# Patient Record
Sex: FEMALE | Race: WHITE | NOT HISPANIC OR LATINO | Employment: OTHER | URBAN - NONMETROPOLITAN AREA
[De-identification: names, ages, dates, MRNs, and addresses within clinical notes are randomized per-mention and may not be internally consistent; named-entity substitution may affect disease eponyms.]

---

## 2017-04-28 ENCOUNTER — OFFICE VISIT (OUTPATIENT)
Dept: CARDIOLOGY | Facility: CLINIC | Age: 72
End: 2017-04-28

## 2017-04-28 VITALS
BODY MASS INDEX: 28 KG/M2 | HEIGHT: 64 IN | HEART RATE: 62 BPM | WEIGHT: 164 LBS | DIASTOLIC BLOOD PRESSURE: 75 MMHG | SYSTOLIC BLOOD PRESSURE: 133 MMHG

## 2017-04-28 DIAGNOSIS — I21.4 NON-STEMI (NON-ST ELEVATED MYOCARDIAL INFARCTION) (HCC): Primary | ICD-10-CM

## 2017-04-28 DIAGNOSIS — E78.5 DYSLIPIDEMIA: ICD-10-CM

## 2017-04-28 DIAGNOSIS — I10 ESSENTIAL HYPERTENSION: ICD-10-CM

## 2017-04-28 PROCEDURE — 93000 ELECTROCARDIOGRAM COMPLETE: CPT | Performed by: PHYSICIAN ASSISTANT

## 2017-04-28 PROCEDURE — 99213 OFFICE O/P EST LOW 20 MIN: CPT | Performed by: PHYSICIAN ASSISTANT

## 2017-04-28 RX ORDER — ATORVASTATIN CALCIUM 10 MG/1
10 TABLET, FILM COATED ORAL DAILY
Qty: 30 TABLET | Refills: 5 | Status: SHIPPED | OUTPATIENT
Start: 2017-04-28 | End: 2017-09-01 | Stop reason: SDUPTHER

## 2017-04-28 RX ORDER — EXEMESTANE 25 MG/1
25 TABLET ORAL DAILY
COMMUNITY

## 2017-04-28 RX ORDER — ROPINIROLE 0.25 MG/1
0.25 TABLET, FILM COATED ORAL NIGHTLY
COMMUNITY

## 2017-04-28 RX ORDER — UBIDECARENONE 75 MG
200 CAPSULE ORAL DAILY
Status: ON HOLD | COMMUNITY
End: 2020-01-05

## 2017-04-28 RX ORDER — ATORVASTATIN CALCIUM 10 MG/1
10 TABLET, FILM COATED ORAL DAILY
COMMUNITY
End: 2017-04-28 | Stop reason: SDUPTHER

## 2017-04-28 RX ORDER — FUROSEMIDE 20 MG/1
20 TABLET ORAL DAILY
Qty: 30 TABLET | Refills: 5 | Status: SHIPPED | OUTPATIENT
Start: 2017-04-28 | End: 2017-09-01 | Stop reason: SDUPTHER

## 2017-04-28 RX ORDER — LISINOPRIL 30 MG/1
30 TABLET ORAL DAILY
Qty: 30 TABLET | Refills: 5 | Status: SHIPPED | OUTPATIENT
Start: 2017-04-28 | End: 2017-09-01 | Stop reason: DRUGHIGH

## 2017-04-28 NOTE — PROGRESS NOTES
Kathy Tao DO  Tahmina Martinez  1945  04/28/2017    Patient Active Problem List   Diagnosis   • History of Non-STEMI (non-ST elevated myocardial infarction) with no coronary intervention needed in 2008, clinically stable.    • Dyspnea, class II-III.    • Hypertension, fair control.   • History of Acute myelocytic leukemia,status post chemotherapy and bone marrow transplant in 2005.   • Breast cancer (right), status post radiation therapy in 08/2015       Dear Kathy Tao DO:    Chief Complaint   Patient presents with   • History of N-STEMI   • Hypertension     Follow up   • Dyslipidemia       Subjective     Tahmina Martinez is a 71 y.o. female with a past medical history significant for previous non-ST elevation myocardial infarction with no significant coronary artery disease noted on cardiac catheterization at that time. She also history of breast cancer status post lumpectomy and radiation therapy in August 2015 and acute myelocytic leukemia status post chemotherapy and bone marrow transplantation in 2005. She presents to the office today for a follow up visit today. She is doing well without any complaints of chest pain, shortness breath, palpitations, dizziness, or near syncope.      Current Outpatient Prescriptions:   •  aspirin 81 MG EC tablet, Take 81 mg by mouth Daily., Disp: , Rfl:   •  atorvastatin (LIPITOR) 10 MG tablet, Take 10 mg by mouth Daily., Disp: , Rfl:   •  Calcium Citrate-Vitamin D (CALCIUM + D PO), Take 600 mg by mouth., Disp: , Rfl:   •  Coenzyme Q10 (CO Q-10) 200 MG capsule, Take  by mouth., Disp: , Rfl:   •  exemestane (AROMASIN) 25 MG chemo tablet, Take 25 mg by mouth Daily., Disp: , Rfl:   •  furosemide (LASIX) 20 MG tablet, Take 1 tablet by mouth Daily., Disp: 30 tablet, Rfl: 5  •  gabapentin (NEURONTIN) 100 MG capsule, Take 100 mg by mouth 3 (Three) Times a Day., Disp: , Rfl:   •  lisinopril (PRINIVIL,ZESTRIL) 20 MG tablet, Take 1 tablet by mouth Daily. (Patient taking  differently: Take 30 mg by mouth Daily.), Disp: 30 tablet, Rfl: 5  •  Loratadine 10 MG capsule, Take  by mouth., Disp: , Rfl:   •  meloxicam (MOBIC) 15 MG tablet, Take 7.5 mg by mouth Daily As Needed., Disp: , Rfl:   •  metFORMIN (GLUCOPHAGE) 500 MG tablet, Take 500 mg by mouth Every Morning., Disp: , Rfl:   •  metoprolol tartrate (LOPRESSOR) 25 MG tablet, Take 0.5 tablets by mouth 2 (Two) Times a Day., Disp: 30 tablet, Rfl: 5  •  pantoprazole (PROTONIX) 40 MG EC tablet, Take 40 mg by mouth Daily., Disp: , Rfl:   •  rOPINIRole (REQUIP) 0.25 MG tablet, Take 0.25 mg by mouth Every Night. Take 1 hour before bedtime., Disp: , Rfl:   •  traMADol (ULTRAM) 50 MG tablet, Take 50 mg by mouth 2 (Two) Times a Day As Needed for Moderate Pain (4-6)., Disp: , Rfl:   •  valACYclovir (VALTREX) 500 MG tablet, Take 1 g by mouth Daily., Disp: , Rfl:   •  vitamin D (ERGOCALCIFEROL) 45336 UNITS capsule capsule, Take 50,000 Units by mouth 1 (One) Time Per Week., Disp: , Rfl:   •  nitroglycerin (NITROSTAT) 0.4 MG SL tablet, Place 0.4 mg under the tongue Every 5 (Five) Minutes As Needed for chest pain. Take no more than 3 doses in 15 minutes., Disp: , Rfl:     The following portions of the patient's history were reviewed and updated as appropriate: allergies, current medications, past family history, past medical history, past social history, past surgical history and problem list.    Social History     Social History   • Marital status:      Spouse name: N/A   • Number of children: N/A   • Years of education: N/A     Occupational History   • Not on file.     Social History Main Topics   • Smoking status: Not on file   • Smokeless tobacco: Not on file   • Alcohol use Not on file   • Drug use: Not on file   • Sexual activity: Defer     Other Topics Concern   • Not on file     Social History Narrative   • No narrative on file       Review of Systems   Constitution: Negative for weakness and malaise/fatigue.   Cardiovascular: Negative  "for chest pain, dyspnea on exertion, leg swelling, near-syncope, palpitations and syncope.   Respiratory: Negative for shortness of breath.    Neurological: Negative for dizziness.       Objective   Blood pressure 133/75, pulse 62, height 64\" (162.6 cm), weight 164 lb (74.4 kg).    Physical Exam   Constitutional: She is oriented to person, place, and time. She appears well-developed and well-nourished. No distress.   HENT:   Head: Normocephalic and atraumatic.   Eyes: Conjunctivae are normal. Right eye exhibits no discharge. Left eye exhibits no discharge.   Neck: Normal range of motion. Neck supple. Carotid bruit is not present.   Cardiovascular: Normal rate, regular rhythm and normal heart sounds.  Exam reveals no gallop and no friction rub.    No murmur heard.  Pulmonary/Chest: Effort normal and breath sounds normal. No respiratory distress. She has no wheezes. She has no rales. She exhibits no tenderness.   Musculoskeletal: Normal range of motion. She exhibits no edema.   Neurological: She is alert and oriented to person, place, and time.   Skin: Skin is warm and dry. No rash noted. She is not diaphoretic. No erythema. No pallor.   Psychiatric: She has a normal mood and affect. Her behavior is normal.   Nursing note and vitals reviewed.      ECG 12 Lead  Date/Time: 4/28/2017 1:21 PM  Performed by: LOUIS ESPINOSA  Authorized by: LOUIS ESPINOSA   Comparison: compared with previous ECG   Similar to previous ECG  Rhythm: sinus rhythm  Rate: normal  BPM: 66  Conduction: incomplete RBBB, LAFB and 1st degree  ST Segments: ST segments normal  T depression: V1 and V2  QRS axis: right  Clinical impression: abnormal ECG  Comments: QTc 469          Assessment:          Diagnosis Plan   1. History of Non-STEMI (non-ST elevated myocardial infarction) with no coronary intervention needed in 2008, clinically stable.      2. Essential hypertension     3. Dyslipidemia      On atorvastatin        Plan:     "   1. Continue low-dose aspirin, atorvastatin, metoprolol, lisinopril.  2. Will follow up in 6 months or sooner if needed.    No Follow-up on file.    I appreciate the opportunity to participate in this patient's cardiovascular care.    Best Regards,    Aurora Oliveros PA-C

## 2017-05-31 ENCOUNTER — HOSPITAL ENCOUNTER (EMERGENCY)
Facility: HOSPITAL | Age: 72
Discharge: HOME | End: 2017-05-31
Attending: EMERGENCY MEDICINE | Admitting: EMERGENCY MEDICINE

## 2017-05-31 ENCOUNTER — APPOINTMENT (OUTPATIENT)
Dept: CT IMAGING | Facility: HOSPITAL | Age: 72
End: 2017-05-31

## 2017-05-31 VITALS
HEIGHT: 64 IN | DIASTOLIC BLOOD PRESSURE: 92 MMHG | WEIGHT: 150 LBS | SYSTOLIC BLOOD PRESSURE: 186 MMHG | TEMPERATURE: 98 F | RESPIRATION RATE: 18 BRPM | HEART RATE: 77 BPM | BODY MASS INDEX: 25.61 KG/M2 | OXYGEN SATURATION: 98 %

## 2017-05-31 DIAGNOSIS — S09.90XA CLOSED HEAD INJURY, INITIAL ENCOUNTER: Primary | ICD-10-CM

## 2017-05-31 PROCEDURE — 70450 CT HEAD/BRAIN W/O DYE: CPT

## 2017-05-31 PROCEDURE — 99283 EMERGENCY DEPT VISIT LOW MDM: CPT

## 2017-05-31 PROCEDURE — 70450 CT HEAD/BRAIN W/O DYE: CPT | Performed by: RADIOLOGY

## 2017-06-01 NOTE — ED PROVIDER NOTES
Subjective   Patient is a 71 y.o. female presenting with head injury.   History provided by:  Patient  Head Injury   Location:  R parietal  Time since incident:  1 hour  Mechanism of injury: fall    Fall:     Fall occurred:  Standing    Impact surface:  Hard floor  Pain details:     Quality:  Aching    Severity:  Mild    Timing:  Constant    Progression:  Unchanged  Chronicity:  New  Relieved by:  Nothing  Worsened by:  Nothing  Ineffective treatments:  None tried  Associated symptoms: headache        Review of Systems   Constitutional: Negative.  Negative for fever.   HENT:        Headache   Respiratory: Negative.    Cardiovascular: Negative.  Negative for chest pain.   Gastrointestinal: Negative.  Negative for abdominal pain.   Endocrine: Negative.    Genitourinary: Negative.  Negative for dysuria.   Skin: Negative.    Neurological: Positive for headaches.   Psychiatric/Behavioral: Negative.    All other systems reviewed and are negative.      Past Medical History:   Diagnosis Date   • Breast cancer    • Dyspnea    • H/O splenectomy    • Hypertension    • Myelocytic leukemia    • Non-STEMI (non-ST elevated myocardial infarction)        Allergies   Allergen Reactions   • Latex    • Penicillins    • Zithromax [Azithromycin]        Past Surgical History:   Procedure Laterality Date   • BONE MARROW TRANSPLANT     •  SECTION     • SPLENECTOMY     • TUBAL ABDOMINAL LIGATION         Family History   Problem Relation Age of Onset   • Heart disease Mother    • Heart disease Father        Social History     Social History   • Marital status:      Spouse name: N/A   • Number of children: N/A   • Years of education: N/A     Social History Main Topics   • Smoking status: Never Smoker   • Smokeless tobacco: None   • Alcohol use No   • Drug use: No   • Sexual activity: Defer     Other Topics Concern   • None     Social History Narrative   • None           Objective   Physical Exam   Constitutional: She is  oriented to person, place, and time. She appears well-developed and well-nourished. No distress.   HENT:   Head: Normocephalic and atraumatic.   Right Ear: External ear normal.   Left Ear: External ear normal.   Nose: Nose normal.   Hematoma right parietal scalp   Eyes: Conjunctivae and EOM are normal. Pupils are equal, round, and reactive to light.   Neck: Normal range of motion. Neck supple. No JVD present. No tracheal deviation present.   Cardiovascular: Normal rate, regular rhythm and normal heart sounds.    No murmur heard.  Pulmonary/Chest: Effort normal and breath sounds normal. No respiratory distress. She has no wheezes.   Abdominal: Soft. Bowel sounds are normal. There is no tenderness.   Musculoskeletal: Normal range of motion. She exhibits no edema or deformity.   Neurological: She is alert and oriented to person, place, and time. No cranial nerve deficit.   Skin: Skin is warm and dry. No rash noted. She is not diaphoretic. No erythema. No pallor.   Psychiatric: She has a normal mood and affect. Her behavior is normal. Thought content normal.   Nursing note and vitals reviewed.      Procedures         ED Course  ED Course                  MDM    Final diagnoses:   Closed head injury, initial encounter            Dick Moreno MD  05/31/17 2054

## 2017-06-15 ENCOUNTER — TRANSCRIBE ORDERS (OUTPATIENT)
Dept: ADMINISTRATIVE | Facility: HOSPITAL | Age: 72
End: 2017-06-15

## 2017-06-15 DIAGNOSIS — Q78.2 BONY SCLEROSIS: Primary | ICD-10-CM

## 2017-06-19 ENCOUNTER — HOSPITAL ENCOUNTER (OUTPATIENT)
Dept: NUCLEAR MEDICINE | Facility: HOSPITAL | Age: 72
Discharge: HOME | End: 2017-06-19

## 2017-06-19 DIAGNOSIS — Q78.2 BONY SCLEROSIS: ICD-10-CM

## 2017-06-19 PROCEDURE — 78306 BONE IMAGING WHOLE BODY: CPT | Performed by: RADIOLOGY

## 2017-06-19 PROCEDURE — A9561 TC99M OXIDRONATE: HCPCS | Performed by: NURSE PRACTITIONER

## 2017-06-19 PROCEDURE — 0 TECHNETIUM OXIDRONATE KIT: Performed by: NURSE PRACTITIONER

## 2017-06-19 PROCEDURE — 78306 BONE IMAGING WHOLE BODY: CPT

## 2017-06-19 RX ADMIN — TECHNETIUM TC 99M OXIDRONATE 1 DOSE: 3.15 INJECTION, POWDER, LYOPHILIZED, FOR SOLUTION INTRAVENOUS at 10:30

## 2017-06-28 ENCOUNTER — TRANSCRIBE ORDERS (OUTPATIENT)
Dept: ADMINISTRATIVE | Facility: HOSPITAL | Age: 72
End: 2017-06-28

## 2017-06-28 DIAGNOSIS — S32.030D COMPRESSION FRACTURE OF L3 LUMBAR VERTEBRA, WITH ROUTINE HEALING, SUBSEQUENT ENCOUNTER: Primary | ICD-10-CM

## 2017-06-30 ENCOUNTER — HOSPITAL ENCOUNTER (OUTPATIENT)
Dept: MRI IMAGING | Facility: HOSPITAL | Age: 72
Discharge: HOME | End: 2017-06-30
Admitting: INTERNAL MEDICINE

## 2017-06-30 DIAGNOSIS — S32.030D COMPRESSION FRACTURE OF L3 LUMBAR VERTEBRA, WITH ROUTINE HEALING, SUBSEQUENT ENCOUNTER: ICD-10-CM

## 2017-06-30 PROCEDURE — 72148 MRI LUMBAR SPINE W/O DYE: CPT | Performed by: RADIOLOGY

## 2017-06-30 PROCEDURE — 72148 MRI LUMBAR SPINE W/O DYE: CPT

## 2017-09-01 ENCOUNTER — OFFICE VISIT (OUTPATIENT)
Dept: CARDIOLOGY | Facility: CLINIC | Age: 72
End: 2017-09-01

## 2017-09-01 VITALS
BODY MASS INDEX: 28.27 KG/M2 | HEART RATE: 60 BPM | HEIGHT: 64 IN | WEIGHT: 165.6 LBS | SYSTOLIC BLOOD PRESSURE: 162 MMHG | DIASTOLIC BLOOD PRESSURE: 75 MMHG

## 2017-09-01 DIAGNOSIS — I21.4 NON-STEMI (NON-ST ELEVATED MYOCARDIAL INFARCTION) (HCC): Primary | ICD-10-CM

## 2017-09-01 DIAGNOSIS — R55 SYNCOPE, UNSPECIFIED SYNCOPE TYPE: ICD-10-CM

## 2017-09-01 DIAGNOSIS — C50.911 MALIGNANT NEOPLASM OF RIGHT FEMALE BREAST, UNSPECIFIED SITE OF BREAST: ICD-10-CM

## 2017-09-01 DIAGNOSIS — I45.10 RBBB: ICD-10-CM

## 2017-09-01 DIAGNOSIS — C92.01 ACUTE MYELOID LEUKEMIA IN REMISSION (HCC): ICD-10-CM

## 2017-09-01 DIAGNOSIS — R06.00 DYSPNEA, UNSPECIFIED TYPE: ICD-10-CM

## 2017-09-01 DIAGNOSIS — I10 ESSENTIAL HYPERTENSION: ICD-10-CM

## 2017-09-01 DIAGNOSIS — R29.6 FALLING EPISODES: ICD-10-CM

## 2017-09-01 PROCEDURE — 99214 OFFICE O/P EST MOD 30 MIN: CPT | Performed by: INTERNAL MEDICINE

## 2017-09-01 RX ORDER — AMLODIPINE BESYLATE 5 MG/1
5 TABLET ORAL DAILY
COMMUNITY
Start: 2017-09-01 | End: 2017-11-14 | Stop reason: SDUPTHER

## 2017-09-01 RX ORDER — FUROSEMIDE 20 MG/1
20 TABLET ORAL DAILY
Qty: 30 TABLET | Refills: 1 | Status: SHIPPED | OUTPATIENT
Start: 2017-09-01 | End: 2018-02-16 | Stop reason: SDUPTHER

## 2017-09-01 RX ORDER — LISINOPRIL 20 MG/1
20 TABLET ORAL DAILY
COMMUNITY
End: 2017-09-01 | Stop reason: SDUPTHER

## 2017-09-01 RX ORDER — AMLODIPINE BESYLATE 2.5 MG/1
2.5 TABLET ORAL DAILY
COMMUNITY
End: 2017-09-01

## 2017-09-01 RX ORDER — LISINOPRIL 20 MG/1
20 TABLET ORAL DAILY
Qty: 30 TABLET | Refills: 1 | Status: SHIPPED | OUTPATIENT
Start: 2017-09-01 | End: 2017-11-14 | Stop reason: SDUPTHER

## 2017-09-01 RX ORDER — ATORVASTATIN CALCIUM 10 MG/1
10 TABLET, FILM COATED ORAL DAILY
Qty: 30 TABLET | Refills: 1 | Status: SHIPPED | OUTPATIENT
Start: 2017-09-01 | End: 2017-11-14 | Stop reason: SDUPTHER

## 2017-09-01 NOTE — PROGRESS NOTES
Tahmina Martinez  1945  09/01/2017   Ref:No referring provider defined for this encounter.  Pcp: Kathy Tao, DO  [address] [facility]  [place] [address]    Follow up for:  Chief Complaint   Patient presents with   • surgery clearance     prior to back surgery        Patient Active Problem List   Diagnosis   • History of Non-STEMI (non-ST elevated myocardial infarction) with no coronary intervention needed in 2008, clinically stable.    • Dyspnea, class II-III.    • Hypertension, fair control.   • History of Acute myelocytic leukemia,status post chemotherapy and bone marrow transplant in 2005.   • Breast cancer (right), status post radiation therapy in 08/2015   • Falling episodes   • Syncope   • RBBB unchanged from 2015       HPI     Tahmina Martinez is a 71 yo female who presents to our office today for a follow-up visit to for a cardiac clearance for L-S  back surgery at  with Dr. Arpan Cruz, Neurosurgery.. Patient notes mild  shortness of breath that has not changes since 2005.She can climb 14 stair without stopping and be mildly SOB at the top. Patient notes bilateral leg edema if she does'tt take her diuretic. .  Patient denies chest pain, edema, or dizziness.She states that her BP has been poorly controlled and is usually 170/90 at home. It was 215/115 at  a couple of weeks ago. Blood pressure slightly better over last 2 weeks on Amlodipine 2.5 mg QD.     She has had mutliple sudden falls about 5 over the last years and has hurt her low back she is not certain if she has passed out. She no dizziness and no warning with these episodes.     Dr. Soler had concern she may had had a non STEMI in 2008 and did a heart cath showing normal EF, 20% mid LAD and 50% RCA stenoses. Repeat heart cath 2014 at [place] showed 20-30% LAD and otherwise normal.     Review of Systems   Constitution: Negative for chills and fever.   HENT: Negative for headaches, nosebleeds and sore throat.    Cardiovascular: Positive for  dyspnea on exertion and leg swelling. Negative for chest pain, palpitations and syncope.   Respiratory: Negative for cough, hemoptysis, shortness of breath and wheezing.    Gastrointestinal: Negative for abdominal pain, hematemesis, hematochezia, melena, nausea and vomiting.   Genitourinary: Negative for dysuria and hematuria.   Neurological: Positive for dizziness.         Current Outpatient Prescriptions:   •  amLODIPine (NORVASC) 5 MG tablet, Take 1 tablet by mouth Daily., Disp: , Rfl:   •  aspirin 81 MG EC tablet, Take 81 mg by mouth Daily., Disp: , Rfl:   •  atorvastatin (LIPITOR) 10 MG tablet, Take 1 tablet by mouth Daily., Disp: 30 tablet, Rfl: 1  •  Calcium Citrate-Vitamin D (CALCIUM + D PO), Take 600 mg by mouth., Disp: , Rfl:   •  Coenzyme Q10 (CO Q-10) 200 MG capsule, Take  by mouth., Disp: , Rfl:   •  exemestane (AROMASIN) 25 MG chemo tablet, Take 25 mg by mouth Daily., Disp: , Rfl:   •  furosemide (LASIX) 20 MG tablet, Take 1 tablet by mouth Daily., Disp: 30 tablet, Rfl: 1  •  gabapentin (NEURONTIN) 100 MG capsule, Take 100 mg by mouth 3 (Three) Times a Day., Disp: , Rfl:   •  lisinopril (PRINIVIL,ZESTRIL) 20 MG tablet, Take 1 tablet by mouth Daily., Disp: 30 tablet, Rfl: 1  •  Loratadine 10 MG capsule, Take  by mouth., Disp: , Rfl:   •  meloxicam (MOBIC) 15 MG tablet, Take 7.5 mg by mouth Daily As Needed., Disp: , Rfl:   •  metFORMIN (GLUCOPHAGE) 500 MG tablet, Take 500 mg by mouth Every Morning., Disp: , Rfl:   •  metoprolol tartrate (LOPRESSOR) 25 MG tablet, Take 0.5 tablets by mouth 2 (Two) Times a Day., Disp: 30 tablet, Rfl: 5  •  nitroglycerin (NITROSTAT) 0.4 MG SL tablet, Place 0.4 mg under the tongue Every 5 (Five) Minutes As Needed for chest pain. Take no more than 3 doses in 15 minutes., Disp: , Rfl:   •  pantoprazole (PROTONIX) 40 MG EC tablet, Take 40 mg by mouth Daily., Disp: , Rfl:   •  rOPINIRole (REQUIP) 0.25 MG tablet, Take 0.25 mg by mouth Every Night. Take 1 hour before bedtime.,  "Disp: , Rfl:   •  traMADol (ULTRAM) 50 MG tablet, Take 50 mg by mouth 2 (Two) Times a Day As Needed for Moderate Pain (4-6)., Disp: , Rfl:   •  valACYclovir (VALTREX) 500 MG tablet, Take 1 g by mouth Daily., Disp: , Rfl:   •  vitamin D (ERGOCALCIFEROL) 09603 UNITS capsule capsule, Take 50,000 Units by mouth 1 (One) Time Per Week., Disp: , Rfl:     Allergies   Allergen Reactions   • Latex    • Penicillins    • Zithromax [Azithromycin]        /75  Pulse 60  Ht 64\" (162.6 cm)  Wt 165 lb 9.6 oz (75.1 kg)  BMI 28.43 kg/m2       Physical Exam   Constitutional: She is oriented to person, place, and time. She appears well-developed and well-nourished. No distress.   Modestly obese   Eyes: No scleral icterus.   Neck: Normal range of motion. Neck supple. No JVD present. No tracheal deviation present. No thyromegaly present.   Cardiovascular: Normal rate, regular rhythm, normal heart sounds and intact distal pulses.  Exam reveals no gallop and no friction rub.    No murmur heard.  Pulses:       Carotid pulses are 2+ on the right side, and 2+ on the left side.       Dorsalis pedis pulses are 2+ on the right side, and 2+ on the left side.        Posterior tibial pulses are 2+ on the right side, and 2+ on the left side.   Pulmonary/Chest: Effort normal and breath sounds normal. No stridor. No respiratory distress. She has no wheezes. She has no rales. She exhibits no tenderness.   Abdominal: Soft. Bowel sounds are normal. She exhibits no distension and no mass. There is no tenderness. There is no rebound and no guarding.   Modestly obese   Musculoskeletal: Normal range of motion. She exhibits no edema, tenderness or deformity.   Lymphadenopathy:     She has no cervical adenopathy.   Neurological: She is alert and oriented to person, place, and time. No cranial nerve deficit. She exhibits normal muscle tone. Coordination normal.   Skin: No rash noted. She is not diaphoretic. No erythema. No pallor.   Psychiatric: She has " a normal mood and affect. Her behavior is normal. Judgment and thought content normal.   :    Lab Review:    Procedures    Lab Results   Component Value Date     12/20/2014    K 3.9 12/20/2014     12/20/2014    CO2 29.1 12/20/2014    BUN 19 12/20/2014    CREATININE 0.68 12/20/2014    GLUCOSE 137 (H) 12/20/2014    CALCIUM 9.7 12/20/2014    AST 25 12/20/2014    ALT 17 12/20/2014    ALKPHOS 148 (H) 12/20/2014    LABIL2 1.4 (L) 12/20/2014     No results found for: CKTOTAL  Lab Results   Component Value Date    WBC 11.4 12/20/2014    HGB 13.1 12/20/2014    HCT 40.0 12/20/2014     07/20/2015     Lab Results   Component Value Date    INR 0.90 07/20/2015    INR <0.90 12/20/2014     No results found for: MG  No results found for: TSH, PSA, CHLPL, TRIG, HDL, LDL   No results found for: BNP    Chemistry        Component Value Date/Time     12/20/2014 1618    K 3.9 12/20/2014 1618     12/20/2014 1618    CO2 29.1 12/20/2014 1618    BUN 19 12/20/2014 1618    CREATININE 0.68 12/20/2014 1618        Component Value Date/Time    CALCIUM 9.7 12/20/2014 1618    ALKPHOS 148 (H) 12/20/2014 1618    AST 25 12/20/2014 1618    ALT 17 12/20/2014 1618    BILITOT 0.4 12/20/2014 1618            Impression:   Diagnosis Plan   1. History of Non-STEMI (non-ST elevated myocardial infarction) with no coronary intervention needed in 2008, clinically stable. Minimal coronary irregularities and no need for intervention.   Adult Stress Echo With Color & Doppler   2. Essential hypertension, uncontrolled     3. Dyspnea, unspecified type     4. Falling episodes  Holter Monitor - 24 Hour    Adult Transthoracic Echo Complete    Adult Stress Echo With Color & Doppler   5. Possible Syncope, unspecified syncope type  Adult Transthoracic Echo Complete    Adult Stress Echo With Color & Doppler   6. Acute myeloid leukemia in remission     7. Malignant neoplasm of right female breast, unspecified site of breast     8. RBBB unchanged  from 2009         Plan:  1. Postpone back surgery until better blood pressure control and cardiac evaluation complete.   2. 24 Holter monitor to rule out arrythmic syncope  3. Echocardiogram  4. Stress echo  5. Increase Amlodipine to 5 mg QD  6. Chart BP        Return in about 2 years (around 9/1/2019) for or sooner if needed..     This document signed by Israel Bardales MD September 1, 2017 2:54 PM     I, Israel Bardales MD, personally performed the services described in this documentation as scribed by the above named individual in my presence, and it is both accurate and complete.  9/1/2017  2:54 PM    Scribed for Israel Bardales MD by Aline Willett CMA. 9/1/2017  2:54 PM

## 2017-09-05 ENCOUNTER — TRANSCRIBE ORDERS (OUTPATIENT)
Dept: ADMINISTRATIVE | Facility: HOSPITAL | Age: 72
End: 2017-09-05

## 2017-09-05 DIAGNOSIS — R93.89 ABNORMAL RADIOLOGICAL FINDINGS IN SKIN AND SUBCUTANEOUS TISSUE: Primary | ICD-10-CM

## 2017-09-06 ENCOUNTER — HOSPITAL ENCOUNTER (OUTPATIENT)
Dept: CT IMAGING | Facility: HOSPITAL | Age: 72
Discharge: HOME | End: 2017-09-06
Admitting: INTERNAL MEDICINE

## 2017-09-06 DIAGNOSIS — R93.89 ABNORMAL RADIOLOGICAL FINDINGS IN SKIN AND SUBCUTANEOUS TISSUE: ICD-10-CM

## 2017-09-06 PROCEDURE — 71250 CT THORAX DX C-: CPT | Performed by: RADIOLOGY

## 2017-09-06 PROCEDURE — 71250 CT THORAX DX C-: CPT

## 2017-09-26 ENCOUNTER — HOSPITAL ENCOUNTER (OUTPATIENT)
Dept: CARDIOLOGY | Facility: HOSPITAL | Age: 72
Discharge: HOME | End: 2017-09-26
Attending: INTERNAL MEDICINE | Admitting: INTERNAL MEDICINE

## 2017-09-26 DIAGNOSIS — I21.4 NON-STEMI (NON-ST ELEVATED MYOCARDIAL INFARCTION) (HCC): ICD-10-CM

## 2017-09-26 DIAGNOSIS — R55 SYNCOPE, UNSPECIFIED SYNCOPE TYPE: ICD-10-CM

## 2017-09-26 DIAGNOSIS — R29.6 FALLING EPISODES: ICD-10-CM

## 2017-09-26 LAB
BH CV ECHO MEAS - % IVS THICK: 51.4 %
BH CV ECHO MEAS - % LVPW THICK: 46 %
BH CV ECHO MEAS - ACS: 2.1 CM
BH CV ECHO MEAS - AO ROOT AREA (BSA CORRECTED): 1.5
BH CV ECHO MEAS - AO ROOT AREA: 6.1 CM^2
BH CV ECHO MEAS - AO ROOT DIAM: 2.8 CM
BH CV ECHO MEAS - BSA(HAYCOCK): 1.9 M^2
BH CV ECHO MEAS - BSA: 1.8 M^2
BH CV ECHO MEAS - BZI_BMI: 28.3 KILOGRAMS/M^2
BH CV ECHO MEAS - BZI_METRIC_HEIGHT: 162.6 CM
BH CV ECHO MEAS - BZI_METRIC_WEIGHT: 74.8 KG
BH CV ECHO MEAS - CONTRAST EF 4CH: 43.8 ML/M^2
BH CV ECHO MEAS - EDV(CUBED): 70 ML
BH CV ECHO MEAS - EDV(MOD-SP4): 73 ML
BH CV ECHO MEAS - EDV(TEICH): 75.1 ML
BH CV ECHO MEAS - EF(CUBED): 82.5 %
BH CV ECHO MEAS - EF(TEICH): 75.8 %
BH CV ECHO MEAS - ESV(CUBED): 12.2 ML
BH CV ECHO MEAS - ESV(MOD-SP4): 41 ML
BH CV ECHO MEAS - ESV(TEICH): 18.2 ML
BH CV ECHO MEAS - FS: 44.1 %
BH CV ECHO MEAS - IVS/LVPW: 0.94
BH CV ECHO MEAS - IVSD: 1.1 CM
BH CV ECHO MEAS - IVSS: 1.7 CM
BH CV ECHO MEAS - LA DIMENSION: 4.2 CM
BH CV ECHO MEAS - LA/AO: 1.5
BH CV ECHO MEAS - LV DIASTOLIC VOL/BSA (35-75): 40.5 ML/M^2
BH CV ECHO MEAS - LV MASS(C)D: 167.1 GRAMS
BH CV ECHO MEAS - LV MASS(C)DI: 92.7 GRAMS/M^2
BH CV ECHO MEAS - LV MASS(C)S: 151.6 GRAMS
BH CV ECHO MEAS - LV MASS(C)SI: 84.1 GRAMS/M^2
BH CV ECHO MEAS - LV SYSTOLIC VOL/BSA (12-30): 22.7 ML/M^2
BH CV ECHO MEAS - LVIDD: 4.1 CM
BH CV ECHO MEAS - LVIDS: 2.3 CM
BH CV ECHO MEAS - LVLD AP4: 6.8 CM
BH CV ECHO MEAS - LVLS AP4: 6.6 CM
BH CV ECHO MEAS - LVOT AREA (M): 2.3 CM^2
BH CV ECHO MEAS - LVOT AREA: 2.3 CM^2
BH CV ECHO MEAS - LVOT DIAM: 1.7 CM
BH CV ECHO MEAS - LVPWD: 1.2 CM
BH CV ECHO MEAS - LVPWS: 1.8 CM
BH CV ECHO MEAS - MV A MAX VEL: 86.9 CM/SEC
BH CV ECHO MEAS - MV E MAX VEL: 68.1 CM/SEC
BH CV ECHO MEAS - MV E/A: 0.78
BH CV ECHO MEAS - PA ACC SLOPE: 785 CM/SEC^2
BH CV ECHO MEAS - PA ACC TIME: 0.12 SEC
BH CV ECHO MEAS - PA PR(ACCEL): 26.7 MMHG
BH CV ECHO MEAS - RAP SYSTOLE: 10 MMHG
BH CV ECHO MEAS - RVDD: 2.7 CM
BH CV ECHO MEAS - RVSP: 31.2 MMHG
BH CV ECHO MEAS - SI(CUBED): 32 ML/M^2
BH CV ECHO MEAS - SI(MOD-SP4): 17.8 ML/M^2
BH CV ECHO MEAS - SI(TEICH): 31.6 ML/M^2
BH CV ECHO MEAS - SV(CUBED): 57.7 ML
BH CV ECHO MEAS - SV(MOD-SP4): 32 ML
BH CV ECHO MEAS - SV(TEICH): 56.9 ML
BH CV ECHO MEAS - TR MAX VEL: 230.4 CM/SEC
BH CV STRESS BP STAGE 1: NORMAL
BH CV STRESS BP STAGE 2: NORMAL
BH CV STRESS BP STAGE 3: NORMAL
BH CV STRESS DURATION MIN STAGE 1: 3
BH CV STRESS DURATION MIN STAGE 2: 3
BH CV STRESS DURATION MIN STAGE 3: 3
BH CV STRESS DURATION SEC STAGE 1: 0
BH CV STRESS DURATION SEC STAGE 2: 0
BH CV STRESS DURATION SEC STAGE 3: 0
BH CV STRESS ECHO POST STRESS EJECTION FRACTION EF: 80 %
BH CV STRESS GRADE STAGE 1: 10
BH CV STRESS GRADE STAGE 2: 12
BH CV STRESS GRADE STAGE 3: 14
BH CV STRESS HR STAGE 1: 104
BH CV STRESS HR STAGE 2: 109
BH CV STRESS HR STAGE 3: 120
BH CV STRESS METS STAGE 1: 5
BH CV STRESS METS STAGE 2: 7.5
BH CV STRESS METS STAGE 3: 10
BH CV STRESS PROTOCOL 1: NORMAL
BH CV STRESS RECOVERY BP: NORMAL MMHG
BH CV STRESS RECOVERY HR: 77 BPM
BH CV STRESS SPEED STAGE 1: 1.7
BH CV STRESS SPEED STAGE 2: 2.5
BH CV STRESS SPEED STAGE 3: 3.4
BH CV STRESS STAGE 1: 1
BH CV STRESS STAGE 2: 2
BH CV STRESS STAGE 3: 3
MAXIMAL PREDICTED HEART RATE: 146 BPM
PERCENT MAX PREDICTED HR: 82.19 %
STRESS BASELINE BP: NORMAL MMHG
STRESS BASELINE HR: 71 BPM
STRESS PERCENT HR: 97 %
STRESS POST ESTIMATED WORKLOAD: 10.1 METS
STRESS POST EXERCISE DUR MIN: 7 MIN
STRESS POST EXERCISE DUR SEC: 0 SEC
STRESS POST PEAK BP: NORMAL MMHG
STRESS POST PEAK HR: 120 BPM
STRESS TARGET HR: 124 BPM

## 2017-09-26 PROCEDURE — 93350 STRESS TTE ONLY: CPT

## 2017-09-26 PROCEDURE — 93325 DOPPLER ECHO COLOR FLOW MAPG: CPT

## 2017-09-26 PROCEDURE — 93350 STRESS TTE ONLY: CPT | Performed by: INTERNAL MEDICINE

## 2017-09-26 PROCEDURE — 93320 DOPPLER ECHO COMPLETE: CPT

## 2017-09-26 PROCEDURE — 93018 CV STRESS TEST I&R ONLY: CPT | Performed by: INTERNAL MEDICINE

## 2017-09-26 PROCEDURE — 93320 DOPPLER ECHO COMPLETE: CPT | Performed by: INTERNAL MEDICINE

## 2017-09-26 PROCEDURE — 93017 CV STRESS TEST TRACING ONLY: CPT

## 2017-09-26 PROCEDURE — 93325 DOPPLER ECHO COLOR FLOW MAPG: CPT | Performed by: INTERNAL MEDICINE

## 2017-10-13 ENCOUNTER — TRANSCRIBE ORDERS (OUTPATIENT)
Dept: ADMINISTRATIVE | Facility: HOSPITAL | Age: 72
End: 2017-10-13

## 2017-10-13 DIAGNOSIS — R93.89 ABNORMAL CXR (CHEST X-RAY): Primary | ICD-10-CM

## 2017-11-14 ENCOUNTER — OFFICE VISIT (OUTPATIENT)
Dept: CARDIOLOGY | Facility: CLINIC | Age: 72
End: 2017-11-14

## 2017-11-14 VITALS
HEIGHT: 64 IN | WEIGHT: 168.8 LBS | DIASTOLIC BLOOD PRESSURE: 78 MMHG | RESPIRATION RATE: 16 BRPM | BODY MASS INDEX: 28.82 KG/M2 | HEART RATE: 66 BPM | SYSTOLIC BLOOD PRESSURE: 161 MMHG

## 2017-11-14 DIAGNOSIS — I21.4 NON-STEMI (NON-ST ELEVATED MYOCARDIAL INFARCTION) (HCC): Primary | ICD-10-CM

## 2017-11-14 DIAGNOSIS — C50.911 MALIGNANT NEOPLASM OF RIGHT FEMALE BREAST, UNSPECIFIED ESTROGEN RECEPTOR STATUS, UNSPECIFIED SITE OF BREAST (HCC): ICD-10-CM

## 2017-11-14 DIAGNOSIS — I10 ESSENTIAL HYPERTENSION: ICD-10-CM

## 2017-11-14 DIAGNOSIS — E78.5 DYSLIPIDEMIA: ICD-10-CM

## 2017-11-14 PROCEDURE — 99213 OFFICE O/P EST LOW 20 MIN: CPT | Performed by: PHYSICIAN ASSISTANT

## 2017-11-14 RX ORDER — LISINOPRIL 20 MG/1
20 TABLET ORAL DAILY
Qty: 30 TABLET | Refills: 5 | Status: SHIPPED | OUTPATIENT
Start: 2017-11-14 | End: 2018-02-16 | Stop reason: SDUPTHER

## 2017-11-14 RX ORDER — ATORVASTATIN CALCIUM 10 MG/1
10 TABLET, FILM COATED ORAL DAILY
Qty: 30 TABLET | Refills: 5 | Status: SHIPPED | OUTPATIENT
Start: 2017-11-14 | End: 2017-12-15 | Stop reason: SDUPTHER

## 2017-11-14 RX ORDER — AMLODIPINE BESYLATE 10 MG/1
10 TABLET ORAL DAILY
Qty: 30 TABLET | Refills: 5 | Status: SHIPPED | OUTPATIENT
Start: 2017-11-14 | End: 2017-11-15 | Stop reason: SDUPTHER

## 2017-11-14 NOTE — PROGRESS NOTES
Kathy Tao DO  Tahmina Martinez  1945  11/14/2017    Patient Active Problem List   Diagnosis   • History of Non-STEMI (non-ST elevated myocardial infarction) with no coronary intervention needed in 2008, clinically stable.    • Dyspnea, class II-III.    • Hypertension, fair control.   • History of Acute myelocytic leukemia,status post chemotherapy and bone marrow transplant in 2005.   • Breast cancer (right), status post radiation therapy in 08/2015   • Falling episodes   • Syncope   • RBBB unchanged from 2015     Dear Kathy Tao DO:    Chief Complaint   Patient presents with   • Follow-up     Echo results, No symptoms    • Dizziness     Subjective     Tahmina Martinez is a 72 y.o. female with a past medical history significant for Previous reported non-ST elevation myocardial infarction in 2008 with nonobstructive coronary artery disease noted, hypertension, history of breast cancer as well as acute myeloid leukemia both of which are apparently in remission.  She was recently evaluated with a stress echocardiogram on 9/26/17 which revealed a normal ejection fraction of 61-65%.  There was mild aortic, mitral, and tricuspid regurgitation.  There was no evidence of myocardial ischemia appreciated.  She was to also have a 24-hour Holter monitor, however it seems this has not been completed yet.  Her blood pressure medications were adjusted due to uncontrolled hypertension. Since then, she has been doing well without any further dizziness or near syncopal episodes. She does report that she is just getting over a flulike illness which occurred after taking her flu vaccination.  She is now starting to feel better.      Current Outpatient Prescriptions:   •  amLODIPine (NORVASC) 10 MG tablet, Take 1 tablet by mouth Daily., Disp: 30 tablet, Rfl: 5  •  aspirin 81 MG EC tablet, Take 81 mg by mouth Daily., Disp: , Rfl:   •  atorvastatin (LIPITOR) 10 MG tablet, Take 1 tablet by mouth Daily., Disp: 30 tablet, Rfl: 5  •   Calcium Citrate-Vitamin D (CALCIUM + D PO), Take 600 mg by mouth., Disp: , Rfl:   •  Coenzyme Q10 (CO Q-10) 200 MG capsule, Take  by mouth., Disp: , Rfl:   •  exemestane (AROMASIN) 25 MG chemo tablet, Take 25 mg by mouth Daily., Disp: , Rfl:   •  furosemide (LASIX) 20 MG tablet, Take 1 tablet by mouth Daily., Disp: 30 tablet, Rfl: 1  •  gabapentin (NEURONTIN) 100 MG capsule, Take 100 mg by mouth 3 (Three) Times a Day., Disp: , Rfl:   •  lisinopril (PRINIVIL,ZESTRIL) 20 MG tablet, Take 1 tablet by mouth Daily., Disp: 30 tablet, Rfl: 5  •  Loratadine 10 MG capsule, Take  by mouth., Disp: , Rfl:   •  meloxicam (MOBIC) 15 MG tablet, Take 7.5 mg by mouth Daily As Needed., Disp: , Rfl:   •  metFORMIN (GLUCOPHAGE) 500 MG tablet, Take 500 mg by mouth Every Morning., Disp: , Rfl:   •  metoprolol tartrate (LOPRESSOR) 25 MG tablet, Take 0.5 tablets by mouth 2 (Two) Times a Day., Disp: 30 tablet, Rfl: 5  •  nitroglycerin (NITROSTAT) 0.4 MG SL tablet, Place 0.4 mg under the tongue Every 5 (Five) Minutes As Needed for chest pain. Take no more than 3 doses in 15 minutes., Disp: , Rfl:   •  pantoprazole (PROTONIX) 40 MG EC tablet, Take 40 mg by mouth Daily., Disp: , Rfl:   •  rOPINIRole (REQUIP) 0.25 MG tablet, Take 0.25 mg by mouth Every Night. Take 1 hour before bedtime., Disp: , Rfl:   •  traMADol (ULTRAM) 50 MG tablet, Take 50 mg by mouth 2 (Two) Times a Day As Needed for Moderate Pain (4-6)., Disp: , Rfl:   •  valACYclovir (VALTREX) 500 MG tablet, Take 1 g by mouth Daily., Disp: , Rfl:   •  vitamin D (ERGOCALCIFEROL) 76737 UNITS capsule capsule, Take 50,000 Units by mouth 1 (One) Time Per Week., Disp: , Rfl:     The following portions of the patient's history were reviewed and updated as appropriate: allergies, current medications, past family history, past medical history, past social history, past surgical history and problem list.    Social History     Social History   • Marital status:      Spouse name: N/A   •  "Number of children: N/A   • Years of education: N/A     Occupational History   • Not on file.     Social History Main Topics   • Smoking status: Never Smoker   • Smokeless tobacco: Not on file   • Alcohol use No   • Drug use: No   • Sexual activity: Defer     Other Topics Concern   • Not on file     Social History Narrative     Review of Systems   Constitution: Negative for weakness and malaise/fatigue.   Cardiovascular: Negative for dyspnea on exertion, leg swelling, near-syncope and syncope.   Respiratory: Negative for shortness of breath.    Neurological: Negative for dizziness.       Objective   Blood pressure 161/78, pulse 66, resp. rate 16, height 64\" (162.6 cm), weight 168 lb 12.8 oz (76.6 kg).  Body mass index is 28.97 kg/(m^2).    Physical Exam   Constitutional: She is oriented to person, place, and time. She appears well-developed and well-nourished. No distress.   HENT:   Head: Normocephalic and atraumatic.   Eyes: Conjunctivae are normal. Right eye exhibits no discharge. Left eye exhibits no discharge.   Neck: Normal range of motion. Neck supple. Carotid bruit is not present.   Cardiovascular: Normal rate, regular rhythm and normal heart sounds.  Exam reveals no gallop and no friction rub.    No murmur heard.  Pulmonary/Chest: Effort normal and breath sounds normal. No respiratory distress. She has no wheezes. She has no rales. She exhibits no tenderness.   Musculoskeletal: Normal range of motion. She exhibits no edema.   Neurological: She is alert and oriented to person, place, and time.   Skin: Skin is warm and dry. No rash noted. She is not diaphoretic. No erythema. No pallor.   Psychiatric: She has a normal mood and affect. Her behavior is normal.   Nursing note and vitals reviewed.    Procedures    Assessment:          Diagnosis Plan   1. History of Non-STEMI (non-ST elevated myocardial infarction) with no coronary intervention needed in 2008, clinically stable.      2. Malignant neoplasm of right " female breast, unspecified estrogen receptor status, unspecified site of breast     3. Essential hypertension, uncontrolled     4. Dyslipidemia      On atorvastatin.         Plan:       1. Increase amlodipine to 10mg QD.   2. She has not had any further dizziness or near syncope. Denies any palpitations. She does not wish to have the holter monitor for now.   3. We will follow up in 5-6 weeks or sooner if needed.    No Follow-up on file.    I appreciate the opportunity to participate in this patient's cardiovascular care.    Best Regards,    Aurora Oliveros PA-C

## 2017-11-15 ENCOUNTER — TELEPHONE (OUTPATIENT)
Dept: CARDIOLOGY | Facility: CLINIC | Age: 72
End: 2017-11-15

## 2017-11-15 RX ORDER — AMLODIPINE BESYLATE 10 MG/1
10 TABLET ORAL DAILY
Qty: 30 TABLET | Refills: 5 | Status: SHIPPED | OUTPATIENT
Start: 2017-11-15 | End: 2018-02-16 | Stop reason: SDUPTHER

## 2017-11-15 NOTE — TELEPHONE ENCOUNTER
Pt called and stated that her medication was sent to [facility] in [place] instead of [place]. I advised her that I will call the [place] one and cancel the order and send it to [facility] in [place]. She expressed understanding.

## 2017-12-07 ENCOUNTER — HOSPITAL ENCOUNTER (OUTPATIENT)
Dept: CT IMAGING | Facility: HOSPITAL | Age: 72
Discharge: HOME | End: 2017-12-07
Admitting: INTERNAL MEDICINE

## 2017-12-07 DIAGNOSIS — R93.89 ABNORMAL CXR (CHEST X-RAY): ICD-10-CM

## 2017-12-07 PROCEDURE — 71250 CT THORAX DX C-: CPT

## 2017-12-07 PROCEDURE — 71250 CT THORAX DX C-: CPT | Performed by: RADIOLOGY

## 2017-12-15 RX ORDER — ATORVASTATIN CALCIUM 10 MG/1
10 TABLET, FILM COATED ORAL DAILY
Qty: 30 TABLET | Refills: 2 | Status: SHIPPED | OUTPATIENT
Start: 2017-12-15 | End: 2018-02-04 | Stop reason: SDUPTHER

## 2018-02-05 RX ORDER — ATORVASTATIN CALCIUM 10 MG/1
TABLET, FILM COATED ORAL
Qty: 30 TABLET | Refills: 2 | Status: SHIPPED | OUTPATIENT
Start: 2018-02-05 | End: 2018-02-16 | Stop reason: SDUPTHER

## 2018-02-16 ENCOUNTER — TELEPHONE (OUTPATIENT)
Dept: CARDIOLOGY | Facility: CLINIC | Age: 73
End: 2018-02-16

## 2018-02-16 ENCOUNTER — OFFICE VISIT (OUTPATIENT)
Dept: CARDIOLOGY | Facility: CLINIC | Age: 73
End: 2018-02-16

## 2018-02-16 VITALS
RESPIRATION RATE: 16 BRPM | BODY MASS INDEX: 28.38 KG/M2 | HEIGHT: 64 IN | SYSTOLIC BLOOD PRESSURE: 182 MMHG | DIASTOLIC BLOOD PRESSURE: 78 MMHG | WEIGHT: 166.2 LBS | HEART RATE: 74 BPM

## 2018-02-16 DIAGNOSIS — I21.4 NON-STEMI (NON-ST ELEVATED MYOCARDIAL INFARCTION) (HCC): ICD-10-CM

## 2018-02-16 DIAGNOSIS — E78.5 DYSLIPIDEMIA: ICD-10-CM

## 2018-02-16 DIAGNOSIS — I10 ESSENTIAL HYPERTENSION: Primary | ICD-10-CM

## 2018-02-16 PROCEDURE — 99213 OFFICE O/P EST LOW 20 MIN: CPT | Performed by: PHYSICIAN ASSISTANT

## 2018-02-16 RX ORDER — ATORVASTATIN CALCIUM 10 MG/1
10 TABLET, FILM COATED ORAL DAILY
Qty: 30 TABLET | Refills: 3 | Status: SHIPPED | OUTPATIENT
Start: 2018-02-16 | End: 2018-05-14 | Stop reason: SDUPTHER

## 2018-02-16 RX ORDER — AMLODIPINE BESYLATE 10 MG/1
10 TABLET ORAL DAILY
Qty: 30 TABLET | Refills: 3 | Status: SHIPPED | OUTPATIENT
Start: 2018-02-16 | End: 2018-03-29 | Stop reason: SDUPTHER

## 2018-02-16 RX ORDER — LISINOPRIL 20 MG/1
20 TABLET ORAL DAILY
Qty: 30 TABLET | Refills: 3 | Status: SHIPPED | OUTPATIENT
Start: 2018-02-16 | End: 2018-03-29 | Stop reason: ALTCHOICE

## 2018-02-16 RX ORDER — CARVEDILOL 12.5 MG/1
12.5 TABLET ORAL 2 TIMES DAILY
Qty: 60 TABLET | Refills: 3 | Status: SHIPPED | OUTPATIENT
Start: 2018-02-16 | End: 2018-03-29 | Stop reason: SDUPTHER

## 2018-02-16 RX ORDER — FUROSEMIDE 20 MG/1
20 TABLET ORAL DAILY
Qty: 30 TABLET | Refills: 3 | Status: SHIPPED | OUTPATIENT
Start: 2018-02-16 | End: 2018-03-29 | Stop reason: ALTCHOICE

## 2018-02-16 NOTE — PATIENT INSTRUCTIONS
BMI for Adults  Body mass index (BMI) is a number that is calculated from a person's weight and height. In most adults, the number is used to find how much of an adult's weight is made up of fat. BMI is not as accurate as a direct measure of body fat.  HOW IS BMI CALCULATED?  BMI is calculated by dividing weight in kilograms by height in meters squared. It can also be calculated by dividing weight in pounds by height in inches squared, then multiplying the resulting number by 703. Charts are available to help you find your BMI quickly and easily without doing this calculation.   HOW IS BMI INTERPRETED?  Health care professionals use BMI charts to identify whether an adult is underweight, at a normal weight, or overweight based on the following guidelines:  · Underweight: BMI less than 18.5.  · Normal weight: BMI between 18.5 and 24.9.  · Overweight: BMI between 25 and 29.9.  · Obese: BMI of 30 and above.  BMI is usually interpreted the same for males and females.  Weight includes both fat and muscle, so someone with a muscular build, such as an athlete, may have a BMI that is higher than 24.9. In cases like these, BMI may not accurately depict body fat. To determine if excess body fat is the cause of a BMI of 25 or higher, further assessments may need to be done by a health care provider.  WHY IS BMI A USEFUL TOOL?  BMI is used to identify a possible weight problem that may be related to a medical problem or may increase the risk for medical problems. BMI can also be used to promote changes to reach a healthy weight.     This information is not intended to replace advice given to you by your health care provider. Make sure you discuss any questions you have with your health care provider.     Document Released: 08/29/2005 Document Revised: 01/08/2016 Document Reviewed: 05/15/2015  [employer] Interactive Patient Education ©2017 [employer]       Calorie Counting for Weight Loss  Calories are energy you get from the  things you eat and drink. Your body uses this energy to keep you going throughout the day. The number of calories you eat affects your weight. When you eat more calories than your body needs, your body stores the extra calories as fat. When you eat fewer calories than your body needs, your body burns fat to get the energy it needs.  Calorie counting means keeping track of how many calories you eat and drink each day. If you make sure to eat fewer calories than your body needs, you should lose weight. In order for calorie counting to work, you will need to eat the number of calories that are right for you in a day to lose a healthy amount of weight per week. A healthy amount of weight to lose per week is usually 1-2 lb (0.5-0.9 kg). A dietitian can determine how many calories you need in a day and give you suggestions on how to reach your calorie goal.   WHAT IS MY MY PLAN?  My goal is to have __________ calories per day.   If I have this many calories per day, I should lose around __________ pounds per week.  WHAT DO I NEED TO KNOW ABOUT CALORIE COUNTING?  In order to meet your daily calorie goal, you will need to:  · Find out how many calories are in each food you would like to eat. Try to do this before you eat.  · Decide how much of the food you can eat.  · Write down what you ate and how many calories it had. Doing this is called keeping a food log.  WHERE DO I FIND CALORIE INFORMATION?  The number of calories in a food can be found on a Nutrition Facts label. Note that all the information on a label is based on a specific serving of the food. If a food does not have a Nutrition Facts label, try to look up the calories online or ask your dietitian for help.  HOW DO I DECIDE HOW MUCH TO EAT?  To decide how much of the food you can eat, you will need to consider both the number of calories in one serving and the size of one serving. This information can be found on the Nutrition Facts label. If a food does not  have a Nutrition Facts label, look up the information online or ask your dietitian for help.  Remember that calories are listed per serving. If you choose to have more than one serving of a food, you will have to multiply the calories per serving by the amount of servings you plan to eat. For example, the label on a package of bread might say that a serving size is 1 slice and that there are 90 calories in a serving. If you eat 1 slice, you will have eaten 90 calories. If you eat 2 slices, you will have eaten 180 calories.  HOW DO I KEEP A FOOD LOG?  After each meal, record the following information in your food log:  · What you ate.  · How much of it you ate.  · How many calories it had.  · Then, add up your calories.  Keep your food log near you, such as in a small notebook in your pocket. Another option is to use a mobile hailey or website. Some programs will calculate calories for you and show you how many calories you have left each time you add an item to the log.  WHAT ARE SOME CALORIE COUNTING TIPS?  · Use your calories on foods and drinks that will fill you up and not leave you hungry. Some examples of this include foods like nuts and nut butters, vegetables, lean proteins, and high-fiber foods (more than 5 g fiber per serving).  · Eat nutritious foods and avoid empty calories. Empty calories are calories you get from foods or beverages that do not have many nutrients, such as candy and soda. It is better to have a nutritious high-calorie food (such as an avocado) than a food with few nutrients (such as a bag of chips).  · Know how many calories are in the foods you eat most often. This way, you do not have to look up how many calories they have each time you eat them.  · Look out for foods that may seem like low-calorie foods but are really high-calorie foods, such as baked goods, soda, and fat-free candy.  · Pay attention to calories in drinks. Drinks such as sodas, specialty coffee drinks, alcohol, and  juices have a lot of calories yet do not fill you up. Choose low-calorie drinks like water and diet drinks.  · Focus your calorie counting efforts on higher calorie items. Logging the calories in a garden salad that contains only vegetables is less important than calculating the calories in a milk shake.  · Find a way of tracking calories that works for you. Get creative. Most people who are successful find ways to keep track of how much they eat in a day, even if they do not count every calorie.  WHAT ARE SOME PORTION CONTROL TIPS?  · Know how many calories are in a serving. This will help you know how many servings of a certain food you can have.  · Use a measuring cup to measure serving sizes. This is helpful when you start out. With time, you will be able to estimate serving sizes for some foods.  · Take some time to put servings of different foods on your favorite plates, bowls, and cups so you know what a serving looks like.  · Try not to eat straight from a bag or box. Doing this can lead to overeating. Put the amount you would like to eat in a cup or on a plate to make sure you are eating the right portion.  · Use smaller plates, glasses, and bowls to prevent overeating. This is a quick and easy way to practice portion control. If your plate is smaller, less food can fit on it.  · Try not to multitask while eating, such as watching TV or using your computer. If it is time to eat, sit down at a table and enjoy your food. Doing this will help you to start recognizing when you are full. It will also make you more aware of what and how much you are eating.  HOW CAN I CALORIE COUNT WHEN EATING OUT?  · Ask for smaller portion sizes or child-sized portions.  · Consider sharing an entree and sides instead of getting your own entree.  · If you get your own entree, eat only half. Ask for a box at the beginning of your meal and put the rest of your entree in it so you are not tempted to eat it.  · Look for the calories  "on the menu. If calories are listed, choose the lower calorie options.  · Choose dishes that include vegetables, fruits, whole grains, low-fat dairy products, and lean protein. Focusing on smart food choices from each of the 5 food groups can help you stay on track at restaurants.  · Choose items that are boiled, broiled, grilled, or steamed.  · Choose water, milk, unsweetened iced tea, or other drinks without added sugars. If you want an alcoholic beverage, choose a lower calorie option. For example, a regular xu can have up to 700 calories and a glass of wine has around 150.  · Stay away from items that are buttered, battered, fried, or served with cream sauce. Items labeled \"crispy\" are usually fried, unless stated otherwise.  · Ask for dressings, sauces, and syrups on the side. These are usually very high in calories, so do not eat much of them.  · Watch out for salads. Many people think salads are a healthy option, but this is often not the case. Many salads come with newman, fried chicken, lots of cheese, fried chips, and dressing. All of these items have a lot of calories. If you want a salad, choose a garden salad and ask for grilled meats or steak. Ask for the dressing on the side, or ask for olive oil and vinegar or lemon to use as dressing.  · Estimate how many servings of a food you are given. For example, a serving of cooked rice is ½ cup or about the size of half a tennis ball or one cupcake wrapper. Knowing serving sizes will help you be aware of how much food you are eating at restaurants. The list below tells you how big or small some common portion sizes are based on everyday objects.  ¨ 1 oz--4 stacked dice.  ¨ 3 oz--1 deck of cards.  ¨ 1 tsp--1 dice.  ¨ 1 Tbsp--½ a Ping-Pong ball.  ¨ 2 Tbsp--1 Ping-Pong ball.  ¨ ½ cup--1 tennis ball or 1 cupcake wrapper.  ¨ 1 cup--1 baseball.     This information is not intended to replace advice given to you by your health care provider. Make sure you " discuss any questions you have with your health care provider.     Document Released: 12/18/2006 Document Revised: 01/08/2016 Document Reviewed: 10/23/2014  [employer] Interactive Patient Education ©2017 [employer]

## 2018-02-16 NOTE — PROGRESS NOTES
Kathy Tao DO  Tahmina Martinez  1945  02/16/2018    Patient Active Problem List   Diagnosis   • History of Non-STEMI (non-ST elevated myocardial infarction) with no coronary intervention needed in 2008, clinically stable.    • Dyspnea, class II-III.    • Hypertension, fair control.   • History of Acute myelocytic leukemia,status post chemotherapy and bone marrow transplant in 2005.   • Breast cancer (right), status post radiation therapy in 08/2015   • Falling episodes   • Syncope   • RBBB unchanged from 2015   • Dyslipidemia     Dear Kathy Tao DO:    Chief Complaint   Patient presents with   • History of non-STEMI   • Hypertension     Subjective     Tahmina Martinez is a 72 y.o. female with a past medical history significant for previous reported non-ST elevation myocardial infarction in 2008 with nonobstructive coronary artery disease noted, hypertension, history of breast cancer as well as acute myeloid leukemia both of which are apparently in remission. She was recently evaluated with a stress echocardiogram on 9/26/17 which revealed a normal ejection fraction of 61-65%.  There was mild aortic, mitral, and tricuspid regurgitation.  There was no evidence of myocardial ischemia appreciated. Bp has been running high at home 199/110 at times. She has been ill recently with cough and congestion. No chest pains.  Breathing is at baseline.  She states that last night her leg swelled up, but resolved this morning.       Current Outpatient Prescriptions:   •  amLODIPine (NORVASC) 10 MG tablet, Take 1 tablet by mouth Daily., Disp: 30 tablet, Rfl: 3  •  aspirin 81 MG EC tablet, Take 81 mg by mouth Daily., Disp: , Rfl:   •  atorvastatin (LIPITOR) 10 MG tablet, Take 1 tablet by mouth Daily., Disp: 30 tablet, Rfl: 3  •  Calcium Citrate-Vitamin D (CALCIUM + D PO), Take 600 mg by mouth., Disp: , Rfl:   •  Coenzyme Q10 (CO Q-10) 200 MG capsule, Take  by mouth., Disp: , Rfl:   •  exemestane (AROMASIN) 25 MG chemo tablet,  Take 25 mg by mouth Daily., Disp: , Rfl:   •  furosemide (LASIX) 20 MG tablet, Take 1 tablet by mouth Daily., Disp: 30 tablet, Rfl: 3  •  gabapentin (NEURONTIN) 100 MG capsule, Take 100 mg by mouth 3 (Three) Times a Day., Disp: , Rfl:   •  lisinopril (PRINIVIL,ZESTRIL) 20 MG tablet, Take 1 tablet by mouth Daily., Disp: 30 tablet, Rfl: 3  •  Loratadine 10 MG capsule, Take  by mouth., Disp: , Rfl:   •  meloxicam (MOBIC) 15 MG tablet, Take 7.5 mg by mouth Daily As Needed., Disp: , Rfl:   •  metFORMIN (GLUCOPHAGE) 500 MG tablet, Take 500 mg by mouth Every Morning., Disp: , Rfl:   •  nitroglycerin (NITROSTAT) 0.4 MG SL tablet, Place 0.4 mg under the tongue Every 5 (Five) Minutes As Needed for chest pain. Take no more than 3 doses in 15 minutes., Disp: , Rfl:   •  pantoprazole (PROTONIX) 40 MG EC tablet, Take 40 mg by mouth Daily., Disp: , Rfl:   •  rOPINIRole (REQUIP) 0.25 MG tablet, Take 0.25 mg by mouth Every Night. Take 1 hour before bedtime., Disp: , Rfl:   •  traMADol (ULTRAM) 50 MG tablet, Take 50 mg by mouth 2 (Two) Times a Day As Needed for Moderate Pain (4-6)., Disp: , Rfl:   •  valACYclovir (VALTREX) 500 MG tablet, Take 1 g by mouth Daily., Disp: , Rfl:   •  vitamin D (ERGOCALCIFEROL) 41606 UNITS capsule capsule, Take 50,000 Units by mouth 1 (One) Time Per Week., Disp: , Rfl:   •  carvedilol (COREG) 12.5 MG tablet, Take 1 tablet by mouth 2 (Two) Times a Day., Disp: 60 tablet, Rfl: 3    The following portions of the patient's history were reviewed and updated as appropriate: allergies, current medications, past family history, past medical history, past social history, past surgical history and problem list.    Social History     Social History   • Marital status:      Spouse name: N/A   • Number of children: N/A   • Years of education: N/A     Occupational History   • Not on file.     Social History Main Topics   • Smoking status: Never Smoker   • Smokeless tobacco: Not on file   • Alcohol use No   •  "Drug use: No   • Sexual activity: Defer     Other Topics Concern   • Not on file     Social History Narrative     Review of Systems   Constitution: Negative for chills and fever.   HENT: Negative for nosebleeds and sore throat.    Cardiovascular: Positive for dyspnea on exertion, leg swelling and palpitations. Negative for chest pain and syncope.   Respiratory: Negative for cough, hemoptysis, shortness of breath and wheezing.    Gastrointestinal: Negative for abdominal pain, hematemesis, hematochezia, melena, nausea and vomiting.   Genitourinary: Negative for dysuria and hematuria.   Neurological: Positive for dizziness. Negative for headaches.     Objective   Blood pressure (!) 182/78, pulse 74, resp. rate 16, height 162.6 cm (64.02\"), weight 75.4 kg (166 lb 3.2 oz).  Body mass index is 28.51 kg/(m^2).    Physical Exam   Constitutional: She is oriented to person, place, and time. She appears well-developed and well-nourished. No distress.   HENT:   Head: Normocephalic and atraumatic.   Eyes: Conjunctivae are normal. Right eye exhibits no discharge. Left eye exhibits no discharge.   Neck: Normal range of motion. Neck supple. Carotid bruit is not present.   Cardiovascular: Normal rate, regular rhythm and normal heart sounds.  Exam reveals no gallop and no friction rub.    No murmur heard.  Pulmonary/Chest: Effort normal and breath sounds normal. No respiratory distress. She has no wheezes. She has no rales. She exhibits no tenderness.   Musculoskeletal: Normal range of motion. She exhibits no edema.   Neurological: She is alert and oriented to person, place, and time.   Skin: Skin is warm and dry. No rash noted. She is not diaphoretic. No erythema. No pallor.   Psychiatric: She has a normal mood and affect. Her behavior is normal.   Nursing note and vitals reviewed.    Procedures    Assessment:          Diagnosis Plan   1. History of Non-STEMI (non-ST elevated myocardial infarction) with no coronary intervention " needed in 2008, clinically stable.      2. Essential hypertension, uncontrolled     3. Dyslipidemia          Plan:       1. Continue aspirin 81mg QD, atorvastatin, lisinopril.   2. Discontinue metoprolol tartrate and start carvedilol 12.5 mg twice daily for better efficacy with uncontrolled hypertension.  3. Patient's BMI is above normal parameters. Follow-up plan includes:  educational material.  4. Encouraged her to avoid sodium diet..  5. She continues to have some mild intermittent ankle edema at next visit and blood pressure has improved, we'll consider decreasing amlodipine.  6. I will write her a letter for jury duty exemption due to her multiple health issues including coronary artery disease, uncontrolled hypertension, and multiple cancers.  7. Follow-up one to 2 months or sooner if needed.    Return in about 1 month (around 3/16/2018).    I appreciate the opportunity to participate in this patient's cardiovascular care.    Best Regards,    Aurora Oliveros PA-C

## 2018-02-19 ENCOUNTER — TELEPHONE (OUTPATIENT)
Dept: CARDIOLOGY | Facility: CLINIC | Age: 73
End: 2018-02-19

## 2018-03-29 ENCOUNTER — OFFICE VISIT (OUTPATIENT)
Dept: CARDIOLOGY | Facility: CLINIC | Age: 73
End: 2018-03-29

## 2018-03-29 VITALS
HEIGHT: 64 IN | DIASTOLIC BLOOD PRESSURE: 76 MMHG | BODY MASS INDEX: 28.17 KG/M2 | WEIGHT: 165 LBS | SYSTOLIC BLOOD PRESSURE: 139 MMHG | HEART RATE: 64 BPM | RESPIRATION RATE: 16 BRPM

## 2018-03-29 DIAGNOSIS — I21.4 NON-STEMI (NON-ST ELEVATED MYOCARDIAL INFARCTION) (HCC): Primary | ICD-10-CM

## 2018-03-29 DIAGNOSIS — E78.5 DYSLIPIDEMIA: ICD-10-CM

## 2018-03-29 DIAGNOSIS — R06.00 DYSPNEA, UNSPECIFIED TYPE: ICD-10-CM

## 2018-03-29 DIAGNOSIS — I45.2 BIFASCICULAR BLOCK: ICD-10-CM

## 2018-03-29 DIAGNOSIS — I10 ESSENTIAL HYPERTENSION: ICD-10-CM

## 2018-03-29 DIAGNOSIS — C50.911 MALIGNANT NEOPLASM OF RIGHT FEMALE BREAST, UNSPECIFIED ESTROGEN RECEPTOR STATUS, UNSPECIFIED SITE OF BREAST (HCC): ICD-10-CM

## 2018-03-29 DIAGNOSIS — C92.01 ACUTE MYELOID LEUKEMIA IN REMISSION (HCC): ICD-10-CM

## 2018-03-29 PROCEDURE — 93000 ELECTROCARDIOGRAM COMPLETE: CPT | Performed by: INTERNAL MEDICINE

## 2018-03-29 PROCEDURE — 99214 OFFICE O/P EST MOD 30 MIN: CPT | Performed by: INTERNAL MEDICINE

## 2018-03-29 RX ORDER — HYDROCHLOROTHIAZIDE 25 MG/1
25 TABLET ORAL DAILY
Qty: 30 TABLET | Refills: 5 | Status: SHIPPED | OUTPATIENT
Start: 2018-03-29 | End: 2018-08-08 | Stop reason: SDUPTHER

## 2018-03-29 RX ORDER — AMLODIPINE BESYLATE 10 MG/1
10 TABLET ORAL DAILY
Qty: 30 TABLET | Refills: 3 | Status: SHIPPED | OUTPATIENT
Start: 2018-03-29 | End: 2018-08-08 | Stop reason: SDUPTHER

## 2018-03-29 RX ORDER — CARVEDILOL 12.5 MG/1
12.5 TABLET ORAL 2 TIMES DAILY
Qty: 60 TABLET | Refills: 5 | Status: SHIPPED | OUTPATIENT
Start: 2018-03-29 | End: 2018-08-08 | Stop reason: SDUPTHER

## 2018-04-04 ENCOUNTER — TELEPHONE (OUTPATIENT)
Dept: CARDIOLOGY | Facility: CLINIC | Age: 73
End: 2018-04-04

## 2018-04-04 NOTE — TELEPHONE ENCOUNTER
Pt called and stated that she had some labs done at her PCP last week. I advised her that I will call them and request them to send us the results also. She expressed understanding.     Called PCP and requested her labs that was done last week.

## 2018-04-12 ENCOUNTER — TELEPHONE (OUTPATIENT)
Dept: CARDIOLOGY | Facility: CLINIC | Age: 73
End: 2018-04-12

## 2018-04-12 DIAGNOSIS — Z79.899 DRUG THERAPY: Primary | ICD-10-CM

## 2018-04-12 NOTE — TELEPHONE ENCOUNTER
"Kitty from pt's PCP office wanted to make us aware that pt was experiencing increasing edema and KIMBROUGH, so pt's HCTZ was stopped and Lasix was restarted at 40 mg qd.  Pt's Amlodipine and Coreg were continued at current doses.  PCP office ordered labs and a cxr, which they will be sending us the results later today.       I called pt check on her symptoms.  She states the above is true, and that she hasn't had this much SOA in a long time and that she is more SOA today than yesterday.  She can only walk up a few stairs then has to stop.  She also said her legs and feet swelling \"looks awful\", but hasn't gotten any worse \"the past few days\".     Pts labs and cxr are scanned into her chart.  It looks like they were routed to Dr. Soler's insket.   "

## 2018-04-12 NOTE — TELEPHONE ENCOUNTER
Labs and CXR reviewed. CXR ok and renal function looks good. Potassium 5.2. CK mildly elevated.     Change atorvastatin to 10mg QOD and recheck a BMP/BNP/CK in 1 week.     When was she placed on the lasix 40mg? Can increase to 40mg BID for 2 days and then back to 40mg QD. Labs as requested above.

## 2018-04-12 NOTE — TELEPHONE ENCOUNTER
Called pt and advised her. She stated that she saw her PCP yesterday and they called her today about med change.   Advised [facility] and she stated to have her to continue with the Lasix like her PCP advised her to do.   She still wanted to her change her Atorvastatin and to get the labs that she is ordering. Pt stated she will go to her PCP to have her labs done.

## 2018-05-14 RX ORDER — ATORVASTATIN CALCIUM 10 MG/1
TABLET, FILM COATED ORAL
Qty: 30 TABLET | Refills: 3 | Status: SHIPPED | OUTPATIENT
Start: 2018-05-14 | End: 2018-08-08 | Stop reason: SDUPTHER

## 2018-06-04 RX ORDER — AMLODIPINE BESYLATE 10 MG/1
TABLET ORAL
Qty: 30 TABLET | Refills: 3 | Status: SHIPPED | OUTPATIENT
Start: 2018-06-04 | End: 2018-08-08 | Stop reason: SDUPTHER

## 2018-08-08 ENCOUNTER — OFFICE VISIT (OUTPATIENT)
Dept: CARDIOLOGY | Facility: CLINIC | Age: 73
End: 2018-08-08

## 2018-08-08 VITALS
SYSTOLIC BLOOD PRESSURE: 109 MMHG | BODY MASS INDEX: 27.72 KG/M2 | DIASTOLIC BLOOD PRESSURE: 67 MMHG | HEART RATE: 60 BPM | WEIGHT: 162.4 LBS | HEIGHT: 64 IN | OXYGEN SATURATION: 96 %

## 2018-08-08 DIAGNOSIS — I21.4 NON-STEMI (NON-ST ELEVATED MYOCARDIAL INFARCTION) (HCC): Primary | ICD-10-CM

## 2018-08-08 DIAGNOSIS — I10 ESSENTIAL HYPERTENSION: ICD-10-CM

## 2018-08-08 DIAGNOSIS — C92.01 ACUTE MYELOID LEUKEMIA IN REMISSION (HCC): ICD-10-CM

## 2018-08-08 DIAGNOSIS — Z79.899 DRUG THERAPY: ICD-10-CM

## 2018-08-08 DIAGNOSIS — I45.2 BIFASCICULAR BLOCK: ICD-10-CM

## 2018-08-08 PROCEDURE — 99213 OFFICE O/P EST LOW 20 MIN: CPT | Performed by: INTERNAL MEDICINE

## 2018-08-08 RX ORDER — ATORVASTATIN CALCIUM 10 MG/1
10 TABLET, FILM COATED ORAL NIGHTLY
Qty: 30 TABLET | Refills: 3 | Status: SHIPPED | OUTPATIENT
Start: 2018-08-08 | End: 2019-01-07 | Stop reason: SDUPTHER

## 2018-08-08 RX ORDER — CARVEDILOL 12.5 MG/1
12.5 TABLET ORAL 2 TIMES DAILY
Qty: 60 TABLET | Refills: 5 | Status: SHIPPED | OUTPATIENT
Start: 2018-08-08 | End: 2019-02-14 | Stop reason: SDUPTHER

## 2018-08-08 RX ORDER — HYDROCHLOROTHIAZIDE 25 MG/1
25 TABLET ORAL DAILY
Qty: 30 TABLET | Refills: 5 | Status: SHIPPED | OUTPATIENT
Start: 2018-08-08 | End: 2019-02-14 | Stop reason: SDUPTHER

## 2018-08-08 RX ORDER — AMLODIPINE BESYLATE 10 MG/1
10 TABLET ORAL DAILY
Qty: 30 TABLET | Refills: 3 | Status: SHIPPED | OUTPATIENT
Start: 2018-08-08 | End: 2019-02-14 | Stop reason: SDUPTHER

## 2018-08-08 NOTE — PROGRESS NOTES
Kathy Tao DO  Tahmina Martinez  1945  08/08/2018    Patient Active Problem List   Diagnosis   • History of Non-STEMI (non-ST elevated myocardial infarction) with no coronary intervention needed in 2008, clinically stable.    • Dyspnea, class II-III.    • Hypertension, fair control.   • History of Acute myelocytic leukemia,status post chemotherapy and bone marrow transplant in 2005.   • Breast cancer (right), status post radiation therapy in 08/2015   • Falling episodes   • Syncope   • RBBB unchanged from 2015   • Dyslipidemia   • Bifascicular block (RBBB plus LP FB)       Dear Kathy Tao, DO:    Subjective     Tahmina Martinez is a 72 y.o. female with the problems as listed above, presents      Fatigue   Associated symptoms include fatigue.   Shortness of Breath     :        Allergies   Allergen Reactions   • Latex    • Penicillins    • Zithromax [Azithromycin]    :      Current Outpatient Prescriptions:   •  amLODIPine (NORVASC) 10 MG tablet, Take 1 tablet by mouth Daily. Take this at 1 PM daily., Disp: 30 tablet, Rfl: 3  •  amLODIPine (NORVASC) 10 MG tablet, TAKE ONE TABLET BY MOUTH EVERY DAY FOR BLOOD PRESSURE., Disp: 30 tablet, Rfl: 3  •  aspirin 81 MG EC tablet, Take 81 mg by mouth Daily., Disp: , Rfl:   •  atorvastatin (LIPITOR) 10 MG tablet, TAKE ONE TABLET BY MOUTH EVERY DAY FOR CHOLESTERO., Disp: 30 tablet, Rfl: 3  •  Calcium Citrate-Vitamin D (CALCIUM + D PO), Take 600 mg by mouth., Disp: , Rfl:   •  carvedilol (COREG) 12.5 MG tablet, Take 1 tablet by mouth 2 (Two) Times a Day., Disp: 60 tablet, Rfl: 5  •  Coenzyme Q10 (CO Q-10) 200 MG capsule, Take  by mouth., Disp: , Rfl:   •  exemestane (AROMASIN) 25 MG chemo tablet, Take 25 mg by mouth Daily., Disp: , Rfl:   •  gabapentin (NEURONTIN) 100 MG capsule, Take 100 mg by mouth 3 (Three) Times a Day., Disp: , Rfl:   •  hydrochlorothiazide (HYDRODIURIL) 25 MG tablet, Take 1 tablet by mouth Daily. Take this every morning along with the lisinopril 40 mg by  mouth., Disp: 30 tablet, Rfl: 5  •  Loratadine 10 MG capsule, Take  by mouth., Disp: , Rfl:   •  meloxicam (MOBIC) 15 MG tablet, Take 7.5 mg by mouth 2 (Two) Times a Day., Disp: , Rfl:   •  metFORMIN (GLUCOPHAGE) 500 MG tablet, Take 500 mg by mouth Every Morning., Disp: , Rfl:   •  nitroglycerin (NITROSTAT) 0.4 MG SL tablet, Place 0.4 mg under the tongue Every 5 (Five) Minutes As Needed for chest pain. Take no more than 3 doses in 15 minutes., Disp: , Rfl:   •  pantoprazole (PROTONIX) 40 MG EC tablet, Take 40 mg by mouth Daily., Disp: , Rfl:   •  rOPINIRole (REQUIP) 0.25 MG tablet, Take 0.25 mg by mouth Every Night. Take 1 hour before bedtime., Disp: , Rfl:   •  traMADol (ULTRAM) 50 MG tablet, Take 50 mg by mouth 2 (Two) Times a Day As Needed for Moderate Pain (4-6)., Disp: , Rfl:   •  valACYclovir (VALTREX) 500 MG tablet, Take 1 g by mouth Daily., Disp: , Rfl:   •  vitamin D (ERGOCALCIFEROL) 35375 UNITS capsule capsule, Take 50,000 Units by mouth Every 14 (Fourteen) Days., Disp: , Rfl:       The following portions of the patient's history were reviewed and updated as appropriate: allergies, current medications, past family history, past medical history, past social history, past surgical history and problem list.    Social History   Substance Use Topics   • Smoking status: Never Smoker   • Smokeless tobacco: Never Used   • Alcohol use No       Review of Systems   Constitution: Positive for fatigue and malaise/fatigue.   Respiratory: Positive for shortness of breath.        Objective   There were no vitals filed for this visit.  There is no height or weight on file to calculate BMI.        Physical Exam    Lab Results   Component Value Date     12/20/2014    K 3.9 12/20/2014     12/20/2014    CO2 29.1 12/20/2014    BUN 19 12/20/2014    CREATININE 0.68 12/20/2014    GLUCOSE 137 (H) 12/20/2014    CALCIUM 9.7 12/20/2014    AST 25 12/20/2014    ALT 17 12/20/2014    ALKPHOS 148 (H) 12/20/2014    LABIL2 1.4  (L) 12/20/2014     No results found for: CKTOTAL  Lab Results   Component Value Date    WBC 11.4 12/20/2014    HGB 13.1 12/20/2014    HCT 40.0 12/20/2014     07/20/2015     Lab Results   Component Value Date    INR 0.90 07/20/2015    INR <0.90 12/20/2014     No results found for: MG  No results found for: TSH, PSA, CHLPL, TRIG, HDL, LDL   No results found for: BNP    During this visit the following were done:  Labs Reviewed [x]    Labs Ordered []    Radiology Reports Reviewed [x]    Radiology Ordered []    PCP Records Reviewed []    Referring Provider Records Reviewed []    ER Records Reviewed [x]    Hospital Records Reviewed [x]    History Obtained From Family []    Radiology Images Reviewed [x]    Other Reviewed [x]    Records Requested []      Procedures      Assessment/Plan   No diagnosis found.             Recommendations:       No Follow-up on file.    As always, I appreciate very much the opportunity to participate in the cardiovascular care of your patients.      With Best Regards,    Dashawn Soler MD, [facility]    Dragon disclaimer:  Much of this encounter note is an electronic transcription/translation of spoken language to printed text. The electronic translation of spoken language may permit erroneous, or at times, nonsensical words or phrases to be inadvertently transcribed; Although I have reviewed the note for such errors, some may still exist.

## 2018-08-08 NOTE — PROGRESS NOTES
Kathy Tao DO Tahmina Martinez  1945  08/08/2018    Patient Active Problem List   Diagnosis   • History of Non-STEMI (non-ST elevated myocardial infarction) with no coronary intervention needed in 2008, clinically stable.    • Dyspnea, class II-III.    • Hypertension, well controlled.   • History of Acute myelocytic leukemia,status post chemotherapy and bone marrow transplant in 2005.   • Breast cancer (right), status post radiation therapy in 08/2015   • Falling episodes   • Syncope   • RBBB unchanged from 2015   • Dyslipidemia   • Bifascicular block (RBBB plus LP FB)       Dear :    Santo Martinez is a 72 y.o. female with the problems as listed above, presents    Chief complaint: Follow-up of ASCVD    History of Present Illness: Ms. Martinez is a pleasant 72-year-old  female with history of known ASCVD with previous non-ST elevation myocardial infarction with no previous coronary interventions, history of hypertension and acute myeloid leukemia, status post chemotherapyAnd more moderate transplant with remission.  She denies any recent chest pains.  She has chronic dyspnea with moderate exertion with no PND, orthopnea.  She lost 3 pounds since the 20/9/18 .  Overall she feels much better.      Allergies   Allergen Reactions   • Latex    • Penicillins    • Zithromax [Azithromycin]    :      Current Outpatient Prescriptions:   •  amLODIPine (NORVASC) 10 MG tablet, Take 1 tablet by mouth Daily. Take this at 1 PM daily., Disp: 30 tablet, Rfl: 3  •  aspirin 81 MG EC tablet, Take 81 mg by mouth Daily., Disp: , Rfl:   •  atorvastatin (LIPITOR) 10 MG tablet, Take 1 tablet by mouth Every Night., Disp: 30 tablet, Rfl: 3  •  Calcium Citrate-Vitamin D (CALCIUM + D PO), Take 600 mg by mouth., Disp: , Rfl:   •  carvedilol (COREG) 12.5 MG tablet, Take 1 tablet by mouth 2 (Two) Times a Day., Disp: 60 tablet, Rfl: 5  •  Coenzyme Q10 (CO Q-10) 200 MG capsule, Take  by mouth., Disp: , Rfl:   •   exemestane (AROMASIN) 25 MG chemo tablet, Take 25 mg by mouth Daily., Disp: , Rfl:   •  gabapentin (NEURONTIN) 100 MG capsule, Take 100 mg by mouth 3 (Three) Times a Day., Disp: , Rfl:   •  hydrochlorothiazide (HYDRODIURIL) 25 MG tablet, Take 1 tablet by mouth Daily. Take this every morning along with the lisinopril 40 mg by mouth., Disp: 30 tablet, Rfl: 5  •  Loratadine 10 MG capsule, Take  by mouth., Disp: , Rfl:   •  meloxicam (MOBIC) 15 MG tablet, Take 7.5 mg by mouth 2 (Two) Times a Day., Disp: , Rfl:   •  metFORMIN (GLUCOPHAGE) 500 MG tablet, Take 500 mg by mouth Every Morning., Disp: , Rfl:   •  nitroglycerin (NITROSTAT) 0.4 MG SL tablet, Place 0.4 mg under the tongue Every 5 (Five) Minutes As Needed for chest pain. Take no more than 3 doses in 15 minutes., Disp: , Rfl:   •  pantoprazole (PROTONIX) 40 MG EC tablet, Take 40 mg by mouth Daily., Disp: , Rfl:   •  rOPINIRole (REQUIP) 0.25 MG tablet, Take 0.25 mg by mouth Every Night. Take 1 hour before bedtime., Disp: , Rfl:   •  traMADol (ULTRAM) 50 MG tablet, Take 50 mg by mouth 2 (Two) Times a Day As Needed for Moderate Pain (4-6)., Disp: , Rfl:   •  valACYclovir (VALTREX) 500 MG tablet, Take 1 g by mouth Daily., Disp: , Rfl:   •  vitamin D (ERGOCALCIFEROL) 59403 UNITS capsule capsule, Take 50,000 Units by mouth Every 14 (Fourteen) Days., Disp: , Rfl:       The following portions of the patient's history were reviewed and updated as appropriate: allergies, current medications, past family history, past medical history, past social history, past surgical history and problem list.    Social History   Substance Use Topics   • Smoking status: Never Smoker   • Smokeless tobacco: Never Used   • Alcohol use No       Review of Systems   Constitution: Negative for chills and fever.   HENT: Negative for nosebleeds and sore throat.    Respiratory: Negative for cough, hemoptysis and wheezing.    Gastrointestinal: Negative for abdominal pain, hematemesis, hematochezia,  "melena, nausea and vomiting.   Genitourinary: Negative for dysuria and hematuria.   Neurological: Negative for headaches.       Objective   Vitals:    08/08/18 1402   BP: 109/67   BP Location: Left arm   Patient Position: Sitting   Cuff Size: Adult   Pulse: 60   SpO2: 96%   Weight: 73.7 kg (162 lb 6.4 oz)   Height: 162.6 cm (64\")     Body mass index is 27.88 kg/m².    Physical Exam   Constitutional: She is oriented to person, place, and time. She appears well-developed and well-nourished.   HENT:   Mouth/Throat: Oropharynx is clear and moist.   Eyes: Pupils are equal, round, and reactive to light. EOM are normal.   Neck: Neck supple. No JVD present. No tracheal deviation present. No thyromegaly present.   Cardiovascular: Normal rate, regular rhythm, S1 normal and S2 normal.  Exam reveals no gallop and no friction rub.    No murmur heard.  Pulmonary/Chest: Effort normal and breath sounds normal.   Abdominal: Soft. Bowel sounds are normal. She exhibits no mass. There is no tenderness.   Musculoskeletal: Normal range of motion. She exhibits no edema.   Lymphadenopathy:     She has no cervical adenopathy.   Neurological: She is alert and oriented to person, place, and time.   Skin: Skin is warm and dry. No rash noted.   Psychiatric: She has a normal mood and affect.           Procedures      Assessment/Plan :   Diagnosis Plan   1. History of Non-STEMI (non-ST elevated myocardial infarction) with no coronary intervention needed in 2008, clinically stable.      2. Essential hypertension, Controlled.     3. Bifascicular block (RBBB plus LPFB)     4. Acute myeloid leukemia in remission, Status post chemotherapy and bone marrow transplantation.         Recommendations:  Continue aspirin, atorvastatin, amlodipine and carvedilol at current doses.  Follow up in 6 months.    Return in about 6 months (around 2/8/2019).    As always, I appreciate very much the opportunity to participate in the cardiovascular care of your " patients.      With Best Regards,    Dashawn Soler MD, [facility]    Dragon disclaimer:  Much of this encounter note is an electronic transcription/translation of spoken language to printed text. The electronic translation of spoken language may permit erroneous, or at times, nonsensical words or phrases to be inadvertently transcribed; Although I have reviewed the note for such errors, some may still exist.

## 2019-01-07 RX ORDER — ATORVASTATIN CALCIUM 10 MG/1
TABLET, FILM COATED ORAL
Qty: 30 TABLET | Refills: 1 | Status: SHIPPED | OUTPATIENT
Start: 2019-01-07 | End: 2019-02-14 | Stop reason: SDUPTHER

## 2019-02-14 ENCOUNTER — OFFICE VISIT (OUTPATIENT)
Dept: CARDIOLOGY | Facility: CLINIC | Age: 74
End: 2019-02-14

## 2019-02-14 VITALS
HEIGHT: 63 IN | WEIGHT: 156 LBS | SYSTOLIC BLOOD PRESSURE: 134 MMHG | HEART RATE: 61 BPM | RESPIRATION RATE: 16 BRPM | DIASTOLIC BLOOD PRESSURE: 67 MMHG | BODY MASS INDEX: 27.64 KG/M2

## 2019-02-14 DIAGNOSIS — C92.01 ACUTE MYELOID LEUKEMIA IN REMISSION (HCC): ICD-10-CM

## 2019-02-14 DIAGNOSIS — I21.4 NON-STEMI (NON-ST ELEVATED MYOCARDIAL INFARCTION) (HCC): Primary | ICD-10-CM

## 2019-02-14 DIAGNOSIS — E78.5 DYSLIPIDEMIA: ICD-10-CM

## 2019-02-14 DIAGNOSIS — I10 ESSENTIAL HYPERTENSION: ICD-10-CM

## 2019-02-14 PROCEDURE — 93000 ELECTROCARDIOGRAM COMPLETE: CPT | Performed by: NURSE PRACTITIONER

## 2019-02-14 PROCEDURE — 99213 OFFICE O/P EST LOW 20 MIN: CPT | Performed by: NURSE PRACTITIONER

## 2019-02-14 RX ORDER — HYDROCHLOROTHIAZIDE 25 MG/1
25 TABLET ORAL DAILY
Qty: 30 TABLET | Refills: 5 | Status: SHIPPED | OUTPATIENT
Start: 2019-02-14 | End: 2019-07-23 | Stop reason: SDUPTHER

## 2019-02-14 RX ORDER — AMLODIPINE BESYLATE 10 MG/1
10 TABLET ORAL DAILY
Qty: 30 TABLET | Refills: 5 | Status: SHIPPED | OUTPATIENT
Start: 2019-02-14 | End: 2019-07-23 | Stop reason: SDUPTHER

## 2019-02-14 RX ORDER — ATORVASTATIN CALCIUM 10 MG/1
10 TABLET, FILM COATED ORAL NIGHTLY
Qty: 30 TABLET | Refills: 5 | Status: SHIPPED | OUTPATIENT
Start: 2019-02-14 | End: 2019-07-23 | Stop reason: SDUPTHER

## 2019-02-14 RX ORDER — CARVEDILOL 12.5 MG/1
12.5 TABLET ORAL 2 TIMES DAILY
Qty: 60 TABLET | Refills: 5 | Status: SHIPPED | OUTPATIENT
Start: 2019-02-14 | End: 2019-07-23 | Stop reason: SDUPTHER

## 2019-02-14 NOTE — PROGRESS NOTES
Noe Bower MD  Tahmina Martinez  1945  02/14/2019    Patient Active Problem List   Diagnosis   • History of Non-STEMI (non-ST elevated myocardial infarction) with no coronary intervention needed in 2008, clinically stable.    • Dyspnea, class II-III.    • Hypertension, well controlled.   • History of Acute myelocytic leukemia,status post chemotherapy and bone marrow transplant in 2005.   • Breast cancer (right), status post radiation therapy in 08/2015   • Falling episodes   • Syncope   • RBBB unchanged from 2015   • Dyslipidemia   • Bifascicular block (RBBB plus LP FB)       Dear Noe Bower MD:    Subjective     Chief Complaint   Patient presents with   • Follow-up     6 mos   • Med Management     call pharm (called @ 2:00 pm; awaiting list via fax)           History of Present Illness:    Tahmina Martinez is a 73 y.o. female with a history of known ASCVD with previous non-ST elevation myocardial infarction 4 years ago she also has a history of hypertension and acute myeloid leukemia status post chemotherapy and bone marrow transplant, currently in remission.  She reports she is doing very well.  She denies any chest pains or shortness of breath.  Denies palpitations, dizziness, lightheadedness, leg edema, near-syncope, and syncope.          Allergies   Allergen Reactions   • Latex    • Penicillins    • Zithromax [Azithromycin]    :      Current Outpatient Medications:   •  amLODIPine (NORVASC) 10 MG tablet, Take 1 tablet by mouth Daily. Take this at 1 PM daily., Disp: 30 tablet, Rfl: 5  •  aspirin 81 MG EC tablet, Take 81 mg by mouth Daily., Disp: , Rfl:   •  atorvastatin (LIPITOR) 10 MG tablet, Take 1 tablet by mouth Every Night., Disp: 30 tablet, Rfl: 5  •  Calcium Citrate-Vitamin D (CALCIUM + D PO), Take 600 mg by mouth., Disp: , Rfl:   •  carvedilol (COREG) 12.5 MG tablet, Take 1 tablet by mouth 2 (Two) Times a Day., Disp: 60 tablet, Rfl: 5  •  Coenzyme Q10 (CO Q-10) 200 MG capsule, Take  by  mouth., Disp: , Rfl:   •  exemestane (AROMASIN) 25 MG chemo tablet, Take 25 mg by mouth Daily., Disp: , Rfl:   •  gabapentin (NEURONTIN) 100 MG capsule, Take 100 mg by mouth 3 (Three) Times a Day., Disp: , Rfl:   •  hydrochlorothiazide (HYDRODIURIL) 25 MG tablet, Take 1 tablet by mouth Daily., Disp: 30 tablet, Rfl: 5  •  Loratadine 10 MG capsule, Take  by mouth., Disp: , Rfl:   •  meloxicam (MOBIC) 15 MG tablet, Take 7.5 mg by mouth 2 (Two) Times a Day., Disp: , Rfl:   •  metFORMIN (GLUCOPHAGE) 500 MG tablet, Take 500 mg by mouth Every Morning., Disp: , Rfl:   •  nitroglycerin (NITROSTAT) 0.4 MG SL tablet, Place 0.4 mg under the tongue Every 5 (Five) Minutes As Needed for chest pain. Take no more than 3 doses in 15 minutes., Disp: , Rfl:   •  pantoprazole (PROTONIX) 40 MG EC tablet, Take 40 mg by mouth Daily., Disp: , Rfl:   •  rOPINIRole (REQUIP) 0.25 MG tablet, Take 0.25 mg by mouth Every Night. Take 1 hour before bedtime., Disp: , Rfl:   •  traMADol (ULTRAM) 50 MG tablet, Take 50 mg by mouth 2 (Two) Times a Day As Needed for Moderate Pain (4-6)., Disp: , Rfl:   •  valACYclovir (VALTREX) 500 MG tablet, Take 1 g by mouth Daily., Disp: , Rfl:   •  vitamin D (ERGOCALCIFEROL) 39450 UNITS capsule capsule, Take 50,000 Units by mouth Every 14 (Fourteen) Days., Disp: , Rfl:       The following portions of the patient's history were reviewed and updated as appropriate: allergies, current medications, past family history, past medical history, past social history, past surgical history and problem list.    Social History     Tobacco Use   • Smoking status: Never Smoker   • Smokeless tobacco: Never Used   Substance Use Topics   • Alcohol use: No   • Drug use: No       Review of Systems   Constitution: Negative for weakness and malaise/fatigue.   Cardiovascular: Negative for chest pain, dyspnea on exertion, irregular heartbeat, near-syncope, palpitations and syncope.   Respiratory: Negative for cough, shortness of breath and  "wheezing.    Neurological: Negative for dizziness and light-headedness.       Objective   Vitals:    02/14/19 1338   BP: 134/67   Pulse: 61   Resp: 16   Weight: 70.8 kg (156 lb)   Height: 160 cm (63\")     Body mass index is 27.63 kg/m².        Physical Exam   Constitutional: She is oriented to person, place, and time. She appears well-developed and well-nourished.   HENT:   Head: Normocephalic and atraumatic.   Neck: No JVD present.   Cardiovascular: Normal rate, regular rhythm and normal heart sounds. Exam reveals no S3 and no S4.   No murmur heard.  Pulmonary/Chest: Effort normal and breath sounds normal. She has no wheezes. She has no rales.   Abdominal: Soft. Bowel sounds are normal.   Musculoskeletal: She exhibits no edema.   Neurological: She is alert and oriented to person, place, and time.   Skin: Skin is warm and dry.   Psychiatric: She has a normal mood and affect. Her behavior is normal.               ECG 12 Lead  Date/Time: 2/14/2019 1:42 PM  Performed by: Kyra Clark APRN  Authorized by: Kyra Clark APRN   Comparison: compared with previous ECG   Similar to previous ECG  Rhythm: sinus rhythm  BPM: 63  Conduction: bifascicular block and 1st degree AV block              Assessment/Plan    Diagnosis Plan   1. History of Non-STEMI (non-ST elevated myocardial infarction) with no coronary intervention needed in 2008, clinically stable.   atorvastatin (LIPITOR) 10 MG tablet    hydrochlorothiazide (HYDRODIURIL) 25 MG tablet    carvedilol (COREG) 12.5 MG tablet    amLODIPine (NORVASC) 10 MG tablet   2. Essential hypertension  atorvastatin (LIPITOR) 10 MG tablet    hydrochlorothiazide (HYDRODIURIL) 25 MG tablet    carvedilol (COREG) 12.5 MG tablet    amLODIPine (NORVASC) 10 MG tablet   3. Acute myeloid leukemia in remission (CMS/HCC)     4. Dyslipidemia  atorvastatin (LIPITOR) 10 MG tablet    hydrochlorothiazide (HYDRODIURIL) 25 MG tablet    carvedilol (COREG) 12.5 MG tablet    amLODIPine (NORVASC) " 10 MG tablet                Recommendations:    1. Continue low dose aspirin, atorvatatin, carvedilol, amlodipine, and HCTZ    2. Follow up in 6 months and PRN        Return in about 6 months (around 8/14/2019) for Recheck.    As always, I appreciate very much the opportunity to participate in the cardiovascular care of your patients.      With Best Regards,    JENNY Hope

## 2019-04-21 ENCOUNTER — APPOINTMENT (OUTPATIENT)
Dept: GENERAL RADIOLOGY | Facility: HOSPITAL | Age: 74
End: 2019-04-21

## 2019-04-21 ENCOUNTER — APPOINTMENT (OUTPATIENT)
Dept: CT IMAGING | Facility: HOSPITAL | Age: 74
End: 2019-04-21

## 2019-04-21 ENCOUNTER — HOSPITAL ENCOUNTER (EMERGENCY)
Facility: HOSPITAL | Age: 74
Discharge: HOME | End: 2019-04-21
Attending: EMERGENCY MEDICINE | Admitting: EMERGENCY MEDICINE

## 2019-04-21 VITALS
OXYGEN SATURATION: 99 % | TEMPERATURE: 98.4 F | RESPIRATION RATE: 18 BRPM | HEART RATE: 87 BPM | DIASTOLIC BLOOD PRESSURE: 98 MMHG | HEIGHT: 64 IN | BODY MASS INDEX: 25.61 KG/M2 | SYSTOLIC BLOOD PRESSURE: 162 MMHG | WEIGHT: 150 LBS

## 2019-04-21 DIAGNOSIS — S32.592A CLOSED FRACTURE OF RAMUS OF LEFT PUBIS, INITIAL ENCOUNTER (HCC): Primary | ICD-10-CM

## 2019-04-21 DIAGNOSIS — S30.0XXA CONTUSION OF COCCYX, INITIAL ENCOUNTER: ICD-10-CM

## 2019-04-21 PROCEDURE — 73502 X-RAY EXAM HIP UNI 2-3 VIEWS: CPT | Performed by: RADIOLOGY

## 2019-04-21 PROCEDURE — 72131 CT LUMBAR SPINE W/O DYE: CPT | Performed by: RADIOLOGY

## 2019-04-21 PROCEDURE — 73502 X-RAY EXAM HIP UNI 2-3 VIEWS: CPT

## 2019-04-21 PROCEDURE — 72131 CT LUMBAR SPINE W/O DYE: CPT

## 2019-04-21 PROCEDURE — 99284 EMERGENCY DEPT VISIT MOD MDM: CPT

## 2019-04-21 RX ORDER — HYDROCODONE BITARTRATE AND ACETAMINOPHEN 5; 325 MG/1; MG/1
1 TABLET ORAL EVERY 4 HOURS PRN
Qty: 12 TABLET | Refills: 0 | Status: ON HOLD | OUTPATIENT
Start: 2019-04-21 | End: 2019-11-03

## 2019-04-22 ENCOUNTER — PATIENT OUTREACH (OUTPATIENT)
Dept: CASE MANAGEMENT | Facility: OTHER | Age: 74
End: 2019-04-22

## 2019-04-22 NOTE — OUTREACH NOTE
RN-CC HRCM outreach to patient.  Details of f/u appointment with Orthopedic Surgery relayed to patient.  Dr. Elizalde only available Wednesday and Thursday of this week.  Patient had requested afternoon appointment.  First available acceptable to patient.  4/24/2019 at 1:00 pm.  Patient has AVS in hand.  Clinic address and number reviewed.  RN-CC contact information provided.

## 2019-04-22 NOTE — OUTREACH NOTE
Care Management Plan 4/22/2019   Lifestyle Goals Decrease falls risk;Medication management   Suggested Appointments Other (See Comment)   Suggested Appointments Follow with orthopedic surgeon and PCP as recommended.   Discharged From: [facility] ED   Discharged to: Home / Self Care   Admit Date: 04/21/2019   Discharge Date: 04/21/2019   Discharge destination: Home   Medication Adherence Other (see comment)   Medication Adherence states adherent     RN-CC contact following patient ED visit at [facility].  Patient presented with c/o pain after experiencing two recent falls. Resulting diagnosis: fracture of ramus of left pubis and contusion of coccyx.  Patient to follow with orthopedic surgeon.  Patient complains of continued discomfort.  Education provided on rest, appropriate use of prescribed pain meds, and importance of follow up with providers.  RN-CC will schedule appointment for patient.  Patient normally drives but has family that can assist. After visit summary reviewed.     Elke Hyatt RN

## 2019-04-22 NOTE — OUTREACH NOTE
RN-CC spoke with patient.  Same day appointment 4/22/2019 at 11:40 acceptable.  Patient stated she did not live far from PCP office and could make this appointment.   available for transportation.

## 2019-04-22 NOTE — OUTREACH NOTE
RNKaleyCC spoke with scheduling staff of [facility].  Same day appointment arranged - 4/22/2019 at 11:40 with Dr. Bower.  RNJENNIFER will notify patient.

## 2019-04-22 NOTE — OUTREACH NOTE
RN-CC received call from patient.  Patient expresses intolerable pain not relieved by Norco.  RN-CC will contact PCP to arrange same day appointment.

## 2019-04-22 NOTE — OUTREACH NOTE
Call returned to RN-CC.  Patient appointment scheduled: Wednesday, 4/24/2019 at 1:00 pm with Dr. Diomedes Elizalde, Orthopedic Surgery.   [phone]  RN-CC will relay information to patient.

## 2019-05-20 RX ORDER — CARVEDILOL 12.5 MG/1
TABLET ORAL
Qty: 60 TABLET | Refills: 5 | Status: SHIPPED | OUTPATIENT
Start: 2019-05-20 | End: 2019-07-23 | Stop reason: SDUPTHER

## 2019-07-23 ENCOUNTER — OFFICE VISIT (OUTPATIENT)
Dept: CARDIOLOGY | Facility: CLINIC | Age: 74
End: 2019-07-23

## 2019-07-23 VITALS
WEIGHT: 153 LBS | OXYGEN SATURATION: 98 % | HEART RATE: 61 BPM | DIASTOLIC BLOOD PRESSURE: 78 MMHG | SYSTOLIC BLOOD PRESSURE: 162 MMHG | BODY MASS INDEX: 26.12 KG/M2 | HEIGHT: 64 IN

## 2019-07-23 DIAGNOSIS — C92.01 ACUTE MYELOID LEUKEMIA IN REMISSION (HCC): ICD-10-CM

## 2019-07-23 DIAGNOSIS — I10 ESSENTIAL HYPERTENSION: ICD-10-CM

## 2019-07-23 DIAGNOSIS — E78.5 DYSLIPIDEMIA: ICD-10-CM

## 2019-07-23 DIAGNOSIS — I21.4 NON-STEMI (NON-ST ELEVATED MYOCARDIAL INFARCTION) (HCC): Primary | ICD-10-CM

## 2019-07-23 PROCEDURE — 99213 OFFICE O/P EST LOW 20 MIN: CPT | Performed by: NURSE PRACTITIONER

## 2019-07-23 RX ORDER — AMLODIPINE BESYLATE 10 MG/1
10 TABLET ORAL DAILY
Qty: 30 TABLET | Refills: 5 | Status: SHIPPED | OUTPATIENT
Start: 2019-07-23 | End: 2019-09-03

## 2019-07-23 RX ORDER — CARVEDILOL 12.5 MG/1
12.5 TABLET ORAL 2 TIMES DAILY
Qty: 60 TABLET | Refills: 5 | Status: SHIPPED | OUTPATIENT
Start: 2019-07-23 | End: 2019-10-29 | Stop reason: SDUPTHER

## 2019-07-23 RX ORDER — HYDROCHLOROTHIAZIDE 25 MG/1
25 TABLET ORAL DAILY
Qty: 30 TABLET | Refills: 5 | Status: SHIPPED | OUTPATIENT
Start: 2019-07-23 | End: 2019-10-29 | Stop reason: ALTCHOICE

## 2019-07-23 RX ORDER — LISINOPRIL 40 MG/1
40 TABLET ORAL DAILY
COMMUNITY
End: 2019-10-29 | Stop reason: SDUPTHER

## 2019-07-23 RX ORDER — ATORVASTATIN CALCIUM 10 MG/1
10 TABLET, FILM COATED ORAL NIGHTLY
Qty: 30 TABLET | Refills: 5 | Status: SHIPPED | OUTPATIENT
Start: 2019-07-23 | End: 2019-10-29 | Stop reason: SDUPTHER

## 2019-07-23 NOTE — PROGRESS NOTES
Noe Bower MD  Tahmina Martinez  1945  07/23/2019    Patient Active Problem List   Diagnosis   • History of Non-STEMI (non-ST elevated myocardial infarction) with no coronary intervention needed in 2008, clinically stable.    • Dyspnea, class II-III.    • Hypertension, well controlled.   • History of Acute myelocytic leukemia,status post chemotherapy and bone marrow transplant in 2005.   • Breast cancer (right), status post radiation therapy in 08/2015   • Falling episodes   • Syncope   • RBBB unchanged from 2015   • Dyslipidemia   • Bifascicular block (RBBB plus LP FB)       Dear Noe Bower MD:    Subjective     Chief Complaint   Patient presents with   • Follow-up   • Med Management     Verbal. Advised to every visit.    • Balance Issues           History of Present Illness:    Tahmina Martinez is a 73 y.o. female with a past medical history significant for ASCVD with previous non-ST elevation myocardial infarction in 2015, hypertension, and history of leukemia as well as breast cancer both in remission.  She presents today for routine cardiology follow-up.  She reports she has been doing fairly well.  She denies any chest pain, shortness of breath, palpitations, dizziness, lightheadedness, near-syncope, or syncope.  Her blood pressure is elevated in the office today.  However, she reports she did take her medication she is to few minutes prior to coming into the office today.  She monitors her blood pressure at home and readings are typically lower.  She has been eating more high sodium foods and not drinking much water.  She does have recent labs with her which have been ordered by her PCP.  Her creatinine was 1.42 a few weeks ago.          Allergies   Allergen Reactions   • Latex    • Penicillins    • Zithromax [Azithromycin]    :      Current Outpatient Medications:   •  amLODIPine (NORVASC) 10 MG tablet, Take 1 tablet by mouth Daily. Take this at 1 PM daily., Disp: 30 tablet, Rfl: 5  •   aspirin 81 MG EC tablet, Take 81 mg by mouth Daily., Disp: , Rfl:   •  atorvastatin (LIPITOR) 10 MG tablet, Take 1 tablet by mouth Every Night., Disp: 30 tablet, Rfl: 5  •  Calcium Citrate-Vitamin D (CALCIUM + D PO), Take 600 mg by mouth., Disp: , Rfl:   •  carvedilol (COREG) 12.5 MG tablet, Take 1 tablet by mouth 2 (Two) Times a Day., Disp: 60 tablet, Rfl: 5  •  Coenzyme Q10 (CO Q-10) 200 MG capsule, Take  by mouth., Disp: , Rfl:   •  exemestane (AROMASIN) 25 MG chemo tablet, Take 25 mg by mouth Daily., Disp: , Rfl:   •  gabapentin (NEURONTIN) 100 MG capsule, Take 100 mg by mouth 3 (Three) Times a Day., Disp: , Rfl:   •  hydrochlorothiazide (HYDRODIURIL) 25 MG tablet, Take 1 tablet by mouth Daily., Disp: 30 tablet, Rfl: 5  •  HYDROcodone-acetaminophen (NORCO) 5-325 MG per tablet, Take 1 tablet by mouth Every 4 (Four) Hours As Needed for Severe Pain ., Disp: 12 tablet, Rfl: 0  •  lisinopril (PRINIVIL,ZESTRIL) 40 MG tablet, Take 40 mg by mouth Daily., Disp: , Rfl:   •  Loratadine 10 MG capsule, Take  by mouth., Disp: , Rfl:   •  meloxicam (MOBIC) 15 MG tablet, Take 7.5 mg by mouth 2 (Two) Times a Day., Disp: , Rfl:   •  metFORMIN (GLUCOPHAGE) 500 MG tablet, Take 500 mg by mouth Every Morning., Disp: , Rfl:   •  nitroglycerin (NITROSTAT) 0.4 MG SL tablet, Place 0.4 mg under the tongue Every 5 (Five) Minutes As Needed for chest pain. Take no more than 3 doses in 15 minutes., Disp: , Rfl:   •  pantoprazole (PROTONIX) 40 MG EC tablet, Take 40 mg by mouth Daily., Disp: , Rfl:   •  rOPINIRole (REQUIP) 0.25 MG tablet, Take 0.25 mg by mouth Every Night. Take 1 hour before bedtime., Disp: , Rfl:   •  traMADol (ULTRAM) 50 MG tablet, Take 50 mg by mouth 2 (Two) Times a Day As Needed for Moderate Pain (4-6)., Disp: , Rfl:   •  valACYclovir (VALTREX) 500 MG tablet, Take 1 g by mouth Daily., Disp: , Rfl:   •  vitamin D (ERGOCALCIFEROL) 04791 UNITS capsule capsule, Take 50,000 Units by mouth Every 14 (Fourteen) Days., Disp: , Rfl:  "      The following portions of the patient's history were reviewed and updated as appropriate: allergies, current medications, past family history, past medical history, past social history, past surgical history and problem list.    Social History     Tobacco Use   • Smoking status: Never Smoker   • Smokeless tobacco: Never Used   Substance Use Topics   • Alcohol use: No   • Drug use: No       Review of Systems   Constitution: Negative for weakness and malaise/fatigue.   Cardiovascular: Negative for chest pain, dyspnea on exertion, irregular heartbeat, leg swelling, near-syncope, orthopnea, palpitations, paroxysmal nocturnal dyspnea and syncope.   Respiratory: Negative for cough, shortness of breath and wheezing.    Neurological: Negative for dizziness and light-headedness.       Objective   Vitals:    07/23/19 0858 07/23/19 0927   BP: (!) 183/76 162/78   BP Location: Left arm    Patient Position: Sitting    Cuff Size: Adult    Pulse: 61    SpO2: 98%    Weight: 69.4 kg (153 lb)    Height: 162.6 cm (64\")      Body mass index is 26.26 kg/m².        Physical Exam   Constitutional: She is oriented to person, place, and time. She appears well-developed and well-nourished.   HENT:   Head: Normocephalic and atraumatic.   Cardiovascular: Normal rate, regular rhythm and normal heart sounds. Exam reveals no S3 and no S4.   No murmur heard.  Pulmonary/Chest: Effort normal and breath sounds normal. She has no wheezes. She has no rales.   Abdominal: Soft. Bowel sounds are normal.   Musculoskeletal: She exhibits no edema.   Neurological: She is alert and oriented to person, place, and time.   Skin: Skin is warm and dry.   Psychiatric: She has a normal mood and affect. Her behavior is normal.         Procedures      Assessment/Plan    Diagnosis Plan   1. History of Non-STEMI (non-ST elevated myocardial infarction) with no coronary intervention needed in 2008, clinically stable.   amLODIPine (NORVASC) 10 MG tablet    atorvastatin " (LIPITOR) 10 MG tablet    carvedilol (COREG) 12.5 MG tablet    hydrochlorothiazide (HYDRODIURIL) 25 MG tablet    Basic Metabolic Panel   2. Essential hypertension  amLODIPine (NORVASC) 10 MG tablet    atorvastatin (LIPITOR) 10 MG tablet    carvedilol (COREG) 12.5 MG tablet    hydrochlorothiazide (HYDRODIURIL) 25 MG tablet    Basic Metabolic Panel   3. Acute myeloid leukemia in remission (CMS/HCC)  Basic Metabolic Panel   4. Dyslipidemia  amLODIPine (NORVASC) 10 MG tablet    atorvastatin (LIPITOR) 10 MG tablet    carvedilol (COREG) 12.5 MG tablet    hydrochlorothiazide (HYDRODIURIL) 25 MG tablet    Basic Metabolic Panel                Recommendations:    1.  Since her renal function was abnormal, I have ordered a repeat BMP.  I have asked her to increase her water intake.  I have also asked her to avoid high sodium foods such as canned foods, processed foods, chips, pickles, lunch meats, beats, and pizzas.      2.  I have also asked her to monitor her blood pressure twice a day at home and let us know how her BP is running.  If BP is greater than 140/90, we will consider adding hydralazine.    3.  Follow-up in 6 weeks and if needed.      Return in about 6 weeks (around 9/3/2019) for Recheck.    As always, I appreciate very much the opportunity to participate in the cardiovascular care of your patients.      With Best Regards,    JENNY Hope

## 2019-09-03 ENCOUNTER — OFFICE VISIT (OUTPATIENT)
Dept: CARDIOLOGY | Facility: CLINIC | Age: 74
End: 2019-09-03

## 2019-09-03 VITALS
WEIGHT: 156.4 LBS | DIASTOLIC BLOOD PRESSURE: 74 MMHG | OXYGEN SATURATION: 99 % | HEART RATE: 63 BPM | BODY MASS INDEX: 26.7 KG/M2 | HEIGHT: 64 IN | SYSTOLIC BLOOD PRESSURE: 176 MMHG

## 2019-09-03 DIAGNOSIS — I45.2 BIFASCICULAR BLOCK: Primary | ICD-10-CM

## 2019-09-03 DIAGNOSIS — I10 ESSENTIAL HYPERTENSION: ICD-10-CM

## 2019-09-03 DIAGNOSIS — I21.4 NON-STEMI (NON-ST ELEVATED MYOCARDIAL INFARCTION) (HCC): ICD-10-CM

## 2019-09-03 DIAGNOSIS — R00.2 PALPITATIONS: ICD-10-CM

## 2019-09-03 DIAGNOSIS — R06.00 DYSPNEA, UNSPECIFIED TYPE: ICD-10-CM

## 2019-09-03 DIAGNOSIS — E78.5 DYSLIPIDEMIA: ICD-10-CM

## 2019-09-03 PROCEDURE — 93000 ELECTROCARDIOGRAM COMPLETE: CPT | Performed by: NURSE PRACTITIONER

## 2019-09-03 PROCEDURE — 99214 OFFICE O/P EST MOD 30 MIN: CPT | Performed by: NURSE PRACTITIONER

## 2019-09-03 RX ORDER — HYDRALAZINE HYDROCHLORIDE 25 MG/1
25 TABLET, FILM COATED ORAL 3 TIMES DAILY
Qty: 90 TABLET | Refills: 5 | Status: SHIPPED | OUTPATIENT
Start: 2019-09-03 | End: 2019-10-29 | Stop reason: SDUPTHER

## 2019-09-03 NOTE — PROGRESS NOTES
Noe Bower MD  Tahmina Martinez  1945  09/03/2019    Patient Active Problem List   Diagnosis   • History of Non-STEMI (non-ST elevated myocardial infarction) with no coronary intervention needed in 2008, clinically stable.    • Dyspnea, class II-III.    • Hypertension, well controlled.   • History of Acute myelocytic leukemia,status post chemotherapy and bone marrow transplant in 2005.   • Breast cancer (right), status post radiation therapy in 08/2015   • Falling episodes   • Syncope   • RBBB unchanged from 2015   • Dyslipidemia   • Bifascicular block (RBBB plus LP FB)       Dear Noe Bower MD:    Subjective     Chief Complaint   Patient presents with   • Follow-up   • Med Management     Verbal med list.    • Results     labs   • Edema   • Palpitations           History of Present Illness:    Tahmina Martinez is a 74 y.o. female with a past medical history significant for ASCVD with previous non-ST elevation myocardial infarction in 2015, hypertension, and history of leukemia as well as breast cancer both in remission.  She presents today for routine cardiology follow-up.  Her blood pressure is elevated in the office today.  The last several readings in our office have been elevated.  The patient does not monitor her blood pressure routinely at home.  Previously her creatinine was 1.4 to.  However, the patient reports she was not drinking much water.  She increase her water intake and creatinine x2 has been within normal limits.  She does report over the past year she has had 3 falls.  This occurred when she was feeling very dizzy.  She denies any near syncope or syncopal episodes.  She she does have occasional palpitations.  In addition, she has noticed both of her ankles have been edematous over the last few weeks. She denies shortness of breath, orthopnea, or PND.          Allergies   Allergen Reactions   • Latex    • Penicillins    • Zithromax [Azithromycin]    :      Current Outpatient  Medications:   •  aspirin 81 MG EC tablet, Take 81 mg by mouth Daily., Disp: , Rfl:   •  atorvastatin (LIPITOR) 10 MG tablet, Take 1 tablet by mouth Every Night., Disp: 30 tablet, Rfl: 5  •  Calcium Citrate-Vitamin D (CALCIUM + D PO), Take 600 mg by mouth., Disp: , Rfl:   •  carvedilol (COREG) 12.5 MG tablet, Take 1 tablet by mouth 2 (Two) Times a Day., Disp: 60 tablet, Rfl: 5  •  Coenzyme Q10 (CO Q-10) 200 MG capsule, Take  by mouth., Disp: , Rfl:   •  exemestane (AROMASIN) 25 MG chemo tablet, Take 25 mg by mouth Daily., Disp: , Rfl:   •  gabapentin (NEURONTIN) 100 MG capsule, Take 100 mg by mouth 3 (Three) Times a Day., Disp: , Rfl:   •  hydrochlorothiazide (HYDRODIURIL) 25 MG tablet, Take 1 tablet by mouth Daily., Disp: 30 tablet, Rfl: 5  •  HYDROcodone-acetaminophen (NORCO) 5-325 MG per tablet, Take 1 tablet by mouth Every 4 (Four) Hours As Needed for Severe Pain ., Disp: 12 tablet, Rfl: 0  •  lisinopril (PRINIVIL,ZESTRIL) 40 MG tablet, Take 40 mg by mouth Daily., Disp: , Rfl:   •  Loratadine 10 MG capsule, Take  by mouth., Disp: , Rfl:   •  meloxicam (MOBIC) 15 MG tablet, Take 7.5 mg by mouth 2 (Two) Times a Day., Disp: , Rfl:   •  metFORMIN (GLUCOPHAGE) 500 MG tablet, Take 500 mg by mouth Every Morning., Disp: , Rfl:   •  nitroglycerin (NITROSTAT) 0.4 MG SL tablet, Place 0.4 mg under the tongue Every 5 (Five) Minutes As Needed for chest pain. Take no more than 3 doses in 15 minutes., Disp: , Rfl:   •  pantoprazole (PROTONIX) 40 MG EC tablet, Take 40 mg by mouth Daily., Disp: , Rfl:   •  rOPINIRole (REQUIP) 0.25 MG tablet, Take 0.25 mg by mouth Every Night. Take 1 hour before bedtime., Disp: , Rfl:   •  traMADol (ULTRAM) 50 MG tablet, Take 50 mg by mouth 2 (Two) Times a Day As Needed for Moderate Pain (4-6)., Disp: , Rfl:   •  valACYclovir (VALTREX) 500 MG tablet, Take 1 g by mouth Daily., Disp: , Rfl:   •  vitamin D (ERGOCALCIFEROL) 19731 UNITS capsule capsule, Take 50,000 Units by mouth Every 14 (Fourteen)  "Days., Disp: , Rfl:   •  hydrALAZINE (APRESOLINE) 25 MG tablet, Take 1 tablet by mouth 3 (Three) Times a Day., Disp: 90 tablet, Rfl: 5      The following portions of the patient's history were reviewed and updated as appropriate: allergies, current medications, past family history, past medical history, past social history, past surgical history and problem list.    Social History     Tobacco Use   • Smoking status: Never Smoker   • Smokeless tobacco: Never Used   Substance Use Topics   • Alcohol use: No   • Drug use: No       Review of Systems   Constitution: Negative for weakness and malaise/fatigue.   Cardiovascular: Positive for leg swelling and palpitations. Negative for chest pain, dyspnea on exertion, irregular heartbeat, near-syncope, orthopnea, paroxysmal nocturnal dyspnea and syncope.   Respiratory: Negative for cough, shortness of breath and wheezing.    Neurological: Positive for dizziness. Negative for light-headedness.       Objective   Vitals:    09/03/19 1435   BP: 176/74   BP Location: Left arm   Patient Position: Sitting   Cuff Size: Adult   Pulse: 63   SpO2: 99%   Weight: 70.9 kg (156 lb 6.4 oz)   Height: 162.6 cm (64\")     Body mass index is 26.85 kg/m².        Physical Exam   Constitutional: She is oriented to person, place, and time. She appears well-developed and well-nourished.   HENT:   Head: Normocephalic and atraumatic.   Cardiovascular: Normal rate, regular rhythm and normal heart sounds. Exam reveals no S3 and no S4.   No murmur heard.  Pulmonary/Chest: Effort normal and breath sounds normal. She has no wheezes. She has no rales.   Abdominal: Soft. Bowel sounds are normal.   Musculoskeletal: She exhibits edema (trace bilateral ankles).   Neurological: She is alert and oriented to person, place, and time.   Skin: Skin is warm and dry.   Psychiatric: She has a normal mood and affect. Her behavior is normal.           ECG 12 Lead  Date/Time: 9/3/2019 2:35 PM  Performed by: Eduardo" JENNY Rae  Authorized by: Kyra Clark APRN   Comparison: compared with previous ECG   Similar to previous ECG  Rhythm: sinus bradycardia  BPM: 59  Conduction: right bundle branch block and 1st degree AV block              Assessment/Plan    Diagnosis Plan   1. Bifascicular block (RBBB plus LP FB)     2. History of Non-STEMI (non-ST elevated myocardial infarction) with no coronary intervention needed in 2008, clinically stable.      3. Essential hypertension  hydrALAZINE (APRESOLINE) 25 MG tablet   4. Dyspnea, unspecified type     5. Dyslipidemia     6. Palpitations  Holter Monitor - 24 Hour                Recommendations:    1. Will evaluate her palpitations and dizziness further with a 24 hour holter monitor.    2. For her elevated blood pressure, will discontinue amlodipine and start hydralazine 25 mg TID.    3. I have asked her to stop by our office once a week for the next few weeks so we can check her blood pressure.    4. Continue low dose aspirin, atorvastatin, carvedilol, HCTZ, and lisinopril.    5. Follow up in 6 weeks and PRN.      Return in about 6 weeks (around 10/15/2019) for Recheck.    POC discussed with Dr. Soler    As always, I appreciate very much the opportunity to participate in the cardiovascular care of your patients.      With Best Regards,    JENNY Hope

## 2019-09-04 ENCOUNTER — HOSPITAL ENCOUNTER (OUTPATIENT)
Dept: RESPIRATORY THERAPY | Facility: HOSPITAL | Age: 74
Discharge: HOME | End: 2019-09-04
Admitting: NURSE PRACTITIONER

## 2019-09-04 DIAGNOSIS — R00.2 PALPITATIONS: ICD-10-CM

## 2019-09-04 PROCEDURE — 93226 XTRNL ECG REC<48 HR SCAN A/R: CPT

## 2019-09-04 PROCEDURE — 93225 XTRNL ECG REC<48 HRS REC: CPT

## 2019-09-06 PROCEDURE — 93227 XTRNL ECG REC<48 HR R&I: CPT | Performed by: INTERNAL MEDICINE

## 2019-09-16 ENCOUNTER — TELEPHONE (OUTPATIENT)
Dept: CARDIOLOGY | Facility: CLINIC | Age: 74
End: 2019-09-16

## 2019-09-16 NOTE — TELEPHONE ENCOUNTER
Spoke with patient she said the last 2 weeks her BP has been running in the low 140's/80's. She states that it has went up because she has been under a tremendous amount of stress with her family. She states that until this it was not running high. She states she keeps a check on it with a home monitor.

## 2019-09-17 NOTE — TELEPHONE ENCOUNTER
Called and spoke with patient she expressed verbal understanding and stated she would continue to monitor her BP.

## 2019-09-24 ENCOUNTER — TELEPHONE (OUTPATIENT)
Dept: CARDIOLOGY | Facility: CLINIC | Age: 74
End: 2019-09-24

## 2019-09-25 ENCOUNTER — TELEPHONE (OUTPATIENT)
Dept: CARDIOLOGY | Facility: CLINIC | Age: 74
End: 2019-09-25

## 2019-10-29 ENCOUNTER — OFFICE VISIT (OUTPATIENT)
Dept: CARDIOLOGY | Facility: CLINIC | Age: 74
End: 2019-10-29

## 2019-10-29 VITALS
DIASTOLIC BLOOD PRESSURE: 71 MMHG | HEART RATE: 62 BPM | WEIGHT: 156.6 LBS | SYSTOLIC BLOOD PRESSURE: 163 MMHG | BODY MASS INDEX: 26.73 KG/M2 | HEIGHT: 64 IN | OXYGEN SATURATION: 97 %

## 2019-10-29 DIAGNOSIS — I10 ESSENTIAL HYPERTENSION: ICD-10-CM

## 2019-10-29 DIAGNOSIS — I21.4 NON-STEMI (NON-ST ELEVATED MYOCARDIAL INFARCTION) (HCC): Primary | ICD-10-CM

## 2019-10-29 DIAGNOSIS — E78.5 DYSLIPIDEMIA: ICD-10-CM

## 2019-10-29 DIAGNOSIS — I45.2 BIFASCICULAR BLOCK: ICD-10-CM

## 2019-10-29 PROCEDURE — 99214 OFFICE O/P EST MOD 30 MIN: CPT | Performed by: INTERNAL MEDICINE

## 2019-10-29 RX ORDER — TRIAMTERENE AND HYDROCHLOROTHIAZIDE 37.5; 25 MG/1; MG/1
1 TABLET ORAL DAILY
Qty: 30 TABLET | Refills: 5 | Status: SHIPPED | OUTPATIENT
Start: 2019-10-29 | End: 2019-11-21 | Stop reason: HOSPADM

## 2019-10-29 RX ORDER — LISINOPRIL 40 MG/1
40 TABLET ORAL DAILY
Qty: 90 TABLET | Refills: 2 | Status: SHIPPED | OUTPATIENT
Start: 2019-10-29 | End: 2019-11-27

## 2019-10-29 RX ORDER — HYDRALAZINE HYDROCHLORIDE 25 MG/1
25 TABLET, FILM COATED ORAL 3 TIMES DAILY
Qty: 90 TABLET | Refills: 5 | Status: SHIPPED | OUTPATIENT
Start: 2019-10-29 | End: 2019-11-04 | Stop reason: HOSPADM

## 2019-10-29 RX ORDER — ATORVASTATIN CALCIUM 10 MG/1
10 TABLET, FILM COATED ORAL NIGHTLY
Qty: 90 TABLET | Refills: 2 | Status: SHIPPED | OUTPATIENT
Start: 2019-10-29 | End: 2020-01-22 | Stop reason: SDUPTHER

## 2019-10-29 RX ORDER — CARVEDILOL 12.5 MG/1
12.5 TABLET ORAL 2 TIMES DAILY
Qty: 180 TABLET | Refills: 2 | Status: SHIPPED | OUTPATIENT
Start: 2019-10-29 | End: 2019-12-03 | Stop reason: HOSPADM

## 2019-10-29 NOTE — PROGRESS NOTES
Noe Bower MD  Tahmina Martinez  1945  10/29/2019    Patient Active Problem List   Diagnosis   • History of Non-STEMI (non-ST elevated myocardial infarction) with no coronary intervention needed in 2008, clinically stable.    • Dyspnea, class II-III.    • Hypertension, well controlled.   • History of Acute myelocytic leukemia,status post chemotherapy and bone marrow transplant in 2005.   • Breast cancer (right), status post radiation therapy in 08/2015   • Falling episodes   • Syncope   • RBBB unchanged from 2015   • Dyslipidemia   • Bifascicular block (RBBB plus LP FB)       Dear Noe Bower MD:    Subjective     Tahmina Martinez is a 74 y.o. female with the problems as listed above, presents    Chief complaint: Follow-up of coronary artery disease with previous history of non-ST elevation microinfarction.    History of Present Illness: Ms. Liu is a pleasant 74-year-old  female with history of non-ST elevation microinfarction and nonobstructive coronary artery disease, is here for a cardiology follow-up.  She denies any complains of chest pains or any significant shortness of breath.  She is able to go up a flight of stairs without any significant dyspnea.  She denies any PND, orthopnea but has mild intermittent leg edema.  Her weight has been stable in the last few weeks.      Allergies   Allergen Reactions   • Latex    • Penicillins    • Zithromax [Azithromycin]    :      Current Outpatient Medications:   •  aspirin 81 MG EC tablet, Take 81 mg by mouth Daily., Disp: , Rfl:   •  atorvastatin (LIPITOR) 10 MG tablet, Take 1 tablet by mouth Every Night., Disp: 90 tablet, Rfl: 2  •  Calcium Citrate-Vitamin D (CALCIUM + D PO), Take 600 mg by mouth., Disp: , Rfl:   •  carvedilol (COREG) 12.5 MG tablet, Take 1 tablet by mouth 2 (Two) Times a Day., Disp: 180 tablet, Rfl: 2  •  Coenzyme Q10 (CO Q-10) 200 MG capsule, Take 200 mg by mouth Daily., Disp: , Rfl:   •  exemestane (AROMASIN) 25 MG  chemo tablet, Take 25 mg by mouth Daily., Disp: , Rfl:   •  lisinopril (PRINIVIL,ZESTRIL) 40 MG tablet, Take 1 tablet by mouth Daily., Disp: 90 tablet, Rfl: 2  •  Loratadine 10 MG capsule, Take  by mouth., Disp: , Rfl:   •  meloxicam (MOBIC) 15 MG tablet, Take 7.5 mg by mouth 2 (Two) Times a Day., Disp: , Rfl:   •  metFORMIN (GLUCOPHAGE) 500 MG tablet, Take 500 mg by mouth Every Morning., Disp: , Rfl:   •  nitroglycerin (NITROSTAT) 0.4 MG SL tablet, Place 0.4 mg under the tongue Every 5 (Five) Minutes As Needed for chest pain. Take no more than 3 doses in 15 minutes., Disp: , Rfl:   •  pantoprazole (PROTONIX) 40 MG EC tablet, Take 40 mg by mouth Daily., Disp: , Rfl:   •  rOPINIRole (REQUIP) 0.25 MG tablet, Take 0.25 mg by mouth Every Night. Take 1 hour before bedtime., Disp: , Rfl:   •  traMADol (ULTRAM) 50 MG tablet, Take 50 mg by mouth 2 (Two) Times a Day As Needed for Moderate Pain (4-6)., Disp: , Rfl:   •  valACYclovir (VALTREX) 500 MG tablet, Take 1 g by mouth Daily., Disp: , Rfl:   •  vitamin D (ERGOCALCIFEROL) 83620 UNITS capsule capsule, Take 50,000 Units by mouth Every 14 (Fourteen) Days., Disp: , Rfl:   •  hydrALAZINE (APRESOLINE) 25 MG tablet, Take 1 tablet by mouth 3 (Three) Times a Day., Disp: 90 tablet, Rfl: 5  •  HYDROcodone-acetaminophen (NORCO) 5-325 MG per tablet, Take 1 tablet by mouth Every 4 (Four) Hours As Needed for Severe Pain ., Disp: 12 tablet, Rfl: 0  •  triamterene-hydrochlorothiazide (MAXZIDE-25) 37.5-25 MG per tablet, Take 1 tablet by mouth Daily., Disp: 30 tablet, Rfl: 5      The following portions of the patient's history were reviewed and updated as appropriate: allergies, current medications, past family history, past medical history, past social history, past surgical history and problem list.    Social History     Tobacco Use   • Smoking status: Never Smoker   • Smokeless tobacco: Never Used   Substance Use Topics   • Alcohol use: No   • Drug use: No       Review of Systems  "  Constitution: Negative for chills and fever.   HENT: Negative for nosebleeds and sore throat.    Respiratory: Negative for cough, hemoptysis and wheezing.    Gastrointestinal: Negative for abdominal pain, hematemesis, hematochezia, melena, nausea and vomiting.   Genitourinary: Negative for dysuria and hematuria.   Neurological: Negative for headaches.       Objective   Vitals:    10/29/19 1221   BP: 163/71   BP Location: Right arm   Patient Position: Sitting   Cuff Size: Adult   Pulse: 62   SpO2: 97%   Weight: 71 kg (156 lb 9.6 oz)   Height: 162.6 cm (64\")     Body mass index is 26.88 kg/m².        Physical Exam   Constitutional: She is oriented to person, place, and time. She appears well-developed and well-nourished.   HENT:   Head: Normocephalic.   Eyes: Conjunctivae and EOM are normal.   Neck: Normal range of motion. Neck supple. No JVD present. No tracheal deviation present. No thyromegaly present.   Cardiovascular: Normal rate and regular rhythm. Exam reveals no gallop and no friction rub.   No murmur heard.  Pulmonary/Chest: Breath sounds normal. No respiratory distress. She has no wheezes. She has no rales.   Abdominal: Soft. Bowel sounds are normal. She exhibits no mass. There is no tenderness.   Musculoskeletal: She exhibits edema (1+ edema on both legs.).   Neurological: She is alert and oriented to person, place, and time. No cranial nerve deficit.   Skin: Skin is warm and dry.   Psychiatric: She has a normal mood and affect.         Procedures      Assessment/Plan :   Diagnosis Plan   1. History of Non-STEMI (non-ST elevated myocardial infarction) with no coronary intervention needed in 2008, clinically stable.      2. Essential hypertension with elevated systolic blood pressure today.     3. Bifascicular block (RBBB plus LP FB), clinically asymptomatic and stable.     4. Dyslipidemia on atorvastatin.        Recommendations:  1. Continue with aspirin, atorvastatin, amlodipine and carvedilol at current " doses.  2. Change HCTZ to triamterene HCTZ 25/37.5mg daily to hopefully get a better blood pressure can.   3. Keep a check on the blood pressure at home and call if it is running more than 140 on the top or more than 90 on the bottom.  4. Check BMP in 1 week.    Return in about 5 months (around 3/29/2020).    As always, Bill I appreciate very much the opportunity to participate in the cardiovascular care of your patients. Please do not hesitate to call me with any questions with regards to Tahmina Martinez's evaluation and management.       With Best Regards,        Dashawn Sloer MD, [facility]    Dragon disclaimer:  Much of this encounter note is an electronic transcription/translation of spoken language to printed text. The electronic translation of spoken language may permit erroneous, or at times, nonsensical words or phrases to be inadvertently transcribed; Although I have reviewed the note for such errors, some may still exist.

## 2019-11-02 ENCOUNTER — APPOINTMENT (OUTPATIENT)
Dept: CT IMAGING | Facility: HOSPITAL | Age: 74
End: 2019-11-02

## 2019-11-02 ENCOUNTER — HOSPITAL ENCOUNTER (OUTPATIENT)
Facility: HOSPITAL | Age: 74
Setting detail: OBSERVATION
Discharge: HOME | End: 2019-11-04
Attending: FAMILY MEDICINE | Admitting: INTERNAL MEDICINE

## 2019-11-02 ENCOUNTER — APPOINTMENT (OUTPATIENT)
Dept: GENERAL RADIOLOGY | Facility: HOSPITAL | Age: 74
End: 2019-11-02

## 2019-11-02 DIAGNOSIS — I16.0 HYPERTENSIVE URGENCY: Primary | ICD-10-CM

## 2019-11-02 LAB
ALBUMIN SERPL-MCNC: 4.26 G/DL (ref 3.5–5.2)
ALBUMIN/GLOB SERPL: 1.1 G/DL
ALP SERPL-CCNC: 131 U/L (ref 39–117)
ALT SERPL W P-5'-P-CCNC: 23 U/L (ref 1–33)
ANION GAP SERPL CALCULATED.3IONS-SCNC: 13.3 MMOL/L (ref 5–15)
APTT PPP: 26.8 SECONDS (ref 23.8–36.1)
AST SERPL-CCNC: 29 U/L (ref 1–32)
BACTERIA UR QL AUTO: ABNORMAL /HPF
BASOPHILS # BLD AUTO: 0.07 10*3/MM3 (ref 0–0.2)
BASOPHILS NFR BLD AUTO: 0.9 % (ref 0–1.5)
BILIRUB SERPL-MCNC: 0.2 MG/DL (ref 0.2–1.2)
BILIRUB UR QL STRIP: NEGATIVE
BUN BLD-MCNC: 12 MG/DL (ref 8–23)
BUN/CREAT SERPL: 15.6 (ref 7–25)
CALCIUM SPEC-SCNC: 9.9 MG/DL (ref 8.6–10.5)
CHLORIDE SERPL-SCNC: 100 MMOL/L (ref 98–107)
CLARITY UR: CLEAR
CO2 SERPL-SCNC: 23.7 MMOL/L (ref 22–29)
COLOR UR: YELLOW
CREAT BLD-MCNC: 0.77 MG/DL (ref 0.57–1)
CRP SERPL-MCNC: 0.33 MG/DL (ref 0–0.5)
DEPRECATED RDW RBC AUTO: 51.1 FL (ref 37–54)
EOSINOPHIL # BLD AUTO: 0.24 10*3/MM3 (ref 0–0.4)
EOSINOPHIL NFR BLD AUTO: 3.2 % (ref 0.3–6.2)
ERYTHROCYTE [DISTWIDTH] IN BLOOD BY AUTOMATED COUNT: 13.2 % (ref 12.3–15.4)
GFR SERPL CREATININE-BSD FRML MDRD: 73 ML/MIN/1.73
GLOBULIN UR ELPH-MCNC: 4 GM/DL
GLUCOSE BLD-MCNC: 93 MG/DL (ref 65–99)
GLUCOSE UR STRIP-MCNC: NEGATIVE MG/DL
HCT VFR BLD AUTO: 37.8 % (ref 34–46.6)
HGB BLD-MCNC: 12.7 G/DL (ref 12–15.9)
HGB UR QL STRIP.AUTO: NEGATIVE
HYALINE CASTS UR QL AUTO: ABNORMAL /LPF
IMM GRANULOCYTES # BLD AUTO: 0.02 10*3/MM3 (ref 0–0.05)
IMM GRANULOCYTES NFR BLD AUTO: 0.3 % (ref 0–0.5)
INR PPP: 0.97 (ref 0.9–1.1)
KETONES UR QL STRIP: NEGATIVE
LEUKOCYTE ESTERASE UR QL STRIP.AUTO: ABNORMAL
LIPASE SERPL-CCNC: 31 U/L (ref 13–60)
LYMPHOCYTES # BLD AUTO: 2.57 10*3/MM3 (ref 0.7–3.1)
LYMPHOCYTES NFR BLD AUTO: 34 % (ref 19.6–45.3)
MCH RBC QN AUTO: 35.1 PG (ref 26.6–33)
MCHC RBC AUTO-ENTMCNC: 33.6 G/DL (ref 31.5–35.7)
MCV RBC AUTO: 104.4 FL (ref 79–97)
MONOCYTES # BLD AUTO: 1.11 10*3/MM3 (ref 0.1–0.9)
MONOCYTES NFR BLD AUTO: 14.7 % (ref 5–12)
NEUTROPHILS # BLD AUTO: 3.55 10*3/MM3 (ref 1.7–7)
NEUTROPHILS NFR BLD AUTO: 46.9 % (ref 42.7–76)
NITRITE UR QL STRIP: NEGATIVE
NRBC BLD AUTO-RTO: 0 /100 WBC (ref 0–0.2)
NT-PROBNP SERPL-MCNC: 795.4 PG/ML (ref 5–1800)
PH UR STRIP.AUTO: 7 [PH] (ref 5–8)
PLATELET # BLD AUTO: 378 10*3/MM3 (ref 140–450)
PMV BLD AUTO: 10 FL (ref 6–12)
POTASSIUM BLD-SCNC: 4.2 MMOL/L (ref 3.5–5.2)
PROT SERPL-MCNC: 8.3 G/DL (ref 6–8.5)
PROT UR QL STRIP: ABNORMAL
PROTHROMBIN TIME: 13.4 SECONDS (ref 11–15.4)
RBC # BLD AUTO: 3.62 10*6/MM3 (ref 3.77–5.28)
RBC # UR: ABNORMAL /HPF
REF LAB TEST METHOD: ABNORMAL
SODIUM BLD-SCNC: 137 MMOL/L (ref 136–145)
SP GR UR STRIP: <=1.005 (ref 1–1.03)
SQUAMOUS #/AREA URNS HPF: ABNORMAL /HPF
TROPONIN T SERPL-MCNC: <0.01 NG/ML (ref 0–0.03)
UROBILINOGEN UR QL STRIP: ABNORMAL
WBC NRBC COR # BLD: 7.56 10*3/MM3 (ref 3.4–10.8)
WBC UR QL AUTO: ABNORMAL /HPF

## 2019-11-02 PROCEDURE — 25010000002 HYDRALAZINE PER 20 MG: Performed by: FAMILY MEDICINE

## 2019-11-02 PROCEDURE — 85025 COMPLETE CBC W/AUTO DIFF WBC: CPT | Performed by: FAMILY MEDICINE

## 2019-11-02 PROCEDURE — 93005 ELECTROCARDIOGRAM TRACING: CPT | Performed by: FAMILY MEDICINE

## 2019-11-02 PROCEDURE — 85730 THROMBOPLASTIN TIME PARTIAL: CPT | Performed by: FAMILY MEDICINE

## 2019-11-02 PROCEDURE — 96374 THER/PROPH/DIAG INJ IV PUSH: CPT

## 2019-11-02 PROCEDURE — 84484 ASSAY OF TROPONIN QUANT: CPT | Performed by: FAMILY MEDICINE

## 2019-11-02 PROCEDURE — 86140 C-REACTIVE PROTEIN: CPT | Performed by: FAMILY MEDICINE

## 2019-11-02 PROCEDURE — 99284 EMERGENCY DEPT VISIT MOD MDM: CPT

## 2019-11-02 PROCEDURE — 85610 PROTHROMBIN TIME: CPT | Performed by: FAMILY MEDICINE

## 2019-11-02 PROCEDURE — 36415 COLL VENOUS BLD VENIPUNCTURE: CPT

## 2019-11-02 PROCEDURE — 80053 COMPREHEN METABOLIC PANEL: CPT | Performed by: FAMILY MEDICINE

## 2019-11-02 PROCEDURE — 96376 TX/PRO/DX INJ SAME DRUG ADON: CPT

## 2019-11-02 PROCEDURE — 83690 ASSAY OF LIPASE: CPT | Performed by: FAMILY MEDICINE

## 2019-11-02 PROCEDURE — 70450 CT HEAD/BRAIN W/O DYE: CPT

## 2019-11-02 PROCEDURE — 71045 X-RAY EXAM CHEST 1 VIEW: CPT

## 2019-11-02 PROCEDURE — 81001 URINALYSIS AUTO W/SCOPE: CPT | Performed by: FAMILY MEDICINE

## 2019-11-02 PROCEDURE — 83880 ASSAY OF NATRIURETIC PEPTIDE: CPT | Performed by: FAMILY MEDICINE

## 2019-11-02 RX ORDER — HYDRALAZINE HYDROCHLORIDE 20 MG/ML
10 INJECTION INTRAMUSCULAR; INTRAVENOUS ONCE
Status: COMPLETED | OUTPATIENT
Start: 2019-11-02 | End: 2019-11-02

## 2019-11-02 RX ORDER — SODIUM CHLORIDE 0.9 % (FLUSH) 0.9 %
10 SYRINGE (ML) INJECTION AS NEEDED
Status: DISCONTINUED | OUTPATIENT
Start: 2019-11-02 | End: 2019-11-04 | Stop reason: HOSPADM

## 2019-11-02 RX ADMIN — HYDRALAZINE HYDROCHLORIDE 10 MG: 20 INJECTION INTRAMUSCULAR; INTRAVENOUS at 23:20

## 2019-11-03 ENCOUNTER — APPOINTMENT (OUTPATIENT)
Dept: CARDIOLOGY | Facility: HOSPITAL | Age: 74
End: 2019-11-03

## 2019-11-03 PROBLEM — I16.0 HYPERTENSIVE URGENCY: Status: ACTIVE | Noted: 2019-11-03

## 2019-11-03 LAB
ALBUMIN SERPL-MCNC: 4 G/DL (ref 3.5–5.2)
ALBUMIN/GLOB SERPL: 1.1 G/DL
ALP SERPL-CCNC: 122 U/L (ref 39–117)
ALT SERPL W P-5'-P-CCNC: 20 U/L (ref 1–33)
ANION GAP SERPL CALCULATED.3IONS-SCNC: 12.9 MMOL/L (ref 5–15)
AST SERPL-CCNC: 26 U/L (ref 1–32)
BASOPHILS # BLD AUTO: 0.03 10*3/MM3 (ref 0–0.2)
BASOPHILS NFR BLD AUTO: 0.4 % (ref 0–1.5)
BH CV ECHO MEAS - % IVS THICK: 70.4 %
BH CV ECHO MEAS - % LVPW THICK: 28.2 %
BH CV ECHO MEAS - ACS: 1.3 CM
BH CV ECHO MEAS - AI DEC SLOPE: 95.3 CM/SEC^2
BH CV ECHO MEAS - AI MAX PG: 23 MMHG
BH CV ECHO MEAS - AI MAX VEL: 240 CM/SEC
BH CV ECHO MEAS - AI P1/2T: 737.6 MSEC
BH CV ECHO MEAS - AO MAX PG: 12.4 MMHG
BH CV ECHO MEAS - AO MEAN PG: 7 MMHG
BH CV ECHO MEAS - AO ROOT AREA (BSA CORRECTED): 1.7
BH CV ECHO MEAS - AO ROOT AREA: 6.4 CM^2
BH CV ECHO MEAS - AO ROOT DIAM: 2.9 CM
BH CV ECHO MEAS - AO V2 MAX: 176 CM/SEC
BH CV ECHO MEAS - AO V2 MEAN: 122 CM/SEC
BH CV ECHO MEAS - AO V2 VTI: 37.9 CM
BH CV ECHO MEAS - BSA(HAYCOCK): 1.7 M^2
BH CV ECHO MEAS - BSA: 1.7 M^2
BH CV ECHO MEAS - BZI_BMI: 26.3 KILOGRAMS/M^2
BH CV ECHO MEAS - BZI_METRIC_HEIGHT: 157.5 CM
BH CV ECHO MEAS - BZI_METRIC_WEIGHT: 65.3 KG
BH CV ECHO MEAS - EDV(CUBED): 64 ML
BH CV ECHO MEAS - EDV(MOD-SP4): 67 ML
BH CV ECHO MEAS - EDV(TEICH): 70 ML
BH CV ECHO MEAS - EF(CUBED): 83.9 %
BH CV ECHO MEAS - EF(MOD-SP4): 74.6 %
BH CV ECHO MEAS - EF(TEICH): 77.5 %
BH CV ECHO MEAS - ESV(CUBED): 10.3 ML
BH CV ECHO MEAS - ESV(MOD-SP4): 17 ML
BH CV ECHO MEAS - ESV(TEICH): 15.7 ML
BH CV ECHO MEAS - FS: 45.6 %
BH CV ECHO MEAS - IVS/LVPW: 0.93
BH CV ECHO MEAS - IVSD: 1.2 CM
BH CV ECHO MEAS - IVSS: 2.1 CM
BH CV ECHO MEAS - LA DIMENSION: 2.9 CM
BH CV ECHO MEAS - LA/AO: 1
BH CV ECHO MEAS - LV DIASTOLIC VOL/BSA (35-75): 40.3 ML/M^2
BH CV ECHO MEAS - LV MASS(C)D: 178.5 GRAMS
BH CV ECHO MEAS - LV MASS(C)DI: 107.4 GRAMS/M^2
BH CV ECHO MEAS - LV MASS(C)S: 165.1 GRAMS
BH CV ECHO MEAS - LV MASS(C)SI: 99.3 GRAMS/M^2
BH CV ECHO MEAS - LV SYSTOLIC VOL/BSA (12-30): 10.2 ML/M^2
BH CV ECHO MEAS - LVIDD: 4 CM
BH CV ECHO MEAS - LVIDS: 2.2 CM
BH CV ECHO MEAS - LVLD AP4: 6.6 CM
BH CV ECHO MEAS - LVLS AP4: 5.3 CM
BH CV ECHO MEAS - LVOT AREA (M): 2 CM^2
BH CV ECHO MEAS - LVOT AREA: 2 CM^2
BH CV ECHO MEAS - LVOT DIAM: 1.6 CM
BH CV ECHO MEAS - LVPWD: 1.3 CM
BH CV ECHO MEAS - LVPWS: 1.7 CM
BH CV ECHO MEAS - MV A MAX VEL: 95.3 CM/SEC
BH CV ECHO MEAS - MV E MAX VEL: 95.8 CM/SEC
BH CV ECHO MEAS - MV E/A: 1
BH CV ECHO MEAS - PA ACC SLOPE: 1051 CM/SEC^2
BH CV ECHO MEAS - PA ACC TIME: 0.12 SEC
BH CV ECHO MEAS - PA PR(ACCEL): 26.8 MMHG
BH CV ECHO MEAS - RAP SYSTOLE: 10 MMHG
BH CV ECHO MEAS - RVSP: 35.2 MMHG
BH CV ECHO MEAS - SI(AO): 145.4 ML/M^2
BH CV ECHO MEAS - SI(CUBED): 32.3 ML/M^2
BH CV ECHO MEAS - SI(MOD-SP4): 30.1 ML/M^2
BH CV ECHO MEAS - SI(TEICH): 32.6 ML/M^2
BH CV ECHO MEAS - SV(AO): 241.8 ML
BH CV ECHO MEAS - SV(CUBED): 53.7 ML
BH CV ECHO MEAS - SV(MOD-SP4): 50 ML
BH CV ECHO MEAS - SV(TEICH): 54.3 ML
BH CV ECHO MEAS - TR MAX VEL: 251 CM/SEC
BILIRUB SERPL-MCNC: 0.3 MG/DL (ref 0.2–1.2)
BUN BLD-MCNC: 10 MG/DL (ref 8–23)
BUN/CREAT SERPL: 13.7 (ref 7–25)
CALCIUM SPEC-SCNC: 9.7 MG/DL (ref 8.6–10.5)
CHLORIDE SERPL-SCNC: 103 MMOL/L (ref 98–107)
CHOLEST SERPL-MCNC: 142 MG/DL (ref 0–200)
CO2 SERPL-SCNC: 23.1 MMOL/L (ref 22–29)
CREAT BLD-MCNC: 0.73 MG/DL (ref 0.57–1)
DEPRECATED RDW RBC AUTO: 51.3 FL (ref 37–54)
EOSINOPHIL # BLD AUTO: 0.22 10*3/MM3 (ref 0–0.4)
EOSINOPHIL NFR BLD AUTO: 2.7 % (ref 0.3–6.2)
ERYTHROCYTE [DISTWIDTH] IN BLOOD BY AUTOMATED COUNT: 13.3 % (ref 12.3–15.4)
GFR SERPL CREATININE-BSD FRML MDRD: 78 ML/MIN/1.73
GLOBULIN UR ELPH-MCNC: 3.7 GM/DL
GLUCOSE BLD-MCNC: 112 MG/DL (ref 65–99)
HCT VFR BLD AUTO: 38.1 % (ref 34–46.6)
HDLC SERPL-MCNC: 57 MG/DL (ref 40–60)
HGB BLD-MCNC: 12.8 G/DL (ref 12–15.9)
IMM GRANULOCYTES # BLD AUTO: 0.02 10*3/MM3 (ref 0–0.05)
IMM GRANULOCYTES NFR BLD AUTO: 0.2 % (ref 0–0.5)
LDLC SERPL CALC-MCNC: 64 MG/DL (ref 0–100)
LDLC/HDLC SERPL: 1.13 {RATIO}
LYMPHOCYTES # BLD AUTO: 2.4 10*3/MM3 (ref 0.7–3.1)
LYMPHOCYTES NFR BLD AUTO: 29.9 % (ref 19.6–45.3)
MACROCYTES BLD QL SMEAR: NORMAL
MCH RBC QN AUTO: 35.5 PG (ref 26.6–33)
MCHC RBC AUTO-ENTMCNC: 33.6 G/DL (ref 31.5–35.7)
MCV RBC AUTO: 105.5 FL (ref 79–97)
MONOCYTES # BLD AUTO: 1.28 10*3/MM3 (ref 0.1–0.9)
MONOCYTES NFR BLD AUTO: 15.9 % (ref 5–12)
NEUTROPHILS # BLD AUTO: 4.08 10*3/MM3 (ref 1.7–7)
NEUTROPHILS NFR BLD AUTO: 50.9 % (ref 42.7–76)
NRBC BLD AUTO-RTO: 0 /100 WBC (ref 0–0.2)
PLAT MORPH BLD: NORMAL
PLATELET # BLD AUTO: 394 10*3/MM3 (ref 140–450)
PMV BLD AUTO: 10.6 FL (ref 6–12)
POTASSIUM BLD-SCNC: 3.9 MMOL/L (ref 3.5–5.2)
PROT SERPL-MCNC: 7.7 G/DL (ref 6–8.5)
RBC # BLD AUTO: 3.61 10*6/MM3 (ref 3.77–5.28)
SODIUM BLD-SCNC: 139 MMOL/L (ref 136–145)
TARGETS BLD QL SMEAR: NORMAL
TRIGL SERPL-MCNC: 104 MG/DL (ref 0–150)
TROPONIN T SERPL-MCNC: <0.01 NG/ML (ref 0–0.03)
TROPONIN T SERPL-MCNC: <0.01 NG/ML (ref 0–0.03)
VLDLC SERPL-MCNC: 20.8 MG/DL
WBC NRBC COR # BLD: 8.03 10*3/MM3 (ref 3.4–10.8)

## 2019-11-03 PROCEDURE — G0378 HOSPITAL OBSERVATION PER HR: HCPCS

## 2019-11-03 PROCEDURE — 99219 PR INITIAL OBSERVATION CARE/DAY 50 MINUTES: CPT | Performed by: INTERNAL MEDICINE

## 2019-11-03 PROCEDURE — 25010000002 HYDRALAZINE PER 20 MG: Performed by: FAMILY MEDICINE

## 2019-11-03 PROCEDURE — 96376 TX/PRO/DX INJ SAME DRUG ADON: CPT

## 2019-11-03 PROCEDURE — 96375 TX/PRO/DX INJ NEW DRUG ADDON: CPT

## 2019-11-03 PROCEDURE — 80061 LIPID PANEL: CPT | Performed by: HOSPITALIST

## 2019-11-03 PROCEDURE — 85007 BL SMEAR W/DIFF WBC COUNT: CPT | Performed by: HOSPITALIST

## 2019-11-03 PROCEDURE — 84484 ASSAY OF TROPONIN QUANT: CPT | Performed by: FAMILY MEDICINE

## 2019-11-03 PROCEDURE — 80053 COMPREHEN METABOLIC PANEL: CPT | Performed by: HOSPITALIST

## 2019-11-03 PROCEDURE — 93306 TTE W/DOPPLER COMPLETE: CPT

## 2019-11-03 PROCEDURE — 25010000002 HYDRALAZINE PER 20 MG: Performed by: HOSPITALIST

## 2019-11-03 PROCEDURE — 85025 COMPLETE CBC W/AUTO DIFF WBC: CPT | Performed by: HOSPITALIST

## 2019-11-03 PROCEDURE — 84484 ASSAY OF TROPONIN QUANT: CPT | Performed by: HOSPITALIST

## 2019-11-03 RX ORDER — HYDRALAZINE HYDROCHLORIDE 20 MG/ML
10 INJECTION INTRAMUSCULAR; INTRAVENOUS ONCE
Status: COMPLETED | OUTPATIENT
Start: 2019-11-03 | End: 2019-11-03

## 2019-11-03 RX ORDER — EXEMESTANE 25 MG/1
25 TABLET ORAL DAILY
Status: DISCONTINUED | OUTPATIENT
Start: 2019-11-03 | End: 2019-11-04 | Stop reason: HOSPADM

## 2019-11-03 RX ORDER — ROPINIROLE 0.25 MG/1
0.25 TABLET, FILM COATED ORAL NIGHTLY
Status: DISCONTINUED | OUTPATIENT
Start: 2019-11-03 | End: 2019-11-04 | Stop reason: HOSPADM

## 2019-11-03 RX ORDER — FUROSEMIDE 40 MG/1
40 TABLET ORAL DAILY
Status: DISCONTINUED | OUTPATIENT
Start: 2019-11-03 | End: 2019-11-04 | Stop reason: HOSPADM

## 2019-11-03 RX ORDER — CARVEDILOL 6.25 MG/1
12.5 TABLET ORAL 2 TIMES DAILY WITH MEALS
Status: DISCONTINUED | OUTPATIENT
Start: 2019-11-03 | End: 2019-11-03

## 2019-11-03 RX ORDER — HYDRALAZINE HYDROCHLORIDE 50 MG/1
50 TABLET, FILM COATED ORAL EVERY 8 HOURS SCHEDULED
Status: DISCONTINUED | OUTPATIENT
Start: 2019-11-03 | End: 2019-11-04 | Stop reason: HOSPADM

## 2019-11-03 RX ORDER — PANTOPRAZOLE SODIUM 40 MG/1
40 TABLET, DELAYED RELEASE ORAL DAILY
Status: DISCONTINUED | OUTPATIENT
Start: 2019-11-03 | End: 2019-11-04 | Stop reason: HOSPADM

## 2019-11-03 RX ORDER — LISINOPRIL 10 MG/1
40 TABLET ORAL
Status: DISCONTINUED | OUTPATIENT
Start: 2019-11-03 | End: 2019-11-04 | Stop reason: HOSPADM

## 2019-11-03 RX ORDER — HYDROCODONE BITARTRATE AND ACETAMINOPHEN 5; 325 MG/1; MG/1
1 TABLET ORAL EVERY 4 HOURS PRN
Status: DISCONTINUED | OUTPATIENT
Start: 2019-11-03 | End: 2019-11-04 | Stop reason: HOSPADM

## 2019-11-03 RX ORDER — TRIAMTERENE AND HYDROCHLOROTHIAZIDE 37.5; 25 MG/1; MG/1
1 TABLET ORAL DAILY
Status: DISCONTINUED | OUTPATIENT
Start: 2019-11-03 | End: 2019-11-04 | Stop reason: HOSPADM

## 2019-11-03 RX ORDER — METOPROLOL TARTRATE 5 MG/5ML
5 INJECTION INTRAVENOUS ONCE
Status: COMPLETED | OUTPATIENT
Start: 2019-11-03 | End: 2019-11-03

## 2019-11-03 RX ORDER — ASPIRIN 81 MG/1
81 TABLET ORAL DAILY
Status: DISCONTINUED | OUTPATIENT
Start: 2019-11-03 | End: 2019-11-04 | Stop reason: HOSPADM

## 2019-11-03 RX ORDER — CARVEDILOL 6.25 MG/1
12.5 TABLET ORAL 2 TIMES DAILY WITH MEALS
Status: DISCONTINUED | OUTPATIENT
Start: 2019-11-03 | End: 2019-11-04 | Stop reason: HOSPADM

## 2019-11-03 RX ORDER — SODIUM CHLORIDE 0.9 % (FLUSH) 0.9 %
10 SYRINGE (ML) INJECTION EVERY 12 HOURS SCHEDULED
Status: DISCONTINUED | OUTPATIENT
Start: 2019-11-03 | End: 2019-11-04 | Stop reason: HOSPADM

## 2019-11-03 RX ORDER — HYDRALAZINE HYDROCHLORIDE 20 MG/ML
10 INJECTION INTRAMUSCULAR; INTRAVENOUS EVERY 6 HOURS PRN
Status: DISCONTINUED | OUTPATIENT
Start: 2019-11-03 | End: 2019-11-03

## 2019-11-03 RX ORDER — CARVEDILOL 25 MG/1
25 TABLET ORAL 2 TIMES DAILY WITH MEALS
Status: DISCONTINUED | OUTPATIENT
Start: 2019-11-03 | End: 2019-11-03

## 2019-11-03 RX ORDER — VALACYCLOVIR HYDROCHLORIDE 500 MG/1
1000 TABLET, FILM COATED ORAL DAILY
Status: DISCONTINUED | OUTPATIENT
Start: 2019-11-03 | End: 2019-11-04 | Stop reason: HOSPADM

## 2019-11-03 RX ORDER — TRAMADOL HYDROCHLORIDE 50 MG/1
50 TABLET ORAL 2 TIMES DAILY PRN
Status: DISCONTINUED | OUTPATIENT
Start: 2019-11-03 | End: 2019-11-04 | Stop reason: HOSPADM

## 2019-11-03 RX ORDER — FUROSEMIDE 40 MG/1
40 TABLET ORAL DAILY
COMMUNITY
End: 2019-11-21 | Stop reason: HOSPADM

## 2019-11-03 RX ORDER — ACETAMINOPHEN 325 MG/1
650 TABLET ORAL EVERY 6 HOURS PRN
Status: DISCONTINUED | OUTPATIENT
Start: 2019-11-03 | End: 2019-11-04 | Stop reason: HOSPADM

## 2019-11-03 RX ORDER — SODIUM CHLORIDE 0.9 % (FLUSH) 0.9 %
10 SYRINGE (ML) INJECTION AS NEEDED
Status: DISCONTINUED | OUTPATIENT
Start: 2019-11-03 | End: 2019-11-04 | Stop reason: HOSPADM

## 2019-11-03 RX ORDER — FUROSEMIDE 40 MG/1
40 TABLET ORAL DAILY
Status: CANCELLED | OUTPATIENT
Start: 2019-11-03

## 2019-11-03 RX ORDER — ATORVASTATIN CALCIUM 10 MG/1
10 TABLET, FILM COATED ORAL NIGHTLY
Status: DISCONTINUED | OUTPATIENT
Start: 2019-11-03 | End: 2019-11-04 | Stop reason: HOSPADM

## 2019-11-03 RX ADMIN — SODIUM CHLORIDE, PRESERVATIVE FREE 10 ML: 5 INJECTION INTRAVENOUS at 19:33

## 2019-11-03 RX ADMIN — METOPROLOL TARTRATE 5 MG: 1 INJECTION, SOLUTION INTRAVENOUS at 06:55

## 2019-11-03 RX ADMIN — HYDRALAZINE HYDROCHLORIDE 50 MG: 50 TABLET ORAL at 09:55

## 2019-11-03 RX ADMIN — FUROSEMIDE 40 MG: 40 TABLET ORAL at 17:00

## 2019-11-03 RX ADMIN — SODIUM CHLORIDE, PRESERVATIVE FREE 10 ML: 5 INJECTION INTRAVENOUS at 08:26

## 2019-11-03 RX ADMIN — CARVEDILOL 12.5 MG: 6.25 TABLET, FILM COATED ORAL at 08:26

## 2019-11-03 RX ADMIN — LISINOPRIL 40 MG: 10 TABLET ORAL at 08:26

## 2019-11-03 RX ADMIN — EXEMESTANE 25 MG: 25 TABLET ORAL at 17:10

## 2019-11-03 RX ADMIN — VALACYCLOVIR HYDROCHLORIDE 1000 MG: 500 TABLET, FILM COATED ORAL at 12:25

## 2019-11-03 RX ADMIN — HYDRALAZINE HYDROCHLORIDE 10 MG: 20 INJECTION INTRAMUSCULAR; INTRAVENOUS at 06:01

## 2019-11-03 RX ADMIN — CARVEDILOL 12.5 MG: 6.25 TABLET, FILM COATED ORAL at 17:10

## 2019-11-03 RX ADMIN — HYDRALAZINE HYDROCHLORIDE 50 MG: 50 TABLET ORAL at 14:53

## 2019-11-03 RX ADMIN — TRIAMTERENE AND HYDROCHLOROTHIAZIDE 1 TABLET: 37.5; 25 TABLET ORAL at 08:26

## 2019-11-03 RX ADMIN — HYDRALAZINE HYDROCHLORIDE 50 MG: 50 TABLET ORAL at 19:32

## 2019-11-03 RX ADMIN — ROPINIROLE HYDROCHLORIDE 0.25 MG: 0.25 TABLET, FILM COATED ORAL at 19:32

## 2019-11-03 RX ADMIN — PANTOPRAZOLE SODIUM 40 MG: 40 TABLET, DELAYED RELEASE ORAL at 12:25

## 2019-11-03 RX ADMIN — ATORVASTATIN CALCIUM 10 MG: 10 TABLET, FILM COATED ORAL at 19:32

## 2019-11-03 RX ADMIN — HYDRALAZINE HYDROCHLORIDE 10 MG: 20 INJECTION INTRAMUSCULAR; INTRAVENOUS at 03:05

## 2019-11-03 RX ADMIN — ASPIRIN 81 MG: 81 TABLET, COATED ORAL at 12:25

## 2019-11-03 RX ADMIN — ACETAMINOPHEN 650 MG: 325 TABLET ORAL at 06:56

## 2019-11-03 NOTE — H&P
[facility] Medicine Services  History & Physical    Patient Identification:  Name:  Tahmina Martinez  Age:  74 y.o.  Sex:  female  :  1945  MRN:  [id number]   Visit Number:  [id number]  Primary Care Physician:  Noe Bower MD    I have seen the patient in conjunction with JENNY Bansal and I agree with the following statements:    Subjective     Chief complaint: High blood Pressure    History of presenting illness:      Tahmina Martinez is a 74 y.o. female with past medical history significant for Breast Cancer , hypertension, acute myelocytic leukemia s/p chemotherapy and bone marrow transplant in , bifascicular block (RBBB plus LP FB),  NSTEMI  with no intervention, Syncope, splenectomy and hyperlipidemia.     Mrs. Martinez presented to [facility] emergency department on 2019 with complaints of high blood pressure and new onset intermittent headache.  Patient reports when she awoke on the morning of 2019 she found her blood pressure to be 212/106.  She reports that she took her medications and went back to bed.  Upon waking again she rechecked her blood pressure which it was 186/92.  She reports developing a headache on and off throughout the day.  She denies any nausea, vomiting, diarrhea, fever, recent infection, chest pain or shortness of breath.  Patient does report bilateral lower extremity swelling over the last few days.  Patient denies any dizziness, numbness, seizures or syncopal episodes.  She also denies any alcohol, tobacco or drug use.  Patient follows with Dr. Soler, cardiologist, and was seen on 9/3/2019 in which amlodipine was discontinued and hydralazine 25 mg 3 times daily was added for elevated blood pressure.  Additionally, patient followed up on 10/29/2019 with Dr. Soler who continued patient's aspirin, atorvastatin, amlodipine and carvedilol at current dose.  There was a change made from HCTZ to triamterene  HCTZ 25/37.5 mg daily to help better control blood pressure.    Upon arrival to the ED, vital signs were temperature 97.9, pulse 70, respirations 20, blood pressure 231/100 and oxygen saturation 96% on room air.  Troponin T < 0.010 x2, proBNP 795.4, blood glucose 93, sodium 137, potassium 4.2, anion gap 13.3, creatinine 0.77, BUN 12, CRP 0.33, lipase 31, WBC 7.56, hemoglobin 12.7, and hematocrit 37.8.  Urinalysis negative for nitrites and reveals small leukocytes and 3-5 WBC.  CT of head without contrast shows no acute intracranial abnormality was stable senescent changes and mucosal thickening bilaterally in the maxillary sinuses with partial fluid opacification of the bilateral mastoid air cells per radiology.  Chest x-ray shows borderline cardiomegaly with clear lungs and no pneumothorax or pleural effusion per radiology.    ---------------------------------------------------------------------------------------------------------------------   Review of Systems   Constitutional: Negative for activity change, appetite change, chills, diaphoresis and fever.   HENT: Negative for congestion, sinus pressure, sinus pain, sore throat and trouble swallowing.    Eyes: Negative for photophobia and visual disturbance.   Respiratory: Negative for apnea, cough, chest tightness, shortness of breath and wheezing.    Cardiovascular: Positive for leg swelling. Negative for chest pain.   Gastrointestinal: Negative for abdominal distention, abdominal pain, constipation, diarrhea, nausea and vomiting.   Endocrine: Negative for polydipsia, polyphagia and polyuria.   Genitourinary: Negative for difficulty urinating, dysuria, frequency, hematuria and urgency.   Musculoskeletal: Negative for back pain, gait problem and joint swelling.   Skin: Negative for rash and wound.   Allergic/Immunologic: Negative for immunocompromised state.   Neurological: Positive for headaches. Negative for dizziness, syncope, weakness and light-headedness.    Hematological: Does not bruise/bleed easily.   Psychiatric/Behavioral: Negative for confusion, hallucinations and sleep disturbance.      ---------------------------------------------------------------------------------------------------------------------   Past Medical History:   Diagnosis Date   • Breast cancer (CMS/HCC)    • Dyspnea    • H/O splenectomy    • Hypertension    • Myelocytic leukemia (CMS/HCC)    • Non-STEMI (non-ST elevated myocardial infarction) (CMS/HCC)      Past Surgical History:   Procedure Laterality Date   • BONE MARROW TRANSPLANT     •  SECTION     • SPLENECTOMY     • TUBAL ABDOMINAL LIGATION       Family History   Problem Relation Age of Onset   • Heart disease Mother    • Heart disease Father      Social History     Socioeconomic History   • Marital status:      Spouse name: Not on file   • Number of children: Not on file   • Years of education: Not on file   • Highest education level: Not on file   Tobacco Use   • Smoking status: Never Smoker   • Smokeless tobacco: Never Used   Substance and Sexual Activity   • Alcohol use: No   • Drug use: No   • Sexual activity: Defer     ---------------------------------------------------------------------------------------------------------------------   Allergies:  Latex; Penicillins; and Zithromax [azithromycin]  ---------------------------------------------------------------------------------------------------------------------   Home medications:    Medications below are reported home medications pulling from within the system; at this time, these medications have not been reconciled unless otherwise specified and are in the verification process for further verifcation as current home medications.    (Not in a hospital admission)    Hospital Scheduled Meds:          Current listed hospital scheduled medications may not yet reflect those currently placed in orders that are signed and held awaiting patient's arrival to floor.    ---------------------------------------------------------------------------------------------------------------------     Objective     Vital Signs:  Temp:  [97.9 °F (36.6 °C)] 97.9 °F (36.6 °C)  Heart Rate:  [63-70] 68  Resp:  [18-20] 18  BP: (144-231)/() 175/71      11/02/19  2159   Weight: 68 kg (150 lb)     Body mass index is 26.57 kg/m².  ---------------------------------------------------------------------------------------------------------------------       Physical Exam   Constitutional: She is oriented to person, place, and time and well-developed, well-nourished, and in no distress. No distress.   HENT:   Head: Normocephalic.   Eyes: Pupils are equal, round, and reactive to light. Right eye exhibits no discharge. Left eye exhibits no discharge.   Neck: Normal range of motion. No JVD present. No thyromegaly present.   Cardiovascular: Normal rate, regular rhythm and normal heart sounds.   Pulmonary/Chest: Effort normal and breath sounds normal. No respiratory distress. She has no wheezes.   Abdominal: Soft. Bowel sounds are normal. She exhibits no distension. There is no tenderness.   Musculoskeletal: Normal range of motion. She exhibits edema (trace edema noted to bilateral lower extremities. ).   Neurological: She is alert and oriented to person, place, and time.   Skin: Skin is warm and dry. No rash noted. She is not diaphoretic. No erythema.   Psychiatric: Mood, memory, affect and judgment normal.     ---------------------------------------------------------------------------------------------------------------------  EKG:          ---------------------------------------------------------------------------------------------------------------------   Results from last 7 days   Lab Units 11/02/19  2249   CRP mg/dL 0.33   WBC 10*3/mm3 7.56   HEMOGLOBIN g/dL 12.7   HEMATOCRIT % 37.8   MCV fL 104.4*   MCHC g/dL 33.6   PLATELETS 10*3/mm3 378   INR  0.97         Results from last 7 days   Lab Units  11/02/19 2249   SODIUM mmol/L 137   POTASSIUM mmol/L 4.2   CHLORIDE mmol/L 100   CO2 mmol/L 23.7   BUN mg/dL 12   CREATININE mg/dL 0.77   EGFR IF NONAFRICN AM mL/min/1.73 73   CALCIUM mg/dL 9.9   GLUCOSE mg/dL 93   ALBUMIN g/dL 4.26   BILIRUBIN mg/dL 0.2   ALK PHOS U/L 131*   AST (SGOT) U/L 29   ALT (SGPT) U/L 23   Estimated Creatinine Clearance: 55.3 mL/min (by C-G formula based on SCr of 0.77 mg/dL).  No results found for: AMMONIA  Results from last 7 days   Lab Units 11/03/19  0101 11/02/19 2249   TROPONIN T ng/mL <0.010 <0.010     Results from last 7 days   Lab Units 11/02/19 2249   PROBNP pg/mL 795.4     No results found for: HGBA1C  No results found for: TSH, FREET4  No results found for: PREGTESTUR, PREGSERUM, HCG, HCGQUANT  Pain Management Panel     There is no flowsheet data to display.            ---------------------------------------------------------------------------------------------------------------------  Imaging Results (Last 7 Days)     Procedure Component Value Units Date/Time    XR Chest 1 View [[id number]] Collected:  11/02/19 2336     Updated:  11/02/19 2338    Narrative:       CR Chest 1 Vw    INDICATION:   Hypertension     COMPARISON:    None available.    FINDINGS:  Single portable AP view(s) of the chest.      There is borderline cardiomegaly. The lungs are clear. No pneumothorax or pleural effusion.    Signer Name: Fabio Ambrose MD   Signed: 11/2/2019 11:36 PM   Workstation [facility]: [email or web address]    [facility]    CT Head Without Contrast [[id number]] Collected:  11/02/19 2242     Updated:  11/02/19 2244    Narrative:       CT Head WO    HISTORY:   74-year-old female with headache today.    TECHNIQUE:   Routine noncontrast head CT. Coronal and sagittal reformatted images. Radiation dose reduction techniques included automated exposure control or exposure modulation based on body size. Count of known CT and cardiac nuc med studies performed in previous  12 months:  1.     COMPARISON:   CT head 5/31/2017    FINDINGS:   No hemorrhage, acute infarction, mass lesion, or abnormal extra-axial fluid collection. No midline shift or focal mass effect. Ventricular system is normal in size and configuration. Mild generalized atrophy and chronic small vessel disease throughout  the supratentorial white matter. No acute osseous abnormality. Mucosal thickening bilateral maxillary sinuses. Partial fluid opacification bilateral mastoid air cells.      Impression:         1. No acute intracranial abnormality. Stable senescent changes.  2. Mucosal thickening bilateral maxillary sinuses.  3. Partial fluid opacification of the bilateral mastoid air cells.          Signer Name: Shabbir Bella MD   Signed: 11/2/2019 10:42 PM   Workstation Name: [email or web address]    [facility]          Cultures:   None.     Last echocardiogram:  Results for orders placed during the hospital encounter of 09/26/17   Adult Stress Echo With Color & Doppler    Addendum · Normal left ventricular cavity size and wall thickness noted. All left  ventricular wall segments contract normally. · Estimated EF appears to be in the range of 61 - 65%. · The aortic valve is structurally normal. Mild aortic valve regurgitation  is present. No aortic valve stenosis is present. · The mitral valve is normal in structure. Mild mitral valve regurgitation  is present. No significant mitral valve stenosis is present. · The tricuspid valve is normal. No tricuspid valve stenosis is present.  Mild tricuspid valve regurgitation is present. Estimated right ventricular  systolic pressure from tricuspid regurgitation is normal (<35 mmHg). · There is no evidence of pericardial effusion. · A stress test was performed following the Travon protocol. · A stress test was performed following the Travon protocol. · Exercise duration (min) 7 min Exercise duration (sec) 0 sec Estimated  workload 10.1 METS · Baseline HR 71 bpm Baseline BP  143/75 mmHg Peak Stress Vitals Peak HR  120 bpm Peak /75 mmHg Recovery Vitals Recovery HR 77 bpm Recovery BP  146/73 mmHg Exercise Data Target HR (85%) 124 bpm Max. Pred. HR (100%) 146  bpm Percent Max Pred HR 82.19 % · Equivocal stress ECG interpretation. · Stress Echo Findings · Segment augmentation had a normal response to stress. Cavity size  behaved normally in response to stress. · Normal stress echo with no significant echocardiographic evidence for  myocardial ischemia.        Dashawn Soler MD 9/27/2017 11:57 AM          Narrative · Normal left ventricular cavity size and wall thickness noted. All left   ventricular wall segments contract normally.  · Estimated EF appears to be in the range of 61 - 65%.  · The aortic valve is structurally normal. Mild aortic valve regurgitation   is present. No aortic valve stenosis is present.  · The mitral valve is normal in structure. Mild mitral valve regurgitation   is present. No significant mitral valve stenosis is present.  · The tricuspid valve is normal. No tricuspid valve stenosis is present.   Mild tricuspid valve regurgitation is present. Estimated right ventricular   systolic pressure from tricuspid regurgitation is normal (<35 mmHg).  · There is no evidence of pericardial effusion.          I have personally reviewed the radiology images and read the final radiology report.  ---------------------------------------------------------------------------------------------------------------------  Assessment / Plan     Active Hospital Problems    Diagnosis POA   • Hypertensive urgency [I16.0] Yes       ASSESSMENT/PLAN:  · Hypertensive Urgency: Blood pressure on arrival to /100.  IV hydralazine 10 mg given in ED with repeat blood pressure 156/80.  PRN IV hydralazine continued on admission.  Patient admitted to observation unit for further monitoring.  Continuous cardiac monitoring ordered.  Supplemental oxygen ordered to be titrated for saturations  greater than 90%.  Continue to monitor vital signs per protocol.  Patient currently denies chest pain.  Troponin T <0.010 x2.  We will continue to trend.  Stress echo with color Doppler performed in 2017.  Normal stress echo with no significant echocardiographic evidence of for myocardial ischemia per cardiology read.  EF during study showed 61 to 65%.ransthoracic echocardiogram ordered.  We will continue home medications when available from pharmacy.    · Hyperlipidemia: Lipid panel ordered.  Continue home statin when available from pharmacy.    · Hx of NSTEMI: NSTEMI 2008 without intervention.  Patient currently denies any chest pain.  He was cardiac monitoring ordered.  Troponin T <0.010 x2.  We will continue to trend.  Patient currently denies any chest pain.  We will continue home aspirin and statin when available from pharmacy.    · History of acute myelocytic leukemia s/p chemotherapy: Supportive care provided.    · History of splenectomy: Supportive care provided.    ----------  -DVT prophylaxis: Sequential compression device  -Diet: Regular cardiac  -Activity: Up with assistance  -Expected length of stay: OBSERVATION status; however, if further evaluation or treatment plans warrant, status may change.  Based upon current information, I predict patient's care encounter to be less than or equal to 2 midnights.       Vernell Lazar, JENNY  11/03/19  3:25 AM  Pager # [phone]

## 2019-11-03 NOTE — PLAN OF CARE
Problem: Pain, Chronic (Adult)  Goal: Identify Related Risk Factors and Signs and Symptoms  Outcome: Ongoing (interventions implemented as appropriate)    Goal: Acceptable Pain/Comfort Level and Functional Ability  Outcome: Ongoing (interventions implemented as appropriate)      Problem: Patient Care Overview  Goal: Plan of Care Review  Outcome: Ongoing (interventions implemented as appropriate)    Goal: Individualization and Mutuality  Outcome: Ongoing (interventions implemented as appropriate)    Goal: Discharge Needs Assessment  Outcome: Ongoing (interventions implemented as appropriate)    Goal: Interprofessional Rounds/Family Conf  Outcome: Ongoing (interventions implemented as appropriate)      Problem: Fall Risk (Adult)  Goal: Identify Related Risk Factors and Signs and Symptoms  Outcome: Ongoing (interventions implemented as appropriate)    Goal: Absence of Fall  Outcome: Ongoing (interventions implemented as appropriate)      Problem: Skin Injury Risk (Adult)  Goal: Identify Related Risk Factors and Signs and Symptoms  Outcome: Ongoing (interventions implemented as appropriate)    Goal: Skin Health and Integrity  Outcome: Ongoing (interventions implemented as appropriate)

## 2019-11-03 NOTE — ED PROVIDER NOTES
Subjective   75 yo female with history of AML ( s/p bone marrow transplant), breast cancer, HTN presents to the ED with elevated BP causing her a headache. Pt reports that she takes her BP meds as directed - went and saw PCP and her BP was high then and they scheduled her to come back on Monday for a recheck but her bp keeps getting higher and higher and she has a headache and was afraid she would have a stroke.        History provided by:  Patient  Hypertension   Severity:  Moderate  Onset quality:  Gradual  Timing:  Constant  Progression:  Worsening  Chronicity:  New  Notable PTA blood pressures:  220/115  Context: medication change    Relieved by:  Nothing  Worsened by:  Nothing  Ineffective treatments:  None tried  Associated symptoms: headaches    Associated symptoms: no abdominal pain, no chest pain and no fever    Risk factors: cardiac disease        Review of Systems   Constitutional: Negative.  Negative for fever.   Respiratory: Negative.    Cardiovascular: Negative.  Negative for chest pain.   Gastrointestinal: Negative.  Negative for abdominal pain.   Endocrine: Negative.    Genitourinary: Negative.  Negative for dysuria.   Skin: Negative.    Neurological: Positive for headaches.   Psychiatric/Behavioral: Negative.    All other systems reviewed and are negative.      Past Medical History:   Diagnosis Date   • Breast cancer (CMS/HCC)    • Dyspnea    • H/O splenectomy    • Hypertension    • Myelocytic leukemia (CMS/HCC)    • Non-STEMI (non-ST elevated myocardial infarction) (CMS/HCC)        Allergies   Allergen Reactions   • Latex    • Penicillins    • Zithromax [Azithromycin]        Past Surgical History:   Procedure Laterality Date   • BONE MARROW TRANSPLANT     •  SECTION     • SPLENECTOMY     • TUBAL ABDOMINAL LIGATION         Family History   Problem Relation Age of Onset   • Heart disease Mother    • Heart disease Father        Social History     Socioeconomic History   • Marital status:       Spouse name: Not on file   • Number of children: Not on file   • Years of education: Not on file   • Highest education level: Not on file   Tobacco Use   • Smoking status: Never Smoker   • Smokeless tobacco: Never Used   Substance and Sexual Activity   • Alcohol use: No   • Drug use: No   • Sexual activity: Defer           Objective   Physical Exam   Constitutional: She is oriented to person, place, and time. She appears well-developed and well-nourished.   HENT:   Head: Normocephalic and atraumatic.   Right Ear: External ear normal.   Left Ear: External ear normal.   Mouth/Throat: Oropharynx is clear and moist.   Eyes: EOM are normal. Pupils are equal, round, and reactive to light.   Neck: Neck supple.   Cardiovascular: Normal rate and regular rhythm.   Pulmonary/Chest: Effort normal and breath sounds normal.   Abdominal: Soft. Bowel sounds are normal.   Musculoskeletal: Normal range of motion.   Neurological: She is alert and oriented to person, place, and time.   Skin: Skin is warm. Capillary refill takes less than 2 seconds.   Psychiatric: She has a normal mood and affect. Her behavior is normal. Judgment and thought content normal.   Nursing note and vitals reviewed.      Procedures           ED Course  ED Course as of Nov 03 0645   Sun Nov 03, 2019   0641 EKG interpretation time is 2356 sinus rhythm with AV block right bundle branch block noted and left posterior fascicular block 67 bpm QRS duration is 134 QT is 458 QTC is 483 no evidence of acute ST elevation or depression  [MH]      ED Course User Index  [MH] Radha Porter, DO ESTRADA  Number of Diagnoses or Management Options  Hypertensive urgency: new and requires workup     Amount and/or Complexity of Data Reviewed  Clinical lab tests: ordered and reviewed  Tests in the radiology section of CPT®: ordered and reviewed  Tests in the medicine section of CPT®: reviewed and ordered  Discuss the patient with other providers:  yes  Independent visualization of images, tracings, or specimens: yes    Risk of Complications, Morbidity, and/or Mortality  Presenting problems: high  Diagnostic procedures: high  Management options: high    Patient Progress  Patient progress: (guarded)      Final diagnoses:   Hypertensive urgency              Radha Porter DO  11/03/19 0646

## 2019-11-03 NOTE — PROGRESS NOTES
Patient seen and examined today.     Updated plan of care:    #Hypertensive urgency   - Patient symptomatic with blood pressure of 230/110 at presentation  - Troponin negative  X3  - Overnight blood pressure trended back up and patient was given IV hydralazine this morning.   - Rechecks after then have been much improved: 135/60 then 155/65  - PO hydralazine increased zeina 25 mg TID to 50 mg TID   - Continue home lisinopril 40 mg daily, coreg 12.5 mg BID and home HCTZ/triamterene   - If still uncontrolled later today, will consider increasing coreg to 25 mg BID.   - Recommend outpatient w/u for secondary causes of her hypertension including renal artery duplex.   - Patient to f/u with Dr. Soler in 1 week     #Bifascifular block   - RBB + LPF blocks  - No reports of syncope.   - Can f/u with cardiology as outpatient     #HLD  - Lipids wnl   - Continue home statin     #CAD  - Continue home statin/ASA    #Hx of AML s/p BM tx   #Hx of splenectomy   - In remission per patient. Has had appropriate vaccinations. Continue prophylactic valacyclovir.       F: PO  E: Replace as needed   N: Cardiac     Dispo: Pending clinical improvement.     Code status: Full

## 2019-11-03 NOTE — NURSING NOTE
"Contacted dr oswald about the patient stating that she has not been taking her blood pressure medications at home. Pt states \"What happens if I have not been taking my blood pressure medicine at home?\" I told the patient that that could contribute to her problem. Pt states \"I was so worried about my chemo pill.\" I encouraged the patient of the importance of taking her blood pressure medications and being compliant with them.   "

## 2019-11-03 NOTE — PLAN OF CARE
Problem: Pain, Chronic (Adult)  Goal: Identify Related Risk Factors and Signs and Symptoms  Outcome: Ongoing (interventions implemented as appropriate)    Goal: Acceptable Pain/Comfort Level and Functional Ability  Outcome: Ongoing (interventions implemented as appropriate)      Problem: Patient Care Overview  Goal: Plan of Care Review  Outcome: Ongoing (interventions implemented as appropriate)    Goal: Individualization and Mutuality  Outcome: Ongoing (interventions implemented as appropriate)    Goal: Discharge Needs Assessment  Outcome: Ongoing (interventions implemented as appropriate)    Goal: Interprofessional Rounds/Family Conf  Outcome: Ongoing (interventions implemented as appropriate)      Problem: Fall Risk (Adult)  Goal: Identify Related Risk Factors and Signs and Symptoms  Outcome: Ongoing (interventions implemented as appropriate)    Goal: Absence of Fall  Outcome: Ongoing (interventions implemented as appropriate)      Problem: Patient Care Overview  Goal: Plan of Care Review  Outcome: Ongoing (interventions implemented as appropriate)    Goal: Individualization and Mutuality  Outcome: Ongoing (interventions implemented as appropriate)    Goal: Discharge Needs Assessment  Outcome: Ongoing (interventions implemented as appropriate)      Problem: Skin Injury Risk (Adult)  Goal: Identify Related Risk Factors and Signs and Symptoms  Outcome: Ongoing (interventions implemented as appropriate)    Goal: Skin Health and Integrity  Outcome: Ongoing (interventions implemented as appropriate)

## 2019-11-04 VITALS
HEIGHT: 63 IN | OXYGEN SATURATION: 95 % | WEIGHT: 143.96 LBS | DIASTOLIC BLOOD PRESSURE: 77 MMHG | BODY MASS INDEX: 25.51 KG/M2 | HEART RATE: 72 BPM | RESPIRATION RATE: 20 BRPM | SYSTOLIC BLOOD PRESSURE: 142 MMHG | TEMPERATURE: 97.5 F

## 2019-11-04 PROCEDURE — 94799 UNLISTED PULMONARY SVC/PX: CPT

## 2019-11-04 PROCEDURE — 99217 PR OBSERVATION CARE DISCHARGE MANAGEMENT: CPT | Performed by: INTERNAL MEDICINE

## 2019-11-04 PROCEDURE — G0378 HOSPITAL OBSERVATION PER HR: HCPCS

## 2019-11-04 RX ORDER — HYDRALAZINE HYDROCHLORIDE 50 MG/1
50 TABLET, FILM COATED ORAL EVERY 8 HOURS SCHEDULED
Qty: 90 TABLET | Refills: 0 | Status: SHIPPED | OUTPATIENT
Start: 2019-11-04 | End: 2019-11-27 | Stop reason: SDUPTHER

## 2019-11-04 RX ADMIN — SODIUM CHLORIDE, PRESERVATIVE FREE 10 ML: 5 INJECTION INTRAVENOUS at 08:36

## 2019-11-04 RX ADMIN — TRIAMTERENE AND HYDROCHLOROTHIAZIDE 1 TABLET: 37.5; 25 TABLET ORAL at 08:35

## 2019-11-04 RX ADMIN — PANTOPRAZOLE SODIUM 40 MG: 40 TABLET, DELAYED RELEASE ORAL at 08:34

## 2019-11-04 RX ADMIN — LISINOPRIL 40 MG: 10 TABLET ORAL at 08:34

## 2019-11-04 RX ADMIN — FUROSEMIDE 40 MG: 40 TABLET ORAL at 08:35

## 2019-11-04 RX ADMIN — ASPIRIN 81 MG: 81 TABLET, COATED ORAL at 08:34

## 2019-11-04 RX ADMIN — VALACYCLOVIR HYDROCHLORIDE 1000 MG: 500 TABLET, FILM COATED ORAL at 08:35

## 2019-11-04 RX ADMIN — ACETAMINOPHEN 650 MG: 325 TABLET ORAL at 08:42

## 2019-11-04 RX ADMIN — CARVEDILOL 12.5 MG: 6.25 TABLET, FILM COATED ORAL at 08:35

## 2019-11-04 NOTE — PLAN OF CARE
Problem: Fall Risk (Adult)  Goal: Identify Related Risk Factors and Signs and Symptoms   11/04/19 1007   Fall Risk (Adult)   Related Risk Factors (Fall Risk) age-related changes;history of falls;environment unfamiliar   Signs and Symptoms (Fall Risk) presence of risk factors       Problem: Skin Injury Risk (Adult)  Goal: Identify Related Risk Factors and Signs and Symptoms   11/04/19 1007   Skin Injury Risk (Adult)   Related Risk Factors (Skin Injury Risk) advanced age

## 2019-11-04 NOTE — DISCHARGE SUMMARY
[facility] HOSPITALISTS DISCHARGE SUMMARY    Patient Identification:  Name:  Tahmina Martinez  Age:  74 y.o.  Sex:  female  :  1945  MRN:  [id number]  Visit Number:  [id number]    Date of Admission: 2019  Date of Discharge:  2019    PCP: Noe Bower MD    DISCHARGE DIAGNOSIS  Hypertensive urgency   Bifascicular block   HLD  CAD  Hx of AML s/p BM tx   Hx of splenomegaly     CONSULTS   None    PROCEDURES PERFORMED  None     HOSPITAL COURSE  Patient is a 74 y.o. female presented on 19 to [facility] complaining of headache and uncontrolled HTN.  Please see the admitting history and physical for further details.      Patietn was found to have hypertensive urgency with blood pressure 231/100 on arrival. Patient given IV hydralazine multiple times on admission. Troponins negative and patient without chest pain, do not suspect concomitant ACS. Patient's EKG revealed new bifascicular block but has not experienced syncope or concerning symptoms that would warrant immediate evaluation for pacemaker. Patient denied noncompliance to me but endorsed missing some of her meds recently to nursing staff. I reinforced the importance of compliance with patient. Patient had recently seen Dr. Soler in clinic who had transitioned her to HCTZ/Triamterene. Home regimen restarted and hydralazine increased from 25 mg TID to 50 mg TID. Blood pressure reasonably controlled the last 24 hours with this regimen. Patient's severe headache resolved with blood pressure control. Patient to continue current regimen.     Patient to f/u with PCP and Dr. Soler in 1 week. Recommend outpatient evaluation into secondary causes of her hypertension including a renal artery duplex. Patient requested flu vaccine prior to discharge which was ordered.         VITAL SIGNS:  Temp:  [97.5 °F (36.4 °C)-98.1 °F (36.7 °C)] 97.5 °F (36.4 °C)  Heart Rate:  [64-73] 72  Resp:  [18-20] 20  BP: (119-144)/(54-91)  142/77  SpO2:  [92 %-95 %] 95 %  on   ;   Device (Oxygen Therapy): room air    Body mass index is 25.51 kg/m².  Wt Readings from Last 3 Encounters:   11/04/19 65.3 kg (143 lb 15.4 oz)   10/29/19 71 kg (156 lb 9.6 oz)   09/03/19 70.9 kg (156 lb 6.4 oz)       PHYSICAL EXAM:  Constitutional:  Well-developed and well-nourished.  No respiratory distress.      HENT:  Head:  Normocephalic and atraumatic.  Mouth:  Moist mucous membranes.    Eyes:  Conjunctivae and EOM are normal.  Pupils are equal, round, and reactive to light.  No scleral icterus.    Cardiovascular:  Normal rate, regular rhythm and normal heart sounds with no murmur.  Pulmonary/Chest:  No respiratory distress, no wheezes, no crackles, with normal breath sounds and good air movement.  Abdominal:  Soft.  Bowel sounds are normal.  No distension and no tenderness.   Musculoskeletal:  No edema, no tenderness, and no deformity.  No red or swollen joints anywhere.    Neurological:  Alert and oriented to person, place, and time.  No gross neurological deficit.   Skin:  Skin is warm and dry. No rash noted. No pallor.   Peripheral vascular:  Strong pulses in all 4 extremities with no clubbing, no cyanosis, no edema.    DISCHARGE DISPOSITION   Stable    DISCHARGE MEDICATIONS:     Discharge Medications      Changes to Medications      Instructions Start Date   hydrALAZINE 50 MG tablet  Commonly known as:  APRESOLINE  What changed:    · medication strength  · how much to take  · when to take this   50 mg, Oral, Every 8 Hours Scheduled         Continue These Medications      Instructions Start Date   aspirin 81 MG EC tablet   81 mg, Oral, Daily      atorvastatin 10 MG tablet  Commonly known as:  LIPITOR   10 mg, Oral, Nightly      CALCIUM + D PO   600 mg, Oral      carvedilol 12.5 MG tablet  Commonly known as:  COREG   12.5 mg, Oral, 2 Times Daily      Co Q-10 200 MG capsule   200 mg, Oral, Daily      exemestane 25 MG chemo tablet  Commonly known as:  AROMASIN   25 mg,  Oral, Daily      furosemide 40 MG tablet  Commonly known as:  LASIX   40 mg, Oral, Daily      lisinopril 40 MG tablet  Commonly known as:  PRINIVIL,ZESTRIL   40 mg, Oral, Daily      Loratadine 10 MG capsule   Oral      metFORMIN 500 MG tablet  Commonly known as:  GLUCOPHAGE   500 mg, Oral, Every Morning      nitroglycerin 0.4 MG SL tablet  Commonly known as:  NITROSTAT   0.4 mg, Sublingual, Every 5 Minutes PRN, Take no more than 3 doses in 15 minutes.      pantoprazole 40 MG EC tablet  Commonly known as:  PROTONIX   40 mg, Oral, Daily      rOPINIRole 0.25 MG tablet  Commonly known as:  REQUIP   0.25 mg, Oral, Nightly, Take 1 hour before bedtime.      triamterene-hydrochlorothiazide 37.5-25 MG per tablet  Commonly known as:  MAXZIDE-25   1 tablet, Oral, Daily      valACYclovir 500 MG tablet  Commonly known as:  VALTREX   1 g, Oral, Daily      vitamin D 1.25 MG (87171 UT) capsule capsule  Commonly known as:  ERGOCALCIFEROL   50,000 Units, Oral, Every 14 Days               Follow-up Information     Noe Bower MD Follow up in 1 week(s).    Specialty:  Internal Medicine  Contact information:  [address] [facility] [address]  [phone]             Dashawn Soler MD Follow up in 1 week(s).    Specialty:  Cardiology  Contact information:  [address]  [facility] [address]  [phone]                    TEST  RESULTS PENDING AT DISCHARGE       CODE STATUS  Code Status and Medical Interventions:   Ordered at: 11/03/19 0150     Level Of Support Discussed With:    Patient     Code Status:    CPR     Medical Interventions (Level of Support Prior to Arrest):    Full       Reid Cheek MD  [facility]  11/04/19  11:58 AM    Please note that this discharge summary required more than 30 minutes to complete.

## 2019-11-04 NOTE — DISCHARGE INSTRUCTIONS
Please take medications as directed. Please go to follow-up appointments as recommended. Please seek medical attention if you start having uncontrolled blood pressure again with the top number over 220 or bottom number over 110 or start having chest pain or severe headache.

## 2019-11-06 ENCOUNTER — TELEPHONE (OUTPATIENT)
Dept: CARDIOLOGY | Facility: CLINIC | Age: 74
End: 2019-11-06

## 2019-11-06 NOTE — TELEPHONE ENCOUNTER
Patient called and said she was in the hospital Saturday-Monday because her blood pressure was so romulo. Patient says now her blood pressure is 90/66. Patient says she is feeling really bad. She said they changed some of her medicines around while she was there. Can someone call patient back about this asap?     Thanks!

## 2019-11-06 NOTE — TELEPHONE ENCOUNTER
Patient advised to keep a log of her BP and bring to office visit. She expressed verbal understanding and is agreeable.

## 2019-11-07 ENCOUNTER — HOSPITAL ENCOUNTER (INPATIENT)
Facility: HOSPITAL | Age: 74
LOS: 13 days | Discharge: HOME HEALTH | End: 2019-11-21
Attending: EMERGENCY MEDICINE | Admitting: FAMILY MEDICINE

## 2019-11-07 ENCOUNTER — APPOINTMENT (OUTPATIENT)
Dept: GENERAL RADIOLOGY | Facility: HOSPITAL | Age: 74
End: 2019-11-07

## 2019-11-07 ENCOUNTER — APPOINTMENT (OUTPATIENT)
Dept: CT IMAGING | Facility: HOSPITAL | Age: 74
End: 2019-11-07

## 2019-11-07 DIAGNOSIS — N28.9 RENAL INSUFFICIENCY: ICD-10-CM

## 2019-11-07 DIAGNOSIS — R07.9 CHEST PAIN IN ADULT: Primary | ICD-10-CM

## 2019-11-07 LAB
ALBUMIN SERPL-MCNC: 4.79 G/DL (ref 3.5–5.2)
ALBUMIN/GLOB SERPL: 1.2 G/DL
ALP SERPL-CCNC: 150 U/L (ref 39–117)
ALT SERPL W P-5'-P-CCNC: 26 U/L (ref 1–33)
ANION GAP SERPL CALCULATED.3IONS-SCNC: 15.4 MMOL/L (ref 5–15)
AST SERPL-CCNC: 30 U/L (ref 1–32)
BASOPHILS # BLD AUTO: 0.02 10*3/MM3 (ref 0–0.2)
BASOPHILS NFR BLD AUTO: 0.2 % (ref 0–1.5)
BILIRUB SERPL-MCNC: 0.7 MG/DL (ref 0.2–1.2)
BILIRUB UR QL STRIP: NEGATIVE
BUN BLD-MCNC: 41 MG/DL (ref 8–23)
BUN/CREAT SERPL: 28.9 (ref 7–25)
CALCIUM SPEC-SCNC: 10.8 MG/DL (ref 8.6–10.5)
CHLORIDE SERPL-SCNC: 95 MMOL/L (ref 98–107)
CLARITY UR: CLEAR
CO2 SERPL-SCNC: 20.6 MMOL/L (ref 22–29)
COLOR UR: YELLOW
CREAT BLD-MCNC: 1.42 MG/DL (ref 0.57–1)
DEPRECATED RDW RBC AUTO: 48.7 FL (ref 37–54)
EOSINOPHIL # BLD AUTO: 0 10*3/MM3 (ref 0–0.4)
EOSINOPHIL NFR BLD AUTO: 0 % (ref 0.3–6.2)
ERYTHROCYTE [DISTWIDTH] IN BLOOD BY AUTOMATED COUNT: 12.7 % (ref 12.3–15.4)
GFR SERPL CREATININE-BSD FRML MDRD: 36 ML/MIN/1.73
GLOBULIN UR ELPH-MCNC: 4 GM/DL
GLUCOSE BLD-MCNC: 133 MG/DL (ref 65–99)
GLUCOSE UR STRIP-MCNC: NEGATIVE MG/DL
HCT VFR BLD AUTO: 41 % (ref 34–46.6)
HGB BLD-MCNC: 14.2 G/DL (ref 12–15.9)
HGB UR QL STRIP.AUTO: NEGATIVE
HOLD SPECIMEN: NORMAL
HOLD SPECIMEN: NORMAL
IMM GRANULOCYTES # BLD AUTO: 0.02 10*3/MM3 (ref 0–0.05)
IMM GRANULOCYTES NFR BLD AUTO: 0.2 % (ref 0–0.5)
KETONES UR QL STRIP: NEGATIVE
LEUKOCYTE ESTERASE UR QL STRIP.AUTO: NEGATIVE
LIPASE SERPL-CCNC: 24 U/L (ref 13–60)
LYMPHOCYTES # BLD AUTO: 1.52 10*3/MM3 (ref 0.7–3.1)
LYMPHOCYTES NFR BLD AUTO: 18.4 % (ref 19.6–45.3)
MCH RBC QN AUTO: 35.2 PG (ref 26.6–33)
MCHC RBC AUTO-ENTMCNC: 34.6 G/DL (ref 31.5–35.7)
MCV RBC AUTO: 101.7 FL (ref 79–97)
MONOCYTES # BLD AUTO: 0.81 10*3/MM3 (ref 0.1–0.9)
MONOCYTES NFR BLD AUTO: 9.8 % (ref 5–12)
NEUTROPHILS # BLD AUTO: 5.87 10*3/MM3 (ref 1.7–7)
NEUTROPHILS NFR BLD AUTO: 71.4 % (ref 42.7–76)
NITRITE UR QL STRIP: NEGATIVE
NRBC BLD AUTO-RTO: 0 /100 WBC (ref 0–0.2)
NT-PROBNP SERPL-MCNC: 127.2 PG/ML (ref 5–1800)
PH UR STRIP.AUTO: 5.5 [PH] (ref 5–8)
PLATELET # BLD AUTO: 395 10*3/MM3 (ref 140–450)
PMV BLD AUTO: 10 FL (ref 6–12)
POTASSIUM BLD-SCNC: 5.3 MMOL/L (ref 3.5–5.2)
PROT SERPL-MCNC: 8.8 G/DL (ref 6–8.5)
PROT UR QL STRIP: ABNORMAL
RBC # BLD AUTO: 4.03 10*6/MM3 (ref 3.77–5.28)
SODIUM BLD-SCNC: 131 MMOL/L (ref 136–145)
SP GR UR STRIP: 1.01 (ref 1–1.03)
TROPONIN T SERPL-MCNC: <0.01 NG/ML (ref 0–0.03)
TROPONIN T SERPL-MCNC: <0.01 NG/ML (ref 0–0.03)
UROBILINOGEN UR QL STRIP: ABNORMAL
WBC NRBC COR # BLD: 8.24 10*3/MM3 (ref 3.4–10.8)
WHOLE BLOOD HOLD SPECIMEN: NORMAL
WHOLE BLOOD HOLD SPECIMEN: NORMAL

## 2019-11-07 PROCEDURE — 71045 X-RAY EXAM CHEST 1 VIEW: CPT | Performed by: RADIOLOGY

## 2019-11-07 PROCEDURE — 74176 CT ABD & PELVIS W/O CONTRAST: CPT

## 2019-11-07 PROCEDURE — 80053 COMPREHEN METABOLIC PANEL: CPT | Performed by: PHYSICIAN ASSISTANT

## 2019-11-07 PROCEDURE — 25010000002 MORPHINE PER 10 MG: Performed by: EMERGENCY MEDICINE

## 2019-11-07 PROCEDURE — 70450 CT HEAD/BRAIN W/O DYE: CPT

## 2019-11-07 PROCEDURE — 99285 EMERGENCY DEPT VISIT HI MDM: CPT

## 2019-11-07 PROCEDURE — 93005 ELECTROCARDIOGRAM TRACING: CPT | Performed by: EMERGENCY MEDICINE

## 2019-11-07 PROCEDURE — 71045 X-RAY EXAM CHEST 1 VIEW: CPT

## 2019-11-07 PROCEDURE — 25010000002 ONDANSETRON PER 1 MG: Performed by: EMERGENCY MEDICINE

## 2019-11-07 PROCEDURE — 81003 URINALYSIS AUTO W/O SCOPE: CPT | Performed by: PHYSICIAN ASSISTANT

## 2019-11-07 PROCEDURE — 83690 ASSAY OF LIPASE: CPT | Performed by: PHYSICIAN ASSISTANT

## 2019-11-07 PROCEDURE — 70450 CT HEAD/BRAIN W/O DYE: CPT | Performed by: RADIOLOGY

## 2019-11-07 PROCEDURE — 85025 COMPLETE CBC W/AUTO DIFF WBC: CPT | Performed by: PHYSICIAN ASSISTANT

## 2019-11-07 PROCEDURE — 84484 ASSAY OF TROPONIN QUANT: CPT | Performed by: PHYSICIAN ASSISTANT

## 2019-11-07 PROCEDURE — 83880 ASSAY OF NATRIURETIC PEPTIDE: CPT | Performed by: EMERGENCY MEDICINE

## 2019-11-07 RX ORDER — MORPHINE SULFATE 2 MG/ML
2 INJECTION, SOLUTION INTRAMUSCULAR; INTRAVENOUS ONCE
Status: COMPLETED | OUTPATIENT
Start: 2019-11-07 | End: 2019-11-07

## 2019-11-07 RX ORDER — ONDANSETRON 2 MG/ML
4 INJECTION INTRAMUSCULAR; INTRAVENOUS ONCE
Status: COMPLETED | OUTPATIENT
Start: 2019-11-07 | End: 2019-11-07

## 2019-11-07 RX ORDER — SODIUM CHLORIDE 0.9 % (FLUSH) 0.9 %
10 SYRINGE (ML) INJECTION AS NEEDED
Status: DISCONTINUED | OUTPATIENT
Start: 2019-11-07 | End: 2019-11-21 | Stop reason: HOSPADM

## 2019-11-07 RX ORDER — ASPIRIN 81 MG/1
324 TABLET, CHEWABLE ORAL ONCE
Status: COMPLETED | OUTPATIENT
Start: 2019-11-07 | End: 2019-11-07

## 2019-11-07 RX ADMIN — SODIUM CHLORIDE 1000 ML: 9 INJECTION, SOLUTION INTRAVENOUS at 18:50

## 2019-11-07 RX ADMIN — ONDANSETRON 4 MG: 2 INJECTION, SOLUTION INTRAMUSCULAR; INTRAVENOUS at 18:50

## 2019-11-07 RX ADMIN — MORPHINE SULFATE 2 MG: 2 INJECTION, SOLUTION INTRAMUSCULAR; INTRAVENOUS at 22:38

## 2019-11-07 RX ADMIN — MORPHINE SULFATE 2 MG: 2 INJECTION, SOLUTION INTRAMUSCULAR; INTRAVENOUS at 18:49

## 2019-11-07 RX ADMIN — ASPIRIN 324 MG: 81 TABLET, CHEWABLE ORAL at 18:32

## 2019-11-07 NOTE — ED NOTES
12 lead EKG performed at this time and shown to Dr Thomas at 1816.      Lianna Anne, RN  11/07/19 1328

## 2019-11-08 ENCOUNTER — APPOINTMENT (OUTPATIENT)
Dept: NUCLEAR MEDICINE | Facility: HOSPITAL | Age: 74
End: 2019-11-08

## 2019-11-08 ENCOUNTER — APPOINTMENT (OUTPATIENT)
Dept: CARDIOLOGY | Facility: HOSPITAL | Age: 74
End: 2019-11-08

## 2019-11-08 PROBLEM — R29.6 FALLING EPISODES: Status: RESOLVED | Noted: 2017-09-01 | Resolved: 2019-11-08

## 2019-11-08 PROBLEM — I44.0 FIRST DEGREE AV BLOCK: Status: ACTIVE | Noted: 2019-11-08

## 2019-11-08 PROBLEM — I45.2 BIFASCICULAR BLOCK: Chronic | Status: ACTIVE | Noted: 2018-03-29

## 2019-11-08 PROBLEM — R73.9 HYPERGLYCEMIA: Status: ACTIVE | Noted: 2019-11-08

## 2019-11-08 PROBLEM — Z90.81 HISTORY OF SPLENECTOMY: Chronic | Status: ACTIVE | Noted: 2019-11-08

## 2019-11-08 PROBLEM — D75.89 MACROCYTOSIS WITHOUT ANEMIA: Status: ACTIVE | Noted: 2019-11-08

## 2019-11-08 PROBLEM — R65.10 SIRS (SYSTEMIC INFLAMMATORY RESPONSE SYNDROME) (HCC): Status: ACTIVE | Noted: 2019-11-08

## 2019-11-08 PROBLEM — I45.10 RBBB: Chronic | Status: ACTIVE | Noted: 2017-09-01

## 2019-11-08 PROBLEM — K80.20 CHOLELITHIASIS: Status: ACTIVE | Noted: 2019-11-08

## 2019-11-08 PROBLEM — R07.9 CHEST PAIN IN ADULT: Status: ACTIVE | Noted: 2019-11-08

## 2019-11-08 PROBLEM — N17.9 ACUTE KIDNEY INJURY (HCC): Status: ACTIVE | Noted: 2019-11-08

## 2019-11-08 PROBLEM — R55 SYNCOPE: Status: RESOLVED | Noted: 2017-09-01 | Resolved: 2019-11-08

## 2019-11-08 PROBLEM — E78.5 DYSLIPIDEMIA: Chronic | Status: ACTIVE | Noted: 2017-11-14

## 2019-11-08 PROBLEM — E11.9 TYPE II DIABETES MELLITUS (HCC): Chronic | Status: ACTIVE | Noted: 2019-11-08

## 2019-11-08 PROBLEM — I16.0 HYPERTENSIVE URGENCY: Status: RESOLVED | Noted: 2019-11-03 | Resolved: 2019-11-08

## 2019-11-08 PROBLEM — E87.8 HYPOCARBIA: Status: ACTIVE | Noted: 2019-11-08

## 2019-11-08 PROBLEM — E87.5 HYPERKALEMIA: Status: ACTIVE | Noted: 2019-11-08

## 2019-11-08 LAB
25(OH)D3 SERPL-MCNC: 42.9 NG/ML (ref 30–100)
ALBUMIN SERPL-MCNC: 3.9 G/DL (ref 3.5–5.2)
ALBUMIN SERPL-MCNC: 3.91 G/DL (ref 3.5–5.2)
ALBUMIN/GLOB SERPL: 1.1 G/DL
ALBUMIN/GLOB SERPL: 1.1 G/DL
ALP SERPL-CCNC: 123 U/L (ref 39–117)
ALP SERPL-CCNC: 125 U/L (ref 39–117)
ALT SERPL W P-5'-P-CCNC: 20 U/L (ref 1–33)
ALT SERPL W P-5'-P-CCNC: 21 U/L (ref 1–33)
ANION GAP SERPL CALCULATED.3IONS-SCNC: 11.5 MMOL/L (ref 5–15)
ANION GAP SERPL CALCULATED.3IONS-SCNC: 12.7 MMOL/L (ref 5–15)
AST SERPL-CCNC: 23 U/L (ref 1–32)
AST SERPL-CCNC: 24 U/L (ref 1–32)
BASOPHILS # BLD AUTO: 0.03 10*3/MM3 (ref 0–0.2)
BASOPHILS # BLD AUTO: 0.03 10*3/MM3 (ref 0–0.2)
BASOPHILS NFR BLD AUTO: 0.3 % (ref 0–1.5)
BASOPHILS NFR BLD AUTO: 0.4 % (ref 0–1.5)
BH CV NUCLEAR PRIOR STUDY: 3
BH CV STRESS BP STAGE 1: NORMAL
BH CV STRESS BP STAGE 2: NORMAL
BH CV STRESS COMMENTS STAGE 1: NORMAL
BH CV STRESS COMMENTS STAGE 2: NORMAL
BH CV STRESS DOSE REGADENOSON STAGE 1: 0.4
BH CV STRESS DURATION MIN STAGE 1: 0
BH CV STRESS DURATION MIN STAGE 2: 4
BH CV STRESS DURATION SEC STAGE 1: 10
BH CV STRESS DURATION SEC STAGE 2: 0
BH CV STRESS HR STAGE 1: 90
BH CV STRESS HR STAGE 2: 85
BH CV STRESS PROTOCOL 1: NORMAL
BH CV STRESS RECOVERY BP: NORMAL MMHG
BH CV STRESS RECOVERY HR: 85 BPM
BH CV STRESS STAGE 1: 1
BH CV STRESS STAGE 2: 2
BILIRUB SERPL-MCNC: 0.5 MG/DL (ref 0.2–1.2)
BILIRUB SERPL-MCNC: 0.6 MG/DL (ref 0.2–1.2)
BUN BLD-MCNC: 34 MG/DL (ref 8–23)
BUN BLD-MCNC: 37 MG/DL (ref 8–23)
BUN/CREAT SERPL: 30.1 (ref 7–25)
BUN/CREAT SERPL: 31.2 (ref 7–25)
CALCIUM SPEC-SCNC: 9.8 MG/DL (ref 8.6–10.5)
CALCIUM SPEC-SCNC: 9.8 MG/DL (ref 8.6–10.5)
CHLORIDE SERPL-SCNC: 102 MMOL/L (ref 98–107)
CHLORIDE SERPL-SCNC: 102 MMOL/L (ref 98–107)
CO2 SERPL-SCNC: 20.3 MMOL/L (ref 22–29)
CO2 SERPL-SCNC: 20.5 MMOL/L (ref 22–29)
CREAT BLD-MCNC: 1.09 MG/DL (ref 0.57–1)
CREAT BLD-MCNC: 1.23 MG/DL (ref 0.57–1)
DEPRECATED RDW RBC AUTO: 49.8 FL (ref 37–54)
DEPRECATED RDW RBC AUTO: 50.6 FL (ref 37–54)
EOSINOPHIL # BLD AUTO: 0.02 10*3/MM3 (ref 0–0.4)
EOSINOPHIL # BLD AUTO: 0.02 10*3/MM3 (ref 0–0.4)
EOSINOPHIL NFR BLD AUTO: 0.2 % (ref 0.3–6.2)
EOSINOPHIL NFR BLD AUTO: 0.3 % (ref 0.3–6.2)
ERYTHROCYTE [DISTWIDTH] IN BLOOD BY AUTOMATED COUNT: 13 % (ref 12.3–15.4)
ERYTHROCYTE [DISTWIDTH] IN BLOOD BY AUTOMATED COUNT: 13.1 % (ref 12.3–15.4)
FOLATE SERPL-MCNC: 19.5 NG/ML (ref 4.78–24.2)
GFR SERPL CREATININE-BSD FRML MDRD: 42 ML/MIN/1.73
GFR SERPL CREATININE-BSD FRML MDRD: 49 ML/MIN/1.73
GLOBULIN UR ELPH-MCNC: 3.5 GM/DL
GLOBULIN UR ELPH-MCNC: 3.6 GM/DL
GLUCOSE BLD-MCNC: 91 MG/DL (ref 65–99)
GLUCOSE BLD-MCNC: 99 MG/DL (ref 65–99)
GLUCOSE BLDC GLUCOMTR-MCNC: 152 MG/DL (ref 70–130)
GLUCOSE BLDC GLUCOMTR-MCNC: 180 MG/DL (ref 70–130)
GLUCOSE BLDC GLUCOMTR-MCNC: 90 MG/DL (ref 70–130)
GLUCOSE BLDC GLUCOMTR-MCNC: 98 MG/DL (ref 70–130)
HBA1C MFR BLD: 6.1 % (ref 4.8–5.6)
HCT VFR BLD AUTO: 38.6 % (ref 34–46.6)
HCT VFR BLD AUTO: 39 % (ref 34–46.6)
HGB BLD-MCNC: 12.9 G/DL (ref 12–15.9)
HGB BLD-MCNC: 13.3 G/DL (ref 12–15.9)
IMM GRANULOCYTES # BLD AUTO: 0.02 10*3/MM3 (ref 0–0.05)
IMM GRANULOCYTES # BLD AUTO: 0.03 10*3/MM3 (ref 0–0.05)
IMM GRANULOCYTES NFR BLD AUTO: 0.3 % (ref 0–0.5)
IMM GRANULOCYTES NFR BLD AUTO: 0.3 % (ref 0–0.5)
LYMPHOCYTES # BLD AUTO: 2.29 10*3/MM3 (ref 0.7–3.1)
LYMPHOCYTES # BLD AUTO: 2.44 10*3/MM3 (ref 0.7–3.1)
LYMPHOCYTES NFR BLD AUTO: 24.2 % (ref 19.6–45.3)
LYMPHOCYTES NFR BLD AUTO: 30.4 % (ref 19.6–45.3)
MAGNESIUM SERPL-MCNC: 2.1 MG/DL (ref 1.6–2.4)
MAXIMAL PREDICTED HEART RATE: 144 BPM
MCH RBC QN AUTO: 35 PG (ref 26.6–33)
MCH RBC QN AUTO: 35.2 PG (ref 26.6–33)
MCHC RBC AUTO-ENTMCNC: 33.4 G/DL (ref 31.5–35.7)
MCHC RBC AUTO-ENTMCNC: 34.1 G/DL (ref 31.5–35.7)
MCV RBC AUTO: 103.2 FL (ref 79–97)
MCV RBC AUTO: 104.6 FL (ref 79–97)
MONOCYTES # BLD AUTO: 0.8 10*3/MM3 (ref 0.1–0.9)
MONOCYTES # BLD AUTO: 1.07 10*3/MM3 (ref 0.1–0.9)
MONOCYTES NFR BLD AUTO: 10.6 % (ref 5–12)
MONOCYTES NFR BLD AUTO: 10.6 % (ref 5–12)
NEUTROPHILS # BLD AUTO: 4.37 10*3/MM3 (ref 1.7–7)
NEUTROPHILS # BLD AUTO: 6.51 10*3/MM3 (ref 1.7–7)
NEUTROPHILS NFR BLD AUTO: 58 % (ref 42.7–76)
NEUTROPHILS NFR BLD AUTO: 64.4 % (ref 42.7–76)
NRBC BLD AUTO-RTO: 0 /100 WBC (ref 0–0.2)
NRBC BLD AUTO-RTO: 0 /100 WBC (ref 0–0.2)
PERCENT MAX PREDICTED HR: 62.5 %
PHOSPHATE SERPL-MCNC: 3.5 MG/DL (ref 2.5–4.5)
PLATELET # BLD AUTO: 356 10*3/MM3 (ref 140–450)
PLATELET # BLD AUTO: 359 10*3/MM3 (ref 140–450)
PMV BLD AUTO: 10.1 FL (ref 6–12)
PMV BLD AUTO: 9.9 FL (ref 6–12)
POTASSIUM BLD-SCNC: 4.8 MMOL/L (ref 3.5–5.2)
POTASSIUM BLD-SCNC: 4.9 MMOL/L (ref 3.5–5.2)
PROT SERPL-MCNC: 7.4 G/DL (ref 6–8.5)
PROT SERPL-MCNC: 7.5 G/DL (ref 6–8.5)
RBC # BLD AUTO: 3.69 10*6/MM3 (ref 3.77–5.28)
RBC # BLD AUTO: 3.78 10*6/MM3 (ref 3.77–5.28)
SODIUM BLD-SCNC: 134 MMOL/L (ref 136–145)
SODIUM BLD-SCNC: 135 MMOL/L (ref 136–145)
STRESS BASELINE BP: NORMAL MMHG
STRESS BASELINE HR: 77 BPM
STRESS PERCENT HR: 74 %
STRESS POST PEAK BP: NORMAL MMHG
STRESS POST PEAK HR: 90 BPM
STRESS TARGET HR: 122 BPM
TROPONIN T SERPL-MCNC: <0.01 NG/ML (ref 0–0.03)
TSH SERPL DL<=0.05 MIU/L-ACNC: 1.43 UIU/ML (ref 0.27–4.2)
VIT B12 BLD-MCNC: 725 PG/ML (ref 211–946)
WBC NRBC COR # BLD: 10.1 10*3/MM3 (ref 3.4–10.8)
WBC NRBC COR # BLD: 7.53 10*3/MM3 (ref 3.4–10.8)

## 2019-11-08 PROCEDURE — 83735 ASSAY OF MAGNESIUM: CPT | Performed by: PHYSICIAN ASSISTANT

## 2019-11-08 PROCEDURE — 94799 UNLISTED PULMONARY SVC/PX: CPT

## 2019-11-08 PROCEDURE — 78452 HT MUSCLE IMAGE SPECT MULT: CPT

## 2019-11-08 PROCEDURE — 83036 HEMOGLOBIN GLYCOSYLATED A1C: CPT | Performed by: PHYSICIAN ASSISTANT

## 2019-11-08 PROCEDURE — 93018 CV STRESS TEST I&R ONLY: CPT | Performed by: INTERNAL MEDICINE

## 2019-11-08 PROCEDURE — A9500 TC99M SESTAMIBI: HCPCS | Performed by: INTERNAL MEDICINE

## 2019-11-08 PROCEDURE — 82306 VITAMIN D 25 HYDROXY: CPT | Performed by: PHYSICIAN ASSISTANT

## 2019-11-08 PROCEDURE — 80053 COMPREHEN METABOLIC PANEL: CPT | Performed by: PHYSICIAN ASSISTANT

## 2019-11-08 PROCEDURE — 82607 VITAMIN B-12: CPT | Performed by: PHYSICIAN ASSISTANT

## 2019-11-08 PROCEDURE — 85025 COMPLETE CBC W/AUTO DIFF WBC: CPT | Performed by: PHYSICIAN ASSISTANT

## 2019-11-08 PROCEDURE — 84100 ASSAY OF PHOSPHORUS: CPT | Performed by: PHYSICIAN ASSISTANT

## 2019-11-08 PROCEDURE — 93017 CV STRESS TEST TRACING ONLY: CPT

## 2019-11-08 PROCEDURE — 84484 ASSAY OF TROPONIN QUANT: CPT | Performed by: INTERNAL MEDICINE

## 2019-11-08 PROCEDURE — 82746 ASSAY OF FOLIC ACID SERUM: CPT | Performed by: PHYSICIAN ASSISTANT

## 2019-11-08 PROCEDURE — 84443 ASSAY THYROID STIM HORMONE: CPT | Performed by: PHYSICIAN ASSISTANT

## 2019-11-08 PROCEDURE — G0108 DIAB MANAGE TRN  PER INDIV: HCPCS

## 2019-11-08 PROCEDURE — 25010000002 REGADENOSON 0.4 MG/5ML SOLUTION: Performed by: INTERNAL MEDICINE

## 2019-11-08 PROCEDURE — 99223 1ST HOSP IP/OBS HIGH 75: CPT | Performed by: INTERNAL MEDICINE

## 2019-11-08 PROCEDURE — 0 TECHNETIUM SESTAMIBI: Performed by: INTERNAL MEDICINE

## 2019-11-08 PROCEDURE — 85025 COMPLETE CBC W/AUTO DIFF WBC: CPT | Performed by: INTERNAL MEDICINE

## 2019-11-08 PROCEDURE — 92610 EVALUATE SWALLOWING FUNCTION: CPT

## 2019-11-08 PROCEDURE — 78452 HT MUSCLE IMAGE SPECT MULT: CPT | Performed by: INTERNAL MEDICINE

## 2019-11-08 PROCEDURE — 97165 OT EVAL LOW COMPLEX 30 MIN: CPT

## 2019-11-08 PROCEDURE — 97162 PT EVAL MOD COMPLEX 30 MIN: CPT

## 2019-11-08 PROCEDURE — 25010000002 HEPARIN (PORCINE) PER 1000 UNITS: Performed by: INTERNAL MEDICINE

## 2019-11-08 PROCEDURE — 82962 GLUCOSE BLOOD TEST: CPT

## 2019-11-08 PROCEDURE — 80053 COMPREHEN METABOLIC PANEL: CPT | Performed by: INTERNAL MEDICINE

## 2019-11-08 RX ORDER — SODIUM CHLORIDE 0.9 % (FLUSH) 0.9 %
10 SYRINGE (ML) INJECTION AS NEEDED
Status: DISCONTINUED | OUTPATIENT
Start: 2019-11-08 | End: 2019-11-21 | Stop reason: HOSPADM

## 2019-11-08 RX ORDER — NICOTINE POLACRILEX 4 MG
15 LOZENGE BUCCAL
Status: DISCONTINUED | OUTPATIENT
Start: 2019-11-08 | End: 2019-11-21 | Stop reason: HOSPADM

## 2019-11-08 RX ORDER — NITROGLYCERIN 0.4 MG/1
0.4 TABLET SUBLINGUAL
Status: CANCELLED | OUTPATIENT
Start: 2019-11-08

## 2019-11-08 RX ORDER — PANTOPRAZOLE SODIUM 40 MG/1
40 TABLET, DELAYED RELEASE ORAL DAILY
Status: CANCELLED | OUTPATIENT
Start: 2019-11-08

## 2019-11-08 RX ORDER — ASPIRIN 81 MG/1
81 TABLET ORAL DAILY
Status: DISCONTINUED | OUTPATIENT
Start: 2019-11-08 | End: 2019-11-21 | Stop reason: HOSPADM

## 2019-11-08 RX ORDER — EXEMESTANE 25 MG/1
25 TABLET ORAL DAILY
Status: DISCONTINUED | OUTPATIENT
Start: 2019-11-08 | End: 2019-11-21 | Stop reason: HOSPADM

## 2019-11-08 RX ORDER — PANTOPRAZOLE SODIUM 40 MG/1
40 TABLET, DELAYED RELEASE ORAL
Status: DISCONTINUED | OUTPATIENT
Start: 2019-11-08 | End: 2019-11-21 | Stop reason: HOSPADM

## 2019-11-08 RX ORDER — CETIRIZINE HYDROCHLORIDE 10 MG/1
5 TABLET ORAL DAILY
Status: DISCONTINUED | OUTPATIENT
Start: 2019-11-08 | End: 2019-11-21 | Stop reason: HOSPADM

## 2019-11-08 RX ORDER — SODIUM CHLORIDE 9 MG/ML
50 INJECTION, SOLUTION INTRAVENOUS CONTINUOUS
Status: DISCONTINUED | OUTPATIENT
Start: 2019-11-08 | End: 2019-11-11

## 2019-11-08 RX ORDER — SODIUM CHLORIDE 0.9 % (FLUSH) 0.9 %
10 SYRINGE (ML) INJECTION EVERY 12 HOURS SCHEDULED
Status: DISCONTINUED | OUTPATIENT
Start: 2019-11-08 | End: 2019-11-21 | Stop reason: HOSPADM

## 2019-11-08 RX ORDER — NITROGLYCERIN 0.4 MG/1
0.4 TABLET SUBLINGUAL
Status: DISCONTINUED | OUTPATIENT
Start: 2019-11-08 | End: 2019-11-21 | Stop reason: HOSPADM

## 2019-11-08 RX ORDER — PROMETHAZINE HYDROCHLORIDE 25 MG/ML
12.5 INJECTION, SOLUTION INTRAMUSCULAR; INTRAVENOUS EVERY 6 HOURS PRN
Status: DISCONTINUED | OUTPATIENT
Start: 2019-11-08 | End: 2019-11-13

## 2019-11-08 RX ORDER — DEXTROSE MONOHYDRATE 25 G/50ML
25 INJECTION, SOLUTION INTRAVENOUS
Status: DISCONTINUED | OUTPATIENT
Start: 2019-11-08 | End: 2019-11-21 | Stop reason: HOSPADM

## 2019-11-08 RX ORDER — FUROSEMIDE 40 MG/1
40 TABLET ORAL DAILY
Status: CANCELLED | OUTPATIENT
Start: 2019-11-08

## 2019-11-08 RX ORDER — HEPARIN SODIUM 5000 [USP'U]/ML
5000 INJECTION, SOLUTION INTRAVENOUS; SUBCUTANEOUS EVERY 12 HOURS SCHEDULED
Status: DISCONTINUED | OUTPATIENT
Start: 2019-11-08 | End: 2019-11-21 | Stop reason: HOSPADM

## 2019-11-08 RX ORDER — UBIDECARENONE 75 MG
200 CAPSULE ORAL DAILY
Status: CANCELLED | OUTPATIENT
Start: 2019-11-08

## 2019-11-08 RX ORDER — TRIAMTERENE AND HYDROCHLOROTHIAZIDE 37.5; 25 MG/1; MG/1
1 TABLET ORAL DAILY
Status: CANCELLED | OUTPATIENT
Start: 2019-11-08

## 2019-11-08 RX ORDER — LISINOPRIL 10 MG/1
40 TABLET ORAL DAILY
Status: CANCELLED | OUTPATIENT
Start: 2019-11-08

## 2019-11-08 RX ORDER — HYDRALAZINE HYDROCHLORIDE 20 MG/ML
10 INJECTION INTRAMUSCULAR; INTRAVENOUS EVERY 6 HOURS PRN
Status: DISCONTINUED | OUTPATIENT
Start: 2019-11-08 | End: 2019-11-21 | Stop reason: HOSPADM

## 2019-11-08 RX ORDER — VALACYCLOVIR HYDROCHLORIDE 500 MG/1
1000 TABLET, FILM COATED ORAL DAILY
Status: DISCONTINUED | OUTPATIENT
Start: 2019-11-08 | End: 2019-11-21 | Stop reason: HOSPADM

## 2019-11-08 RX ORDER — HYDRALAZINE HYDROCHLORIDE 50 MG/1
50 TABLET, FILM COATED ORAL 2 TIMES DAILY
Status: DISCONTINUED | OUTPATIENT
Start: 2019-11-08 | End: 2019-11-08

## 2019-11-08 RX ORDER — ROPINIROLE 0.25 MG/1
0.25 TABLET, FILM COATED ORAL NIGHTLY
Status: DISCONTINUED | OUTPATIENT
Start: 2019-11-08 | End: 2019-11-21 | Stop reason: HOSPADM

## 2019-11-08 RX ORDER — CARVEDILOL 6.25 MG/1
12.5 TABLET ORAL 2 TIMES DAILY
Status: DISCONTINUED | OUTPATIENT
Start: 2019-11-08 | End: 2019-11-21 | Stop reason: HOSPADM

## 2019-11-08 RX ORDER — HYDRALAZINE HYDROCHLORIDE 50 MG/1
50 TABLET, FILM COATED ORAL EVERY 8 HOURS SCHEDULED
Status: DISCONTINUED | OUTPATIENT
Start: 2019-11-08 | End: 2019-11-13

## 2019-11-08 RX ORDER — ATORVASTATIN CALCIUM 10 MG/1
10 TABLET, FILM COATED ORAL NIGHTLY
Status: DISCONTINUED | OUTPATIENT
Start: 2019-11-08 | End: 2019-11-21 | Stop reason: HOSPADM

## 2019-11-08 RX ORDER — ACETAMINOPHEN 325 MG/1
650 TABLET ORAL EVERY 6 HOURS PRN
Status: DISCONTINUED | OUTPATIENT
Start: 2019-11-08 | End: 2019-11-21 | Stop reason: HOSPADM

## 2019-11-08 RX ADMIN — TECHNETIUM TC 99M SESTAMIBI 1 DOSE: 1 INJECTION INTRAVENOUS at 09:10

## 2019-11-08 RX ADMIN — ATORVASTATIN CALCIUM 10 MG: 10 TABLET, FILM COATED ORAL at 20:05

## 2019-11-08 RX ADMIN — CARVEDILOL 12.5 MG: 6.25 TABLET, FILM COATED ORAL at 20:05

## 2019-11-08 RX ADMIN — ROPINIROLE HYDROCHLORIDE 0.25 MG: 0.25 TABLET, FILM COATED ORAL at 20:05

## 2019-11-08 RX ADMIN — SODIUM CHLORIDE, PRESERVATIVE FREE 10 ML: 5 INJECTION INTRAVENOUS at 07:51

## 2019-11-08 RX ADMIN — HYDRALAZINE HYDROCHLORIDE 50 MG: 50 TABLET ORAL at 20:10

## 2019-11-08 RX ADMIN — HYDRALAZINE HYDROCHLORIDE 50 MG: 50 TABLET ORAL at 07:50

## 2019-11-08 RX ADMIN — SODIUM CHLORIDE 50 ML/HR: 9 INJECTION, SOLUTION INTRAVENOUS at 01:36

## 2019-11-08 RX ADMIN — REGADENOSON 0.4 MG: 0.08 INJECTION, SOLUTION INTRAVENOUS at 12:08

## 2019-11-08 RX ADMIN — HEPARIN SODIUM 5000 UNITS: 5000 INJECTION INTRAVENOUS; SUBCUTANEOUS at 01:40

## 2019-11-08 RX ADMIN — HEPARIN SODIUM 5000 UNITS: 5000 INJECTION INTRAVENOUS; SUBCUTANEOUS at 20:05

## 2019-11-08 RX ADMIN — CARVEDILOL 12.5 MG: 6.25 TABLET, FILM COATED ORAL at 07:50

## 2019-11-08 RX ADMIN — SODIUM CHLORIDE, PRESERVATIVE FREE 10 ML: 5 INJECTION INTRAVENOUS at 01:40

## 2019-11-08 RX ADMIN — SODIUM CHLORIDE, PRESERVATIVE FREE 10 ML: 5 INJECTION INTRAVENOUS at 20:05

## 2019-11-08 RX ADMIN — TECHNETIUM TC 99M SESTAMIBI 1 DOSE: 1 INJECTION INTRAVENOUS at 12:08

## 2019-11-08 NOTE — THERAPY EVALUATION
Acute Care - Speech Language Pathology   Swallow Initial Evaluation  Jose   CLINICAL DYSPHAGIA ASSESSMENT     Patient Name: Tahmina Martinez  : 1945  MRN: [id number]  Today's Date: 2019             Admit Date: 2019     Tahmina Martinez  is seen at bedside this pm on 3S to assess safety/efficacy of swallowing fnx, determine safest/least restrictive diet tolerance. She was admitted 19 from her private residence 2/2 chest pain w/ dizziness, s/p emesis event. She is assessed to r/o aspiration prior to initiating po diet.     Social History     Socioeconomic History   • Marital status:      Spouse name: Not on file   • Number of children: Not on file   • Years of education: Not on file   • Highest education level: Not on file   Tobacco Use   • Smoking status: Former Smoker     Types: Cigarettes   • Smokeless tobacco: Never Used   • Tobacco comment: Patient states she smoked 3 cigarettes a day for 1 year as a teenager    Substance and Sexual Activity   • Alcohol use: No   • Drug use: No   • Sexual activity: Defer   Social History Narrative    Patient lives at home with ex-; she is currently on disability       Chest xray 19 revealed no acute cardiopulmonary findings.     Diet Orders (active) (From admission, onward)    Start     Ordered    19 0047  NPO Diet  Diet Effective Now      19 0046        Ms. Martinez is positioned upright and centered in bed to accept multiple po presentations of ice chips, solid cracker, puree and thin liquids via spoon, cup and straw.  She is able to self feed.     Facial/oral structures are symmetrical upon observation. Lingual protrusion reveals no deviation. Oral mucosa are moist, pink, and clean. Secretions are clear, thin, and well controlled. OROM/MARINE is wfl to imitate oral postures. Gag is not assessed. Volitional cough is intact w/ adequate  intensity, clear in quality, non-productive. Voice is adequate in intensity, clear in quality  w/ intelligible speech. She is a/a and interactive, oriented x3, follows simple directives, and participates in simple conversational exchanges. She is eager for po intake.     Upon po presentations, adequate bolus anticipation and acceptance w/ good labial seal for bolus clearance via spoon bowl, cup rim stability and suction via straw. Bolus formation, manipulation and control are wfl w/ rotary mastication pattern. A-p transit is timely w/o oral residue. No overt s/s aspiration before the swallow.     Pharyngeal swallow is timely w/ adequate hyolaryngeal elevation per palpation. No overt s/s aspiration evidenced across this evaluation. No silent aspiration suspected as she is w/o changes in vocal quality, respirations or secretions post po presentations. She denies odynophagia.    Visit Dx:     ICD-10-CM ICD-9-CM   1. Chest pain in adult R07.9 786.50   2. Renal insufficiency N28.9 593.9     Patient Active Problem List   Diagnosis   • History of Non-STEMI (non-ST elevated myocardial infarction) with no coronary intervention needed in 2008, clinically stable.    • Dyspnea, class II-III.    • Essential hypertension   • History of Acute myelocytic leukemia,status post chemotherapy and bone marrow transplant in 2005.   • Breast cancer (right), status post radiation therapy in 08/2015   • RBBB unchanged from 2015   • Dyslipidemia   • Bifascicular block (RBBB plus LP FB)   • Chest pain in adult   • History of splenectomy   • Cholelithiasis   • Acute kidney injury (CMS/HCC)   • Hyperkalemia   • SIRS (systemic inflammatory response syndrome) (CMS/HCC)   • Hyperglycemia   • First degree AV block   • Macrocytosis without anemia   • Hypocarbia   • Type II diabetes mellitus (CMS/HCC)     Past Medical History:   Diagnosis Date   • Dyslipidemia 11/14/2017    On atorvastatin.    • Essential hypertension 10/12/2016   • First degree AV block 11/8/2019   • H/O splenectomy    • History of breast cancer     2015   • Hypertension    •  Myelocytic leukemia (CMS/HCC)    • Non-STEMI (non-ST elevated myocardial infarction) (CMS/Formerly [facility])    • Restless leg    • Type II diabetes mellitus (CMS/Formerly [facility]) 2019     Past Surgical History:   Procedure Laterality Date   • BONE MARROW TRANSPLANT     •  SECTION     • SPLENECTOMY     • TUBAL ABDOMINAL LIGATION       EDUCATION  The patient has been educated in the following areas:   Dysphagia (Swallowing Impairment) Oral Care/Hydration.    Impression: Ms. Martinez  presents w/ wfl oropharyngeal swallow w/o s/s aspiration.No s/s indicative of silent aspiration.  No odynophagia reported.    She is felt to most benefit from least restrictive po diet regular consistency, thin liquids. Medications whole in puree, thins. No further formal SLP f/u warranted.       SLP Recommendation and Plan    1. Regular consistency, thin liquids.    2. Medications whole in puree/thins.   3. Upright and centered for all po intake  4. IVONNE precautions.  5. Oral care protocol.  No further formal SLP f/u warranted at this time.    D/w pt results and recommendations w/ verbal agreement.    Thank you for allowing me to participate in the care of your patient-  Danya Brizuela M.S., CCC/SLP                                                      Time Calculation:       Therapy Charges for Today     Code Description Service Date Service Provider Modifiers Qty    [id number] HC ST EVAL ORAL PHARYNG SWALLOW 3 2019 Danya Brizuela, MS CCC-SLP GN 1               Danya Brizuela, MS CCC-SLP  2019

## 2019-11-08 NOTE — ED PROVIDER NOTES
Subjective   74-year-old female who comes in with chief complaint fatigue, weakness, chest discomfort.  Patient reports that she developed left-sided arm pain upon awakening this morning.  States that she has had generalized fatigue and weakness.  Patient states she has had intermittent chest pain started earlier this day.  Patient does have significant history of hypertension, previous cardiac disease.        History provided by:  Patient   used: No    Weakness - Generalized   Severity:  Moderate  Onset quality:  Sudden  Duration:  2 hours  Progression:  Worsening  Chronicity:  New  Context: not alcohol use, not change in medication, not dehydration, not drug use and not increased activity    Relieved by:  Nothing  Worsened by:  Nothing  Ineffective treatments:  None tried  Associated symptoms: nausea and vomiting    Associated symptoms: no aphasia, no ataxia, no chest pain, no diarrhea, no difficulty walking, no dizziness, no dysuria, no falls, no fever, no frequency, no hematochezia, no myalgias, no seizures, no sensory-motor deficit and no shortness of breath    Risk factors: no congestive heart failure, no coronary artery disease, no family hx of stroke, no neurologic disease and no new medications        Review of Systems   Constitutional: Negative for fever.   Respiratory: Negative.  Negative for shortness of breath.    Cardiovascular: Negative for chest pain and leg swelling.   Gastrointestinal: Positive for abdominal distention, nausea and vomiting. Negative for diarrhea and hematochezia.   Genitourinary: Negative.  Negative for difficulty urinating, dyspareunia, dysuria and frequency.   Musculoskeletal: Negative.  Negative for falls, gait problem, joint swelling and myalgias.   Skin: Negative.  Negative for color change and pallor.   Neurological: Negative for dizziness and seizures.   All other systems reviewed and are negative.      Past Medical History:   Diagnosis Date   • Breast  cancer (CMS/HCC)    • Dyspnea    • H/O splenectomy    • Hypertension    • Myelocytic leukemia (CMS/HCC)    • Non-STEMI (non-ST elevated myocardial infarction) (CMS/HCC)        Allergies   Allergen Reactions   • Latex    • Penicillins    • Zithromax [Azithromycin]        Past Surgical History:   Procedure Laterality Date   • BONE MARROW TRANSPLANT     •  SECTION     • SPLENECTOMY     • TUBAL ABDOMINAL LIGATION         Family History   Problem Relation Age of Onset   • Heart disease Mother    • Heart disease Father        Social History     Socioeconomic History   • Marital status:      Spouse name: Not on file   • Number of children: Not on file   • Years of education: Not on file   • Highest education level: Not on file   Tobacco Use   • Smoking status: Never Smoker   • Smokeless tobacco: Never Used   Substance and Sexual Activity   • Alcohol use: No   • Drug use: No   • Sexual activity: Defer           Objective   Physical Exam   Constitutional: She is oriented to person, place, and time. She appears well-developed and well-nourished.  Non-toxic appearance. She does not appear ill. No distress.   HENT:   Head: Normocephalic and atraumatic.   Mouth/Throat: Oropharynx is clear and moist.   Eyes: EOM are normal. Pupils are equal, round, and reactive to light. No scleral icterus.   Cardiovascular: Normal rate and regular rhythm.   Pulmonary/Chest: Effort normal and breath sounds normal. No stridor. No respiratory distress. She has no wheezes. She has no rales. She exhibits no tenderness.   Abdominal: Soft. Normal appearance and bowel sounds are normal. She exhibits no distension, no pulsatile liver, no fluid wave, no abdominal bruit, no ascites and no mass. There is no hepatosplenomegaly. There is no tenderness. There is no rigidity, no rebound, no guarding, no CVA tenderness, no tenderness at McBurney's point and negative Sebastian's sign. No hernia.   Neurological: She is alert and oriented to person,  place, and time.   Skin: Skin is warm and dry. Capillary refill takes less than 2 seconds. No rash noted. She is not diaphoretic. No cyanosis or erythema.   Psychiatric: She has a normal mood and affect. Her behavior is normal.   Nursing note and vitals reviewed.      Procedures           ED Course  ED Course as of Nov 08 0322   Thu Nov 07, 2019   1835 EKG interpreted by Dr. Duque: Sinus rhythm with right bundle branch block ventricular rate of 75 bpm no indication of STEMI.  [RB]   1928 XR chest rad interpreted:  No evidence of active or acute cardiopulmonary disease on today's chest  radiograph.  [RB]   2026 Ct head rad interpreted:  No acute intracranial pathology. Nothing is seen on this exam to  specifically account for the patient's symptoms.  [RB]   2059 Took over care for Wali Gonzalez.  Patient has no abdominal tenderness at this time.  [BH]   2323 Cholelithiasis without CT evidence of cholecystitis or biliary obstruction.    No renal or bowel obstruction or other acute abnormality. Normal appendix.  [BH]   2343 Discussed care with Dr. Ulloa. Patient will be admitted to telemetry for acute renal failure and chest pain rule out.   []   Fri Nov 08, 2019   0049 Evaluated at bedside, and agree with assessment and plan.  Will admit at this time for further evaluation and treatment.  Almas BROWN  [ES]      ED Course User Index  [BH] Carl Estes PA-C  [ES] Charlie Coburn MD  [RB] Wali Gonzalez PA                  [facility]    Final diagnoses:   Chest pain in adult   Renal insufficiency              Carl Estes PA-C  11/07/19 7567       Carl Estes PA-C  11/08/19 1942

## 2019-11-08 NOTE — PROGRESS NOTES
Discharge Planning Assessment   [place]     Patient Name: Tahmina Martinez  MRN: [id number]  Today's Date: 11/8/2019    Admit Date: 11/7/2019    Discharge Needs Assessment     Row Name 11/08/19 1540       Living Environment    Lives With  alone    Current Living Arrangements  home/apartment/condo    Primary Care Provided by  self;spouse/significant other    Provides Primary Care For  no one    Family Caregiver if Needed  child(slade), adult;significant other    Quality of Family Relationships  involved    Able to Return to Prior Arrangements  yes       Resource/Environmental Concerns    Transportation Concerns  car, none       Transition Planning    Patient/Family Anticipates Transition to  home with family    Patient/Family Anticipated Services at Transition  durable medical equipment       Discharge Needs Assessment    Equipment Currently Used at Home  cane, quad;walker, rolling    Equipment Needed After Discharge  rollator    Offered/Gave Vendor List  yes    Patient's Choice of Community Agency(s)  No preference        Discharge Plan     Row Name 11/08/19 154       Plan    Plan  SS spoke with pt on this day. Pt lives at home alone. Pt does not receive  services. Pt has a quad cane, and is requesting a rollator. Pt has no preference of agency. Pt does not have a POA or a living will. Pt uses [facility], and er PCP is Devi Bower. Pt plans to return home at discharge, with transportation provided by her ex- Bala. SS will follow and assist as needed.     Patient/Family in Agreement with Plan  yes          Demographic Summary     Row Name 11/08/19 1540       General Information    Admission Type  inpatient    Referral Source  nursing    Reason for Consult  discharge planning    Preferred Language  English     Used During This Interaction  no            LLOYD Vieira

## 2019-11-08 NOTE — PLAN OF CARE
Problem: Patient Care Overview  Goal: Individualization and Mutuality  CLinical dysphagia assessment w/o overt s/s aspiration evidenced. No silent aspiration suspected. Recommend least restrictive po diet regular consistency, thin liquids. No further formal SLP f/u recommended.

## 2019-11-08 NOTE — THERAPY EVALUATION
Acute Care - Occupational Therapy Initial Evaluation   Jose     Patient Name: Tahmina Martinez  : 1945  MRN: [id number]  Today's Date: 2019  Onset of Illness/Injury or Date of Surgery: 19     Referring Physician: Dr. Cheek    Admit Date: 2019       ICD-10-CM ICD-9-CM   1. Chest pain in adult R07.9 786.50   2. Renal insufficiency N28.9 593.9     Patient Active Problem List   Diagnosis   • History of Non-STEMI (non-ST elevated myocardial infarction) with no coronary intervention needed in , clinically stable.    • Dyspnea, class II-III.    • Essential hypertension   • History of Acute myelocytic leukemia,status post chemotherapy and bone marrow transplant in .   • Breast cancer (right), status post radiation therapy in 2015   • RBBB unchanged from    • Dyslipidemia   • Bifascicular block (RBBB plus LP FB)   • Chest pain in adult   • History of splenectomy   • Cholelithiasis   • Acute kidney injury (CMS/HCC)   • Hyperkalemia   • SIRS (systemic inflammatory response syndrome) (CMS/HCC)   • Hyperglycemia   • First degree AV block   • Macrocytosis without anemia   • Hypocarbia   • Type II diabetes mellitus (CMS/HCC)     Past Medical History:   Diagnosis Date   • Dyslipidemia 2017    On atorvastatin.    • Essential hypertension 10/12/2016   • First degree AV block 2019   • H/O splenectomy    • History of breast cancer        • Hypertension    • Myelocytic leukemia (CMS/HCC)    • Non-STEMI (non-ST elevated myocardial infarction) (CMS/HCC)    • Restless leg    • Type II diabetes mellitus (CMS/HCC) 2019     Past Surgical History:   Procedure Laterality Date   • BONE MARROW TRANSPLANT     •  SECTION     • SPLENECTOMY     • TUBAL ABDOMINAL LIGATION            OT ASSESSMENT FLOWSHEET (last 12 hours)      Occupational Therapy Evaluation     Row Name 19 1528                   OT Evaluation Time/Intention    Subjective Information  complains  of;weakness;fatigue  -LM        Document Type  evaluation  -LM        Mode of Treatment  occupational therapy  -LM        Patient Effort  good  -LM           General Information    Patient Profile Reviewed?  yes  -LM        Onset of Illness/Injury or Date of Surgery  11/07/19  -LM        Patient Observations  alert;cooperative;agree to therapy  -LM        Prior Level of Function  independent:;ADL's;all household mobility  -LM        Equipment Currently Used at Home  cane, quad;walker, rolling  -LM        Pertinent History of Current Functional Problem  Admitted with chest pain.  PMH:  HTN, nstemi, dm, myelocytic leukemia, bone marrow transplant, htn, av block, r bbb, breast ca, splenectomy  -LM        Existing Precautions/Restrictions  fall  -LM        Benefits Reviewed  patient:;improve function;increase independence;increase strength;increase balance  -LM           Relationship/Environment    Lives With  spouse  -LM           Resource/Environmental Concerns    Current Living Arrangements  home/apartment/condo  -           Cognitive Assessment/Intervention- PT/OT    Orientation Status (Cognition)  oriented x 4  -LM        Follows Commands (Cognition)  WFL  -LM           ADL Assessment/Intervention    BADL Assessment/Intervention  bathing;upper body dressing;lower body dressing;feeding;grooming;toileting  -LM           Bathing Assessment/Intervention    Bathing [place] Level  supervision;contact guard assist  -LM           Upper Body Dressing Assessment/Training    Upper Body Dressing [place] Level  set up  -LM           Lower Body Dressing Assessment/Training    Lower Body Dressing [place] Level  contact guard assist  -LM           Grooming Assessment/Training    [place] Level (Grooming)  set up  -LM           Self-Feeding Assessment/Training    [place] Level (Feeding)  independent  -LM           Toileting Assessment/Training    [place] Level (Toileting)  contact guard assist  -LM            General ROM    GENERAL ROM COMMENTS  wfl bue  -LM           MMT (Manual Muscle Testing)    General MMT Comments  wfl bue, 3+/5  -LM           Positioning and Restraints    Post Treatment Position  bed  -LM        In Bed  encouraged to call for assist;call light within reach  -LM           Plan of Care Review    Plan of Care Reviewed With  patient  -LM           Clinical Impression (OT)    OT Diagnosis  debility  -LM        Patient/Family Goals Statement (OT Eval)  return to plof  -LM        Criteria for Skilled Therapeutic Interventions Met (OT Eval)  yes  -LM        Rehab Potential (OT Eval)  good, to achieve stated therapy goals  -LM        Therapy Frequency (OT Eval)  -- 3-5 times week  -LM        Care Plan Review (OT)  evaluation/treatment results reviewed;patient/other agree to care plan;care plan/treatment goals reviewed  -LM           Planned OT Interventions    Planned Therapy Interventions (OT Eval)  activity tolerance training;BADL retraining;transfer/mobility retraining;ROM/therapeutic exercise;strengthening exercise  -LM           OT Goals    Transfer Goal Selection (OT)  transfer, OT goal 1  -LM        Bathing Goal Selection (OT)  --  -LM        Dressing Goal Selection (OT)  dressing, OT goal 1  -LM           Transfer Goal 1 (OT)    Activity/Assistive Device (Transfer Goal 1, OT)  commode, 3-in-1;transfers, all  -LM        [place] Level/Cues Needed (Transfer Goal 1, OT)  conditional independence  -LM        Time Frame (Transfer Goal 1, OT)  1 week  -LM           Bathing Goal 1 (OT)    Activity/Assistive Device (Bathing Goal 1, OT)  bathing skills, all  -LM        [place] Level/Cues Needed (Bathing Goal 1, OT)  conditional independence  -LM        Time Frame (Bathing Goal 1, OT)  1 week  -LM          User Key  (r) = Recorded By, (t) = Taken By, (c) = Cosigned By    Initials Name Effective Dates    LM María Monique, OT 03/14/16 -          Occupational Therapy Education     Title: PT OT  SLP Therapies (Done)     Topic: Occupational Therapy (Done)     Point: ADL training (Done)     Description: Instruct learner(s) on proper safety adaptation and remediation techniques during self care or transfers.   Instruct in proper use of assistive devices.    Learning Progress Summary           Patient Acceptance, E,D, VU,DU,NR by  at 11/8/2019  3:38 PM                   Point: Home exercise program (Done)     Description: Instruct learner(s) on appropriate technique for monitoring, assisting and/or progressing therapeutic exercises/activities.    Learning Progress Summary           Patient Acceptance, E,D, VU,DU,NR by  at 11/8/2019  3:38 PM                   Point: Precautions (Done)     Description: Instruct learner(s) on prescribed precautions during self-care and functional transfers.    Learning Progress Summary           Patient Acceptance, E,D, VU,DU,NR by  at 11/8/2019  3:38 PM                   Point: Body mechanics (Done)     Description: Instruct learner(s) on proper positioning and spine alignment during self-care, functional mobility activities and/or exercises.    Learning Progress Summary           Patient Acceptance, E,D, VU,DU,NR by  at 11/8/2019  3:38 PM                               User Key     Initials Effective Dates Name Provider Type Discipline     03/14/16 -  María Monique, OT Occupational Therapist OT                  OT Recommendation and Plan  Planned Therapy Interventions (OT Eval): activity tolerance training, BADL retraining, transfer/mobility retraining, ROM/therapeutic exercise, strengthening exercise  Therapy Frequency (OT Eval): (3-5 times week)  Plan of Care Review  Plan of Care Reviewed With: patient  Plan of Care Reviewed With: patient  Outcome Summary: OT evaluation completed.  Patient agreeable to POC.    Outcome Measures     Row Name 11/08/19 1500             How much help from another is currently needed...    Putting on and taking off regular lower body  clothing?  3  -LM      Bathing (including washing, rinsing, and drying)  3  -LM      Toileting (which includes using toilet bed pan or urinal)  3  -LM      Putting on and taking off regular upper body clothing  4  -LM      Taking care of personal grooming (such as brushing teeth)  4  -LM      Eating meals  4  -LM      AM-PAC 6 Clicks Score (OT)  21  -LM         Functional Assessment    Outcome Measure Options  AM-PAC 6 Clicks Daily Activity (OT)  -LM        User Key  (r) = Recorded By, (t) = Taken By, (c) = Cosigned By    Initials Name Provider Type    María Allen OT Occupational Therapist          Time Calculation:   Time Calculation- OT     Row Name 11/08/19 1542             Time Calculation- OT    Total Timed Code Minutes- OT  60 minute(s)  -LM        User Key  (r) = Recorded By, (t) = Taken By, (c) = Cosigned By    Initials Name Provider Type    María Allen OT Occupational Therapist        Therapy Charges for Today     Code Description Service Date Service Provider Modifiers Qty    [id number]  OT EVAL LOW COMPLEXITY 4 11/8/2019 María Monique OT GO 1               María Monique OT  11/8/2019

## 2019-11-08 NOTE — PLAN OF CARE
Problem: Fall Risk (Adult)  Goal: Identify Related Risk Factors and Signs and Symptoms  Outcome: Ongoing (interventions implemented as appropriate)    Goal: Absence of Fall  Outcome: Ongoing (interventions implemented as appropriate)      Problem: Pain, Acute (Adult)  Goal: Identify Related Risk Factors and Signs and Symptoms  Outcome: Ongoing (interventions implemented as appropriate)    Goal: Acceptable Pain Control/Comfort Level  Outcome: Ongoing (interventions implemented as appropriate)

## 2019-11-08 NOTE — PLAN OF CARE
Problem: Patient Care Overview  Goal: Plan of Care Review  Outcome: Ongoing (interventions implemented as appropriate)    Goal: Individualization and Mutuality  Outcome: Ongoing (interventions implemented as appropriate)    Goal: Discharge Needs Assessment  Outcome: Ongoing (interventions implemented as appropriate)    Goal: Interprofessional Rounds/Family Conf  Outcome: Ongoing (interventions implemented as appropriate)      Problem: Fall Risk (Adult)  Goal: Identify Related Risk Factors and Signs and Symptoms  Outcome: Ongoing (interventions implemented as appropriate)    Goal: Absence of Fall  Outcome: Ongoing (interventions implemented as appropriate)      Problem: Pain, Acute (Adult)  Goal: Identify Related Risk Factors and Signs and Symptoms  Outcome: Ongoing (interventions implemented as appropriate)    Goal: Acceptable Pain Control/Comfort Level  Outcome: Ongoing (interventions implemented as appropriate)

## 2019-11-08 NOTE — PLAN OF CARE
Problem: Patient Care Overview  Goal: Plan of Care Review  Outcome: Ongoing (interventions implemented as appropriate)   11/08/19 8511   Coping/Psychosocial   Plan of Care Reviewed With patient   Plan of Care Review   Progress no change   OTHER   Outcome Summary OT evaluation completed. Patient agreeable to POC.

## 2019-11-08 NOTE — CONSULTS
Patient resting comfortably in bed, alert and oriented.  Made patient aware of current HgB A1C of 6.10.  Patient reports compliance with diet and medication at home, no episodes of hyperglycemia  Counseled patient on diabetes basic handout which discusses exactly what diabetes is and how it affects the body.  Counseled patient on complications of hyperglycemia and ways to prevent it.  Counseled patient on hypoglycemia and treatment by using the rule of 15.  Counseled patient on the importance of checking blood glucose levels frequently and keeping a log to take to all MD appointments.  Counseled patient on how to care for themselves during sick day.  Stressed the importance of healthy eating with a limit of carbohydrates to 180 per day.  Counseled on importance of complying with medications as ordered by provider.  Counseled on the importance of daily foot inspections.  Counseled patient to seek medical attention if blood glucose above 300.  Counseled on importance of daily exercise. Patient verbalizes understanding of all information given. Encouraged patient to attend an outpatient diabetes smart class or attend diabetes support  group.  Left time and dates of classes with patient along with my name and contact information, will continue to monitor patient as needed.

## 2019-11-08 NOTE — THERAPY EVALUATION
Acute Care - Physical Therapy Initial Evaluation   Jose     Patient Name: Tahmina Martinez  : 1945  MRN: [id number]  Today's Date: 2019   Onset of Illness/Injury or Date of Surgery: 19  Date of Referral to PT: 19  Referring Physician: Dr. Cheek      Admit Date: 2019    Visit Dx:     ICD-10-CM ICD-9-CM   1. Chest pain in adult R07.9 786.50   2. Renal insufficiency N28.9 593.9     Patient Active Problem List   Diagnosis   • History of Non-STEMI (non-ST elevated myocardial infarction) with no coronary intervention needed in , clinically stable.    • Dyspnea, class II-III.    • Essential hypertension   • History of Acute myelocytic leukemia,status post chemotherapy and bone marrow transplant in .   • Breast cancer (right), status post radiation therapy in 2015   • RBBB unchanged from    • Dyslipidemia   • Bifascicular block (RBBB plus LP FB)   • Chest pain in adult   • History of splenectomy   • Cholelithiasis   • Acute kidney injury (CMS/HCC)   • Hyperkalemia   • SIRS (systemic inflammatory response syndrome) (CMS/HCC)   • Hyperglycemia   • First degree AV block   • Macrocytosis without anemia   • Hypocarbia   • Type II diabetes mellitus (CMS/HCC)     Past Medical History:   Diagnosis Date   • Dyslipidemia 2017    On atorvastatin.    • Essential hypertension 10/12/2016   • First degree AV block 2019   • H/O splenectomy    • History of breast cancer        • Hypertension    • Myelocytic leukemia (CMS/HCC)    • Non-STEMI (non-ST elevated myocardial infarction) (CMS/HCC)    • Restless leg    • Type II diabetes mellitus (CMS/HCC) 2019     Past Surgical History:   Procedure Laterality Date   • BONE MARROW TRANSPLANT     •  SECTION     • SPLENECTOMY     • TUBAL ABDOMINAL LIGATION          PT ASSESSMENT (last 12 hours)      Physical Therapy Evaluation     Row Name 19 1534          PT Evaluation Time/Intention    Subjective Information  complains  of;weakness  -BC     Document Type  evaluation  -BC     Mode of Treatment  individual therapy;physical therapy  -BC     Patient Effort  good  -BC     Row Name 11/08/19 1534          General Information    Patient Profile Reviewed?  yes  -BC     Onset of Illness/Injury or Date of Surgery  11/07/19  -BC     Referring Physician  Dr. Cheek  -BC     Patient Observations  alert;cooperative;agree to therapy  -BC     Prior Level of Function  independent:;all household mobility;community mobility  -BC     Equipment Currently Used at Home  cane, quad  -BC     Pertinent History of Current Functional Problem  admitted with chest pain  -BC     Existing Precautions/Restrictions  fall  -BC     Risks Reviewed  patient:;LOB;nausea/vomiting;dizziness;increased discomfort;change in vital signs;increased drainage;lines disloged  -BC     Benefits Reviewed  patient:;improve function;increase independence;increase strength;increase balance;decrease pain;decrease risk of DVT;improve skin integrity;increase knowledge  -BC     Row Name 11/08/19 1534          Relationship/Environment    Lives With  spouse  -BC     Row Name 11/08/19 1534          Resource/Environmental Concerns    Current Living Arrangements  home/apartment/condo  -BC     Row Name 11/08/19 1534          Cognitive Assessment/Intervention- PT/OT    Orientation Status (Cognition)  oriented x 4  -BC     Follows Commands (Cognition)  WFL  -BC     Row Name 11/08/19 1534          Mobility Assessment/Treatment    Extremity Weight-bearing Status  left lower extremity;right lower extremity  -BC     Left Lower Extremity (Weight-bearing Status)  weight-bearing as tolerated (WBAT)  -BC     Right Lower Extremity (Weight-bearing Status)  weight-bearing as tolerated (WBAT)  -BC     Row Name 11/08/19 1534          Bed Mobility Assessment/Treatment    Bed Mobility Assessment/Treatment  bed mobility (all) activities  -BC     [place] Level (Bed Mobility)  minimum assist (75% patient effort)   -BC     Assistive Device (Bed Mobility)  bed rails  -BC     Row Name 11/08/19 1534          Transfer Assessment/Treatment    Transfer Assessment/Treatment  sit-stand transfer;stand-sit transfer  -BC     Maintains Weight-bearing Status (Transfers)  able to maintain  -BC     Sit-Stand [place] (Transfers)  minimum assist (75% patient effort)  -BC     Stand-Sit [place] (Transfers)  minimum assist (75% patient effort)  -BC     Row Name 11/08/19 1534          Sit-Stand Transfer    Assistive Device (Sit-Stand Transfers)  walker, front-wheeled  -BC     Row Name 11/08/19 1534          Stand-Sit Transfer    Assistive Device (Stand-Sit Transfers)  walker, front-wheeled  -BC     Row Name 11/08/19 1534          Gait/Stairs Assessment/Training    Gait/Stairs Assessment/Training  gait/ambulation independence  -BC     [place] Level (Gait)  minimum assist (75% patient effort)  -BC     Assistive Device (Gait)  walker, front-wheeled  -BC     Distance in Feet (Gait)  60  -BC     Row Name 11/08/19 1534          General ROM    GENERAL ROM COMMENTS  WFL BLE  -BC     Row Name 11/08/19 1534          MMT (Manual Muscle Testing)    General MMT Comments  BLE  4/5  -BC     Row Name 11/08/19 1534          Coping    Observed Emotional State  accepting  -BC     Verbalized Emotional State  acceptance  -BC     Row Name 11/08/19 1534          Plan of Care Review    Plan of Care Reviewed With  patient  -BC     Row Name 11/08/19 1534          Physical Therapy Clinical Impression    Date of Referral to PT  11/07/19  -BC     PT Diagnosis (PT Clinical Impression)  weakness  -BC     Functional Level at Time of Evaluation (PT Clinical Impression)  good  -BC     Criteria for Skilled Interventions Met (PT Clinical Impression)  no;no problems identified which require skilled intervention  -BC     Row Name 11/08/19 1534          Positioning and Restraints    Pre-Treatment Position  in bed  -BC     Post Treatment Position  bed  -BC     In Bed   notified nsg;call light within reach;encouraged to call for assist  -BC       User Key  (r) = Recorded By, (t) = Taken By, (c) = Cosigned By    Initials Name Provider Type    Karly Nagel, PT Physical Therapist          PT Recommendation and Plan     Plan of Care Reviewed With: patient  Outcome Measures     Row Name 11/08/19 1500             How much help from another is currently needed...    Putting on and taking off regular lower body clothing?  3  -LM      Bathing (including washing, rinsing, and drying)  3  -LM      Toileting (which includes using toilet bed pan or urinal)  3  -LM      Putting on and taking off regular upper body clothing  4  -LM      Taking care of personal grooming (such as brushing teeth)  4  -LM      Eating meals  4  -LM      AM-PAC 6 Clicks Score (OT)  21  -LM         Functional Assessment    Outcome Measure Options  AM-PAC 6 Clicks Daily Activity (OT)  -        User Key  (r) = Recorded By, (t) = Taken By, (c) = Cosigned By    Initials Name Provider Type     María Monique, OT Occupational Therapist         Time Calculation:   PT Charges     Row Name 11/08/19 1543             Time Calculation    PT Received On  11/08/19  -BC         Time Calculation- PT    Total Timed Code Minutes- PT  60 minute(s)  -BC        User Key  (r) = Recorded By, (t) = Taken By, (c) = Cosigned By    Initials Name Provider Type    Karly Nagel, PT Physical Therapist        Therapy Charges for Today     Code Description Service Date Service Provider Modifiers Qty    66337272720 HC PT EVAL MOD COMPLEXITY 3 11/8/2019 Karly Carranza, PT GP 1          PT G-Codes  Outcome Measure Options: AM-PAC 6 Clicks Daily Activity (OT)  AM-PAC 6 Clicks Score (OT): 21      Karly Carranza PT  11/8/2019

## 2019-11-08 NOTE — H&P
"     [facility] Services  HISTORY & PHYSICAL    Patient Identification:  Name:  Tahmina Martinez  Age:  74 y.o.  Sex:  female  :  1945  MRN:  [id number]   Visit Number:  [id number]  Primary Care Physician:  Noe Bower MD     Subjective     Chief complaint:   Chief Complaint   Patient presents with   • Abdominal Pain   • Chest Pain     History of presenting illness:   Patient is a 74 y.o. 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 The patient states that she started  experiencing a severe headache with chest pain that radiates to her to left arm last night. She describes the chest pain as a \"tingling\" with \"knots\" that is located below her left breast that occurs when she is active and occasionally at rest. She states that once she began having the headache she went to check her blood pressure and take her blood pressure medications. Per the patient, her BP was 170/90 at that time. She states she tried to eat and suddenly felt nauseous with one episode of emesis with subsequent \"weakness\" and \"\"fogginess.\" She admits to having no energy, fatigue, chills, chest tightness, occasional choking, shortness of breath, chest pain described as \"tingling/knots,\" intermittent leg edema, abdominal distention, abdominal pain with greasy foods, nausea and vomiting x 1, vertigo when going from sitting to standing, headaches, light headedness, and weakness. The patient states that she has become more unsteady on " "her feet and has fallen at least 3 times within the past year but denies any recent falls, syncope, or trauma. She uses a cane at home to ambulate. She denies fever, diaphoresis, congestion, sore throat, cough, wheezing, palpitations, constipation, diarrhea, dysuria, wounds, numbness/tingling, syncope, or confusion. The patient has a history of NSTEMI in the past with no coronary intervention and a significant family history with her mother and father having heart disease and hypertension and her two brother's and sister having hypertension as well.The patient states she had \"at least 13 medications\" that she takes regularly and states that she takes them as directed and does not get them confused.     Furthermore, the patient was recently admitted to [facility] on 11/2/2019 with a chief complaint of headache and uncontrolled hypertension.  Patient was found to have hypertensive urgency with blood pressure 231/100 on arrival to the emergency department.  Upon evaluation a new bifascicular block was found but patient denied any episodes of syncope or concerning symptoms that warranted a pacemaker. Patient had been seen by Dr. Soler in the clinic who transitioned her HCTZ/triamterene.  Her home regimen was restarted and hydralazine was increased from 25 mg 3 times daily to 50 mg 3 times daily on this admission and at that time her blood pressure was reasonably controlled.  Patient's severe headache had resolved with blood pressure control and she was educated to continue her current regimen.  Patient was to follow-up with PCP and Dr. Soler in 1 week and outpatient evaluation into secondary causes of her hypertension including a renal artery duplex was suggested. The patient states she has an appointment with Dr. Soler on 11/14/19 and her PCP, Dr. Bower, on 11/12/19.     Upon arrival to the ED, vitals were temperature 97.9 °F, pulse 106, respirations 20, /76, SPO2 95% on room air.  Troponin negative " "x2.  proBNP 127.2.  CMP with glucose 133, sodium 131, potassium 5.3, CO2 20.6, chloride 95, anion gap 15.4, creatinine 1.42, BUN 41, BUN/creatinine ratio 28.9, calcium 10.8, GFR 36, alkaline phosphatase 150, total protein 8.8.  CBC with .7, MCH 35.2.  Urinalysis shows trace protein, otherwise unremarkable.  Chest x-ray shows no evidence of acute or active cardiopulmonary disease.  Noncontrast CT of abdomen/pelvis shows cholelithiasis without CT evidence of cholecystitis or biliary obstruction.  CT of head without contrast shows no acute intracranial pathology.  EKG shows sinus rhythm with first-degree AV block, right bundle branch block, 75 bpm, QTc 469 MS.  Patient received p.o. aspirin 324 mg, IV morphine 2 mg x 2, IV Zofran 4 mg x 1, normal saline thousand millimeter bolus, and emergency department.    Patient has been admitted to the telemetry floor for further evaluation and treatment.  ---------------------------------------------------------------------------------------------------------------------   Review of Systems   Constitutional: Positive for activity change (no energy ), appetite change (unable to eat greasy foods ), chills and fatigue. Negative for diaphoresis and fever.   HENT: Negative for congestion and sore throat.    Eyes: Negative for photophobia and visual disturbance.   Respiratory: Positive for choking (occasionally on foods ), chest tightness and shortness of breath. Negative for cough and wheezing.    Cardiovascular: Positive for chest pain (described as a \"tingling/knotting\" feeling) and leg swelling (intermittent). Negative for palpitations.   Gastrointestinal: Positive for abdominal distention, abdominal pain (after eating greasy foods ), nausea and vomiting (x1 episode yesterday ). Negative for blood in stool, constipation and diarrhea.   Endocrine: Negative for polydipsia and polyphagia.   Genitourinary: Negative for dysuria and frequency.   Musculoskeletal: Negative for " arthralgias.   Skin: Negative for wound.   Allergic/Immunologic: Negative for environmental allergies.   Neurological: Positive for dizziness (when going from sitting to standing ), weakness, light-headedness and headaches. Negative for syncope and numbness.   Hematological: Does not bruise/bleed easily.   Psychiatric/Behavioral: Negative for confusion.      ---------------------------------------------------------------------------------------------------------------------   Past Medical History:   Diagnosis Date   • Breast cancer (CMS/[facility])    • Dyspnea    • H/O splenectomy    • Hypertension    • Myelocytic leukemia (CMS/[facility])    • Non-STEMI (non-ST elevated myocardial infarction) (CMS/[facility])      Past Surgical History:   Procedure Laterality Date   • BONE MARROW TRANSPLANT     •  SECTION     • SPLENECTOMY     • TUBAL ABDOMINAL LIGATION       Family History   Problem Relation Age of Onset   • Heart disease Mother    • Heart disease Father      Social History     Socioeconomic History   • Marital status:      Spouse name: Not on file   • Number of children: Not on file   • Years of education: Not on file   • Highest education level: Not on file   Tobacco Use   • Smoking status: Never Smoker   • Smokeless tobacco: Never Used   Substance and Sexual Activity   • Alcohol use: No   • Drug use: No   • Sexual activity: Defer     ---------------------------------------------------------------------------------------------------------------------   Allergies:  Latex; Penicillins; and Zithromax [azithromycin]  ---------------------------------------------------------------------------------------------------------------------   Medications below are reported home medications pulling from within the system; at this time, these medications have not been reconciled unless otherwise specified and are in the verification process for further verifcation as current home medications.    Prior to Admission Medications      Prescriptions Last Dose Informant Patient Reported? Taking?    aspirin 81 MG EC tablet  Pharmacy Yes No    Take 81 mg by mouth Daily.    atorvastatin (LIPITOR) 10 MG tablet  Pharmacy No No    Take 1 tablet by mouth Every Night.    Calcium Citrate-Vitamin D (CALCIUM + D PO)  Pharmacy Yes No    Take 600 mg by mouth.    carvedilol (COREG) 12.5 MG tablet  Pharmacy No No    Take 1 tablet by mouth 2 (Two) Times a Day.    Coenzyme Q10 (CO Q-10) 200 MG capsule  Pharmacy Yes No    Take 200 mg by mouth Daily.    exemestane (AROMASIN) 25 MG chemo tablet  Pharmacy Yes No    Take 25 mg by mouth Daily.    furosemide (LASIX) 40 MG tablet  Pharmacy Yes No    Take 40 mg by mouth Daily.    hydrALAZINE (APRESOLINE) 50 MG tablet   No No    Take 1 tablet by mouth Every 8 (Eight) Hours.    lisinopril (PRINIVIL,ZESTRIL) 40 MG tablet  Pharmacy No No    Take 1 tablet by mouth Daily.    Loratadine 10 MG capsule  Pharmacy Yes No    Take  by mouth.    metFORMIN (GLUCOPHAGE) 500 MG tablet  Pharmacy Yes No    Take 500 mg by mouth Every Morning.    nitroglycerin (NITROSTAT) 0.4 MG SL tablet  Pharmacy Yes No    Place 0.4 mg under the tongue Every 5 (Five) Minutes As Needed for chest pain. Take no more than 3 doses in 15 minutes.    pantoprazole (PROTONIX) 40 MG EC tablet  Pharmacy Yes No    Take 40 mg by mouth Daily.    rOPINIRole (REQUIP) 0.25 MG tablet  Pharmacy Yes No    Take 0.25 mg by mouth Every Night. Take 1 hour before bedtime.    triamterene-hydrochlorothiazide (MAXZIDE-25) 37.5-25 MG per tablet  Pharmacy No No    Take 1 tablet by mouth Daily.    valACYclovir (VALTREX) 500 MG tablet  Pharmacy Yes No    Take 1 g by mouth Daily.    vitamin D (ERGOCALCIFEROL) 66422 UNITS capsule capsule  Pharmacy Yes No    Take 50,000 Units by mouth Every 14 (Fourteen) Days.        ---------------------------------------------------------------------------------------------------------------------    Objective     Hospital Scheduled Meds:    heparin  (porcine) 5,000 Units Subcutaneous Q12H   pantoprazole 40 mg Oral Q AM   sodium chloride 10 mL Intravenous Q12H       sodium chloride 50 mL/hr Last Rate: 50 mL/hr (11/08/19 0136)       Current listed hospital scheduled medications may not yet reflect those currently placed in orders that are signed and held, awaiting patient's arrival to floor/unit.    ---------------------------------------------------------------------------------------------------------------------   Vital Signs:  Temp:  [97.9 °F (36.6 °C)-98.3 °F (36.8 °C)] 97.9 °F (36.6 °C)  Heart Rate:  [] 93  Resp:  [16-20] 18  BP: (106-182)/(62-90) 131/62  Mean Arterial Pressure (Non-Invasive) for the past 24 hrs (Last 3 readings):   Noninvasive MAP (mmHg)   11/08/19 0049 110   11/08/19 0002 116   11/07/19 2347 117     SpO2 Percentage    11/08/19 0002 11/08/19 0049 11/08/19 0330   SpO2: 99% 95% 96%     SpO2:  [95 %-100 %] 96 %  on   ;        Body mass index is 25.95 kg/m².  Wt Readings from Last 3 Encounters:   11/08/19 66.5 kg (146 lb 8 oz)   11/04/19 65.3 kg (143 lb 15.4 oz)   10/29/19 71 kg (156 lb 9.6 oz)     ---------------------------------------------------------------------------------------------------------------------   Physical Exam:  Physical Exam   Constitutional: She is oriented to person, place, and time. She appears well-developed and well-nourished. She is cooperative.  Non-toxic appearance. She does not have a sickly appearance. She does not appear ill. No distress.   Upon evaluation patient lying in bed in no acute distress    HENT:   Head: Normocephalic and atraumatic.   Nose: Nose normal.   Mouth/Throat: Mucous membranes are normal. Mucous membranes are not pale, not dry and not cyanotic.   Eyes: Conjunctivae and lids are normal. Right eye exhibits no discharge. Left eye exhibits no discharge. Right conjunctiva is not injected. Left conjunctiva is not injected.   Cardiovascular: Regular rhythm, normal heart sounds, intact distal  pulses and normal pulses. Exam reveals no decreased pulses.   Pulses:       Dorsalis pedis pulses are 2+ on the right side, and 2+ on the left side.        Posterior tibial pulses are 2+ on the right side, and 2+ on the left side.   Pulmonary/Chest: Effort normal and breath sounds normal. No stridor. No tachypnea and no bradypnea. No respiratory distress. She has no decreased breath sounds. She has no wheezes. She has no rhonchi. She has no rales.   Abdominal: Soft. Normal appearance and bowel sounds are normal. She exhibits no ascites and no mass. There is no tenderness. There is no rigidity, no guarding and negative Sebastian's sign.   Musculoskeletal:        Right lower leg: She exhibits no edema.        Left lower leg: She exhibits no edema.     Vascular Status -  Her right foot exhibits no edema. Her left foot exhibits no edema.  Skin Integrity  -  Her right foot skin is intact.Her left foot skin is intact..  Neurological: She is alert and oriented to person, place, and time. She has normal strength. She is not disoriented. No sensory deficit.   No focal neurological deficits. Patient alert and oriented to time and place, answers questions appropriately. Strength equal bilaterally in upper and lower extremities. Sensation intact bilaterally in upper and lower extremities. Able to move arms and legs equal bilaterally in both upper and lower extremities.    Skin: No abrasion, no lesion and no rash noted.   Psychiatric: She has a normal mood and affect. Her behavior is normal. Thought content normal. Her mood appears not anxious. Her speech is not delayed and not slurred. Cognition and memory are normal. Cognition and memory are not impaired. She does not exhibit a depressed mood.     ---------------------------------------------------------------------------------------------------------------------  EKG:    Pending cardiology read.  Per my evaluation, EKG shows sinus rhythm with first-degree AV block, right bundle  branch block, heart rate 75 bpm, QTc 469 MS.    Telemetry:    Normal sinus rhythm.  Heart rate 92 bpm, SPO2 99% on room air.    I have personally reviewed the EKG/Telemetry strip  ---------------------------------------------------------------------------------------------------------------------   Results from last 7 days   Lab Units 11/08/19 0111 11/07/19 2032 11/07/19 1832   TROPONIN T ng/mL <0.010 <0.010 <0.010     Results from last 7 days   Lab Units 11/07/19 1832 11/02/19  2249   PROBNP pg/mL 127.2 795.4     Results from last 7 days   Lab Units 11/03/19  0531   CHOLESTEROL mg/dL 142   TRIGLYCERIDES mg/dL 104   HDL CHOL mg/dL 57   LDL CHOL mg/dL 64       Results from last 7 days   Lab Units 11/08/19 0111 11/07/19 1832 11/03/19 0531 11/02/19  2249   CRP mg/dL  --   --   --  0.33   WBC 10*3/mm3 10.10 8.24 8.03 7.56   HEMOGLOBIN g/dL 12.9 14.2 12.8 12.7   HEMATOCRIT % 38.6 41.0 38.1 37.8   MCV fL 104.6* 101.7* 105.5* 104.4*   MCHC g/dL 33.4 34.6 33.6 33.6   PLATELETS 10*3/mm3 359 395 394 378   INR   --   --   --  0.97     Results from last 7 days   Lab Units 11/08/19 0111 11/07/19 1832 11/03/19  0531   SODIUM mmol/L 134* 131* 139   POTASSIUM mmol/L 4.9 5.3* 3.9   CHLORIDE mmol/L 102 95* 103   CO2 mmol/L 20.5* 20.6* 23.1   BUN mg/dL 37* 41* 10   CREATININE mg/dL 1.23* 1.42* 0.73   EGFR IF NONAFRICN AM mL/min/1.73 42* 36* 78   CALCIUM mg/dL 9.8 10.8* 9.7   GLUCOSE mg/dL 99 133* 112*   ALBUMIN g/dL 3.91 4.79 4.00   BILIRUBIN mg/dL 0.5 0.7 0.3   ALK PHOS U/L 125* 150* 122*   AST (SGOT) U/L 23 30 26   ALT (SGPT) U/L 21 26 20   Estimated Creatinine Clearance: 35.6 mL/min (A) (by C-G formula based on SCr of 1.23 mg/dL (H)).  No results found for: AMMONIA    Hemoglobin A1C   Date/Time Value Ref Range Status   11/08/2019 0111 6.10 (H) 4.80 - 5.60 % Final     Lab Results   Component Value Date    HGBA1C 6.10 (H) 11/08/2019     Lab Results   Component Value Date    TSH 1.430 11/08/2019       Pain Management Panel      There is no flowsheet data to display.        I have personally reviewed the above laboratory results.   ---------------------------------------------------------------------------------------------------------------------  Imaging Results (Last 7 Days)     Procedure Component Value Units Date/Time    CT Abdomen Pelvis Without Contrast [[id number]] Collected:  11/07/19 2320     Updated:  11/07/19 2322    Narrative:       CT Abdomen Pelvis WO    INDICATION:   Nausea and vomiting    TECHNIQUE:   CT of the abdomen and pelvis without contrast. Coronal and sagittal reconstructions were obtained.  Radiation dose reduction techniques included automated exposure control or exposure modulation based on body size. Radiation audit for number of CT and  nuclear cardiology exams performed in the last year: 3.      COMPARISON:   None available.    FINDINGS:    Visualized lung bases are unremarkable.    Abdomen: Unenhanced images of the liver are normal. There is cholelithiasis, but no CT evidence of cholecystitis or biliary obstruction. The spleen is surgically absent. The pancreas appears normal. The adrenal glands are normal. There are areas of left  renal cortical scarring but the right kidney is normal. The aorta is normal in caliber and there is no evidence of bowel obstruction.    Pelvis:  The appendix is normal. There is no pelvic mass or inflammatory change or abnormal fluid collection. There is no hernia or bowel obstruction.    There are spinal degenerative and postoperative changes, but there is no acute bony abnormality.      Impression:       Cholelithiasis without CT evidence of cholecystitis or biliary obstruction.    No renal or bowel obstruction or other acute abnormality. Normal appendix.      Signer Name: Fabio Ambrose MD   Signed: 11/7/2019 11:20 PM   Workstation Name: [email or web address]    [facility]    CT Head Without Contrast [[id number]] Collected:  11/07/19 2000     Updated:   11/07/19 2004    Narrative:       CT HEAD WO CONTRAST-     CLINICAL INDICATION: Decreased alertness        COMPARISON: 11/02/2019      TECHNIQUE: Axial images of the brain were obtained with out intravenous  contrast.  Reformatted images were created in the sagittal and coronal  planes.     DOSE:     Radiation dose reduction techniques were utilized per ALARA protocol.  Automated exposure control was initiated through either or [employer] or  [employer] software packages by  protocol.           FINDINGS:   Today's study shows no mass, hemorrhage, or midline shift.   The ventricles, cisterns, and sulci are unremarkable. There is no  hydrocephalus.   There is no evidence of acute ischemia.  I do not see epidural or subdural hematoma.  The gray-white differentiation is appropriate.   The bone window setting images show no destructive calvarial lesion or  acute calvarial fracture.   The posterior fossa is unremarkable.          Impression:       No acute intracranial pathology. Nothing is seen on this exam to  specifically account for the patient's symptoms.     This report was finalized on 11/7/2019 8:00 PM by Dr. Sal Cheek MD.       XR Chest 1 View [[id number]] Collected:  11/07/19 1919     Updated:  11/07/19 1922    Narrative:       XR CHEST 1 VW-     CLINICAL INDICATION: Chest pain protocol        COMPARISON: 11/02/2019      TECHNIQUE: Single frontal view of the chest.     FINDINGS:     There is no focal alveolar infiltrate or effusion.  The cardiac silhouette is normal. The pulmonary vasculature is  unremarkable.  There is no evidence of an acute osseous abnormality.   There are no suspicious-appearing parenchymal soft tissue nodules.          Impression:       No evidence of active or acute cardiopulmonary disease on today's chest  radiograph.     This report was finalized on 11/7/2019 7:19 PM by Dr. Sal Cheek MD.           I have personally reviewed the above radiology results.     Last  Echocardiogram:  Results for orders placed during the hospital encounter of 11/02/19   Transthoracic Echo Complete With Contrast if Necessary Per Protocol    Narrative · Left ventricular wall thickness is consistent with borderline concentric   hypertrophy.  · Left ventricular systolic function is normal.  · Estimated EF appears to be in the range of 66 - 70%.  · Left ventricular diastolic dysfunction.  · Normal cardiac chamber dimensions  · Mild aortic valve regurgitation is present.  · Mild mitral valve regurgitation is present  · Mild tricuspid valve regurgitation is present. 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      • RBBB unchanged from 2015 [I45.10] Yes   • History of Non-STEMI (non-ST elevated myocardial infarction) with no coronary intervention needed in 2008, clinically stable.  [I21.4] Yes   • History of Acute myelocytic leukemia,status post chemotherapy and bone marrow transplant in 2005. [C92.00] Yes   • Essential hypertension [I10] Yes   • Breast cancer (right), status post radiation therapy in 08/2015 [C50.919] Yes     · SIRS criteria present  upon admission: patient meets SIRS criteria with pulse 106 and HR 20. Likely due to patient's elevated blood pressure.  PRN hydralazine for SBP greater than 160.  Continue home medications once reconciled per pharmacy. Will adjust as necessary.  Continue to monitor blood pressure closely.    · Unstable angina with known history of NSTEMI in the past without intervention: Patient is currently chest pain-free.  EKG shows normal sinus rhythm.  Troponin negative x2.  Continue to trend troponin.  Patient stress test in 2017 was normal with no significant echo cardiographic evidence for myocardial ischemia. Continue daily aspirin and statin.  Monitor closely on telemetry.  Repeat a.m. EKG.    · Acute kidney injury: Gentle IV fluids. Avoid nephrotoxic agents as much as possible. Hold home lasix. Repeat AM CMP.     · Hyponatremia: likely due to dehydration in setting of hypochloremia. Will give gentle IV fluids. Repeat AM CMP. Monitor I's and O's. Monitor closely.     · Acute hyperkalemia, mild: only slightly elevated. Will hold off on treatment for now. Monitor closely and repeat AM CMP.     · Acute hypercalcemia, mild: only slightly elevated. Will repeat AM CMP. Monitor closely.     · Type II diabetes mellitus (non-insulin dependent): Hemoglobin A1c ordered. Hold home oral hypoglycemics to prevent hypoglycemia. Will add SSI for now. Accu-cheks qAC and qhs. Titrate insulin therapy as necessary.     · Hypocarbia: Likely secondary to patient's increased respiratory rate.  Repeat a.m. CMP.  Monitor closely.    · Macrocytosis without anemia present upon admission: No active signs of bleeding. H&H stable. Vitamin b12, folate and vitamin D levels ordered, replace as necessary. Monitor H&H closely. Repeat AM CBC.     · Right bundle branch block with first degree AV block: patient in normal sinus rhythm and currently chest pain free.  Bifascicular block was seen on past admission on 11/2/2019, not present on this admission.   Patient denies any recent syncopal episodes or other concerning symptoms.  Monitor closely on telemetry.      · Cholelithiasis without evidence of cholecystitis or biliary obstruction, present upon admission: incidental finding on CT of abdomen. Patient is not complaining of any abdominal pain at this time. No evidence of an acute abdomen present, Sebastian's sign negative. Monitor closely. Recommend outpatient follow up with PCP.     · Essential hypertension, poorly controlled: PRN hydralazine for SBP greater than 160. Continue home medications once reconciled per pharmacy with holding parameters. Obtain orthostatics in setting of vertigo when going from a sitting to standing position.  Continue to monitor blood pressure closely. Will adjust medication as necessary. Recommend patient keep appointment with Dr. Soler on 11/14/19 and with PCP on 11/12/19.     · Hyperlipidemia: continue patients home statin.  Lipid panel on 11/3/2019 was unremarkable, controlled. Recommend continued outpatient follow up with PCP.     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 continue home medication once reconciled per pharmacy. Repeat AM CBC.     · History of splenectomy: patient has not received flu shot this year, requesting one while admitted. States she has tolerated flu shot before with no adverse side effects.       · F/E/N: IV fluids.  Replace electrolytes as necessary per protocol.  N.p.o.    ---------------------------------------------------  DVT Prophylaxis: Subcutaneous heparin   GI Prophylaxis: Protonix   Activity: Up with assistance     The patient is considered to be a high risk patient due to: Sirs criteria present upon admission, chest pain with typical features with known history of an STEMI in the past, acute kidney injury.    INPATIENT status due to the need for care which can only be reasonably provided in an hospital  setting such as aggressive/expedited ancillary services and/or consultation services, the necessity for IV medications, close physician monitoring and/or the possible need for procedures.  In such, I feel patient’s risk for adverse outcomes and need for care warrant INPATIENT evaluation and predict the patient’s care encounter to likely last beyond 2 midnights.      Code Status: FULL CODE     I have discussed the patient's assessment and plan with the patient and attending physician.       Stephenie Munoz PA-C  Hospitalist Service -- [facility]       11/08/19  4:40 AM    Attending Physician: Didi Ulloa DO

## 2019-11-09 LAB
ANION GAP SERPL CALCULATED.3IONS-SCNC: 10.2 MMOL/L (ref 5–15)
BUN BLD-MCNC: 37 MG/DL (ref 8–23)
BUN/CREAT SERPL: 37.4 (ref 7–25)
CALCIUM SPEC-SCNC: 9.3 MG/DL (ref 8.6–10.5)
CHLORIDE SERPL-SCNC: 106 MMOL/L (ref 98–107)
CO2 SERPL-SCNC: 19.8 MMOL/L (ref 22–29)
CREAT BLD-MCNC: 0.99 MG/DL (ref 0.57–1)
GFR SERPL CREATININE-BSD FRML MDRD: 55 ML/MIN/1.73
GLUCOSE BLD-MCNC: 128 MG/DL (ref 65–99)
GLUCOSE BLDC GLUCOMTR-MCNC: 126 MG/DL (ref 70–130)
GLUCOSE BLDC GLUCOMTR-MCNC: 137 MG/DL (ref 70–130)
GLUCOSE BLDC GLUCOMTR-MCNC: 224 MG/DL (ref 70–130)
GLUCOSE BLDC GLUCOMTR-MCNC: 95 MG/DL (ref 70–130)
POTASSIUM BLD-SCNC: 4.5 MMOL/L (ref 3.5–5.2)
SODIUM BLD-SCNC: 136 MMOL/L (ref 136–145)

## 2019-11-09 PROCEDURE — 25010000002 HEPARIN (PORCINE) PER 1000 UNITS: Performed by: INTERNAL MEDICINE

## 2019-11-09 PROCEDURE — 94799 UNLISTED PULMONARY SVC/PX: CPT

## 2019-11-09 PROCEDURE — 93005 ELECTROCARDIOGRAM TRACING: CPT | Performed by: PHYSICIAN ASSISTANT

## 2019-11-09 PROCEDURE — 99231 SBSQ HOSP IP/OBS SF/LOW 25: CPT | Performed by: INTERNAL MEDICINE

## 2019-11-09 PROCEDURE — 93010 ELECTROCARDIOGRAM REPORT: CPT | Performed by: INTERNAL MEDICINE

## 2019-11-09 PROCEDURE — 82962 GLUCOSE BLOOD TEST: CPT

## 2019-11-09 PROCEDURE — 80048 BASIC METABOLIC PNL TOTAL CA: CPT | Performed by: INTERNAL MEDICINE

## 2019-11-09 RX ORDER — TRAMADOL HYDROCHLORIDE 50 MG/1
50 TABLET ORAL DAILY PRN
Status: ON HOLD | COMMUNITY
End: 2019-11-28

## 2019-11-09 RX ORDER — TRAMADOL HYDROCHLORIDE 50 MG/1
50 TABLET ORAL DAILY PRN
Status: DISCONTINUED | OUTPATIENT
Start: 2019-11-09 | End: 2019-11-21 | Stop reason: HOSPADM

## 2019-11-09 RX ADMIN — CETIRIZINE HYDROCHLORIDE 5 MG: 10 TABLET, FILM COATED ORAL at 08:39

## 2019-11-09 RX ADMIN — CARVEDILOL 12.5 MG: 6.25 TABLET, FILM COATED ORAL at 08:39

## 2019-11-09 RX ADMIN — VALACYCLOVIR HYDROCHLORIDE 1000 MG: 500 TABLET, FILM COATED ORAL at 08:39

## 2019-11-09 RX ADMIN — PANTOPRAZOLE SODIUM 40 MG: 40 TABLET, DELAYED RELEASE ORAL at 05:10

## 2019-11-09 RX ADMIN — ATORVASTATIN CALCIUM 10 MG: 10 TABLET, FILM COATED ORAL at 20:43

## 2019-11-09 RX ADMIN — CARVEDILOL 12.5 MG: 6.25 TABLET, FILM COATED ORAL at 20:43

## 2019-11-09 RX ADMIN — ROPINIROLE HYDROCHLORIDE 0.25 MG: 0.25 TABLET, FILM COATED ORAL at 20:43

## 2019-11-09 RX ADMIN — SODIUM CHLORIDE, PRESERVATIVE FREE 10 ML: 5 INJECTION INTRAVENOUS at 08:40

## 2019-11-09 RX ADMIN — Medication 1 TABLET: at 08:39

## 2019-11-09 RX ADMIN — HYDRALAZINE HYDROCHLORIDE 50 MG: 50 TABLET ORAL at 05:10

## 2019-11-09 RX ADMIN — HEPARIN SODIUM 5000 UNITS: 5000 INJECTION INTRAVENOUS; SUBCUTANEOUS at 08:44

## 2019-11-09 RX ADMIN — HYDRALAZINE HYDROCHLORIDE 50 MG: 50 TABLET ORAL at 20:43

## 2019-11-09 RX ADMIN — SODIUM CHLORIDE, PRESERVATIVE FREE 10 ML: 5 INJECTION INTRAVENOUS at 20:43

## 2019-11-09 RX ADMIN — TRAMADOL HYDROCHLORIDE 50 MG: 50 TABLET, FILM COATED ORAL at 20:43

## 2019-11-09 RX ADMIN — ASPIRIN 81 MG: 81 TABLET, COATED ORAL at 08:39

## 2019-11-09 RX ADMIN — HYDRALAZINE HYDROCHLORIDE 50 MG: 50 TABLET ORAL at 14:25

## 2019-11-09 RX ADMIN — HEPARIN SODIUM 5000 UNITS: 5000 INJECTION INTRAVENOUS; SUBCUTANEOUS at 20:43

## 2019-11-09 NOTE — PROGRESS NOTES
Patient seen and evaluated.     Updated plan of care:    #Chest pain   - Potential etiologies include hypertensive urgency, psych, MSK, GERD, costochondritis   - Stress test low risk study, unlikely to be from CAD  - Has had increased stress recently with death of family member   - No recurrent chest pain while inpatient   - Will start on PPI   - Monitor on tele     #CADENCE  - Basline Cr: 0.7, presents with 1.3  - Suspect prerenal in setting of poor PO intake   - Improved to 1.09 with fluids, will continue to monitor     #Uncontrolled HTN   - Patient reports variable readings at home and 100% compliance to home regimen   - Continue 12.5 mg BID coreg, 50 mg TID hydralazine   - Lisinopril held for CADENCE. Blood pressure currently well controlled to borderline low this AM.   - Continue current regimen     #Hypovolemic hyponatremia   - 131 on admission, improved to 135 with fluids     #Cholelithiasis without evidence of cholecystitis or biliary obstruction  - Recommend outpatient surgery evaluation if it becomes symptomatic     #DM  - SSI    #Hypercalcemia  - Resolved after fluids     #Concern for aspiration   - SLP consult revealed no evidence of aspiration   - Restart regular diet     Chronic medical problems:  #HLD  #Hx of AML  #Hx of splenectomy   #Right bundle branch block with first degree AV block - asymptomatics    Code status: Full     Dispo: Pending clinical improvement. Likely tomorrow.

## 2019-11-09 NOTE — PLAN OF CARE
Problem: Patient Care Overview  Goal: Plan of Care Review  Outcome: Ongoing (interventions implemented as appropriate)    Goal: Discharge Needs Assessment  Outcome: Ongoing (interventions implemented as appropriate)      Problem: Fall Risk (Adult)  Goal: Identify Related Risk Factors and Signs and Symptoms  Outcome: Ongoing (interventions implemented as appropriate)    Goal: Absence of Fall  Outcome: Ongoing (interventions implemented as appropriate)      Problem: Pain, Acute (Adult)  Goal: Identify Related Risk Factors and Signs and Symptoms  Outcome: Ongoing (interventions implemented as appropriate)    Goal: Acceptable Pain Control/Comfort Level  Outcome: Ongoing (interventions implemented as appropriate)

## 2019-11-10 LAB
GLUCOSE BLDC GLUCOMTR-MCNC: 116 MG/DL (ref 70–130)
GLUCOSE BLDC GLUCOMTR-MCNC: 141 MG/DL (ref 70–130)
GLUCOSE BLDC GLUCOMTR-MCNC: 176 MG/DL (ref 70–130)

## 2019-11-10 PROCEDURE — 25010000002 HEPARIN (PORCINE) PER 1000 UNITS: Performed by: INTERNAL MEDICINE

## 2019-11-10 PROCEDURE — 94799 UNLISTED PULMONARY SVC/PX: CPT

## 2019-11-10 PROCEDURE — 63710000001 INSULIN ASPART PER 5 UNITS: Performed by: PHYSICIAN ASSISTANT

## 2019-11-10 PROCEDURE — 99231 SBSQ HOSP IP/OBS SF/LOW 25: CPT | Performed by: INTERNAL MEDICINE

## 2019-11-10 PROCEDURE — 82962 GLUCOSE BLOOD TEST: CPT

## 2019-11-10 RX ADMIN — HYDRALAZINE HYDROCHLORIDE 50 MG: 50 TABLET ORAL at 22:05

## 2019-11-10 RX ADMIN — SODIUM CHLORIDE, PRESERVATIVE FREE 10 ML: 5 INJECTION INTRAVENOUS at 09:42

## 2019-11-10 RX ADMIN — ROPINIROLE HYDROCHLORIDE 0.25 MG: 0.25 TABLET, FILM COATED ORAL at 22:05

## 2019-11-10 RX ADMIN — HEPARIN SODIUM 5000 UNITS: 5000 INJECTION INTRAVENOUS; SUBCUTANEOUS at 09:41

## 2019-11-10 RX ADMIN — ATORVASTATIN CALCIUM 10 MG: 10 TABLET, FILM COATED ORAL at 22:05

## 2019-11-10 RX ADMIN — HYDRALAZINE HYDROCHLORIDE 50 MG: 50 TABLET ORAL at 05:04

## 2019-11-10 RX ADMIN — CETIRIZINE HYDROCHLORIDE 5 MG: 10 TABLET, FILM COATED ORAL at 09:41

## 2019-11-10 RX ADMIN — ASPIRIN 81 MG: 81 TABLET, COATED ORAL at 09:40

## 2019-11-10 RX ADMIN — PANTOPRAZOLE SODIUM 40 MG: 40 TABLET, DELAYED RELEASE ORAL at 05:04

## 2019-11-10 RX ADMIN — CARVEDILOL 12.5 MG: 6.25 TABLET, FILM COATED ORAL at 09:40

## 2019-11-10 RX ADMIN — CARVEDILOL 12.5 MG: 6.25 TABLET, FILM COATED ORAL at 22:05

## 2019-11-10 RX ADMIN — VALACYCLOVIR HYDROCHLORIDE 1000 MG: 500 TABLET, FILM COATED ORAL at 09:41

## 2019-11-10 RX ADMIN — EXEMESTANE 25 MG: 25 TABLET ORAL at 09:40

## 2019-11-10 RX ADMIN — HEPARIN SODIUM 5000 UNITS: 5000 INJECTION INTRAVENOUS; SUBCUTANEOUS at 22:07

## 2019-11-10 RX ADMIN — Medication 1 TABLET: at 09:41

## 2019-11-10 NOTE — PLAN OF CARE
Problem: Patient Care Overview  Goal: Plan of Care Review  Outcome: Ongoing (interventions implemented as appropriate)    Goal: Discharge Needs Assessment  Outcome: Ongoing (interventions implemented as appropriate)      Problem: Fall Risk (Adult)  Goal: Identify Related Risk Factors and Signs and Symptoms  Outcome: Ongoing (interventions implemented as appropriate)    Goal: Absence of Fall  Outcome: Ongoing (interventions implemented as appropriate)      Problem: Pain, Acute (Adult)  Goal: Identify Related Risk Factors and Signs and Symptoms  Outcome: Ongoing (interventions implemented as appropriate)    Goal: Acceptable Pain Control/Comfort Level  Outcome: Ongoing (interventions implemented as appropriate)    Goal: Identify Related Risk Factors and Signs and Symptoms  Outcome: Ongoing (interventions implemented as appropriate)    Goal: Acceptable Pain Control/Comfort Level  Outcome: Ongoing (interventions implemented as appropriate)

## 2019-11-10 NOTE — PLAN OF CARE
Problem: Patient Care Overview  Goal: Plan of Care Review  Outcome: Ongoing (interventions implemented as appropriate)    Goal: Individualization and Mutuality  Outcome: Ongoing (interventions implemented as appropriate)    Goal: Discharge Needs Assessment  Outcome: Ongoing (interventions implemented as appropriate)    Goal: Interprofessional Rounds/Family Conf  Outcome: Ongoing (interventions implemented as appropriate)      Problem: Fall Risk (Adult)  Goal: Identify Related Risk Factors and Signs and Symptoms  Outcome: Ongoing (interventions implemented as appropriate)    Goal: Absence of Fall  Outcome: Ongoing (interventions implemented as appropriate)      Problem: Pain, Acute (Adult)  Goal: Identify Related Risk Factors and Signs and Symptoms  Outcome: Ongoing (interventions implemented as appropriate)    Goal: Acceptable Pain Control/Comfort Level  Outcome: Ongoing (interventions implemented as appropriate)    Goal: Identify Related Risk Factors and Signs and Symptoms  Outcome: Ongoing (interventions implemented as appropriate)    Goal: Acceptable Pain Control/Comfort Level  Outcome: Ongoing (interventions implemented as appropriate)

## 2019-11-10 NOTE — PROGRESS NOTES
[facility] HOSPITALIST PROGRESS NOTE     Patient Identification:  Name:  Tahmina Martinez  Age:  74 y.o.  Sex:  female  :  1945  MRN:  [id number]  Visit Number:  [id number]  ROOM: [address] [place] [address]     Primary Care Provider:  Noe Bower MD    Length of stay in inpatient status:  1    Subjective     Chief Compliant:    Chief Complaint   Patient presents with   • Abdominal Pain   • Chest Pain       Subjective:  Patient reports feeling more weak today and does not feel ready to go home. Nursing staff reports she is unsteady on her feet. Blood pressure well controlled.      ROS:   Denies fevers/chills. Denies chest pain.   Otherwise a 10 point ROS reviewed and negative other than per subjective.     Objective     Current Hospital Meds:  aspirin 81 mg Oral Daily   atorvastatin 10 mg Oral Nightly   calcium carb-cholecalciferol 1 tablet Oral Daily   carvedilol 12.5 mg Oral BID   cetirizine 5 mg Oral Daily   cholecalciferol 50,000 Units Oral Weekly   exemestane 25 mg Oral Daily   heparin (porcine) 5,000 Units Subcutaneous Q12H   hydrALAZINE 50 mg Oral Q8H   insulin aspart 0-7 Units Subcutaneous 4x Daily AC & at Bedtime   pantoprazole 40 mg Oral Q AM   rOPINIRole 0.25 mg Oral Nightly   sodium chloride 10 mL Intravenous Q12H   valACYclovir 1,000 mg Oral Daily     sodium chloride 50 mL/hr Last Rate: 50 mL/hr (19 0136)       Current Antimicrobial Therapy:  Anti-Infectives (From admission, onward)    Ordered     Dose/Rate Route Frequency Start Stop    19 0634  valACYclovir (VALTREX) tablet 1,000 mg     Ordering Provider:  Stephenie Munoz PA-C    1,000 mg Oral Daily 19 0900 20 0859        Current Diuretic Therapy:  Diuretics (From admission, onward)    None        ----------------------------------------------------------------------------------------------------------------------  Vital Signs:  Temp:  [97.8 °F (36.6 °C)-98.2 °F (36.8 °C)] 98.1 °F (36.7 °C)  Heart Rate:  [66-73]  69  Resp:  [18] 18  BP: (124-157)/(54-75) 131/65  SpO2:  [96 %-99 %] 96 %  on   ;   Device (Oxygen Therapy): room air  Body mass index is 26.37 kg/m².    Wt Readings from Last 3 Encounters:   11/09/19 67.5 kg (148 lb 12.8 oz)   11/04/19 65.3 kg (143 lb 15.4 oz)   10/29/19 71 kg (156 lb 9.6 oz)     Intake & Output (last 3 days)       11/07 0701 - 11/08 0700 11/08 0701 - 11/09 0700 11/09 0701 - 11/10 0700    P.O.  240 240    I.V. (mL/kg) 1000 (15)      IV Piggyback 1000      Total Intake(mL/kg) 2000 (30.1) 240 (3.6) 240 (3.6)    Urine (mL/kg/hr) 1200 2550 (1.6) 1150 (1.4)    Total Output 1200 2550 1150    Net +800 -2310 -910               Diet Regular; Cardiac, Consistent Carbohydrate  ----------------------------------------------------------------------------------------------------------------------  Physical exam:  Constitutional:  Well-developed and well-nourished.  No respiratory distress.      HENT:  Head:  Normocephalic and atraumatic.  Mouth:  Moist mucous membranes.    Eyes:  Conjunctivae and EOM are normal.  Pupils are equal, round, and reactive to light.  No scleral icterus.    Cardiovascular:  Normal rate, regular rhythm and normal heart sounds with no murmur.  Pulmonary/Chest:  No respiratory distress, no wheezes, no crackles, with normal breath sounds and good air movement.  Abdominal:  Soft.  Bowel sounds are normal.  No distension and no tenderness.   Musculoskeletal:  No edema, no tenderness, and no deformity.  No red or swollen joints anywhere.    Neurological:  Alert and oriented to person, place, and time.  No focal neurological deficit.    Skin:  Skin is warm and dry. No rash noted. No pallor.   Peripheral vascular:  Strong pulses in all 4 extremities with no clubbing, no cyanosis, no edema.  --------------------------------------------------------------------------------------------------  Tele:     ----------------------------------------------------------------------------------------------------------------------  Results from last 7 days   Lab Units 11/08/19  0759 11/08/19  0111 11/07/19  1832  11/02/19  2249   CRP mg/dL  --   --   --   --  0.33   WBC 10*3/mm3 7.53 10.10 8.24   < > 7.56   HEMOGLOBIN g/dL 13.3 12.9 14.2   < > 12.7   HEMATOCRIT % 39.0 38.6 41.0   < > 37.8   MCV fL 103.2* 104.6* 101.7*   < > 104.4*   MCHC g/dL 34.1 33.4 34.6   < > 33.6   PLATELETS 10*3/mm3 356 359 395   < > 378   INR   --   --   --   --  0.97    < > = values in this interval not displayed.         Results from last 7 days   Lab Units 11/09/19  0432 11/08/19  0759 11/08/19  0111 11/07/19  1832   SODIUM mmol/L 136 135* 134* 131*   POTASSIUM mmol/L 4.5 4.8 4.9 5.3*   MAGNESIUM mg/dL  --  2.1  --   --    CHLORIDE mmol/L 106 102 102 95*   CO2 mmol/L 19.8* 20.3* 20.5* 20.6*   BUN mg/dL 37* 34* 37* 41*   CREATININE mg/dL 0.99 1.09* 1.23* 1.42*   EGFR IF NONAFRICN AM mL/min/1.73 55* 49* 42* 36*   CALCIUM mg/dL 9.3 9.8 9.8 10.8*   PHOSPHORUS mg/dL  --  3.5  --   --    GLUCOSE mg/dL 128* 91 99 133*   ALBUMIN g/dL  --  3.90 3.91 4.79   BILIRUBIN mg/dL  --  0.6 0.5 0.7   ALK PHOS U/L  --  123* 125* 150*   AST (SGOT) U/L  --  24 23 30   ALT (SGPT) U/L  --  20 21 26   Estimated Creatinine Clearance: 44.6 mL/min (by C-G formula based on SCr of 0.99 mg/dL).  No results found for: AMMONIA  Results from last 7 days   Lab Units 11/08/19  1334 11/08/19  0759 11/08/19  0111   TROPONIN T ng/mL <0.010 <0.010 <0.010     Results from last 7 days   Lab Units 11/07/19  1832 11/02/19  2249   PROBNP pg/mL 127.2 795.4     Results from last 7 days   Lab Units 11/03/19  0531   CHOLESTEROL mg/dL 142   TRIGLYCERIDES mg/dL 104   HDL CHOL mg/dL 57   LDL CHOL mg/dL 64     Hemoglobin A1C   Date/Time Value Ref Range Status   11/08/2019 0111 6.10 (H) 4.80 - 5.60 % Final     Glucose   Date/Time Value Ref Range Status   11/09/2019 1900 224 (H) 70 - 130 mg/dL Final    11/09/2019 1615 126 70 - 130 mg/dL Final   11/09/2019 1012 137 (H) 70 - 130 mg/dL Final   11/09/2019 0614 95 70 - 130 mg/dL Final   11/08/2019 1944 152 (H) 70 - 130 mg/dL Final   11/08/2019 1557 180 (H) 70 - 130 mg/dL Final   11/08/2019 1112 98 70 - 130 mg/dL Final   11/08/2019 0737 90 70 - 130 mg/dL Final     Lab Results   Component Value Date    TSH 1.430 11/08/2019     No results found for: PREGTESTUR, PREGSERUM, HCG, HCGQUANT  Pain Management Panel     There is no flowsheet data to display.        Brief Urine Lab Results  (Last result in the past 365 days)      Color   Clarity   Blood   Leuk Est   Nitrite   Protein   CREAT   Urine HCG        11/07/19 2128 Yellow Clear Negative Negative Negative Trace                               Results from last 7 days   Lab Units 11/02/19  2249   CRP mg/dL 0.33       I have personally looked at the labs and they are summarized above.  ----------------------------------------------------------------------------------------------------------------------  Detailed radiology reports for the last 24 hours:    Imaging Results (Last 24 Hours)     ** No results found for the last 24 hours. **        Assessment & Plan      #Chest pain   - Potential etiologies include hypertensive urgency, psych, MSK, GERD, costochondritis   - Stress test low risk study, unlikely to be from CAD  - Has had increased stress recently with death of family member   - No recurrent chest pain while inpatient   - Will start on PPI   - Monitor on tele      #CADENCE  - Basline Cr: 0.7, presents with 1.3  - Suspect prerenal in setting of poor PO intake   - Continues to improve, 0.99 today. Encouraged PO intake.      #Uncontrolled HTN   - Patient reports variable readings at home and 100% compliance to home regimen   - Continue 12.5 mg BID coreg, 50 mg TID hydralazine   - Lisinopril held for CADENCE. Blood pressure currently well controlled to borderline low this AM.   - Continue current regimen. Blood pressure well  controlled. Suspect level of noncompliance at home.      #Hypovolemic hyponatremia   - 131 on admission, improved to 135 with fluids      #Cholelithiasis without evidence of cholecystitis or biliary obstruction  - Recommend outpatient surgery evaluation if it becomes symptomatic     #Chronic debility   - Patient more unsteady on her feet today. Possibly a component of orthstasis.   - PT/OT continues to follow      #DM  - SSI     #Hypercalcemia  - Resolved after fluids      #Concern for aspiration   - SLP consult revealed no evidence of aspiration   - Restart regular diet      Chronic medical problems:  #HLD  #Hx of AML  #Hx of splenectomy   #hx of breast ca   #Right bundle branch block with first degree AV block - asymptomatics     Code status: Full      Dispo: Rehab vs. Home.   VTE Prophylaxis:   Mechanical Order History:     None      Pharmalogical Order History:     Ordered     Dose Route Frequency Stop    11/08/19 0046  heparin (porcine) 5000 UNIT/ML injection 5,000 Units      5,000 Units SC Every 12 Hours Scheduled --          Reid Cheek MD  [facility]  11/09/19  7:18 PM

## 2019-11-11 ENCOUNTER — APPOINTMENT (OUTPATIENT)
Dept: GENERAL RADIOLOGY | Facility: HOSPITAL | Age: 74
End: 2019-11-11

## 2019-11-11 ENCOUNTER — APPOINTMENT (OUTPATIENT)
Dept: ULTRASOUND IMAGING | Facility: HOSPITAL | Age: 74
End: 2019-11-11

## 2019-11-11 LAB
ANION GAP SERPL CALCULATED.3IONS-SCNC: 12.7 MMOL/L (ref 5–15)
BUN BLD-MCNC: 28 MG/DL (ref 8–23)
BUN/CREAT SERPL: 29.8 (ref 7–25)
CALCIUM SPEC-SCNC: 10.1 MG/DL (ref 8.6–10.5)
CHLORIDE SERPL-SCNC: 100 MMOL/L (ref 98–107)
CO2 SERPL-SCNC: 21.3 MMOL/L (ref 22–29)
CREAT BLD-MCNC: 0.94 MG/DL (ref 0.57–1)
GFR SERPL CREATININE-BSD FRML MDRD: 58 ML/MIN/1.73
GLUCOSE BLD-MCNC: 167 MG/DL (ref 65–99)
GLUCOSE BLDC GLUCOMTR-MCNC: 107 MG/DL (ref 70–130)
GLUCOSE BLDC GLUCOMTR-MCNC: 111 MG/DL (ref 70–130)
GLUCOSE BLDC GLUCOMTR-MCNC: 128 MG/DL (ref 70–130)
GLUCOSE BLDC GLUCOMTR-MCNC: 141 MG/DL (ref 70–130)
POTASSIUM BLD-SCNC: 4.8 MMOL/L (ref 3.5–5.2)
SODIUM BLD-SCNC: 134 MMOL/L (ref 136–145)

## 2019-11-11 PROCEDURE — 73030 X-RAY EXAM OF SHOULDER: CPT

## 2019-11-11 PROCEDURE — 93880 EXTRACRANIAL BILAT STUDY: CPT

## 2019-11-11 PROCEDURE — 93880 EXTRACRANIAL BILAT STUDY: CPT | Performed by: RADIOLOGY

## 2019-11-11 PROCEDURE — 25010000002 HEPARIN (PORCINE) PER 1000 UNITS: Performed by: INTERNAL MEDICINE

## 2019-11-11 PROCEDURE — 82962 GLUCOSE BLOOD TEST: CPT

## 2019-11-11 PROCEDURE — 73030 X-RAY EXAM OF SHOULDER: CPT | Performed by: RADIOLOGY

## 2019-11-11 PROCEDURE — 25010000002 ONDANSETRON PER 1 MG: Performed by: NURSE PRACTITIONER

## 2019-11-11 PROCEDURE — 99232 SBSQ HOSP IP/OBS MODERATE 35: CPT | Performed by: NURSE PRACTITIONER

## 2019-11-11 PROCEDURE — 25010000002 PROMETHAZINE PER 50 MG: Performed by: PHYSICIAN ASSISTANT

## 2019-11-11 PROCEDURE — 80048 BASIC METABOLIC PNL TOTAL CA: CPT | Performed by: NURSE PRACTITIONER

## 2019-11-11 RX ORDER — ONDANSETRON 2 MG/ML
4 INJECTION INTRAMUSCULAR; INTRAVENOUS ONCE
Status: COMPLETED | OUTPATIENT
Start: 2019-11-11 | End: 2019-11-11

## 2019-11-11 RX ADMIN — VALACYCLOVIR HYDROCHLORIDE 1000 MG: 500 TABLET, FILM COATED ORAL at 07:55

## 2019-11-11 RX ADMIN — CARVEDILOL 12.5 MG: 6.25 TABLET, FILM COATED ORAL at 20:47

## 2019-11-11 RX ADMIN — CARVEDILOL 12.5 MG: 6.25 TABLET, FILM COATED ORAL at 07:57

## 2019-11-11 RX ADMIN — HEPARIN SODIUM 5000 UNITS: 5000 INJECTION INTRAVENOUS; SUBCUTANEOUS at 20:47

## 2019-11-11 RX ADMIN — PROMETHAZINE HYDROCHLORIDE 12.5 MG: 25 INJECTION INTRAMUSCULAR; INTRAVENOUS at 18:06

## 2019-11-11 RX ADMIN — ROPINIROLE HYDROCHLORIDE 0.25 MG: 0.25 TABLET, FILM COATED ORAL at 20:47

## 2019-11-11 RX ADMIN — SODIUM CHLORIDE, PRESERVATIVE FREE 10 ML: 5 INJECTION INTRAVENOUS at 08:00

## 2019-11-11 RX ADMIN — CETIRIZINE HYDROCHLORIDE 5 MG: 10 TABLET, FILM COATED ORAL at 07:54

## 2019-11-11 RX ADMIN — ATORVASTATIN CALCIUM 10 MG: 10 TABLET, FILM COATED ORAL at 20:48

## 2019-11-11 RX ADMIN — EXEMESTANE 25 MG: 25 TABLET ORAL at 07:59

## 2019-11-11 RX ADMIN — PANTOPRAZOLE SODIUM 40 MG: 40 TABLET, DELAYED RELEASE ORAL at 05:26

## 2019-11-11 RX ADMIN — ACETAMINOPHEN 650 MG: 325 TABLET ORAL at 02:24

## 2019-11-11 RX ADMIN — HYDRALAZINE HYDROCHLORIDE 50 MG: 50 TABLET ORAL at 05:26

## 2019-11-11 RX ADMIN — HYDRALAZINE HYDROCHLORIDE 50 MG: 50 TABLET ORAL at 16:29

## 2019-11-11 RX ADMIN — Medication 1 TABLET: at 07:58

## 2019-11-11 RX ADMIN — ONDANSETRON 4 MG: 2 INJECTION, SOLUTION INTRAMUSCULAR; INTRAVENOUS at 20:50

## 2019-11-11 RX ADMIN — HYDRALAZINE HYDROCHLORIDE 50 MG: 50 TABLET ORAL at 20:48

## 2019-11-11 RX ADMIN — HEPARIN SODIUM 5000 UNITS: 5000 INJECTION INTRAVENOUS; SUBCUTANEOUS at 07:58

## 2019-11-11 RX ADMIN — ASPIRIN 81 MG: 81 TABLET, COATED ORAL at 07:56

## 2019-11-11 NOTE — PROGRESS NOTES
Discharge Planning Assessment  [facility]     Patient Name: Tahmina Martinez  MRN: [id number]  Today's Date: 11/11/2019    Admit Date: 11/7/2019        Discharge Plan     Row Name 11/11/19 1636       Plan    Plan  SS spoke with pt on this day. Pt states that she is agreeable to [facility] rehab at discharge. Pt states that [facility] is her first choice if SNF placement is needed. SS spoke with Elisa and sent pt's referral. SS will follow and assist as needed.      Patient/Family in Agreement with Plan  yes    Row Name 11/11/19 5179       LLOYD Vieira

## 2019-11-11 NOTE — DISCHARGE PLACEMENT REQUEST
"Erica Martinez (74 y.o. Female)     Date of Birth Social Security Number Address Home Phone MRN    1945  [address] [facility] [address] [phone] [id number]    Voodoo Marital Status          Christian        Admission Date Admission Type Admitting Provider Attending Provider Department, Room/Bed    11/7/19 Emergency Didi Ulloa DO Troxell, Christopher A, DO [address], 3304/1S    Discharge Date Discharge Disposition Discharge Destination                       Attending Provider:  David Matute DO    Allergies:  Latex, Penicillins, Zithromax [Azithromycin]    Isolation:  None   Infection:  None   Code Status:  CPR    Ht:  160 cm (62.99\")   Wt:  66.7 kg (147 lb 1.6 oz)    Admission Cmt:  None   Principal Problem:  Chest pain in adult [R07.9]                 Active Insurance as of 11/7/2019     Primary Coverage     Payor Plan Insurance Group Employer/Plan Group    MEDICARE MEDICARE A & B      Payor Plan Address Payor Plan Phone Number Payor Plan Fax Number Effective Dates    [address] [phone]  3/1/2007 - None Entered    [facility] [address]       Subscriber Name Subscriber Birth Date Member ERICA TRIMBLE 1945 [id number]           Secondary Coverage     Payor Plan Insurance Group Employer/Plan Group    [place]      Payor Plan Address Payor Plan Phone Number Payor Plan Fax Number Effective Dates    [address] [phone]  5/31/2017 - None Entered    [place] [address]       Subscriber Name Subscriber Birth Date Member ERICA TRIMBLE 1945 [id number]                 Emergency Contacts      (Rel.) Home Phone Work Phone Mobile Phone    Bala Martinez (Spouse) [phone] -- --    PlacidoKamari (Son) [phone] -- [phone]            Emergency Contact Information     Name Relation Home Work Mobile    Bala Martinez Spouse [phone]      Kamari Cummins Son [phone]  " [phone]          Insurance Information                MEDICARE/MEDICARE A & B Phone: [phone]    Subscriber: Tahmina Martinez Subscriber#: [id number]    Group#:  Precert#:         [place] Phone: [phone]    Subscriber: Tahmina Martinez Subscriber#: [id number]    Group#:  Precert#:           Treatment Team     Provider Relationship Specialty Contact    David Matute DO Attending -- [phone]    Georgina Vidal, RRT Respiratory Therapist -- [phone]    Antonio Arroyo Patient Care Technician --     Didi Ulloa DO Consulting Physician Hospitalist [phone]    Lianna Byrne, PAULINA Physical Therapist Physical Therapy     Jones Toussaint RN Registered Nurse --     Stephanie Little Patient Care Technician --           Problem List           Codes Noted - Resolved       Hospital    * (Principal) Chest pain in adult ICD-10-CM: R07.9  ICD-9-CM: 786.50 11/8/2019 - Present    History of splenectomy (Chronic) ICD-10-CM: Z90.81  ICD-9-CM: V45.79 11/8/2019 - Present    Cholelithiasis ICD-10-CM: K80.20  ICD-9-CM: 574.20 11/8/2019 - Present    Acute kidney injury (CMS/HCC) ICD-10-CM: N17.9  ICD-9-CM: 584.9 11/8/2019 - Present    Hyperkalemia ICD-10-CM: E87.5  ICD-9-CM: 276.7 11/8/2019 - Present    SIRS (systemic inflammatory response syndrome) (CMS/HCC) ICD-10-CM: R65.10  ICD-9-CM: 995.90 11/8/2019 - Present    Hyperglycemia ICD-10-CM: R73.9  ICD-9-CM: 790.29 11/8/2019 - Present    First degree AV block ICD-10-CM: I44.0  ICD-9-CM: 426.11 11/8/2019 - Present    Macrocytosis without anemia ICD-10-CM: D75.89  ICD-9-CM: 289.89 11/8/2019 - Present    Hypocarbia ICD-10-CM: E87.8  ICD-9-CM: 276.9 11/8/2019 - Present    Type II diabetes mellitus (CMS/HCC) (Chronic) ICD-10-CM: E11.9  ICD-9-CM: 250.00 11/8/2019 - Present    Dyslipidemia (Chronic) ICD-10-CM: E78.5  ICD-9-CM: 272.4 11/14/2017 - Present    RBBB unchanged from 2015 (Chronic) ICD-10-CM:  "I45.10  ICD-9-CM: 426.4 9/1/2017 - Present    History of Non-STEMI (non-ST elevated myocardial infarction) with no coronary intervention needed in 2008, clinically stable.  (Chronic) ICD-10-CM: I21.4  ICD-9-CM: 410.70 10/12/2016 - Present    Essential hypertension (Chronic) ICD-10-CM: I10  ICD-9-CM: 401.9 10/12/2016 - Present    History of Acute myelocytic leukemia,status post chemotherapy and bone marrow transplant in 2005. (Chronic) ICD-10-CM: C92.00  ICD-9-CM: 205.00 10/12/2016 - Present    Breast cancer (right), status post radiation therapy in 08/2015 (Chronic) ICD-10-CM: C50.919  ICD-9-CM: 174.9 10/12/2016 - Present       Non-Hospital    Bifascicular block (RBBB plus LP FB) ICD-10-CM: I45.2  ICD-9-CM: 426.53 3/29/2018 - Present    Dyspnea, class II-III.  ICD-10-CM: R06.00  ICD-9-CM: 786.09 10/12/2016 - Present             History & Physical      TammyStephenie PA-C at 11/08/19 0051     Attestation signed by Didi Ulloa DO at 11/08/19 0635    Patient seen and examined.  Patient was resting in bed.  Patient currently denies any chest pain and/or left upper extremity pain.  Patient reports that she was awakened with left upper extremity pain and chest pain on Thursday morning.  The patient states that the pain is described as an ache.  Patient states that she also felt very weak.  She reports that she took her medications and attempted to eat breakfast when she became very nauseous and had an episode of vomiting.  She also reports chills and diaphoresis.  She stated that she also felt dizzy when ambulating back to her bedroom and for a moment felt \"confused.\"  The patient told me that her blood pressure was 189/86 yesterday morning but stated that since her most recent discharge her systolic blood pressure will at times be as low as the 1 teens which for her causes dizziness.    Brief data: Patient's maximum blood pressure since this admission has been 182/76.  She has had 2- troponin T levels.  " BUN 41 upon admission with a creatinine of 1.42.  Baseline appears to be around 0.73.  Potassium 5.3, sodium 131, BUN to creatinine ratio 28.9.  CBC largely unremarkable with the exception of an MCV of 101.7.  EKG has been reviewed and reveals a normal sinus rhythm with a first-degree AV block and right bundle branch block.  QTc 469 MS.    Brief exam: Patient is pleasant, well-nourished and well-developed and resting in bed.  HEENT exam is unremarkable.  Sclerae anicteric and oropharynx is clear.  No JVD.  No carotid bruits.  Heart is regular rate and rhythm without obvious murmur/gallop/rub.  Patient does have some mild tenderness to palpation across the anterior chest wall left greater than right.  Lungs are clear to auscultation anteriorly.  Abdomen is soft and nontender, nondistended during my exam.  No hepatomegaly is palpated.  Bowel sounds are slightly hypoactive.  Lower extremities are with no significant edema.  Neurologically Patient Is Oriented x3.  Strength Is Equal Bilaterally.  No Focal Neurologic Deficits.    Assessment and Plan:  -Chest pain with mixed features in the setting of known MI, hypertension and dyslipidemia: Patient has been admitted to the telemetry unit.  She has ruled out for acute myocardial infarction.  Plan to schedule a Lexiscan today.  Patient had a recent echocardiogram that revealed some borderline concentric hypertrophy.  Systolic function was normal with an EF of 66 to 70%.  She had some left ventricular diastolic dysfunction with mild aortic, mitral and tricuspid valve regurgitation.  Recent lipid panel was acceptable.  Obtain hemoglobin A1c.  Consider cardiology evaluation.    -Acute kidney injury on top of probable stage II CKD: Patient's baseline creatinine appears to be around 0.7.  Patient is receiving very gentle IV fluid hydration.  Hold nephrotoxic medications and repeat chemistry panel.    -Mild hyperkalemia at 5.3: Gentle IV fluids as noted above.  Repeat chemistry  "panel.  Monitor on telemetry.    -Essential hypertension, currently with fair control: Await patient's home medication list. Place holding parameters given patient's complaint of dizziness when systolic blood pressure in the range of the low 100s to 1-teens.                       [facility] Medicine Services  HISTORY & PHYSICAL    Patient Identification:  Name:  Tahmina Martinez  Age:  74 y.o.  Sex:  female  :  1945  MRN:  [id number]   Visit Number:  [id number]  Primary Care Physician:  Noe Bower MD     Subjective     Chief complaint:   Chief Complaint   Patient presents with   • Abdominal Pain   • Chest Pain     History of presenting illness:   Patient is a 74 y.o. female with past medical history significant for history of NSTEMI with no coronary intervention (), type II diabetes mellitus (non-insulin dependent), history of acute myelocytic leukemia status post chemotherapy and bone marrow transplant (), essential hypertension, hyperlipidemia, first-degree AV block, right bundle branch block, history of splenectomy, and history of breast cancer as post radiation, that presented to the [facility] emergency department for evaluation of chest pain and abdominal pain. The patient states that she started  experiencing a severe headache with chest pain that radiates to her to left arm last night. She describes the chest pain as a \"tingling\" with \"knots\" that is located below her left breast that occurs when she is active and occasionally at rest. She states that once she began having the headache she went to check her blood pressure and take her blood pressure medications. Per the patient, her BP was 170/90 at that time. She states she tried to eat and suddenly felt nauseous with one episode of emesis with subsequent \"weakness\" and \"\"fogginess.\" She admits to having no energy, fatigue, chills, chest tightness, occasional choking, shortness of breath, chest pain " "described as \"tingling/knots,\" intermittent leg edema, abdominal distention, abdominal pain with greasy foods, nausea and vomiting x 1, vertigo when going from sitting to standing, headaches, light headedness, and weakness. The patient states that she has become more unsteady on her feet and has fallen at least 3 times within the past year but denies any recent falls, syncope, or trauma. She uses a cane at home to ambulate. She denies fever, diaphoresis, congestion, sore throat, cough, wheezing, palpitations, constipation, diarrhea, dysuria, wounds, numbness/tingling, syncope, or confusion. The patient has a history of NSTEMI in the past with no coronary intervention and a significant family history with her mother and father having heart disease and hypertension and her two brother's and sister having hypertension as well.The patient states she had \"at least 13 medications\" that she takes regularly and states that she takes them as directed and does not get them confused.     Furthermore, the patient was recently admitted to [facility] on 11/2/2019 with a chief complaint of headache and uncontrolled hypertension.  Patient was found to have hypertensive urgency with blood pressure 231/100 on arrival to the emergency department.  Upon evaluation a new bifascicular block was found but patient denied any episodes of syncope or concerning symptoms that warranted a pacemaker. Patient had been seen by Dr. Soler in the clinic who transitioned her HCTZ/triamterene.  Her home regimen was restarted and hydralazine was increased from 25 mg 3 times daily to 50 mg 3 times daily on this admission and at that time her blood pressure was reasonably controlled.  Patient's severe headache had resolved with blood pressure control and she was educated to continue her current regimen.  Patient was to follow-up with PCP and Dr. Soler in 1 week and outpatient evaluation into secondary causes of her hypertension including a " "renal artery duplex was suggested. The patient states she has an appointment with Dr. Soler on 11/14/19 and her PCP, Dr. Bower, on 11/12/19.     Upon arrival to the ED, vitals were temperature 97.9 °F, pulse 106, respirations 20, /76, SPO2 95% on room air.  Troponin negative x2.  proBNP 127.2.  CMP with glucose 133, sodium 131, potassium 5.3, CO2 20.6, chloride 95, anion gap 15.4, creatinine 1.42, BUN 41, BUN/creatinine ratio 28.9, calcium 10.8, GFR 36, alkaline phosphatase 150, total protein 8.8.  CBC with .7, MCH 35.2.  Urinalysis shows trace protein, otherwise unremarkable.  Chest x-ray shows no evidence of acute or active cardiopulmonary disease.  Noncontrast CT of abdomen/pelvis shows cholelithiasis without CT evidence of cholecystitis or biliary obstruction.  CT of head without contrast shows no acute intracranial pathology.  EKG shows sinus rhythm with first-degree AV block, right bundle branch block, 75 bpm, QTc 469 MS.  Patient received p.o. aspirin 324 mg, IV morphine 2 mg x 2, IV Zofran 4 mg x 1, normal saline thousand millimeter bolus, and emergency department.    Patient has been admitted to the telemetry floor for further evaluation and treatment.  ---------------------------------------------------------------------------------------------------------------------   Review of Systems   Constitutional: Positive for activity change (no energy ), appetite change (unable to eat greasy foods ), chills and fatigue. Negative for diaphoresis and fever.   HENT: Negative for congestion and sore throat.    Eyes: Negative for photophobia and visual disturbance.   Respiratory: Positive for choking (occasionally on foods ), chest tightness and shortness of breath. Negative for cough and wheezing.    Cardiovascular: Positive for chest pain (described as a \"tingling/knotting\" feeling) and leg swelling (intermittent). Negative for palpitations.   Gastrointestinal: Positive for abdominal distention, " abdominal pain (after eating greasy foods ), nausea and vomiting (x1 episode yesterday ). Negative for blood in stool, constipation and diarrhea.   Endocrine: Negative for polydipsia and polyphagia.   Genitourinary: Negative for dysuria and frequency.   Musculoskeletal: Negative for arthralgias.   Skin: Negative for wound.   Allergic/Immunologic: Negative for environmental allergies.   Neurological: Positive for dizziness (when going from sitting to standing ), weakness, light-headedness and headaches. Negative for syncope and numbness.   Hematological: Does not bruise/bleed easily.   Psychiatric/Behavioral: Negative for confusion.      ---------------------------------------------------------------------------------------------------------------------   Past Medical History:   Diagnosis Date   • Breast cancer (CMS/[facility])    • Dyspnea    • H/O splenectomy    • Hypertension    • Myelocytic leukemia (CMS/[facility])    • Non-STEMI (non-ST elevated myocardial infarction) (CMS/[facility])      Past Surgical History:   Procedure Laterality Date   • BONE MARROW TRANSPLANT     •  SECTION     • SPLENECTOMY     • TUBAL ABDOMINAL LIGATION       Family History   Problem Relation Age of Onset   • Heart disease Mother    • Heart disease Father      Social History     Socioeconomic History   • Marital status:      Spouse name: Not on file   • Number of children: Not on file   • Years of education: Not on file   • Highest education level: Not on file   Tobacco Use   • Smoking status: Never Smoker   • Smokeless tobacco: Never Used   Substance and Sexual Activity   • Alcohol use: No   • Drug use: No   • Sexual activity: Defer     ---------------------------------------------------------------------------------------------------------------------   Allergies:  Latex; Penicillins; and Zithromax [azithromycin]  ---------------------------------------------------------------------------------------------------------------------    Medications below are reported home medications pulling from within the system; at this time, these medications have not been reconciled unless otherwise specified and are in the verification process for further verifcation as current home medications.    Prior to Admission Medications     Prescriptions Last Dose Informant Patient Reported? Taking?    aspirin 81 MG EC tablet  Pharmacy Yes No    Take 81 mg by mouth Daily.    atorvastatin (LIPITOR) 10 MG tablet  Pharmacy No No    Take 1 tablet by mouth Every Night.    Calcium Citrate-Vitamin D (CALCIUM + D PO)  Pharmacy Yes No    Take 600 mg by mouth.    carvedilol (COREG) 12.5 MG tablet  Pharmacy No No    Take 1 tablet by mouth 2 (Two) Times a Day.    Coenzyme Q10 (CO Q-10) 200 MG capsule  Pharmacy Yes No    Take 200 mg by mouth Daily.    exemestane (AROMASIN) 25 MG chemo tablet  Pharmacy Yes No    Take 25 mg by mouth Daily.    furosemide (LASIX) 40 MG tablet  Pharmacy Yes No    Take 40 mg by mouth Daily.    hydrALAZINE (APRESOLINE) 50 MG tablet   No No    Take 1 tablet by mouth Every 8 (Eight) Hours.    lisinopril (PRINIVIL,ZESTRIL) 40 MG tablet  Pharmacy No No    Take 1 tablet by mouth Daily.    Loratadine 10 MG capsule  Pharmacy Yes No    Take  by mouth.    metFORMIN (GLUCOPHAGE) 500 MG tablet  Pharmacy Yes No    Take 500 mg by mouth Every Morning.    nitroglycerin (NITROSTAT) 0.4 MG SL tablet  Pharmacy Yes No    Place 0.4 mg under the tongue Every 5 (Five) Minutes As Needed for chest pain. Take no more than 3 doses in 15 minutes.    pantoprazole (PROTONIX) 40 MG EC tablet  Pharmacy Yes No    Take 40 mg by mouth Daily.    rOPINIRole (REQUIP) 0.25 MG tablet  Pharmacy Yes No    Take 0.25 mg by mouth Every Night. Take 1 hour before bedtime.    triamterene-hydrochlorothiazide (MAXZIDE-25) 37.5-25 MG per tablet  Pharmacy No No    Take 1 tablet by mouth Daily.    valACYclovir (VALTREX) 500 MG tablet  Pharmacy Yes No    Take 1 g by mouth Daily.    vitamin D  (ERGOCALCIFEROL) 15803 UNITS capsule capsule  Pharmacy Yes No    Take 50,000 Units by mouth Every 14 (Fourteen) Days.        ---------------------------------------------------------------------------------------------------------------------    Objective     Hospital Scheduled Meds:    heparin (porcine) 5,000 Units Subcutaneous Q12H   pantoprazole 40 mg Oral Q AM   sodium chloride 10 mL Intravenous Q12H       sodium chloride 50 mL/hr Last Rate: 50 mL/hr (11/08/19 0136)       Current listed hospital scheduled medications may not yet reflect those currently placed in orders that are signed and held, awaiting patient's arrival to floor/unit.    ---------------------------------------------------------------------------------------------------------------------   Vital Signs:  Temp:  [97.9 °F (36.6 °C)-98.3 °F (36.8 °C)] 97.9 °F (36.6 °C)  Heart Rate:  [] 93  Resp:  [16-20] 18  BP: (106-182)/(62-90) 131/62  Mean Arterial Pressure (Non-Invasive) for the past 24 hrs (Last 3 readings):   Noninvasive MAP (mmHg)   11/08/19 0049 110   11/08/19 0002 116   11/07/19 2347 117     SpO2 Percentage    11/08/19 0002 11/08/19 0049 11/08/19 0330   SpO2: 99% 95% 96%     SpO2:  [95 %-100 %] 96 %  on   ;        Body mass index is 25.95 kg/m².  Wt Readings from Last 3 Encounters:   11/08/19 66.5 kg (146 lb 8 oz)   11/04/19 65.3 kg (143 lb 15.4 oz)   10/29/19 71 kg (156 lb 9.6 oz)     ---------------------------------------------------------------------------------------------------------------------   Physical Exam:  Physical Exam   Constitutional: She is oriented to person, place, and time. She appears well-developed and well-nourished. She is cooperative.  Non-toxic appearance. She does not have a sickly appearance. She does not appear ill. No distress.   Upon evaluation patient lying in bed in no acute distress    HENT:   Head: Normocephalic and atraumatic.   Nose: Nose normal.   Mouth/Throat: Mucous membranes are normal. Mucous  membranes are not pale, not dry and not cyanotic.   Eyes: Conjunctivae and lids are normal. Right eye exhibits no discharge. Left eye exhibits no discharge. Right conjunctiva is not injected. Left conjunctiva is not injected.   Cardiovascular: Regular rhythm, normal heart sounds, intact distal pulses and normal pulses. Exam reveals no decreased pulses.   Pulses:       Dorsalis pedis pulses are 2+ on the right side, and 2+ on the left side.        Posterior tibial pulses are 2+ on the right side, and 2+ on the left side.   Pulmonary/Chest: Effort normal and breath sounds normal. No stridor. No tachypnea and no bradypnea. No respiratory distress. She has no decreased breath sounds. She has no wheezes. She has no rhonchi. She has no rales.   Abdominal: Soft. Normal appearance and bowel sounds are normal. She exhibits no ascites and no mass. There is no tenderness. There is no rigidity, no guarding and negative Sebastian's sign.   Musculoskeletal:        Right lower leg: She exhibits no edema.        Left lower leg: She exhibits no edema.     Vascular Status -  Her right foot exhibits no edema. Her left foot exhibits no edema.  Skin Integrity  -  Her right foot skin is intact.Her left foot skin is intact..  Neurological: She is alert and oriented to person, place, and time. She has normal strength. She is not disoriented. No sensory deficit.   No focal neurological deficits. Patient alert and oriented to time and place, answers questions appropriately. Strength equal bilaterally in upper and lower extremities. Sensation intact bilaterally in upper and lower extremities. Able to move arms and legs equal bilaterally in both upper and lower extremities.    Skin: No abrasion, no lesion and no rash noted.   Psychiatric: She has a normal mood and affect. Her behavior is normal. Thought content normal. Her mood appears not anxious. Her speech is not delayed and not slurred. Cognition and memory are normal. Cognition and memory  are not impaired. She does not exhibit a depressed mood.     ---------------------------------------------------------------------------------------------------------------------  EKG:    Pending cardiology read.  Per my evaluation, EKG shows sinus rhythm with first-degree AV block, right bundle branch block, heart rate 75 bpm, QTc 469 MS.    Telemetry:    Normal sinus rhythm.  Heart rate 92 bpm, SPO2 99% on room air.    I have personally reviewed the EKG/Telemetry strip  ---------------------------------------------------------------------------------------------------------------------   Results from last 7 days   Lab Units 11/08/19 0111 11/07/19 2032 11/07/19 1832   TROPONIN T ng/mL <0.010 <0.010 <0.010     Results from last 7 days   Lab Units 11/07/19 1832 11/02/19  2249   PROBNP pg/mL 127.2 795.4     Results from last 7 days   Lab Units 11/03/19  0531   CHOLESTEROL mg/dL 142   TRIGLYCERIDES mg/dL 104   HDL CHOL mg/dL 57   LDL CHOL mg/dL 64       Results from last 7 days   Lab Units 11/08/19 0111 11/07/19 1832 11/03/19  0531 11/02/19  2249   CRP mg/dL  --   --   --  0.33   WBC 10*3/mm3 10.10 8.24 8.03 7.56   HEMOGLOBIN g/dL 12.9 14.2 12.8 12.7   HEMATOCRIT % 38.6 41.0 38.1 37.8   MCV fL 104.6* 101.7* 105.5* 104.4*   MCHC g/dL 33.4 34.6 33.6 33.6   PLATELETS 10*3/mm3 359 395 394 378   INR   --   --   --  0.97     Results from last 7 days   Lab Units 11/08/19  0111 11/07/19  1832 11/03/19  0531   SODIUM mmol/L 134* 131* 139   POTASSIUM mmol/L 4.9 5.3* 3.9   CHLORIDE mmol/L 102 95* 103   CO2 mmol/L 20.5* 20.6* 23.1   BUN mg/dL 37* 41* 10   CREATININE mg/dL 1.23* 1.42* 0.73   EGFR IF NONAFRICN AM mL/min/1.73 42* 36* 78   CALCIUM mg/dL 9.8 10.8* 9.7   GLUCOSE mg/dL 99 133* 112*   ALBUMIN g/dL 3.91 4.79 4.00   BILIRUBIN mg/dL 0.5 0.7 0.3   ALK PHOS U/L 125* 150* 122*   AST (SGOT) U/L 23 30 26   ALT (SGPT) U/L 21 26 20   Estimated Creatinine Clearance: 35.6 mL/min (A) (by C-G formula based on SCr of 1.23 mg/dL  (H)).  No results found for: AMMONIA    Hemoglobin A1C   Date/Time Value Ref Range Status   11/08/2019 0111 6.10 (H) 4.80 - 5.60 % Final     Lab Results   Component Value Date    HGBA1C 6.10 (H) 11/08/2019     Lab Results   Component Value Date    TSH 1.430 11/08/2019       Pain Management Panel     There is no flowsheet data to display.        I have personally reviewed the above laboratory results.   ---------------------------------------------------------------------------------------------------------------------  Imaging Results (Last 7 Days)     Procedure Component Value Units Date/Time    CT Abdomen Pelvis Without Contrast [[id number]] Collected:  11/07/19 2320     Updated:  11/07/19 2322    Narrative:       CT Abdomen Pelvis WO    INDICATION:   Nausea and vomiting    TECHNIQUE:   CT of the abdomen and pelvis without contrast. Coronal and sagittal reconstructions were obtained.  Radiation dose reduction techniques included automated exposure control or exposure modulation based on body size. Radiation audit for number of CT and  nuclear cardiology exams performed in the last year: 3.      COMPARISON:   None available.    FINDINGS:    Visualized lung bases are unremarkable.    Abdomen: Unenhanced images of the liver are normal. There is cholelithiasis, but no CT evidence of cholecystitis or biliary obstruction. The spleen is surgically absent. The pancreas appears normal. The adrenal glands are normal. There are areas of left  renal cortical scarring but the right kidney is normal. The aorta is normal in caliber and there is no evidence of bowel obstruction.    Pelvis:  The appendix is normal. There is no pelvic mass or inflammatory change or abnormal fluid collection. There is no hernia or bowel obstruction.    There are spinal degenerative and postoperative changes, but there is no acute bony abnormality.      Impression:       Cholelithiasis without CT evidence of cholecystitis or biliary obstruction.    No  renal or bowel obstruction or other acute abnormality. Normal appendix.      Signer Name: Fabio Ambrose MD   Signed: 11/7/2019 11:20 PM   Workstation Name: [email or web address]    [facility]    CT Head Without Contrast [[id number]] Collected:  11/07/19 2000     Updated:  11/07/19 2004    Narrative:       CT HEAD WO CONTRAST-     CLINICAL INDICATION: Decreased alertness        COMPARISON: 11/02/2019      TECHNIQUE: Axial images of the brain were obtained with out intravenous  contrast.  Reformatted images were created in the sagittal and coronal  planes.     DOSE:     Radiation dose reduction techniques were utilized per ALARA protocol.  Automated exposure control was initiated through either or [employer] or  DoseRight software packages by  protocol.           FINDINGS:   Today's study shows no mass, hemorrhage, or midline shift.   The ventricles, cisterns, and sulci are unremarkable. There is no  hydrocephalus.   There is no evidence of acute ischemia.  I do not see epidural or subdural hematoma.  The gray-white differentiation is appropriate.   The bone window setting images show no destructive calvarial lesion or  acute calvarial fracture.   The posterior fossa is unremarkable.          Impression:       No acute intracranial pathology. Nothing is seen on this exam to  specifically account for the patient's symptoms.     This report was finalized on 11/7/2019 8:00 PM by Dr. Sal Cheek MD.       XR Chest 1 View [[id number]] Collected:  11/07/19 1919     Updated:  11/07/19 1922    Narrative:       XR CHEST 1 VW-     CLINICAL INDICATION: Chest pain protocol        COMPARISON: 11/02/2019      TECHNIQUE: Single frontal view of the chest.     FINDINGS:     There is no focal alveolar infiltrate or effusion.  The cardiac silhouette is normal. The pulmonary vasculature is  unremarkable.  There is no evidence of an acute osseous abnormality.   There are no suspicious-appearing parenchymal  soft tissue nodules.          Impression:       No evidence of active or acute cardiopulmonary disease on today's chest  radiograph.     This report was finalized on 11/7/2019 7:19 PM by Dr. Sal Cheek MD.           I have personally reviewed the above radiology results.     Last Echocardiogram:  Results for orders placed during the hospital encounter of 11/02/19   Transthoracic Echo Complete With Contrast if Necessary Per Protocol    Narrative · Left ventricular wall thickness is consistent with borderline concentric   hypertrophy.  · Left ventricular systolic function is normal.  · Estimated EF appears to be in the range of 66 - 70%.  · Left ventricular diastolic dysfunction.  · Normal cardiac chamber dimensions  · Mild aortic valve regurgitation is present.  · Mild mitral valve regurgitation is present  · Mild tricuspid valve regurgitation is present. 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      • RBBB unchanged from 2015 [I45.10] Yes   • History of Non-STEMI (non-ST elevated myocardial infarction) with no coronary intervention needed in 2008,  clinically stable.  [I21.4] Yes   • History of Acute myelocytic leukemia,status post chemotherapy and bone marrow transplant in 2005. [C92.00] Yes   • Essential hypertension [I10] Yes   • Breast cancer (right), status post radiation therapy in 08/2015 [C50.919] Yes     · SIRS criteria present upon admission: patient meets SIRS criteria with pulse 106 and HR 20. Likely due to patient's elevated blood pressure.  PRN hydralazine for SBP greater than 160.  Continue home medications once reconciled per pharmacy. Will adjust as necessary.  Continue to monitor blood pressure closely.    · Unstable angina with known history of NSTEMI in the past without intervention: Patient is currently chest pain-free.  EKG shows normal sinus rhythm.  Troponin negative x2.  Continue to trend troponin.  Patient stress test in 2017 was normal with no significant echo cardiographic evidence for myocardial ischemia. Continue daily aspirin and statin.  Monitor closely on telemetry.  Repeat a.m. EKG.    · Acute kidney injury: Gentle IV fluids. Avoid nephrotoxic agents as much as possible. Hold home lasix. Repeat AM CMP.     · Hyponatremia: likely due to dehydration in setting of hypochloremia. Will give gentle IV fluids. Repeat AM CMP. Monitor I's and O's. Monitor closely.     · Acute hyperkalemia, mild: only slightly elevated. Will hold off on treatment for now. Monitor closely and repeat AM CMP.     · Acute hypercalcemia, mild: only slightly elevated. Will repeat AM CMP. Monitor closely.     · Type II diabetes mellitus (non-insulin dependent): Hemoglobin A1c ordered. Hold home oral hypoglycemics to prevent hypoglycemia. Will add SSI for now. Accu-cheks qAC and qhs. Titrate insulin therapy as necessary.     · Hypocarbia: Likely secondary to patient's increased respiratory rate.  Repeat a.m. CMP.  Monitor closely.    · Macrocytosis without anemia present upon admission: No active signs of bleeding. H&H stable. Vitamin b12, folate and vitamin D  levels ordered, replace as necessary. Monitor H&H closely. Repeat AM CBC.     · Right bundle branch block with first degree AV block: patient in normal sinus rhythm and currently chest pain free.  Bifascicular block was seen on past admission on 11/2/2019, not present on this admission.  Patient denies any recent syncopal episodes or other concerning symptoms.  Monitor closely on telemetry.      · Cholelithiasis without evidence of cholecystitis or biliary obstruction, present upon admission: incidental finding on CT of abdomen. Patient is not complaining of any abdominal pain at this time. No evidence of an acute abdomen present, Sebastian's sign negative. Monitor closely. Recommend outpatient follow up with PCP.     · Essential hypertension, poorly controlled: PRN hydralazine for SBP greater than 160. Continue home medications once reconciled per pharmacy with holding parameters. Obtain orthostatics in setting of vertigo when going from a sitting to standing position.  Continue to monitor blood pressure closely. Will adjust medication as necessary. Recommend patient keep appointment with Dr. Soler on 11/14/19 and with PCP on 11/12/19.     · Hyperlipidemia: continue patients home statin.  Lipid panel on 11/3/2019 was unremarkable, controlled. Recommend continued outpatient follow up with PCP.     · History of acute myelocytic leukemia s/p chemotherapy and bone marrow transplant: Follows with [facility] in [place], [place]. continue home medication once reconciled per pharmacy. Repeat AM CBC.     · History of splenectomy: patient has not received flu shot this year, requesting one while admitted. States she has tolerated flu shot before with no adverse side effects.       · F/E/N: IV fluids.  Replace electrolytes as necessary per protocol.  N.p.o.    ---------------------------------------------------  DVT Prophylaxis: Subcutaneous heparin   GI Prophylaxis: Protonix   Activity: Up with assistance     The patient  is considered to be a high risk patient due to: Sirs criteria present upon admission, chest pain with typical features with known history of an STEMI in the past, acute kidney injury.    INPATIENT status due to the need for care which can only be reasonably provided in an hospital setting such as aggressive/expedited ancillary services and/or consultation services, the necessity for IV medications, close physician monitoring and/or the possible need for procedures.  In such, I feel patient’s risk for adverse outcomes and need for care warrant INPATIENT evaluation and predict the patient’s care encounter to likely last beyond 2 midnights.      Code Status: FULL CODE     I have discussed the patient's assessment and plan with the patient and attending physician.       Stephenie Munoz PA-C  Hospitalist Service -- [facility]       11/08/19  4:40 AM    Attending Physician: Didi Ulloa DO       Electronically signed by Didi Ulloa DO at 11/08/19 0635       Vital Signs (last day)     Date/Time   Temp   Temp src   Pulse   Resp   BP   Patient Position   SpO2    11/11/19 1424   --   --   66   --   128/62   Lying   96    11/11/19 1132   --   --   73   --   110/58   Lying   98    11/11/19 1131   --   --   72   --   122/62   Sitting   98    11/11/19 1130   --   --   68   --   114/56   Lying   98    11/11/19 0617   98.4 (36.9)   Oral   77   20   132/63   Lying   98    11/11/19 0314   98.2 (36.8)   Oral   71   20   134/57   --   97    11/10/19 2049   --   --   --   --   --   --   98    11/10/19 1820   98.4 (36.9)   Oral   73   18   144/73   --   98    11/10/19 1500   98.1 (36.7)   Oral   80   16   104/53   Lying   97    11/10/19 1422   --   --   65   --   104/58   --   --    11/10/19 1025   97.7 (36.5)   Oral   70   18   124/61   Lying   98    11/10/19 0654   97.3 (36.3)   Oral   68   18   122/63   Lying   97    11/10/19 0340   98 (36.7)   Oral   69   18   140/70   Lying   96              Intake  & Output (last day)       11/10 0701 - 11/11 0700 11/11 0701 - 11/12 0700    P.O. 480 360    Total Intake(mL/kg) 480 (7.2) 360 (5.4)    Urine (mL/kg/hr) 1450 (0.9)     Total Output 1450     Net -970 +360              Hospital Medications (active)       Dose Frequency Start End    acetaminophen (TYLENOL) tablet 650 mg 650 mg Every 6 Hours PRN 11/8/2019     Sig - Route: Take 2 tablets by mouth Every 6 (Six) Hours As Needed for Mild Pain . - Oral    Cosign for Ordering: Accepted by Didi Ulloa DO on 11/8/2019  4:01 AM    aspirin EC tablet 81 mg 81 mg Daily 11/8/2019     Sig - Route: Take 1 tablet by mouth Daily. - Oral    Cosign for Ordering: Accepted by Didi Ulloa DO on 11/8/2019  6:39 AM    atorvastatin (LIPITOR) tablet 10 mg 10 mg Nightly 11/8/2019     Sig - Route: Take 1 tablet by mouth Every Night. - Oral    Cosign for Ordering: Accepted by Didi Ulloa DO on 11/8/2019  6:39 AM    calcium carb-cholecalciferol 600-800 MG-UNIT tablet 1 tablet 1 tablet Daily 11/8/2019     Sig - Route: Take 1 tablet by mouth Daily. - Oral    Cosign for Ordering: Accepted by Didi Ulloa DO on 11/8/2019  6:39 AM    carvedilol (COREG) tablet 12.5 mg 12.5 mg 2 Times Daily 11/8/2019     Sig - Route: Take 2 tablets by mouth 2 (Two) Times a Day. - Oral    Cosign for Ordering: Accepted by Didi Ulloa DO on 11/8/2019  6:39 AM    cetirizine (zyrTEC) tablet 5 mg 5 mg Daily 11/8/2019     Sig - Route: Take 0.5 tablets by mouth Daily. - Oral    Cosign for Ordering: Accepted by Didi Ulloa DO on 11/8/2019  6:39 AM    cholecalciferol (VITAMIN D3) capsule 50,000 Units 50,000 Units Weekly 11/8/2019     Sig - Route: Take 1 capsule by mouth 1 (One) Time Per Week. - Oral    Cosign for Ordering: Accepted by Didi Ulloa DO on 11/8/2019  6:39 AM    dextrose (D50W) 25 g/ 50mL Intravenous Solution 25 g 25 g Every 15 Minutes PRN 11/8/2019     Sig - Route: Infuse 50 mL into a venous catheter  Every 15 (Fifteen) Minutes As Needed for Low Blood Sugar (Blood Sugar Less Than 70). - Intravenous    Cosign for Ordering: Accepted by Didi Ulloa DO on 11/8/2019  6:39 AM    dextrose (GLUTOSE) oral gel 15 g 15 g Every 15 Minutes PRN 11/8/2019     Sig - Route: Take 15 application by mouth Every 15 (Fifteen) Minutes As Needed for Low Blood Sugar (Blood sugar less than 70). - Oral    Cosign for Ordering: Accepted by Didi Ulloa DO on 11/8/2019  6:39 AM    exemestane (AROMASIN) chemo tablet 25 mg 25 mg Daily 11/8/2019     Sig - Route: Take 1 tablet by mouth Daily. - Oral    Cosign for Ordering: Accepted by Didi Ulloa DO on 11/8/2019  6:39 AM    glucagon (human recombinant) (GLUCAGEN DIAGNOSTIC) injection 1 mg 1 mg Every 15 Minutes PRN 11/8/2019     Sig - Route: Inject 1 mg under the skin into the appropriate area as directed Every 15 (Fifteen) Minutes As Needed for Low Blood Sugar (Blood Glucose Less Than 70). - Subcutaneous    Cosign for Ordering: Accepted by Didi Ulloa DO on 11/8/2019  6:39 AM    heparin (porcine) 5000 UNIT/ML injection 5,000 Units 5,000 Units Every 12 Hours Scheduled 11/8/2019     Sig - Route: Inject 1 mL under the skin into the appropriate area as directed Every 12 (Twelve) Hours. - Subcutaneous    hydrALAZINE (APRESOLINE) injection 10 mg 10 mg Every 6 Hours PRN 11/8/2019     Sig - Route: Infuse 0.5 mL into a venous catheter Every 6 (Six) Hours As Needed for High Blood Pressure. - Intravenous    Cosign for Ordering: Accepted by Didi Ulloa DO on 11/8/2019  4:01 AM    hydrALAZINE (APRESOLINE) tablet 50 mg 50 mg Every 8 Hours Scheduled 11/8/2019     Sig - Route: Take 1 tablet by mouth Every 8 (Eight) Hours. - Oral    insulin aspart (novoLOG) injection 0-7 Units 0-7 Units 4 Times Daily Before Meals & Nightly 11/8/2019     Sig - Route: Inject 0-7 Units under the skin into the appropriate area as directed 4 (Four) Times a Day Before Meals & at Bedtime.  - Subcutaneous    Cosign for Ordering: Accepted by Didi Ulloa DO on 11/8/2019  6:39 AM    nitroglycerin (NITROSTAT) SL tablet 0.4 mg 0.4 mg Every 5 Minutes PRN 11/8/2019     Sig - Route: Place 1 tablet under the tongue Every 5 (Five) Minutes As Needed for Chest Pain (Only if SBP Greater Than 100). - Sublingual    pantoprazole (PROTONIX) EC tablet 40 mg 40 mg Every Early Morning 11/8/2019     Sig - Route: Take 1 tablet by mouth Every Morning. - Oral    Cosign for Ordering: Accepted by Didi Ulloa DO on 11/8/2019  4:01 AM    promethazine (PHENERGAN) injection 12.5 mg 12.5 mg Every 6 Hours PRN 11/8/2019     Sig - Route: Inject 0.5 mL into the appropriate muscle as directed by prescriber Every 6 (Six) Hours As Needed for Nausea or Vomiting. - Intramuscular    Cosign for Ordering: Accepted by Didi Ulloa DO on 11/8/2019  4:01 AM    rOPINIRole (REQUIP) tablet 0.25 mg 0.25 mg Nightly 11/8/2019     Sig - Route: Take 1 tablet by mouth Every Night. - Oral    Cosign for Ordering: Accepted by Didi Ulloa DO on 11/8/2019  6:39 AM    sodium chloride 0.9 % flush 10 mL 10 mL As Needed 11/7/2019     Sig - Route: Infuse 10 mL into a venous catheter As Needed for Line Care. - Intravenous    Cosign for Ordering: Accepted by Suresh Thomas MD on 11/7/2019  6:24 PM    sodium chloride 0.9 % flush 10 mL 10 mL Every 12 Hours Scheduled 11/8/2019     Sig - Route: Infuse 10 mL into a venous catheter Every 12 (Twelve) Hours. - Intravenous    sodium chloride 0.9 % flush 10 mL 10 mL As Needed 11/8/2019     Sig - Route: Infuse 10 mL into a venous catheter As Needed for Line Care. - Intravenous    traMADol (ULTRAM) tablet 50 mg 50 mg Daily PRN 11/9/2019     Sig - Route: Take 1 tablet by mouth Daily As Needed for Moderate Pain . - Oral    valACYclovir (VALTREX) tablet 1,000 mg 1,000 mg Daily 11/8/2019 11/7/2020    Sig - Route: Take 2 tablets by mouth Daily. - Oral    Cosign for Ordering: Accepted by  Didi Ulloa DO on 11/8/2019  6:39 AM    sodium chloride 0.9 % infusion (Discontinued) 50 mL/hr Continuous 11/8/2019 11/11/2019    Sig - Route: Infuse 50 mL/hr into a venous catheter Continuous. - Intravenous            Lab Results (last 24 hours)     Procedure Component Value Units Date/Time    POC Glucose Once [[id number]]  (Normal) Collected:  11/11/19 1605    Specimen:  Blood Updated:  11/11/19 1618     Glucose 111 mg/dL     POC Glucose Once [[id number]]  (Normal) Collected:  11/11/19 1155    Specimen:  Blood Updated:  11/11/19 1216     Glucose 107 mg/dL     Basic Metabolic Panel [[id number]]  (Abnormal) Collected:  11/11/19 0837    Specimen:  Blood Updated:  11/11/19 0909     Glucose 167 mg/dL      BUN 28 mg/dL      Creatinine 0.94 mg/dL      Sodium 134 mmol/L      Potassium 4.8 mmol/L      Chloride 100 mmol/L      CO2 21.3 mmol/L      Calcium 10.1 mg/dL      eGFR Non African Amer 58 mL/min/1.73      BUN/Creatinine Ratio 29.8     Anion Gap 12.7 mmol/L     Narrative:       GFR Normal >60  Chronic Kidney Disease <60  Kidney Failure <15    POC Glucose Once [[id number]]  (Normal) Collected:  11/11/19 0616    Specimen:  Blood Updated:  11/11/19 0642     Glucose 128 mg/dL     POC Glucose Once [[id number]]  (Abnormal) Collected:  11/10/19 1942    Specimen:  Blood Updated:  11/10/19 1948     Glucose 176 mg/dL     POC Glucose Once [[id number]]  (Normal) Collected:  11/10/19 1632    Specimen:  Blood Updated:  11/10/19 1706     Glucose 116 mg/dL         Imaging Results (Most Recent)     Procedure Component Value Units Date/Time    US Carotid Bilateral [[id number]] Collected:  11/11/19 1310     Updated:  11/11/19 1316    Narrative:       US CAROTID BILATERAL-      TECHNIQUE: Multiple real-time color Doppler images were acquired of  bilateral carotid arteries.     Stenosis measurements if obtained, were performed by the NASCET or  similar method.     CLINICAL INDICATION:     Dizziness; R07.9-Chest pain,  unspecified;  N28.9-Disorder of kidney and ureter, unspecified.     COMPARISON:    None.     FINDINGS:        RIGHT:  No significant intimal thickening or plaque is noted. No occlusion.     RIGHT ICA PSV: 1564.00 mm/s  RIGHT ICA EDV: 344.00 mm/s.   Right ICA/CCA Ratio: 1.45  Anterograde flow is demonstrated in RIGHT vertebral artery.     LEFT:  No significant intimal thickening or plaque is noted. No occlusion.     LEFT ICA PSV: 1058.00 mm/s  LEFT EDV: 236.00 mm/s.   Left ICA/CCA Ratio: 0.93  Anterograde flow is demonstrated in LEFT vertebral artery.          Impression:          No evidence of hemodynamically significant plaques or stenosis within  the carotid system at this time.     This report was finalized on 11/11/2019 1:14 PM by Dr. Sal Cheek MD.       XR Shoulder 2+ View Left [[id number]] Collected:  11/11/19 1237     Updated:  11/11/19 1239    Narrative:       EXAMINATION: XR SHOULDER 2+ VW LEFT-      CLINICAL INDICATION:left shoulder pain; R07.9-Chest pain, unspecified;  N28.9-Disorder of kidney and ureter, unspecified     COMPARISON: None      TECHNIQUE: 3 views left shoulder     FINDINGS:   No acute osseous or articular abnormality. No soft tissue abnormality.          Impression:       No acute osseous or articular abnormalities.     This report was finalized on 11/11/2019 12:37 PM by Dr. Brendan Orellana MD.       CT Abdomen Pelvis Without Contrast [[id number]] Collected:  11/07/19 2320     Updated:  11/07/19 2322    Narrative:       CT Abdomen Pelvis WO    INDICATION:   Nausea and vomiting    TECHNIQUE:   CT of the abdomen and pelvis without contrast. Coronal and sagittal reconstructions were obtained.  Radiation dose reduction techniques included automated exposure control or exposure modulation based on body size. Radiation audit for number of CT and  nuclear cardiology exams performed in the last year: 3.      COMPARISON:   None available.    FINDINGS:    Visualized lung bases are  unremarkable.    Abdomen: Unenhanced images of the liver are normal. There is cholelithiasis, but no CT evidence of cholecystitis or biliary obstruction. The spleen is surgically absent. The pancreas appears normal. The adrenal glands are normal. There are areas of left  renal cortical scarring but the right kidney is normal. The aorta is normal in caliber and there is no evidence of bowel obstruction.    Pelvis:  The appendix is normal. There is no pelvic mass or inflammatory change or abnormal fluid collection. There is no hernia or bowel obstruction.    There are spinal degenerative and postoperative changes, but there is no acute bony abnormality.      Impression:       Cholelithiasis without CT evidence of cholecystitis or biliary obstruction.    No renal or bowel obstruction or other acute abnormality. Normal appendix.      Signer Name: Fabio Ambrose MD   Signed: 11/7/2019 11:20 PM   Workstation Name: [email or web address]    [facility]    CT Head Without Contrast [[id number]] Collected:  11/07/19 2000     Updated:  11/07/19 2004    Narrative:       CT HEAD WO CONTRAST-     CLINICAL INDICATION: Decreased alertness        COMPARISON: 11/02/2019      TECHNIQUE: Axial images of the brain were obtained with out intravenous  contrast.  Reformatted images were created in the sagittal and coronal  planes.     DOSE:     Radiation dose reduction techniques were utilized per ALARA protocol.  Automated exposure control was initiated through either or [employer] or  DoseRight software packages by  protocol.           FINDINGS:   Today's study shows no mass, hemorrhage, or midline shift.   The ventricles, cisterns, and sulci are unremarkable. There is no  hydrocephalus.   There is no evidence of acute ischemia.  I do not see epidural or subdural hematoma.  The gray-white differentiation is appropriate.   The bone window setting images show no destructive calvarial lesion or  acute calvarial  fracture.   The posterior fossa is unremarkable.          Impression:       No acute intracranial pathology. Nothing is seen on this exam to  specifically account for the patient's symptoms.     This report was finalized on 11/7/2019 8:00 PM by Dr. Sal Cheek MD.       XR Chest 1 View [[id number]] Collected:  11/07/19 1919     Updated:  11/07/19 1922    Narrative:       XR CHEST 1 VW-     CLINICAL INDICATION: Chest pain protocol        COMPARISON: 11/02/2019      TECHNIQUE: Single frontal view of the chest.     FINDINGS:     There is no focal alveolar infiltrate or effusion.  The cardiac silhouette is normal. The pulmonary vasculature is  unremarkable.  There is no evidence of an acute osseous abnormality.   There are no suspicious-appearing parenchymal soft tissue nodules.          Impression:       No evidence of active or acute cardiopulmonary disease on today's chest  radiograph.     This report was finalized on 11/7/2019 7:19 PM by Dr. Sal Cheek MD.           ECG/EMG Results (most recent)     Procedure Component Value Units Date/Time    ECG 12 Lead [[id number]] Collected:  11/07/19 1815     Updated:  11/08/19 1533    Narrative:       Test Reason : abdominal pain  Blood Pressure : **/** mmHG  Vent. Rate : 075 BPM     Atrial Rate : 075 BPM     P-R Int : 244 ms          QRS Dur : 134 ms      QT Int : 420 ms       P-R-T Axes : 066 270 051 degrees     QTc Int : 469 ms    Sinus rhythm with 1st degree AV block  Right bundle branch block  Abnormal ECG    Confirmed by Deisy Morales (2003) on 11/8/2019 3:33:51 PM    Referred By:  LIUDMILA           Confirmed By:Deisy Morales    ECG 12 Lead [[id number]] Collected:  11/09/19 0706     Updated:  11/10/19 1626    Narrative:       Test Reason : chest pain  Blood Pressure : **/** mmHG  Vent. Rate : 069 BPM     Atrial Rate : 069 BPM     P-R Int : 272 ms          QRS Dur : 150 ms      QT Int : 440 ms       P-R-T Axes : 042 -50 026 degrees     QTc Int : 471 ms    Sinus  rhythm with 1st degree AV block  Left axis deviation  Right bundle branch block  Abnormal ECG  When compared with ECG of 07-NOV-2019 18:15,  No significant change was found  Confirmed by Leida Fontenot ([id number]) on 11/10/2019 4:26:02 PM    Referred By:  JAM           Confirmed By:Leida Fontenot        Orders (last 24 hrs)     Start     Ordered    11/12/19 0600  CBC & Differential  Morning Draw      11/11/19 1200    11/12/19 0600  Comprehensive Metabolic Panel  Morning Draw      11/11/19 1200    11/11/19 1619  POC Glucose Once  Once      11/11/19 1605    11/11/19 1217  POC Glucose Once  Once      11/11/19 1155    11/11/19 1158  US Carotid Bilateral  1 Time Imaging      11/11/19 1157    11/11/19 1100  XR Shoulder 2+ View Left  1 Time Imaging      11/11/19 1059    11/11/19 1059  Orthostatic Blood Pressure  Once      11/11/19 1059    11/11/19 0808  PT Consult: Eval & Treat Other; hx of falls, continued funcitonal decline  Once      11/11/19 0807    11/11/19 0808  Inpatient Rehab Admission Consult  Once     Provider:  (Not yet assigned)    11/11/19 0807    11/11/19 0804  Basic Metabolic Panel  STAT      11/11/19 0803    11/11/19 0643  POC Glucose Once  Once      11/11/19 0616    11/10/19 1949  POC Glucose Once  Once      11/10/19 1942    11/10/19 1707  POC Glucose Once  Once      11/10/19 1632    11/09/19 1052  traMADol (ULTRAM) tablet 50 mg  Daily PRN      11/09/19 1052    11/08/19 2100  rOPINIRole (REQUIP) tablet 0.25 mg  Nightly      11/08/19 0634    11/08/19 2100  atorvastatin (LIPITOR) tablet 10 mg  Nightly      11/08/19 0634    11/08/19 1500  hydrALAZINE (APRESOLINE) tablet 50 mg  Every 8 Hours Scheduled      11/08/19 0932    11/08/19 0900  cetirizine (zyrTEC) tablet 5 mg  Daily      11/08/19 0634    11/08/19 0900  valACYclovir (VALTREX) tablet 1,000 mg  Daily      11/08/19 0634    11/08/19 0900  aspirin EC tablet 81 mg  Daily      11/08/19 0634    11/08/19 0900  cholecalciferol (VITAMIN D3) capsule 50,000 Units   Weekly      11/08/19 0634 11/08/19 0900  exemestane (AROMASIN) chemo tablet 25 mg  Daily      11/08/19 0634 11/08/19 0900  calcium carb-cholecalciferol 600-800 MG-UNIT tablet 1 tablet  Daily      11/08/19 0634 11/08/19 0900  carvedilol (COREG) tablet 12.5 mg  2 Times Daily      11/08/19 0634 11/08/19 0730  insulin aspart (novoLOG) injection 0-7 Units  4 Times Daily Before Meals & Nightly      11/08/19 0454 11/08/19 0700  POC Glucose 4x Daily AC & at Bedtime  4 Times Daily Before Meals & at Bedtime      11/08/19 0454 11/08/19 0600  pantoprazole (PROTONIX) EC tablet 40 mg  Every Early Morning      11/08/19 0214 11/08/19 0453  dextrose (GLUTOSE) oral gel 15 g  Every 15 Minutes PRN      11/08/19 0454    11/08/19 0453  dextrose (D50W) 25 g/ 50mL Intravenous Solution 25 g  Every 15 Minutes PRN      11/08/19 0454    11/08/19 0453  glucagon (human recombinant) (GLUCAGEN DIAGNOSTIC) injection 1 mg  Every 15 Minutes PRN      11/08/19 0454    11/08/19 0214  acetaminophen (TYLENOL) tablet 650 mg  Every 6 Hours PRN      11/08/19 0215 11/08/19 0214  promethazine (PHENERGAN) injection 12.5 mg  Every 6 Hours PRN      11/08/19 0215    11/08/19 0214  hydrALAZINE (APRESOLINE) injection 10 mg  Every 6 Hours PRN      11/08/19 0214    11/08/19 0145  sodium chloride 0.9 % flush 10 mL  Every 12 Hours Scheduled      11/08/19 0046 11/08/19 0145  heparin (porcine) 5000 UNIT/ML injection 5,000 Units  Every 12 Hours Scheduled      11/08/19 0046 11/08/19 0145  sodium chloride 0.9 % infusion  Continuous,   Status:  Discontinued      11/08/19 0046 11/08/19 0046  sodium chloride 0.9 % flush 10 mL  As Needed      11/08/19 0046 11/08/19 0046  nitroglycerin (NITROSTAT) SL tablet 0.4 mg  Every 5 Minutes PRN      11/08/19 0046    11/07/19 1819  sodium chloride 0.9 % flush 10 mL  As Needed      11/07/19 1819    Unscheduled  ECG 12 Lead  As Needed      11/07/19 1819    Unscheduled  Telemetry - Pulse Oximetry  Continuous  PRN     Comments:  If Patient Develops Unresponsiveness, Acute Dyspnea, Cyanosis or Suspected Hypoxemia Start Continuous Pulse Ox Monitoring, Apply Oxygen & Notify Provider    19 004    Unscheduled  Oxygen Therapy- Nasal Cannula; Titrate for SPO2: 90% - 95%  Continuous PRN     Comments:  If Patient Develops Unresponsiveness, Acute Dyspnea, Cyanosis or Suspected Hypoxemia Start Continuous Pulse Ox Monitoring, Apply Oxygen & Notify Provider    19 0046    Unscheduled  ECG 12 Lead  As Needed     Comments:  Nurse to Release if Patient Expericences Acute Chest Pain or Dysrhythmias    19 004    Unscheduled  Potassium  As Needed     Comments:  For Ventricular Arrhythmias      19    Unscheduled  Magnesium  As Needed     Comments:  For Ventricular Arrhythmias      19    Unscheduled  Troponin  As Needed     Comments:  For Chest Pain      19    Unscheduled  Digoxin Level  As Needed     Comments:  For Atrial Arrhythmias      19    Unscheduled  Blood Gas, Arterial  As Needed     Comments:  Per O2 PolicyNotify Physician      19 0046    Unscheduled  Up In Chair  As Needed      19 0214    Unscheduled  Up With Assistance  As Needed      19 0452    --  traMADol (ULTRAM) 50 MG tablet  Daily PRN      19 0844             Physician Progress Notes (last 24 hours) (Notes from 11/10/19 1641 through 19 1641)      Mayra Downey, APRN at 19 0742     Attestation signed by David Matute DO at 19 1624    I have reviewed the documentation above and agree. Pt seen and examined with ANGEL Goldman. Awake and alert. Denies CP. Reports dizziness upon standing. BP mildly fluctuates with standing but not profoundly orthostatic. No hemodynamically significant plaques or stenosis on carotid US. Await PT eval.                     Patient Identification:  Name:  Tahmina Martinez  Age:  74 y.o.  Sex:  female  :  1945  MRN:   [id number]  Visit Number:  [id number]  Primary Care Provider:  Noe Bower MD    Length of stay:  3    Chief Complaint: chest pain     HPI:     Mrs. Martinez is a 74 year old female patient who was admitted to [facility] on 11/7/19 for unstable angina and CADENCE on probable CKD stage II. 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    Subjective:      Mrs. Martinez is lying in bed in no distress on exam this morning. She states last night she had left shoulder pain that improved with tylenol. She denies any chest pain, no nausea or vomiting, she is eating well. Discussed with ANGEL Giraldo.  ----------------------------------------------------------------------------------------------------------------------  Current Hospital Meds:    aspirin 81 mg Oral Daily   atorvastatin 10 mg Oral Nightly   calcium carb-cholecalciferol 1 tablet Oral Daily   carvedilol 12.5 mg Oral BID   cetirizine 5 mg Oral Daily   cholecalciferol 50,000 Units Oral Weekly   exemestane 25 mg Oral Daily   heparin (porcine) 5,000 Units Subcutaneous Q12H   hydrALAZINE 50 mg Oral Q8H   insulin aspart 0-7 Units Subcutaneous 4x Daily AC & at Bedtime   pantoprazole 40 mg Oral Q AM   rOPINIRole 0.25 mg Oral Nightly   sodium chloride 10 mL Intravenous Q12H   valACYclovir 1,000 mg Oral Daily       sodium chloride 50 mL/hr Last Rate: 50 mL/hr (11/08/19 0136)     ----------------------------------------------------------------------------------------------------------------------  Vital Signs:  Temp:  [97.7 °F (36.5 °C)-98.4 °F (36.9 °C)] 98.4 °F (36.9 °C)  Heart Rate:  [65-80] 77  Resp:  [16-20] 20  BP: (104-144)/(53-73) 132/63      11/09/19  0500 11/10/19  0500 11/11/19  0500   Weight: 67.5 kg (148 lb 12.8 oz) 67.3 kg (148 lb 6.4 oz) 66.7 kg (147 lb 1.6 oz)     Body mass index is 26.06 kg/m².    Intake/Output Summary  (Last 24 hours) at 11/11/2019 0743  Last data filed at 11/11/2019 0314  Gross per 24 hour   Intake 480 ml   Output 1450 ml   Net -970 ml     No intake/output data recorded.  Diet Regular; Cardiac, Consistent Carbohydrate  ----------------------------------------------------------------------------------------------------------------------  Physical exam:  Constitutional:  Well-developed and well-nourished.  No respiratory distress.      HENT:  Head:  Normocephalic and atraumatic.  Mouth:  Moist mucous membranes.    Eyes:    Pupils are equal, round, and reactive to light.  No scleral icterus.    Neck:  Neck supple.   Cardiovascular:  Normal rate, regular rhythm and normal heart sounds with no murmur.  Pulmonary/Chest:  No respiratory distress, no wheezes, no crackles, with normal breath sounds and good air movement.  Abdominal:  Soft.  Bowel sounds are normal.  No distension and no tenderness.   Musculoskeletal:  No edema, no tenderness, and no deformity.  No red or swollen joints anywhere.    Neurological:  Alert and oriented to person, place, and time.  No cranial nerve deficit.  No tongue deviation.  No facial droop.  No slurred speech.   Skin:  Skin is warm and dry. No rash noted. No pallor.   ----------------------------------------------------------------------------------------------------------------------  Tele:        ----------------------------------------------------------------------------------------------------------------------  Results from last 7 days   Lab Units 11/08/19  1334 11/08/19 0759 11/08/19 0111 11/07/19 1832   TROPONIN T ng/mL <0.010 <0.010 <0.010   < > <0.010   PROBNP pg/mL  --   --   --   --  127.2    < > = values in this interval not displayed.     Results from last 7 days   Lab Units 11/08/19 0759 11/08/19 0111 11/07/19 1832   WBC 10*3/mm3 7.53 10.10 8.24   HEMOGLOBIN g/dL 13.3 12.9 14.2   HEMATOCRIT % 39.0 38.6 41.0   MCV fL 103.2* 104.6* 101.7*   MCHC g/dL 34.1 33.4 34.6    PLATELETS 10*3/mm3 356 359 395         Results from last 7 days   Lab Units 11/09/19  0432 11/08/19  0759 11/08/19  0111 11/07/19  1832   SODIUM mmol/L 136 135* 134* 131*   POTASSIUM mmol/L 4.5 4.8 4.9 5.3*   MAGNESIUM mg/dL  --  2.1  --   --    CHLORIDE mmol/L 106 102 102 95*   CO2 mmol/L 19.8* 20.3* 20.5* 20.6*   BUN mg/dL 37* 34* 37* 41*   CREATININE mg/dL 0.99 1.09* 1.23* 1.42*   EGFR IF NONAFRICN AM mL/min/1.73 55* 49* 42* 36*   CALCIUM mg/dL 9.3 9.8 9.8 10.8*   GLUCOSE mg/dL 128* 91 99 133*   ALBUMIN g/dL  --  3.90 3.91 4.79   BILIRUBIN mg/dL  --  0.6 0.5 0.7   ALK PHOS U/L  --  123* 125* 150*   AST (SGOT) U/L  --  24 23 30   ALT (SGPT) U/L  --  20 21 26   Estimated Creatinine Clearance: 44.3 mL/min (by C-G formula based on SCr of 0.99 mg/dL).  No results found for: AMMONIA             ----------------------------------------------------------------------------------------------------------------------  Imaging Results (Last 24 Hours)     ** No results found for the last 24 hours. **        ----------------------------------------------------------------------------------------------------------------------  Assessment and Plan:    Chest pain with hx of MI: denies any chest pain currently, no events on telemetry. Stress test on 11/8/19 showed low risk study.     Essential HTN: /58, HR 73, continue coreg 12.5mg PO BID, Hydralazine 50mg TID.     Diabetes Type 2, non-insulin dependent, A1c 6.10:  hypoglycemia protocol initiated, accu checks ac/hs and prn, diabetic diet, low dose sliding scale ordered.     Hypovolemic hyponatremia: sodium is 134, IV fluids dc'd. Repeat in the am.     Chronic Diastolic HF with preserved EF, 66-70%: IV fluids stopped, weight is up 7 pounds from admission, monitor closely for fluid overload. Continue daily weights. She denies any dyspnea,     Macrocytosis: H/H has been stable during hospitalization, repeat in the am. Folate on 11/8/19 is 19.50, Vitamin B12 725. Iron profile  and ferritin ordered for the am.     Cholelithiasis: no concerns currently, continue to monitor outpatient.     Chronic Debility with hx of falls, dizziness: PT/OT consulted. Patient states she has had a few falls in recent months, she states she gets dizzy and falls. CT of the head negative, TSH 1.430, orthostatic v/s noted below. Carotid US pending.         Hx of AML and Breast Cancer: continue aromasin daily.     Hx of Splenectomy:no concerns currently, monitor     Left shoulder pain: patient states she had left shoulder pain last night that was relieved with tylenol. She reports pain on ROM, left shoulder xray negative, monitor.     Diet: cardiac, consistent carb  Precautions:   DVT prophylaxis: Heparin BID sq  GI prophylaxis:     Disposition: rehab referral placed but patient states she does not want to go to rehab and reportedly lives with a significant other. Will await PT evaluation.       Mayra Downey, APRN  19  7:43 AM        Electronically signed by David Matute DO at 19 1624     Reid Cheek MD at 11/10/19 2012              [facility] HOSPITALIST PROGRESS NOTE     Patient Identification:  Name:  Tahmina Martinez  Age:  74 y.o.  Sex:  female  :  1945  MRN:  [id number]  Visit Number:  [id number]  ROOM: [address] [place] [address]     Primary Care Provider:  Noe Bower MD    Length of stay in inpatient status:  2    Subjective     Chief Compliant:    Chief Complaint   Patient presents with   • Abdominal Pain   • Chest Pain       Subjective:  Patient reports multiple falls at home and has been unsteady on her feet. Does not report hitting her head. Patient reports not feeling ready to go home. No recurrent chest pain.      bedside helping provide history.     ROS:   Denies fevers/chills. Denies chest pain.   Otherwise a 10 point ROS reviewed and negative other than per subjective.     Objective     Current Hospital Meds:  aspirin 81 mg Oral Daily   atorvastatin  10 mg Oral Nightly   calcium carb-cholecalciferol 1 tablet Oral Daily   carvedilol 12.5 mg Oral BID   cetirizine 5 mg Oral Daily   cholecalciferol 50,000 Units Oral Weekly   exemestane 25 mg Oral Daily   heparin (porcine) 5,000 Units Subcutaneous Q12H   hydrALAZINE 50 mg Oral Q8H   insulin aspart 0-7 Units Subcutaneous 4x Daily AC & at Bedtime   pantoprazole 40 mg Oral Q AM   rOPINIRole 0.25 mg Oral Nightly   sodium chloride 10 mL Intravenous Q12H   valACYclovir 1,000 mg Oral Daily     sodium chloride 50 mL/hr Last Rate: 50 mL/hr (11/08/19 0136)       Current Antimicrobial Therapy:  Anti-Infectives (From admission, onward)    Ordered     Dose/Rate Route Frequency Start Stop    11/08/19 0634  valACYclovir (VALTREX) tablet 1,000 mg     Ordering Provider:  Stephenie Munoz PA-C    1,000 mg Oral Daily 11/08/19 0900 11/07/20 0859        Current Diuretic Therapy:  Diuretics (From admission, onward)    None        ----------------------------------------------------------------------------------------------------------------------  Vital Signs:  Temp:  [97.3 °F (36.3 °C)-98.4 °F (36.9 °C)] 98.4 °F (36.9 °C)  Heart Rate:  [65-80] 73  Resp:  [16-18] 18  BP: (104-144)/(53-73) 144/73  SpO2:  [96 %-98 %] 98 %  on   ;   Device (Oxygen Therapy): room air  Body mass index is 26.29 kg/m².    Wt Readings from Last 3 Encounters:   11/10/19 67.3 kg (148 lb 6.4 oz)   11/04/19 65.3 kg (143 lb 15.4 oz)   10/29/19 71 kg (156 lb 9.6 oz)     Intake & Output (last 3 days)       11/08 0701 - 11/09 0700 11/09 0701 - 11/10 0700 11/10 0701 - 11/11 0700    P.O. 240 360 480    I.V. (mL/kg)       IV Piggyback       Total Intake(mL/kg) 240 (3.6) 360 (5.3) 480 (7.1)    Urine (mL/kg/hr) 2550 (1.6) 2950 (1.8)     Total Output 2550 2950     Net -2310 -2590 +931               Diet Regular; Cardiac, Consistent Carbohydrate  ----------------------------------------------------------------------------------------------------------------------  Physical  exam:  Constitutional:  Well-developed and well-nourished.  No respiratory distress.      HENT:  Head:  Normocephalic and atraumatic.  Mouth:  Moist mucous membranes.    Eyes:  Conjunctivae and EOM are normal.  Pupils are equal, round, and reactive to light.  No scleral icterus.    Cardiovascular:  Normal rate, regular rhythm and normal heart sounds with no murmur.  Pulmonary/Chest:  No respiratory distress, no wheezes, no crackles, with normal breath sounds and good air movement.  Abdominal:  Soft.  Bowel sounds are normal.  No distension and no tenderness.   Musculoskeletal:  No edema, no tenderness, and no deformity.  No red or swollen joints anywhere.    Neurological:  Alert and oriented to person, place, and time.  No focal neurological deficit.    Skin:  Skin is warm and dry. No rash noted. No pallor.   Peripheral vascular:  Strong pulses in all 4 extremities with no clubbing, no cyanosis, no edema.  --------------------------------------------------------------------------------------------------  Tele:    ----------------------------------------------------------------------------------------------------------------------  Results from last 7 days   Lab Units 11/08/19  0759 11/08/19  0111 11/07/19  1832   WBC 10*3/mm3 7.53 10.10 8.24   HEMOGLOBIN g/dL 13.3 12.9 14.2   HEMATOCRIT % 39.0 38.6 41.0   MCV fL 103.2* 104.6* 101.7*   MCHC g/dL 34.1 33.4 34.6   PLATELETS 10*3/mm3 356 359 395         Results from last 7 days   Lab Units 11/09/19  0432 11/08/19  0759 11/08/19  0111 11/07/19  1832   SODIUM mmol/L 136 135* 134* 131*   POTASSIUM mmol/L 4.5 4.8 4.9 5.3*   MAGNESIUM mg/dL  --  2.1  --   --    CHLORIDE mmol/L 106 102 102 95*   CO2 mmol/L 19.8* 20.3* 20.5* 20.6*   BUN mg/dL 37* 34* 37* 41*   CREATININE mg/dL 0.99 1.09* 1.23* 1.42*   EGFR IF NONAFRICN AM mL/min/1.73 55* 49* 42* 36*   CALCIUM mg/dL 9.3 9.8 9.8 10.8*   PHOSPHORUS mg/dL  --  3.5  --   --    GLUCOSE mg/dL 128* 91 99 133*   ALBUMIN g/dL  --  3.90  3.91 4.79   BILIRUBIN mg/dL  --  0.6 0.5 0.7   ALK PHOS U/L  --  123* 125* 150*   AST (SGOT) U/L  --  24 23 30   ALT (SGPT) U/L  --  20 21 26   Estimated Creatinine Clearance: 44.6 mL/min (by C-G formula based on SCr of 0.99 mg/dL).  No results found for: AMMONIA  Results from last 7 days   Lab Units 11/08/19  1334 11/08/19  0759 11/08/19  0111   TROPONIN T ng/mL <0.010 <0.010 <0.010     Results from last 7 days   Lab Units 11/07/19  1832   PROBNP pg/mL 127.2         Hemoglobin A1C   Date/Time Value Ref Range Status   11/08/2019 0111 6.10 (H) 4.80 - 5.60 % Final     Glucose   Date/Time Value Ref Range Status   11/10/2019 1942 176 (H) 70 - 130 mg/dL Final   11/10/2019 1632 116 70 - 130 mg/dL Final   11/10/2019 1030 141 (H) 70 - 130 mg/dL Final   11/09/2019 1900 224 (H) 70 - 130 mg/dL Final   11/09/2019 1615 126 70 - 130 mg/dL Final   11/09/2019 1012 137 (H) 70 - 130 mg/dL Final   11/09/2019 0614 95 70 - 130 mg/dL Final   11/08/2019 1944 152 (H) 70 - 130 mg/dL Final     Lab Results   Component Value Date    TSH 1.430 11/08/2019     No results found for: PREGTESTUR, PREGSERUM, HCG, HCGQUANT  Pain Management Panel     There is no flowsheet data to display.        Brief Urine Lab Results  (Last result in the past 365 days)      Color   Clarity   Blood   Leuk Est   Nitrite   Protein   CREAT   Urine HCG        11/07/19 2128 Yellow Clear Negative Negative Negative Trace                                     I have personally looked at the labs and they are summarized above.  ----------------------------------------------------------------------------------------------------------------------  Detailed radiology reports for the last 24 hours:    Imaging Results (Last 24 Hours)     ** No results found for the last 24 hours. **        Assessment & Plan       #Chest pain   - Potential etiologies include hypertensive urgency, psych, MSK, GERD, costochondritis   - Stress test low risk study, unlikely to be from CAD  - Has had  increased stress recently with death of family member   - No recurrent chest pain while inpatient   - Will start on PPI   - Monitor on tele      #CADENCE  - Basline Cr: 0.7, presents with 1.3  - Suspect prerenal in setting of poor PO intake   - Continues to improve, 0.99 on 11/9     #Uncontrolled HTN   - Patient reports variable readings at home and 100% compliance to home regimen   - Continue 12.5 mg BID coreg, 50 mg TID hydralazine   - Lisinopril held for CADENCE. Blood pressure currently well controlled without medication.   - Continue current regimen. Blood pressure well controlled. Suspect level of noncompliance at home.      #Hypovolemic hyponatremia   - 131 on admission, improved to 135 with fluids      #Cholelithiasis without evidence of cholecystitis or biliary obstruction  - Recommend outpatient surgery evaluation if it becomes symptomatic      #Chronic debility .   - PT/OT continues to follow       #DM  - SSI     #Hypercalcemia  - Resolved after fluids      #Concern for aspiration   - SLP consult revealed no evidence of aspiration   - Restart regular diet      Chronic medical problems:  #HLD  #Hx of AML  #Hx of splenectomy   #hx of breast ca   #Right bundle branch block with first degree AV block - asymptomatics     Code status: Full      Dispo: Rehab vs. Home.  PT/OT following. Has had multiple falls at home, suspect she might benefit from rehab.     VTE Prophylaxis:   Mechanical Order History:     None      Pharmalogical Order History:     Ordered     Dose Route Frequency Stop    11/08/19 0046  heparin (porcine) 5000 UNIT/ML injection 5,000 Units      5,000 Units SC Every 12 Hours Scheduled --          Reid Cheek MD  [facility]  11/10/19  8:12 PM    Electronically signed by Reid Cheek MD at 11/10/19 2043          Physical Therapy Notes (most recent note)      Karly Carranza PT at 11/08/19 1543  Version 1 of 1         Acute Care - Physical Therapy Initial Evaluation  [facility]  [facility]     Patient Name: Tahmina Martinez  : 1945  MRN: [id number]  Today's Date: 2019   Onset of Illness/Injury or Date of Surgery: 19  Date of Referral to PT: 19  Referring Physician: Dr. Cheek      Admit Date: 2019    Visit Dx:     ICD-10-CM ICD-9-CM   1. Chest pain in adult R07.9 786.50   2. Renal insufficiency N28.9 593.9     Patient Active Problem List   Diagnosis   • History of Non-STEMI (non-ST elevated myocardial infarction) with no coronary intervention needed in , clinically stable.    • Dyspnea, class II-III.    • Essential hypertension   • History of Acute myelocytic leukemia,status post chemotherapy and bone marrow transplant in .   • Breast cancer (right), status post radiation therapy in 2015   • RBBB unchanged from    • Dyslipidemia   • Bifascicular block (RBBB plus LP FB)   • Chest pain in adult   • History of splenectomy   • Cholelithiasis   • Acute kidney injury (CMS/HCC)   • Hyperkalemia   • SIRS (systemic inflammatory response syndrome) (CMS/HCC)   • Hyperglycemia   • First degree AV block   • Macrocytosis without anemia   • Hypocarbia   • Type II diabetes mellitus (CMS/HCC)     Past Medical History:   Diagnosis Date   • Dyslipidemia 2017    On atorvastatin.    • Essential hypertension 10/12/2016   • First degree AV block 2019   • H/O splenectomy    • History of breast cancer        • Hypertension    • Myelocytic leukemia (CMS/HCC)    • Non-STEMI (non-ST elevated myocardial infarction) (CMS/HCC)    • Restless leg    • Type II diabetes mellitus (CMS/HCC) 2019     Past Surgical History:   Procedure Laterality Date   • BONE MARROW TRANSPLANT     •  SECTION     • SPLENECTOMY     • TUBAL ABDOMINAL LIGATION          PT ASSESSMENT (last 12 hours)      Physical Therapy Evaluation     Row Name 19 1534          PT Evaluation Time/Intention    Subjective Information  complains of;weakness  -BC     Document Type  evaluation  -BC      Mode of Treatment  individual therapy;physical therapy  -BC     Patient Effort  good  -BC     Row Name 11/08/19 1534          General Information    Patient Profile Reviewed?  yes  -BC     Onset of Illness/Injury or Date of Surgery  11/07/19  -BC     Referring Physician  Dr. Cheek  -BC     Patient Observations  alert;cooperative;agree to therapy  -BC     Prior Level of Function  independent:;all household mobility;community mobility  -BC     Equipment Currently Used at Home  cane, quad  -BC     Pertinent History of Current Functional Problem  admitted with chest pain  -BC     Existing Precautions/Restrictions  fall  -BC     Risks Reviewed  patient:;LOB;nausea/vomiting;dizziness;increased discomfort;change in vital signs;increased drainage;lines disloged  -BC     Benefits Reviewed  patient:;improve function;increase independence;increase strength;increase balance;decrease pain;decrease risk of DVT;improve skin integrity;increase knowledge  -BC     Row Name 11/08/19 1534          Relationship/Environment    Lives With  spouse  -BC     Row Name 11/08/19 1534          Resource/Environmental Concerns    Current Living Arrangements  home/apartment/condo  -BC     Row Name 11/08/19 1534          Cognitive Assessment/Intervention- PT/OT    Orientation Status (Cognition)  oriented x 4  -BC     Follows Commands (Cognition)  WFL  -BC     Row Name 11/08/19 1534          Mobility Assessment/Treatment    Extremity Weight-bearing Status  left lower extremity;right lower extremity  -BC     Left Lower Extremity (Weight-bearing Status)  weight-bearing as tolerated (WBAT)  -BC     Right Lower Extremity (Weight-bearing Status)  weight-bearing as tolerated (WBAT)  -BC     Row Name 11/08/19 1534          Bed Mobility Assessment/Treatment    Bed Mobility Assessment/Treatment  bed mobility (all) activities  -BC     [place] Level (Bed Mobility)  minimum assist (75% patient effort)  -BC     Assistive Device (Bed Mobility)  bed rails   -BC     Row Name 11/08/19 1534          Transfer Assessment/Treatment    Transfer Assessment/Treatment  sit-stand transfer;stand-sit transfer  -BC     Maintains Weight-bearing Status (Transfers)  able to maintain  -BC     Sit-Stand [place] (Transfers)  minimum assist (75% patient effort)  -BC     Stand-Sit [place] (Transfers)  minimum assist (75% patient effort)  -BC     Row Name 11/08/19 1534          Sit-Stand Transfer    Assistive Device (Sit-Stand Transfers)  walker, front-wheeled  -BC     Row Name 11/08/19 1534          Stand-Sit Transfer    Assistive Device (Stand-Sit Transfers)  walker, front-wheeled  -BC     Row Name 11/08/19 1534          Gait/Stairs Assessment/Training    Gait/Stairs Assessment/Training  gait/ambulation independence  -BC     [place] Level (Gait)  minimum assist (75% patient effort)  -BC     Assistive Device (Gait)  walker, front-wheeled  -BC     Distance in Feet (Gait)  60  -BC     Row Name 11/08/19 1534          General ROM    GENERAL ROM COMMENTS  WFL BLE  -BC     Row Name 11/08/19 1534          MMT (Manual Muscle Testing)    General MMT Comments  BLE  4/5  -BC     Row Name 11/08/19 1534          Coping    Observed Emotional State  accepting  -BC     Verbalized Emotional State  acceptance  -BC     Row Name 11/08/19 1534          Plan of Care Review    Plan of Care Reviewed With  patient  -BC     Row Name 11/08/19 1534          Physical Therapy Clinical Impression    Date of Referral to PT  11/07/19  -BC     PT Diagnosis (PT Clinical Impression)  weakness  -BC     Functional Level at Time of Evaluation (PT Clinical Impression)  good  -BC     Criteria for Skilled Interventions Met (PT Clinical Impression)  no;no problems identified which require skilled intervention  -BC     Row Name 11/08/19 1534          Positioning and Restraints    Pre-Treatment Position  in bed  -BC     Post Treatment Position  bed  -BC     In Bed  notified nsg;call light within reach;encouraged to call  for assist  -BC       User Key  (r) = Recorded By, (t) = Taken By, (c) = Cosigned By    Initials Name Provider Type    BC Karly Carranza PT Physical Therapist          PT Recommendation and Plan     Plan of Care Reviewed With: patient  Outcome Measures     Row Name 11/08/19 1500             How much help from another is currently needed...    Putting on and taking off regular lower body clothing?  3  -LM      Bathing (including washing, rinsing, and drying)  3  -LM      Toileting (which includes using toilet bed pan or urinal)  3  -LM      Putting on and taking off regular upper body clothing  4  -LM      Taking care of personal grooming (such as brushing teeth)  4  -LM      Eating meals  4  -LM      AM-PAC 6 Clicks Score (OT)  21  -LM         Functional Assessment    Outcome Measure Options  AM-PAC 6 Clicks Daily Activity (OT)  -        User Key  (r) = Recorded By, (t) = Taken By, (c) = Cosigned By    Initials Name Provider Type     María Monique, OT Occupational Therapist         Time Calculation:   PT Charges     Row Name 11/08/19 1543             Time Calculation    PT Received On  11/08/19  -BC         Time Calculation- PT    Total Timed Code Minutes- PT  60 minute(s)  -BC        User Key  (r) = Recorded By, (t) = Taken By, (c) = Cosigned By    Initials Name Provider Type    BC Karly Carranza PT Physical Therapist        Therapy Charges for Today     Code Description Service Date Service Provider Modifiers Qty    [id number]  PT EVAL MOD COMPLEXITY 3 11/8/2019 Karly Carranza, PT GP 1          PT G-Codes  Outcome Measure Options: AM-PAC 6 Clicks Daily Activity (OT)  AM-PAC 6 Clicks Score (OT): 21      Karly Carranza PT  11/8/2019          Electronically signed by Karly Carranza, PT at 11/08/19 1549          Occupational Therapy Notes (most recent note)      María Monique, OT at 11/08/19 1542          Acute Care - Occupational Therapy Initial Evaluation  [facility]     Patient Name:  Tahmina Martinez  : 1945  MRN: [id number]  Today's Date: 2019  Onset of Illness/Injury or Date of Surgery: 19     Referring Physician: Dr. Cheek    Admit Date: 2019       ICD-10-CM ICD-9-CM   1. Chest pain in adult R07.9 786.50   2. Renal insufficiency N28.9 593.9     Patient Active Problem List   Diagnosis   • History of Non-STEMI (non-ST elevated myocardial infarction) with no coronary intervention needed in , clinically stable.    • Dyspnea, class II-III.    • Essential hypertension   • History of Acute myelocytic leukemia,status post chemotherapy and bone marrow transplant in .   • Breast cancer (right), status post radiation therapy in 2015   • RBBB unchanged from    • Dyslipidemia   • Bifascicular block (RBBB plus LP FB)   • Chest pain in adult   • History of splenectomy   • Cholelithiasis   • Acute kidney injury (CMS/HCC)   • Hyperkalemia   • SIRS (systemic inflammatory response syndrome) (CMS/HCC)   • Hyperglycemia   • First degree AV block   • Macrocytosis without anemia   • Hypocarbia   • Type II diabetes mellitus (CMS/HCC)     Past Medical History:   Diagnosis Date   • Dyslipidemia 2017    On atorvastatin.    • Essential hypertension 10/12/2016   • First degree AV block 2019   • H/O splenectomy    • History of breast cancer        • Hypertension    • Myelocytic leukemia (CMS/HCC)    • Non-STEMI (non-ST elevated myocardial infarction) (CMS/HCC)    • Restless leg    • Type II diabetes mellitus (CMS/HCC) 2019     Past Surgical History:   Procedure Laterality Date   • BONE MARROW TRANSPLANT     •  SECTION     • SPLENECTOMY     • TUBAL ABDOMINAL LIGATION            OT ASSESSMENT FLOWSHEET (last 12 hours)      Occupational Therapy Evaluation     Row Name 19 1528                   OT Evaluation Time/Intention    Subjective Information  complains of;weakness;fatigue  -LM        Document Type  evaluation  -LM        Mode of Treatment   occupational therapy  -LM        Patient Effort  good  -LM           General Information    Patient Profile Reviewed?  yes  -LM        Onset of Illness/Injury or Date of Surgery  11/07/19  -LM        Patient Observations  alert;cooperative;agree to therapy  -LM        Prior Level of Function  independent:;ADL's;all household mobility  -LM        Equipment Currently Used at Home  cane, quad;walker, rolling  -LM        Pertinent History of Current Functional Problem  Admitted with chest pain.  PMH:  HTN, nstemi, dm, myelocytic leukemia, bone marrow transplant, htn, av block, r bbb, breast ca, splenectomy  -LM        Existing Precautions/Restrictions  fall  -LM        Benefits Reviewed  patient:;improve function;increase independence;increase strength;increase balance  -LM           Relationship/Environment    Lives With  spouse  -LM           Resource/Environmental Concerns    Current Living Arrangements  home/apartment/condo  -LM           Cognitive Assessment/Intervention- PT/OT    Orientation Status (Cognition)  oriented x 4  -LM        Follows Commands (Cognition)  WFL  -LM           ADL Assessment/Intervention    BADL Assessment/Intervention  bathing;upper body dressing;lower body dressing;feeding;grooming;toileting  -LM           Bathing Assessment/Intervention    Bathing [place] Level  supervision;contact guard assist  -LM           Upper Body Dressing Assessment/Training    Upper Body Dressing [place] Level  set up  -LM           Lower Body Dressing Assessment/Training    Lower Body Dressing [place] Level  contact guard assist  -LM           Grooming Assessment/Training    [place] Level (Grooming)  set up  -LM           Self-Feeding Assessment/Training    [place] Level (Feeding)  independent  -LM           Toileting Assessment/Training    [place] Level (Toileting)  contact guard assist  -LM           General ROM    GENERAL ROM COMMENTS  wfl bue  -LM           MMT (Manual Muscle  Testing)    General MMT Comments  wfl bue, 3+/5  -LM           Positioning and Restraints    Post Treatment Position  bed  -LM        In Bed  encouraged to call for assist;call light within reach  -LM           Plan of Care Review    Plan of Care Reviewed With  patient  -LM           Clinical Impression (OT)    OT Diagnosis  debility  -LM        Patient/Family Goals Statement (OT Eval)  return to plof  -LM        Criteria for Skilled Therapeutic Interventions Met (OT Eval)  yes  -LM        Rehab Potential (OT Eval)  good, to achieve stated therapy goals  -LM        Therapy Frequency (OT Eval)  -- 3-5 times week  -LM        Care Plan Review (OT)  evaluation/treatment results reviewed;patient/other agree to care plan;care plan/treatment goals reviewed  -LM           Planned OT Interventions    Planned Therapy Interventions (OT Eval)  activity tolerance training;BADL retraining;transfer/mobility retraining;ROM/therapeutic exercise;strengthening exercise  -LM           OT Goals    Transfer Goal Selection (OT)  transfer, OT goal 1  -LM        Bathing Goal Selection (OT)  --  -LM        Dressing Goal Selection (OT)  dressing, OT goal 1  -LM           Transfer Goal 1 (OT)    Activity/Assistive Device (Transfer Goal 1, OT)  commode, 3-in-1;transfers, all  -LM        [place] Level/Cues Needed (Transfer Goal 1, OT)  conditional independence  -LM        Time Frame (Transfer Goal 1, OT)  1 week  -LM           Bathing Goal 1 (OT)    Activity/Assistive Device (Bathing Goal 1, OT)  bathing skills, all  -LM        [place] Level/Cues Needed (Bathing Goal 1, OT)  conditional independence  -LM        Time Frame (Bathing Goal 1, OT)  1 week  -LM          User Key  (r) = Recorded By, (t) = Taken By, (c) = Cosigned By    Initials Name Effective Dates     María Monique OT 03/14/16 -          Occupational Therapy Education     Title: PT OT SLP Therapies (Done)     Topic: Occupational Therapy (Done)     Point: ADL training  (Done)     Description: Instruct learner(s) on proper safety adaptation and remediation techniques during self care or transfers.   Instruct in proper use of assistive devices.    Learning Progress Summary           Patient Acceptance, E,D, VU,DU,NR by  at 11/8/2019  3:38 PM                   Point: Home exercise program (Done)     Description: Instruct learner(s) on appropriate technique for monitoring, assisting and/or progressing therapeutic exercises/activities.    Learning Progress Summary           Patient Acceptance, E,D, VU,DU,NR by  at 11/8/2019  3:38 PM                   Point: Precautions (Done)     Description: Instruct learner(s) on prescribed precautions during self-care and functional transfers.    Learning Progress Summary           Patient Acceptance, E,D, VU,DU,NR by  at 11/8/2019  3:38 PM                   Point: Body mechanics (Done)     Description: Instruct learner(s) on proper positioning and spine alignment during self-care, functional mobility activities and/or exercises.    Learning Progress Summary           Patient Acceptance, E,D, VU,DU,NR by  at 11/8/2019  3:38 PM                               User Key     Initials Effective Dates Name Provider Type Discipline     03/14/16 -  María Monique, OT Occupational Therapist OT                  OT Recommendation and Plan  Planned Therapy Interventions (OT Eval): activity tolerance training, BADL retraining, transfer/mobility retraining, ROM/therapeutic exercise, strengthening exercise  Therapy Frequency (OT Eval): (3-5 times week)  Plan of Care Review  Plan of Care Reviewed With: patient  Plan of Care Reviewed With: patient  Outcome Summary: OT evaluation completed.  Patient agreeable to POC.    Outcome Measures     Row Name 11/08/19 1500             How much help from another is currently needed...    Putting on and taking off regular lower body clothing?  3  -LM      Bathing (including washing, rinsing, and drying)  3  -LM       Toileting (which includes using toilet bed pan or urinal)  3  -LM      Putting on and taking off regular upper body clothing  4  -LM      Taking care of personal grooming (such as brushing teeth)  4  -LM      Eating meals  4  -LM      AM-PAC 6 Clicks Score (OT)  21  -LM         Functional Assessment    Outcome Measure Options  AM-PAC 6 Clicks Daily Activity (OT)  -LM        User Key  (r) = Recorded By, (t) = Taken By, (c) = Cosigned By    Initials Name Provider Type    María Allen OT Occupational Therapist          Time Calculation:   Time Calculation- OT     Row Name 11/08/19 1542             Time Calculation- OT    Total Timed Code Minutes- OT  60 minute(s)  -LM        User Key  (r) = Recorded By, (t) = Taken By, (c) = Cosigned By    Initials Name Provider Type    María Allen OT Occupational Therapist        Therapy Charges for Today     Code Description Service Date Service Provider Modifiers Qty    [id number]  OT EVAL LOW COMPLEXITY 4 11/8/2019 María Monique OT GO 1               María Monique OT  11/8/2019    Electronically signed by María Monique OT at 11/08/19 3346

## 2019-11-11 NOTE — PLAN OF CARE
Problem: Patient Care Overview  Goal: Individualization and Mutuality  Outcome: Ongoing (interventions implemented as appropriate)    Goal: Discharge Needs Assessment  Outcome: Ongoing (interventions implemented as appropriate)    Goal: Interprofessional Rounds/Family Conf  Outcome: Ongoing (interventions implemented as appropriate)      Problem: Fall Risk (Adult)  Goal: Identify Related Risk Factors and Signs and Symptoms  Outcome: Ongoing (interventions implemented as appropriate)    Goal: Absence of Fall  Outcome: Ongoing (interventions implemented as appropriate)      Problem: Pain, Acute (Adult)  Goal: Identify Related Risk Factors and Signs and Symptoms  Outcome: Ongoing (interventions implemented as appropriate)    Goal: Acceptable Pain Control/Comfort Level  Outcome: Ongoing (interventions implemented as appropriate)    Goal: Identify Related Risk Factors and Signs and Symptoms  Outcome: Ongoing (interventions implemented as appropriate)    Goal: Acceptable Pain Control/Comfort Level  Outcome: Ongoing (interventions implemented as appropriate)

## 2019-11-11 NOTE — NURSING NOTE
"Transitional Care Note    Enrolled in [facility] Transitional Care Note    Enrolled in [facility] Transitional Care Services under theTCM Model to be followed for 6 weeks post discharge.  [facility] will assist with support and education at time of transition home from hospital.  Hospital  will follow throughout the stay at [facility].  Home  will visit within 48 hours of discharge and follow with home vitals and telephone contact for 6 weeks.      Patient admitted to [facility] via Emergency Department, complaints of abd pain and chest pain.    Patient sitting up in bed with family at bedside.  Pleasant and talkative.    Explained transition to home program and Patient is agreeable to home visits.  Home  will be Hedy Paul RN    Clinical Assessment Instrument Scores:  >Short Portable Mental Status 10, normal mental function  >Geriatric Depression Scale 5, normal  >IADL 8, Independent with ADL's  >HOWARD-ADL 6, Independent with self-care  >Overall Quality of Life \"good\"  >Subjective Health Rating \"good\"  >Symptom Bother Scale 26  >General Anxiety Scale  4  >REAL SF 7 indicates reads on 12th grade level   "

## 2019-11-11 NOTE — PLAN OF CARE
Problem: Fall Risk (Adult)  Goal: Identify Related Risk Factors and Signs and Symptoms  Outcome: Ongoing (interventions implemented as appropriate)    Goal: Absence of Fall  Outcome: Ongoing (interventions implemented as appropriate)      Problem: Pain, Acute (Adult)  Goal: Identify Related Risk Factors and Signs and Symptoms  Outcome: Ongoing (interventions implemented as appropriate)    Goal: Acceptable Pain Control/Comfort Level  Outcome: Ongoing (interventions implemented as appropriate)    Goal: Identify Related Risk Factors and Signs and Symptoms  Outcome: Ongoing (interventions implemented as appropriate)    Goal: Acceptable Pain Control/Comfort Level  Outcome: Ongoing (interventions implemented as appropriate)

## 2019-11-11 NOTE — PROGRESS NOTES
[facility] HOSPITALIST PROGRESS NOTE     Patient Identification:  Name:  Tahmina Martinez  Age:  74 y.o.  Sex:  female  :  1945  MRN:  [id number]  Visit Number:  [id number]  ROOM: [address] [place] [address]     Primary Care Provider:  Noe Bower MD    Length of stay in inpatient status:  2    Subjective     Chief Compliant:    Chief Complaint   Patient presents with   • Abdominal Pain   • Chest Pain       Subjective:  Patient reports multiple falls at home and has been unsteady on her feet. Does not report hitting her head. Patient reports not feeling ready to go home. No recurrent chest pain.      bedside helping provide history.     ROS:   Denies fevers/chills. Denies chest pain.   Otherwise a 10 point ROS reviewed and negative other than per subjective.     Objective     Current Hospital Meds:  aspirin 81 mg Oral Daily   atorvastatin 10 mg Oral Nightly   calcium carb-cholecalciferol 1 tablet Oral Daily   carvedilol 12.5 mg Oral BID   cetirizine 5 mg Oral Daily   cholecalciferol 50,000 Units Oral Weekly   exemestane 25 mg Oral Daily   heparin (porcine) 5,000 Units Subcutaneous Q12H   hydrALAZINE 50 mg Oral Q8H   insulin aspart 0-7 Units Subcutaneous 4x Daily AC & at Bedtime   pantoprazole 40 mg Oral Q AM   rOPINIRole 0.25 mg Oral Nightly   sodium chloride 10 mL Intravenous Q12H   valACYclovir 1,000 mg Oral Daily     sodium chloride 50 mL/hr Last Rate: 50 mL/hr (19 0136)       Current Antimicrobial Therapy:  Anti-Infectives (From admission, onward)    Ordered     Dose/Rate Route Frequency Start Stop    19 0634  valACYclovir (VALTREX) tablet 1,000 mg     Ordering Provider:  Stephenie Munoz PA-C    1,000 mg Oral Daily 19 0900 20 0859        Current Diuretic Therapy:  Diuretics (From admission, onward)    None        ----------------------------------------------------------------------------------------------------------------------  Vital Signs:  Temp:  [97.3 °F (36.3  °C)-98.4 °F (36.9 °C)] 98.4 °F (36.9 °C)  Heart Rate:  [65-80] 73  Resp:  [16-18] 18  BP: (104-144)/(53-73) 144/73  SpO2:  [96 %-98 %] 98 %  on   ;   Device (Oxygen Therapy): room air  Body mass index is 26.29 kg/m².    Wt Readings from Last 3 Encounters:   11/10/19 67.3 kg (148 lb 6.4 oz)   11/04/19 65.3 kg (143 lb 15.4 oz)   10/29/19 71 kg (156 lb 9.6 oz)     Intake & Output (last 3 days)       11/08 0701 - 11/09 0700 11/09 0701 - 11/10 0700 11/10 0701 - 11/11 0700    P.O. 240 360 480    I.V. (mL/kg)       IV Piggyback       Total Intake(mL/kg) 240 (3.6) 360 (5.3) 480 (7.1)    Urine (mL/kg/hr) 2550 (1.6) 2950 (1.8)     Total Output 2550 2950     Net -2310 -2590 +480               Diet Regular; Cardiac, Consistent Carbohydrate  ----------------------------------------------------------------------------------------------------------------------  Physical exam:  Constitutional:  Well-developed and well-nourished.  No respiratory distress.      HENT:  Head:  Normocephalic and atraumatic.  Mouth:  Moist mucous membranes.    Eyes:  Conjunctivae and EOM are normal.  Pupils are equal, round, and reactive to light.  No scleral icterus.    Cardiovascular:  Normal rate, regular rhythm and normal heart sounds with no murmur.  Pulmonary/Chest:  No respiratory distress, no wheezes, no crackles, with normal breath sounds and good air movement.  Abdominal:  Soft.  Bowel sounds are normal.  No distension and no tenderness.   Musculoskeletal:  No edema, no tenderness, and no deformity.  No red or swollen joints anywhere.    Neurological:  Alert and oriented to person, place, and time.  No focal neurological deficit.    Skin:  Skin is warm and dry. No rash noted. No pallor.   Peripheral vascular:  Strong pulses in all 4 extremities with no clubbing, no cyanosis, no edema.  --------------------------------------------------------------------------------------------------  Tele:     ----------------------------------------------------------------------------------------------------------------------  Results from last 7 days   Lab Units 11/08/19 0759 11/08/19 0111 11/07/19  1832   WBC 10*3/mm3 7.53 10.10 8.24   HEMOGLOBIN g/dL 13.3 12.9 14.2   HEMATOCRIT % 39.0 38.6 41.0   MCV fL 103.2* 104.6* 101.7*   MCHC g/dL 34.1 33.4 34.6   PLATELETS 10*3/mm3 356 359 395         Results from last 7 days   Lab Units 11/09/19  0432 11/08/19 0759 11/08/19 0111 11/07/19  1832   SODIUM mmol/L 136 135* 134* 131*   POTASSIUM mmol/L 4.5 4.8 4.9 5.3*   MAGNESIUM mg/dL  --  2.1  --   --    CHLORIDE mmol/L 106 102 102 95*   CO2 mmol/L 19.8* 20.3* 20.5* 20.6*   BUN mg/dL 37* 34* 37* 41*   CREATININE mg/dL 0.99 1.09* 1.23* 1.42*   EGFR IF NONAFRICN AM mL/min/1.73 55* 49* 42* 36*   CALCIUM mg/dL 9.3 9.8 9.8 10.8*   PHOSPHORUS mg/dL  --  3.5  --   --    GLUCOSE mg/dL 128* 91 99 133*   ALBUMIN g/dL  --  3.90 3.91 4.79   BILIRUBIN mg/dL  --  0.6 0.5 0.7   ALK PHOS U/L  --  123* 125* 150*   AST (SGOT) U/L  --  24 23 30   ALT (SGPT) U/L  --  20 21 26   Estimated Creatinine Clearance: 44.6 mL/min (by C-G formula based on SCr of 0.99 mg/dL).  No results found for: AMMONIA  Results from last 7 days   Lab Units 11/08/19  1334 11/08/19  0759 11/08/19  0111   TROPONIN T ng/mL <0.010 <0.010 <0.010     Results from last 7 days   Lab Units 11/07/19  1832   PROBNP pg/mL 127.2         Hemoglobin A1C   Date/Time Value Ref Range Status   11/08/2019 0111 6.10 (H) 4.80 - 5.60 % Final     Glucose   Date/Time Value Ref Range Status   11/10/2019 1942 176 (H) 70 - 130 mg/dL Final   11/10/2019 1632 116 70 - 130 mg/dL Final   11/10/2019 1030 141 (H) 70 - 130 mg/dL Final   11/09/2019 1900 224 (H) 70 - 130 mg/dL Final   11/09/2019 1615 126 70 - 130 mg/dL Final   11/09/2019 1012 137 (H) 70 - 130 mg/dL Final   11/09/2019 0614 95 70 - 130 mg/dL Final   11/08/2019 1944 152 (H) 70 - 130 mg/dL Final     Lab Results   Component Value Date    TSH  1.430 11/08/2019     No results found for: PREGTESTUR, PREGSERUM, HCG, HCGQUANT  Pain Management Panel     There is no flowsheet data to display.        Brief Urine Lab Results  (Last result in the past 365 days)      Color   Clarity   Blood   Leuk Est   Nitrite   Protein   CREAT   Urine HCG        11/07/19 2128 Yellow Clear Negative Negative Negative Trace                                     I have personally looked at the labs and they are summarized above.  ----------------------------------------------------------------------------------------------------------------------  Detailed radiology reports for the last 24 hours:    Imaging Results (Last 24 Hours)     ** No results found for the last 24 hours. **        Assessment & Plan      #Chest pain   - Potential etiologies include hypertensive urgency, psych, MSK, GERD, costochondritis   - Stress test low risk study, unlikely to be from CAD  - Has had increased stress recently with death of family member   - No recurrent chest pain while inpatient   - Will start on PPI   - Monitor on tele      #CADENCE  - Basline Cr: 0.7, presents with 1.3  - Suspect prerenal in setting of poor PO intake   - Continues to improve, 0.99 on 11/9     #Uncontrolled HTN   - Patient reports variable readings at home and 100% compliance to home regimen   - Continue 12.5 mg BID coreg, 50 mg TID hydralazine   - Lisinopril held for CADENCE. Blood pressure currently well controlled without medication.   - Continue current regimen. Blood pressure well controlled. Suspect level of noncompliance at home.      #Hypovolemic hyponatremia   - 131 on admission, improved to 135 with fluids      #Cholelithiasis without evidence of cholecystitis or biliary obstruction  - Recommend outpatient surgery evaluation if it becomes symptomatic      #Chronic debility .   - PT/OT continues to follow       #DM  - SSI     #Hypercalcemia  - Resolved after fluids      #Concern for aspiration   - SLP consult revealed no  evidence of aspiration   - Restart regular diet      Chronic medical problems:  #HLD  #Hx of AML  #Hx of splenectomy   #hx of breast ca   #Right bundle branch block with first degree AV block - asymptomatics     Code status: Full      Dispo: Rehab vs. Home. PT/OT following. Has had multiple falls at home, suspect she might benefit from rehab.     VTE Prophylaxis:   Mechanical Order History:     None      Pharmalogical Order History:     Ordered     Dose Route Frequency Stop    11/08/19 0046  heparin (porcine) 5000 UNIT/ML injection 5,000 Units      5,000 Units SC Every 12 Hours Scheduled --          Reid Cheek MD  [facility]  11/10/19  8:12 PM

## 2019-11-11 NOTE — PROGRESS NOTES
Discharge Planning Assessment   [place]     Patient Name: Tahmina Martinez  MRN: [id number]  Today's Date: 11/11/2019    Admit Date: 11/7/2019        Discharge Plan     Row Name 11/11/19 1448       Plan    Plan   SS spoke with pt on this day. Pt lives at home alone. Pt does not receive HH services. Pt is requesting [facility] at discharge for physical therapy. Pt is not agreeable to inpatient rehab at this time. Pt has a quad cane, and is requesting a rollator. Pt has no preference of agency. Pt plans to return home at discharge, with transportation provided by her ex- [place]. HH to be arranged at discharge. SS will follow and assist as needed.     Patient/Family in Agreement with Plan  yes          LLOYD Vieira

## 2019-11-11 NOTE — PROGRESS NOTES
Patient Identification:  Name:  Tahmina Martinez  Age:  74 y.o.  Sex:  female  :  1945  MRN:  [id number]  Visit Number:  [id number]  Primary Care Provider:  Noe Bower MD    Length of stay:  3    Chief Complaint: chest pain     HPI:     Mrs. Martinez is a 74 year old female patient who was admitted to [facility] on 19 for unstable angina and CADENCE on probable CKD stage II. 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    Subjective:      Mrs. Martinez is lying in bed in no distress on exam this morning. She states last night she had left shoulder pain that improved with tylenol. She denies any chest pain, no nausea or vomiting, she is eating well. Discussed with ANGEL Giraldo.  ----------------------------------------------------------------------------------------------------------------------  Current [facility] Meds:    aspirin 81 mg Oral Daily   atorvastatin 10 mg Oral Nightly   calcium carb-cholecalciferol 1 tablet Oral Daily   carvedilol 12.5 mg Oral BID   cetirizine 5 mg Oral Daily   cholecalciferol 50,000 Units Oral Weekly   exemestane 25 mg Oral Daily   heparin (porcine) 5,000 Units Subcutaneous Q12H   hydrALAZINE 50 mg Oral Q8H   insulin aspart 0-7 Units Subcutaneous 4x Daily AC & at Bedtime   pantoprazole 40 mg Oral Q AM   rOPINIRole 0.25 mg Oral Nightly   sodium chloride 10 mL Intravenous Q12H   valACYclovir 1,000 mg Oral Daily       sodium chloride 50 mL/hr Last Rate: 50 mL/hr (19 0136)     ----------------------------------------------------------------------------------------------------------------------  Vital Signs:  Temp:  [97.7 °F (36.5 °C)-98.4 °F (36.9 °C)] 98.4 °F (36.9 °C)  Heart Rate:  [65-80] 77  Resp:  [16-20] 20  BP: (104-144)/(53-73) 132/63      19  0500 11/10/19  0500 19  0500   Weight: 67.5 kg (148 lb 12.8 oz) 67.3 kg  (148 lb 6.4 oz) 66.7 kg (147 lb 1.6 oz)     Body mass index is 26.06 kg/m².    Intake/Output Summary (Last 24 hours) at 11/11/2019 0743  Last data filed at 11/11/2019 0314  Gross per 24 hour   Intake 480 ml   Output 1450 ml   Net -970 ml     No intake/output data recorded.  Diet Regular; Cardiac, Consistent Carbohydrate  ----------------------------------------------------------------------------------------------------------------------  Physical exam:  Constitutional:  Well-developed and well-nourished.  No respiratory distress.      HENT:  Head:  Normocephalic and atraumatic.  Mouth:  Moist mucous membranes.    Eyes:    Pupils are equal, round, and reactive to light.  No scleral icterus.    Neck:  Neck supple.   Cardiovascular:  Normal rate, regular rhythm and normal heart sounds with no murmur.  Pulmonary/Chest:  No respiratory distress, no wheezes, no crackles, with normal breath sounds and good air movement.  Abdominal:  Soft.  Bowel sounds are normal.  No distension and no tenderness.   Musculoskeletal:  No edema, no tenderness, and no deformity.  No red or swollen joints anywhere.    Neurological:  Alert and oriented to person, place, and time.  No cranial nerve deficit.  No tongue deviation.  No facial droop.  No slurred speech.   Skin:  Skin is warm and dry. No rash noted. No pallor.   ----------------------------------------------------------------------------------------------------------------------  Tele:        ----------------------------------------------------------------------------------------------------------------------  Results from last 7 days   Lab Units 11/08/19  1334 11/08/19 0759 11/08/19 0111 11/07/19 1832   TROPONIN T ng/mL <0.010 <0.010 <0.010   < > <0.010   PROBNP pg/mL  --   --   --   --  127.2    < > = values in this interval not displayed.     Results from last 7 days   Lab Units 11/08/19 0759 11/08/19 0111 11/07/19 1832   WBC 10*3/mm3 7.53 10.10 8.24   HEMOGLOBIN g/dL 13.3  12.9 14.2   HEMATOCRIT % 39.0 38.6 41.0   MCV fL 103.2* 104.6* 101.7*   MCHC g/dL 34.1 33.4 34.6   PLATELETS 10*3/mm3 356 359 395         Results from last 7 days   Lab Units 11/09/19  0432 11/08/19  0759 11/08/19  0111 11/07/19  1832   SODIUM mmol/L 136 135* 134* 131*   POTASSIUM mmol/L 4.5 4.8 4.9 5.3*   MAGNESIUM mg/dL  --  2.1  --   --    CHLORIDE mmol/L 106 102 102 95*   CO2 mmol/L 19.8* 20.3* 20.5* 20.6*   BUN mg/dL 37* 34* 37* 41*   CREATININE mg/dL 0.99 1.09* 1.23* 1.42*   EGFR IF NONAFRICN AM mL/min/1.73 55* 49* 42* 36*   CALCIUM mg/dL 9.3 9.8 9.8 10.8*   GLUCOSE mg/dL 128* 91 99 133*   ALBUMIN g/dL  --  3.90 3.91 4.79   BILIRUBIN mg/dL  --  0.6 0.5 0.7   ALK PHOS U/L  --  123* 125* 150*   AST (SGOT) U/L  --  24 23 30   ALT (SGPT) U/L  --  20 21 26   Estimated Creatinine Clearance: 44.3 mL/min (by C-G formula based on SCr of 0.99 mg/dL).  No results found for: AMMONIA             ----------------------------------------------------------------------------------------------------------------------  Imaging Results (Last 24 Hours)     ** No results found for the last 24 hours. **        ----------------------------------------------------------------------------------------------------------------------  Assessment and Plan:    Chest pain with hx of MI: denies any chest pain currently, no events on telemetry. Stress test on 11/8/19 showed low risk study.     Essential HTN: /58, HR 73, continue coreg 12.5mg PO BID, Hydralazine 50mg TID.     Diabetes Type 2, non-insulin dependent, A1c 6.10:  hypoglycemia protocol initiated, accu checks ac/hs and prn, diabetic diet, low dose sliding scale ordered.     Hypovolemic hyponatremia: sodium is 134, IV fluids dc'd. Repeat in the am.     Chronic Diastolic HF with preserved EF, 66-70%: IV fluids stopped, weight is up 7 pounds from admission, monitor closely for fluid overload. Continue daily weights. She denies any dyspnea,     Macrocytosis: H/H has been stable  during hospitalization, repeat in the am. Folate on 11/8/19 is 19.50, Vitamin B12 725. Iron profile and ferritin ordered for the am.     Cholelithiasis: no concerns currently, continue to monitor outpatient.     Chronic Debility with hx of falls, dizziness: PT/OT consulted. Patient states she has had a few falls in recent months, she states she gets dizzy and falls. CT of the head negative, TSH 1.430, orthostatic v/s noted below. Carotid US pending.         Hx of AML and Breast Cancer: continue aromasin daily.     Hx of Splenectomy:no concerns currently, monitor     Left shoulder pain: patient states she had left shoulder pain last night that was relieved with tylenol. She reports pain on ROM, left shoulder xray negative, monitor.     Diet: cardiac, consistent carb  Precautions:   DVT prophylaxis: Heparin BID sq  GI prophylaxis:     Disposition: rehab referral placed but patient states she does not want to go to rehab and reportedly lives with a significant other. Will await PT evaluation.       Mayra Downey, JENNY  11/11/19  7:43 AM

## 2019-11-12 ENCOUNTER — APPOINTMENT (OUTPATIENT)
Dept: ULTRASOUND IMAGING | Facility: HOSPITAL | Age: 74
End: 2019-11-12

## 2019-11-12 ENCOUNTER — APPOINTMENT (OUTPATIENT)
Dept: GENERAL RADIOLOGY | Facility: HOSPITAL | Age: 74
End: 2019-11-12

## 2019-11-12 LAB
ADV 40+41 DNA STL QL NAA+NON-PROBE: NOT DETECTED
ALBUMIN SERPL-MCNC: 3.87 G/DL (ref 3.5–5.2)
ALBUMIN/GLOB SERPL: 1 G/DL
ALP SERPL-CCNC: 121 U/L (ref 39–117)
ALT SERPL W P-5'-P-CCNC: 41 U/L (ref 1–33)
ANION GAP SERPL CALCULATED.3IONS-SCNC: 13.7 MMOL/L (ref 5–15)
AST SERPL-CCNC: 51 U/L (ref 1–32)
ASTRO TYP 1-8 RNA STL QL NAA+NON-PROBE: NOT DETECTED
BASOPHILS # BLD AUTO: 0.02 10*3/MM3 (ref 0–0.2)
BASOPHILS NFR BLD AUTO: 0.2 % (ref 0–1.5)
BILIRUB SERPL-MCNC: 0.6 MG/DL (ref 0.2–1.2)
BUN BLD-MCNC: 36 MG/DL (ref 8–23)
BUN/CREAT SERPL: 30.5 (ref 7–25)
C CAYETANENSIS DNA STL QL NAA+NON-PROBE: NOT DETECTED
CALCIUM SPEC-SCNC: 9.8 MG/DL (ref 8.6–10.5)
CAMPY SP DNA.DIARRHEA STL QL NAA+PROBE: NOT DETECTED
CHLORIDE SERPL-SCNC: 100 MMOL/L (ref 98–107)
CO2 SERPL-SCNC: 16.3 MMOL/L (ref 22–29)
CREAT BLD-MCNC: 1.18 MG/DL (ref 0.57–1)
CRYPTOSP STL CULT: NOT DETECTED
DEPRECATED RDW RBC AUTO: 50.7 FL (ref 37–54)
E COLI DNA SPEC QL NAA+PROBE: NOT DETECTED
E HISTOLYT AG STL-ACNC: NOT DETECTED
EAEC PAA PLAS AGGR+AATA ST NAA+NON-PRB: DETECTED
EC STX1 + STX2 GENES STL NAA+PROBE: NOT DETECTED
EOSINOPHIL # BLD AUTO: 0.02 10*3/MM3 (ref 0–0.4)
EOSINOPHIL NFR BLD AUTO: 0.2 % (ref 0.3–6.2)
EPEC EAE GENE STL QL NAA+NON-PROBE: NOT DETECTED
ERYTHROCYTE [DISTWIDTH] IN BLOOD BY AUTOMATED COUNT: 13.2 % (ref 12.3–15.4)
ETEC LTA+ST1A+ST1B TOX ST NAA+NON-PROBE: NOT DETECTED
G LAMBLIA DNA SPEC QL NAA+PROBE: NOT DETECTED
GFR SERPL CREATININE-BSD FRML MDRD: 45 ML/MIN/1.73
GLOBULIN UR ELPH-MCNC: 3.7 GM/DL
GLUCOSE BLD-MCNC: 126 MG/DL (ref 65–99)
GLUCOSE BLDC GLUCOMTR-MCNC: 112 MG/DL (ref 70–130)
GLUCOSE BLDC GLUCOMTR-MCNC: 144 MG/DL (ref 70–130)
GLUCOSE BLDC GLUCOMTR-MCNC: 152 MG/DL (ref 70–130)
GLUCOSE BLDC GLUCOMTR-MCNC: 155 MG/DL (ref 70–130)
GLUCOSE BLDC GLUCOMTR-MCNC: 184 MG/DL (ref 70–130)
HCT VFR BLD AUTO: 39.1 % (ref 34–46.6)
HGB BLD-MCNC: 13.2 G/DL (ref 12–15.9)
IMM GRANULOCYTES # BLD AUTO: 0.03 10*3/MM3 (ref 0–0.05)
IMM GRANULOCYTES NFR BLD AUTO: 0.3 % (ref 0–0.5)
LYMPHOCYTES # BLD AUTO: 1.24 10*3/MM3 (ref 0.7–3.1)
LYMPHOCYTES NFR BLD AUTO: 12.5 % (ref 19.6–45.3)
MAGNESIUM SERPL-MCNC: 1.8 MG/DL (ref 1.6–2.4)
MCH RBC QN AUTO: 34.8 PG (ref 26.6–33)
MCHC RBC AUTO-ENTMCNC: 33.8 G/DL (ref 31.5–35.7)
MCV RBC AUTO: 103.2 FL (ref 79–97)
MONOCYTES # BLD AUTO: 1.18 10*3/MM3 (ref 0.1–0.9)
MONOCYTES NFR BLD AUTO: 11.9 % (ref 5–12)
NEUTROPHILS # BLD AUTO: 7.4 10*3/MM3 (ref 1.7–7)
NEUTROPHILS NFR BLD AUTO: 74.9 % (ref 42.7–76)
NOROVIRUS GI+II RNA STL QL NAA+NON-PROBE: NOT DETECTED
NRBC BLD AUTO-RTO: 0 /100 WBC (ref 0–0.2)
P SHIGELLOIDES DNA STL QL NAA+PROBE: NOT DETECTED
PLATELET # BLD AUTO: 293 10*3/MM3 (ref 140–450)
PMV BLD AUTO: 10.2 FL (ref 6–12)
POTASSIUM BLD-SCNC: 4.5 MMOL/L (ref 3.5–5.2)
POTASSIUM BLD-SCNC: 4.6 MMOL/L (ref 3.5–5.2)
PROT SERPL-MCNC: 7.6 G/DL (ref 6–8.5)
RBC # BLD AUTO: 3.79 10*6/MM3 (ref 3.77–5.28)
RV RNA STL NAA+PROBE: DETECTED
SALMONELLA DNA SPEC QL NAA+PROBE: NOT DETECTED
SAPO I+II+IV+V RNA STL QL NAA+NON-PROBE: NOT DETECTED
SHIGELLA SP+EIEC IPAH STL QL NAA+PROBE: NOT DETECTED
SODIUM BLD-SCNC: 130 MMOL/L (ref 136–145)
TROPONIN T SERPL-MCNC: <0.01 NG/ML (ref 0–0.03)
V CHOLERAE DNA SPEC QL NAA+PROBE: NOT DETECTED
VIBRIO DNA SPEC NAA+PROBE: NOT DETECTED
WBC NRBC COR # BLD: 9.89 10*3/MM3 (ref 3.4–10.8)
YERSINIA STL CULT: NOT DETECTED

## 2019-11-12 PROCEDURE — 99222 1ST HOSP IP/OBS MODERATE 55: CPT | Performed by: NURSE PRACTITIONER

## 2019-11-12 PROCEDURE — 99233 SBSQ HOSP IP/OBS HIGH 50: CPT | Performed by: NURSE PRACTITIONER

## 2019-11-12 PROCEDURE — 74018 RADEX ABDOMEN 1 VIEW: CPT

## 2019-11-12 PROCEDURE — 63710000001 INSULIN ASPART PER 5 UNITS: Performed by: PHYSICIAN ASSISTANT

## 2019-11-12 PROCEDURE — 83735 ASSAY OF MAGNESIUM: CPT | Performed by: NURSE PRACTITIONER

## 2019-11-12 PROCEDURE — 0097U HC BIOFIRE FILMARRAY GI PANEL: CPT | Performed by: NURSE PRACTITIONER

## 2019-11-12 PROCEDURE — 84484 ASSAY OF TROPONIN QUANT: CPT | Performed by: NURSE PRACTITIONER

## 2019-11-12 PROCEDURE — 93010 ELECTROCARDIOGRAM REPORT: CPT | Performed by: INTERNAL MEDICINE

## 2019-11-12 PROCEDURE — 82962 GLUCOSE BLOOD TEST: CPT

## 2019-11-12 PROCEDURE — 76705 ECHO EXAM OF ABDOMEN: CPT

## 2019-11-12 PROCEDURE — 74018 RADEX ABDOMEN 1 VIEW: CPT | Performed by: RADIOLOGY

## 2019-11-12 PROCEDURE — 25010000002 HEPARIN (PORCINE) PER 1000 UNITS: Performed by: INTERNAL MEDICINE

## 2019-11-12 PROCEDURE — 80053 COMPREHEN METABOLIC PANEL: CPT | Performed by: INTERNAL MEDICINE

## 2019-11-12 PROCEDURE — 84132 ASSAY OF SERUM POTASSIUM: CPT | Performed by: NURSE PRACTITIONER

## 2019-11-12 PROCEDURE — 85025 COMPLETE CBC W/AUTO DIFF WBC: CPT | Performed by: INTERNAL MEDICINE

## 2019-11-12 PROCEDURE — 25010000002 PROMETHAZINE PER 50 MG: Performed by: PHYSICIAN ASSISTANT

## 2019-11-12 PROCEDURE — 93005 ELECTROCARDIOGRAM TRACING: CPT | Performed by: INTERNAL MEDICINE

## 2019-11-12 PROCEDURE — 94799 UNLISTED PULMONARY SVC/PX: CPT

## 2019-11-12 PROCEDURE — 76705 ECHO EXAM OF ABDOMEN: CPT | Performed by: RADIOLOGY

## 2019-11-12 RX ORDER — SODIUM CHLORIDE 9 MG/ML
75 INJECTION, SOLUTION INTRAVENOUS CONTINUOUS
Status: DISCONTINUED | OUTPATIENT
Start: 2019-11-12 | End: 2019-11-21 | Stop reason: HOSPADM

## 2019-11-12 RX ADMIN — ROPINIROLE HYDROCHLORIDE 0.25 MG: 0.25 TABLET, FILM COATED ORAL at 20:47

## 2019-11-12 RX ADMIN — CETIRIZINE HYDROCHLORIDE 5 MG: 10 TABLET, FILM COATED ORAL at 08:17

## 2019-11-12 RX ADMIN — CARVEDILOL 12.5 MG: 6.25 TABLET, FILM COATED ORAL at 08:17

## 2019-11-12 RX ADMIN — ACETAMINOPHEN 650 MG: 325 TABLET ORAL at 20:47

## 2019-11-12 RX ADMIN — PROMETHAZINE HYDROCHLORIDE 12.5 MG: 25 INJECTION INTRAMUSCULAR; INTRAVENOUS at 08:21

## 2019-11-12 RX ADMIN — INSULIN ASPART 2 UNITS: 100 INJECTION, SOLUTION INTRAVENOUS; SUBCUTANEOUS at 20:47

## 2019-11-12 RX ADMIN — ASPIRIN 81 MG: 81 TABLET, COATED ORAL at 08:17

## 2019-11-12 RX ADMIN — CARVEDILOL 12.5 MG: 6.25 TABLET, FILM COATED ORAL at 20:47

## 2019-11-12 RX ADMIN — VALACYCLOVIR HYDROCHLORIDE 1000 MG: 500 TABLET, FILM COATED ORAL at 08:18

## 2019-11-12 RX ADMIN — ACETAMINOPHEN 650 MG: 325 TABLET ORAL at 06:25

## 2019-11-12 RX ADMIN — PROMETHAZINE HYDROCHLORIDE 12.5 MG: 25 INJECTION INTRAMUSCULAR; INTRAVENOUS at 17:21

## 2019-11-12 RX ADMIN — ATORVASTATIN CALCIUM 10 MG: 10 TABLET, FILM COATED ORAL at 20:47

## 2019-11-12 RX ADMIN — SODIUM CHLORIDE, PRESERVATIVE FREE 10 ML: 5 INJECTION INTRAVENOUS at 20:48

## 2019-11-12 RX ADMIN — HEPARIN SODIUM 5000 UNITS: 5000 INJECTION INTRAVENOUS; SUBCUTANEOUS at 08:18

## 2019-11-12 RX ADMIN — HEPARIN SODIUM 5000 UNITS: 5000 INJECTION INTRAVENOUS; SUBCUTANEOUS at 20:47

## 2019-11-12 RX ADMIN — HYDRALAZINE HYDROCHLORIDE 50 MG: 50 TABLET ORAL at 12:23

## 2019-11-12 RX ADMIN — HYDRALAZINE HYDROCHLORIDE 50 MG: 50 TABLET ORAL at 20:47

## 2019-11-12 RX ADMIN — Medication 1 TABLET: at 08:18

## 2019-11-12 RX ADMIN — TRAMADOL HYDROCHLORIDE 50 MG: 50 TABLET, FILM COATED ORAL at 09:52

## 2019-11-12 RX ADMIN — SODIUM CHLORIDE 75 ML/HR: 9 INJECTION, SOLUTION INTRAVENOUS at 09:52

## 2019-11-12 RX ADMIN — PANTOPRAZOLE SODIUM 40 MG: 40 TABLET, DELAYED RELEASE ORAL at 06:22

## 2019-11-12 RX ADMIN — HYDRALAZINE HYDROCHLORIDE 50 MG: 50 TABLET ORAL at 06:22

## 2019-11-12 RX ADMIN — EXEMESTANE 25 MG: 25 TABLET ORAL at 08:18

## 2019-11-12 NOTE — CONSULTS
Date of Admit: 11/7/2019  Date of Consult: 11/12/19  No ref. provider found        Chest pain in adult    History of Non-STEMI (non-ST elevated myocardial infarction) with no coronary intervention needed in 2008, clinically stable.     Essential hypertension    History of Acute myelocytic leukemia,status post chemotherapy and bone marrow transplant in 2005.    Breast cancer (right), status post radiation therapy in 08/2015    RBBB unchanged from 2015    Dyslipidemia    History of splenectomy    Cholelithiasis    Acute kidney injury (CMS/HCC)    Hyperkalemia    SIRS (systemic inflammatory response syndrome) (CMS/HCC)    Hyperglycemia    First degree AV block    Macrocytosis without anemia    Hypocarbia    Type II diabetes mellitus (CMS/HCC)      Assessment      1. Some typical and atypical Chest pains, troponin negative, EKG reveals no acute ischemia, stress test on 11/8/2019 revealed no evidence of ischemia  2. Chronic diastolic heart failure, appears compensated   3. Nonobstructive CAD, noted on [facility] in 2008  4. Acute kidney injury, recent creatinine 1.18  5. Essential hypertension, controlled  6. Dizziness with recent fall  7. Diabetes mellitus type 2  8. Left shoulder pain, xray negative  9. History of AML and breast cancer    Recommendations     1. Chest pains/CAD  · Troponins have been negative.  EKG reveals no acute ischemia.  Stress test on 11/8/2019 revealed no evidence of ischemia.  Will continue with medical management for nonobstructive CAD.  Continue with aspirin, Lipitor and carvedilol.     2.  Dizziness  · Echocardiogram noted EF 66 to 70% with no significant valvular abnormalities.  CT of the head negative.  Orthostatic vital signs were normal yesterday.  Carotid ultrasound revealed no significant stenosis.  Would recommend continuing gentle hydration due to nausea, vomiting and diarrhea.  GI PCR was positive for E. coli and rotavirus. Will continue to monitor on telemetry for any arrhythmias or  significant bradycardia.    Reason for consultation: Chest pain and dizziness    Subjective       Subjective     Tahmina Martinez is a 74 y.o. female with problems as listed above presented to [facility] ED on 10/8/2019 with complaints of chest pain and abdominal pain.    History of Present Illness    Tahmina Floyd a 74-year-old female who presented to [facility] ED on 10/8/2019 with complaints of chest pain abdominal pain.  She was admitted to [facility] for further evaluation.  Cardiology was consulted for chest pain and dizziness.  Per chart review, patient was recently admitted to this facility on 11/2/2019 with a chief complaint of headache and uncontrolled hypertension.  Her home regimen was restarted and hydralazine was increased from 25 mg 3 times daily to 50 mg 3 times daily during that admission and her blood pressure was reasonably controlled.    Upon evaluation today, the patient states that she has felt overall poorly the last couple days. She has been having nausea and vomiting today with episodes of diarrhea. She also complains of intermittent chest tightness in the middle of her chest with left shoulder pain that is achy in nature. She has associated shortness of breath. She also reports dizziness when she gets up out of the bed and goes from sitting to standing.  She states that she has a history of an NSTEMI for which she underwent left heart catheterization 2008 that showed nonobstructive CAD with 50% diffuse narrowing in the distal RCA.  She states that she takes all of her medications as prescribed.She has had recent falls due to dizziness and near syncope.     Troponin has been negative.  Creatinine 1.18.  ALT 41 AST 51.  EKG reveals sinus rhythm with first-degree AV block and right bundle branch block with no acute ischemia.  Echocardiogram on 11/3/2019 revealed EF 66 to 70%, mild aortic valve regurgitation, mild mitral valve regurgitation and mild tricuspid valve  regurgitation. Stress test on 11/8/2019 revealed no evidence of ischemia. GI panel shows stool positive for E.coli and Rotavirus.  Carotid ultrasound reveals no evidence of significant plaques or stenosis. Holter monitor on 9/4/2019 showed sinus rhythm with one 6 beat run of SVT with a rate of 140 bpm.     Cardiac risk factors:arteriosclerotic heart disease, hypercholesterolemia, hypertension and Sedentary life style    Last Echo: 11/3/2019  · Left ventricular wall thickness is consistent with borderline concentric hypertrophy.  · Left ventricular systolic function is normal.  · Estimated EF appears to be in the range of 66 - 70%.  · Left ventricular diastolic dysfunction.  · Normal cardiac chamber dimensions  · Mild aortic valve regurgitation is present.  · Mild mitral valve regurgitation is present  · Mild tricuspid valve regurgitation is present. No evidence of pulmonary hypertension is present  · There is no evidence of pericardial effusion    Last Stress: 11/8/2019  · A pharmacological stress test was performed using regadenoson with low-level exercise.  · Findings consistent with an indeterminate ECG stress test.  · Myocardial perfusion imaging indicates a normal myocardial perfusion study with no evidence of ischemia.  · Normal LV cavity size. Normal LV wall motion noted.  · Left ventricular ejection fraction is hyperdynamic (Calculated EF > 70%).  · Impressions are consistent with a low risk study.    Last Cath: 10/31/2008        Past Medical History:   Diagnosis Date   • Dyslipidemia 11/14/2017    On atorvastatin.    • Essential hypertension 10/12/2016   • First degree AV block 11/8/2019   • H/O splenectomy    • History of breast cancer     2015   • Hypertension    • Myelocytic leukemia (CMS/HCC)    • Non-STEMI (non-ST elevated myocardial infarction) (CMS/HCC)    • Restless leg    • Type II diabetes mellitus (CMS/HCC) 11/8/2019     Past Surgical History:   Procedure Laterality Date   • BONE MARROW  TRANSPLANT     •  SECTION     • SPLENECTOMY     • TUBAL ABDOMINAL LIGATION       Family History   Problem Relation Age of Onset   • Heart disease Mother    • Hypertension Mother    • Heart disease Father    • Hypertension Father    • Hypertension Sister    • Hypertension Brother      Social History     Tobacco Use   • Smoking status: Former Smoker     Types: Cigarettes   • Smokeless tobacco: Never Used   • Tobacco comment: Patient states she smoked 3 cigarettes a day for 1 year as a teenager    Substance Use Topics   • Alcohol use: No   • Drug use: No     Medications Prior to Admission   Medication Sig Dispense Refill Last Dose   • aspirin 81 MG EC tablet Take 81 mg by mouth Daily.   2019 at Unknown time   • atorvastatin (LIPITOR) 10 MG tablet Take 1 tablet by mouth Every Night. 90 tablet 2 2019 at Unknown time   • Calcium Citrate-Vitamin D (CALCIUM + D PO) Take 600 mg by mouth Every Morning.   2019 at 0800   • carvedilol (COREG) 12.5 MG tablet Take 1 tablet by mouth 2 (Two) Times a Day. 180 tablet 2 2019 at 0800   • Coenzyme Q10 (CO Q-10) 200 MG capsule Take 200 mg by mouth Daily.   2019 at 0800   • exemestane (AROMASIN) 25 MG chemo tablet Take 25 mg by mouth Daily.   2019 at 0800   • furosemide (LASIX) 40 MG tablet Take 40 mg by mouth Daily.   2019 at Unknown time   • hydrALAZINE (APRESOLINE) 50 MG tablet Take 1 tablet by mouth Every 8 (Eight) Hours. (Patient taking differently: Take 50 mg by mouth 2 (Two) Times a Day.) 90 tablet 0 2019 at Unknown time   • lisinopril (PRINIVIL,ZESTRIL) 40 MG tablet Take 1 tablet by mouth Daily. 90 tablet 2 2019 at Unknown time   • Loratadine 10 MG capsule Take 1 capsule by mouth Daily.   2019 at Unknown time   • metFORMIN (GLUCOPHAGE) 500 MG tablet Take 500 mg by mouth Every Morning.   2019 at Unknown time   • nitroglycerin (NITROSTAT) 0.4 MG SL tablet Place 0.4 mg under the tongue Every 5 (Five) Minutes As Needed for  chest pain. Take no more than 3 doses in 15 minutes.   11/7/2019 at Unknown time   • pantoprazole (PROTONIX) 40 MG EC tablet Take 40 mg by mouth Daily.   11/6/2019 at Unknown time   • rOPINIRole (REQUIP) 0.25 MG tablet Take 0.25 mg by mouth Every Night. Take 1 hour before bedtime.   11/6/2019 at Unknown time   • traMADol (ULTRAM) 50 MG tablet Take 50 mg by mouth Daily As Needed for Moderate Pain .   11/6/2019 at unknown   • triamterene-hydrochlorothiazide (MAXZIDE-25) 37.5-25 MG per tablet Take 1 tablet by mouth Daily. 30 tablet 5 11/6/2019 at Unknown time   • valACYclovir (VALTREX) 500 MG tablet Take 1 g by mouth Daily.   11/6/2019 at Unknown time   • vitamin D (ERGOCALCIFEROL) 55624 UNITS capsule capsule Take 50,000 Units by mouth Every 14 (Fourteen) Days.   11/6/2019 at Unknown time     Allergies:  Latex; Penicillins; and Zithromax [azithromycin]    Review of Systems   Constitutional: Positive for chills and fatigue. Negative for diaphoresis and fever.   HENT: Negative for congestion and trouble swallowing.    Eyes: Negative for photophobia and visual disturbance.   Respiratory: Positive for chest tightness and shortness of breath. Negative for wheezing.    Cardiovascular: Negative for chest pain, palpitations and leg swelling.   Gastrointestinal: Positive for abdominal pain, nausea and vomiting. Negative for abdominal distention.   Endocrine: Negative for polyphagia and polyuria.   Genitourinary: Negative for dysuria and hematuria.   Musculoskeletal: Negative for neck pain and neck stiffness.   Skin: Negative for rash and wound.   Allergic/Immunologic: Negative for food allergies and immunocompromised state.   Neurological: Positive for dizziness, weakness and light-headedness. Negative for syncope.   Hematological: Does not bruise/bleed easily.   Psychiatric/Behavioral: Negative for confusion and suicidal ideas.       Objective       Objective      Vital Signs  Temp:  [97.9 °F (36.6 °C)-98.6 °F (37 °C)] 98.6 °F  (37 °C)  Heart Rate:  [66-81] 68  Resp:  [17-20] 18  BP: ()/(44-78) 138/65     Vital Signs (last 72 hrs)       11/09 0700  -  11/10 0659 11/10 0700  -  11/11 0659 11/11 0700  -  11/12 0659 11/12 0700  -  11/12 1321   Most Recent    Temp (°F) 97.3 -  98.2    97.7 -  98.4    97.9 -  98.1      98.6     98.6 (37)    Heart Rate 66 -  72    65 -  80    66 -  81      68     68    Resp   18    16 -  20    17 -  20      18     18    /63 -  140/70    104/53 -  144/73    99/44 -  166/74      138/65     138/65    SpO2 (%) 96 -  98    97 -  98    92 -  98    96 -  98     98        Body mass index is 25.69 kg/m².    Intake/Output Summary (Last 24 hours) at 11/12/2019 1321  Last data filed at 11/12/2019 0952  Gross per 24 hour   Intake 1360 ml   Output --   Net 1360 ml     Physical Exam   Constitutional: She is oriented to person, place, and time. She appears well-developed and well-nourished. She has a sickly appearance.   HENT:   Head: Normocephalic and atraumatic.   Neck: Normal range of motion.   Cardiovascular: Normal rate, regular rhythm and normal heart sounds.   No murmur heard.  Pulmonary/Chest: Effort normal and breath sounds normal. No respiratory distress. She has no wheezes.   Abdominal: Soft. Bowel sounds are normal. She exhibits no distension. There is no tenderness.   Musculoskeletal: She exhibits no edema.   Neurological: She is alert and oriented to person, place, and time.   Psychiatric: She has a normal mood and affect. Her behavior is normal.     Results review     Results Review:    I reviewed the patient's new clinical results.  Results from last 7 days   Lab Units 11/12/19  1026 11/08/19  1334 11/08/19  0759 11/08/19  0111 11/07/19  2032   TROPONIN T ng/mL <0.010 <0.010 <0.010 <0.010 <0.010     Results from last 7 days   Lab Units 11/12/19  0443 11/08/19  0759 11/08/19  0111 11/07/19  1832   WBC 10*3/mm3 9.89 7.53 10.10 8.24   HEMOGLOBIN g/dL 13.2 13.3 12.9 14.2   PLATELETS 10*3/mm3 293 356 359  395     Results from last 7 days   Lab Units 11/12/19  0443 11/11/19  0837 11/09/19  0432 11/08/19  0759 11/08/19  0111 11/07/19  1832   SODIUM mmol/L 130* 134* 136 135* 134* 131*   POTASSIUM mmol/L 4.6 4.8 4.5 4.8 4.9 5.3*   CHLORIDE mmol/L 100 100 106 102 102 95*   CO2 mmol/L 16.3* 21.3* 19.8* 20.3* 20.5* 20.6*   BUN mg/dL 36* 28* 37* 34* 37* 41*   CREATININE mg/dL 1.18* 0.94 0.99 1.09* 1.23* 1.42*   CALCIUM mg/dL 9.8 10.1 9.3 9.8 9.8 10.8*   GLUCOSE mg/dL 126* 167* 128* 91 99 133*   ALT (SGPT) U/L 41*  --   --  20 21 26   AST (SGOT) U/L 51*  --   --  24 23 30     Lab Results   Component Value Date    INR 0.97 11/02/2019    INR 0.90 07/20/2015    INR <0.90 12/20/2014     Lab Results   Component Value Date    MG 2.1 11/08/2019     Lab Results   Component Value Date    TSH 1.430 11/08/2019    TRIG 104 11/03/2019    HDL 57 11/03/2019    LDL 64 11/03/2019      Lab Results   Component Value Date    PROBNP 127.2 11/07/2019    PROBNP 795.4 11/02/2019       ECG  ECG/EMG Results (last 24 hours)     ** No results found for the last 24 hours. **          Imaging Results (Last 72 Hours)     Procedure Component Value Units Date/Time    XR Abdomen KUB [[id number]] Collected:  11/12/19 1136     Updated:  11/12/19 1146    Narrative:       EXAMINATION: XR ABDOMEN KUB-      TECHNIQUE: Single KUB     CLINICAL INDICATION:     nausea/vomiting/diarrhea; R07.9-Chest pain,  unspecified; N28.9-Disorder of kidney and ureter, unspecified      COMPARISON:    None available.     FINDINGS:    There are no calcifications projecting over the kidneys or along the  expected course of the ureters.  Nonobstructive bowel gas pattern is noted.  Scattered fecal material seen throughout colon.  The bony structures are unremarkable.  No definite radiographic evidence of soft tissue mass.  Midline laparotomy staples.       Impression:       Nonobstructive bowel gas pattern.     This report was finalized on 11/12/2019 11:37 AM by Dr. Brendan Orellana MD.        US Carotid Bilateral [[id number]] Collected:  11/11/19 1310     Updated:  11/11/19 1316    Narrative:       US CAROTID BILATERAL-      TECHNIQUE: Multiple real-time color Doppler images were acquired of  bilateral carotid arteries.     Stenosis measurements if obtained, were performed by the NASCET or  similar method.     CLINICAL INDICATION:     Dizziness; R07.9-Chest pain, unspecified;  N28.9-Disorder of kidney and ureter, unspecified.     COMPARISON:    None.     FINDINGS:        RIGHT:  No significant intimal thickening or plaque is noted. No occlusion.     RIGHT ICA PSV: 1564.00 mm/s  RIGHT ICA EDV: 344.00 mm/s.   Right ICA/CCA Ratio: 1.45  Anterograde flow is demonstrated in RIGHT vertebral artery.     LEFT:  No significant intimal thickening or plaque is noted. No occlusion.     LEFT ICA PSV: 1058.00 mm/s  LEFT EDV: 236.00 mm/s.   Left ICA/CCA Ratio: 0.93  Anterograde flow is demonstrated in LEFT vertebral artery.          Impression:          No evidence of hemodynamically significant plaques or stenosis within  the carotid system at this time.     This report was finalized on 11/11/2019 1:14 PM by Dr. Sal Cheek MD.       XR Shoulder 2+ View Left [[id number]] Collected:  11/11/19 1237     Updated:  11/11/19 1239    Narrative:       EXAMINATION: XR SHOULDER 2+ VW LEFT-      CLINICAL INDICATION:left shoulder pain; R07.9-Chest pain, unspecified;  N28.9-Disorder of kidney and ureter, unspecified     COMPARISON: None      TECHNIQUE: 3 views left shoulder     FINDINGS:   No acute osseous or articular abnormality. No soft tissue abnormality.          Impression:       No acute osseous or articular abnormalities.     This report was finalized on 11/11/2019 12:37 PM by Dr. Brendan Orellana MD.             I have discussed my impression and recommendations with the patient and family.    Thank you very much for asking us to be involved in this patient's care.  We will follow along with you.      Elyse Dowling,  APRN  11/12/19  1:21 PM     I, Dashawn Soler MD, [facility], personally performed the services described in this documentation as documented by the above named individual in my presence, made necessary changes and the note is both accurate and complete.     Dashawn Soler MD, [facility]  11/12/2019  8:30 PM      Please note that portions of this note were completed with a voice recognition program.

## 2019-11-12 NOTE — NURSING NOTE
Will continue to follow patient from rehab standpoint for possible admission when cleared medically by MD.

## 2019-11-12 NOTE — PLAN OF CARE
Problem: Patient Care Overview  Goal: Discharge Needs Assessment  Outcome: Ongoing (interventions implemented as appropriate)    Goal: Interprofessional Rounds/Family Conf  Outcome: Ongoing (interventions implemented as appropriate)      Problem: Fall Risk (Adult)  Goal: Identify Related Risk Factors and Signs and Symptoms  Outcome: Ongoing (interventions implemented as appropriate)    Goal: Absence of Fall  Outcome: Ongoing (interventions implemented as appropriate)      Problem: Pain, Acute (Adult)  Goal: Identify Related Risk Factors and Signs and Symptoms  Outcome: Ongoing (interventions implemented as appropriate)    Goal: Acceptable Pain Control/Comfort Level  Outcome: Ongoing (interventions implemented as appropriate)    Goal: Identify Related Risk Factors and Signs and Symptoms  Outcome: Ongoing (interventions implemented as appropriate)    Goal: Acceptable Pain Control/Comfort Level  Outcome: Ongoing (interventions implemented as appropriate)

## 2019-11-12 NOTE — PROGRESS NOTES
"Patient Identification:  Name:  Tahmina Martinez  Age:  74 y.o.  Sex:  female  :  1945  MRN:  [id number]  Visit Number:  [id number]  Primary Care Provider:  Noe Bower MD    Length of stay:  4    Chief Complaint: dizziness, nausea/vomiting/diarrhea, left shoulder pain    HPI:      Mrs. Maritnez is a 74 year old female patient who was admitted to [facility] on 19 for unstable angina and CADENCE on probable CKD stage II. 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    Subjective:      Mrs. Martinez is lying in bed on my exam this am. She states she had nausea and some vomiting last night which was \"chunks of food\", she also reports 3 episodes of diarrhea. She denies any abdominal pain, no fevers. She states she started having nausea and vomiting about 2 days ago. She continues to have dizziness when she is up out of the bed. She denies chest pain but continues to have left shoulder pain last night and some this morning. Discussed with ANGEL Cohen. No family is present during exam.   ----------------------------------------------------------------------------------------------------------------------  Current Hospital Meds:    aspirin 81 mg Oral Daily   atorvastatin 10 mg Oral Nightly   calcium carb-cholecalciferol 1 tablet Oral Daily   carvedilol 12.5 mg Oral BID   cetirizine 5 mg Oral Daily   cholecalciferol 50,000 Units Oral Weekly   exemestane 25 mg Oral Daily   heparin (porcine) 5,000 Units Subcutaneous Q12H   hydrALAZINE 50 mg Oral Q8H   insulin aspart 0-7 Units Subcutaneous 4x Daily AC & at Bedtime   pantoprazole 40 mg Oral Q AM   rOPINIRole 0.25 mg Oral Nightly   sodium chloride 10 mL Intravenous Q12H   valACYclovir 1,000 mg Oral Daily      "   ----------------------------------------------------------------------------------------------------------------------  Vital Signs:  Temp:  [97.9 °F (36.6 °C)-98.1 °F (36.7 °C)] 98 °F (36.7 °C)  Heart Rate:  [66-81] 80  Resp:  [17-20] 18  BP: ()/(44-78) 123/67      11/10/19  0500 11/11/19  0500 11/12/19  0500   Weight: 67.3 kg (148 lb 6.4 oz) 66.7 kg (147 lb 1.6 oz) 65.8 kg (145 lb)     Body mass index is 25.69 kg/m².    Intake/Output Summary (Last 24 hours) at 11/12/2019 0712  Last data filed at 11/12/2019 0302  Gross per 24 hour   Intake 480 ml   Output --   Net 480 ml     No intake/output data recorded.  Diet Regular; Cardiac, Consistent Carbohydrate  ----------------------------------------------------------------------------------------------------------------------  Physical exam:  Constitutional:  Well-developed and well-nourished.  No respiratory distress.      HENT:  Head:  Normocephalic and atraumatic.  Mouth:  Moist mucous membranes.    Eyes:   Pupils are equal, round, and reactive to light.  No scleral icterus. Neck:  Neck supple.  No JVD present.    Cardiovascular:  Normal rate, regular rhythm and normal heart sounds with no murmur.  Pulmonary/Chest:  No respiratory distress, no wheezes, no crackles, with normal breath sounds and good air movement.  Abdominal:  Soft.  Bowel sounds are normal.  No distension and no tenderness.   Musculoskeletal:  No edema, no tenderness, and no deformity.  No red or swollen joints anywhere.    Neurological:  Alert and oriented to person, place, and time.   No tongue deviation.  No facial droop.  No slurred speech.   Skin:  Skin is warm and dry. No rash noted. No pallor.   ----------------------------------------------------------------------------------------------------------------------  Tele:      SR with 1st Degree AV block       ----------------------------------------------------------------------------------------------------------------------  Results  from last 7 days   Lab Units 11/08/19  1334 11/08/19  0759 11/08/19  0111  11/07/19  1832   TROPONIN T ng/mL <0.010 <0.010 <0.010   < > <0.010   PROBNP pg/mL  --   --   --   --  127.2    < > = values in this interval not displayed.     Results from last 7 days   Lab Units 11/12/19  0443 11/08/19  0759 11/08/19  0111   WBC 10*3/mm3 9.89 7.53 10.10   HEMOGLOBIN g/dL 13.2 13.3 12.9   HEMATOCRIT % 39.1 39.0 38.6   MCV fL 103.2* 103.2* 104.6*   MCHC g/dL 33.8 34.1 33.4   PLATELETS 10*3/mm3 293 356 359         Results from last 7 days   Lab Units 11/12/19  0443 11/11/19  0837 11/09/19  0432 11/08/19 0759 11/08/19  0111   SODIUM mmol/L 130* 134* 136 135* 134*   POTASSIUM mmol/L 4.6 4.8 4.5 4.8 4.9   MAGNESIUM mg/dL  --   --   --  2.1  --    CHLORIDE mmol/L 100 100 106 102 102   CO2 mmol/L 16.3* 21.3* 19.8* 20.3* 20.5*   BUN mg/dL 36* 28* 37* 34* 37*   CREATININE mg/dL 1.18* 0.94 0.99 1.09* 1.23*   EGFR IF NONAFRICN AM mL/min/1.73 45* 58* 55* 49* 42*   CALCIUM mg/dL 9.8 10.1 9.3 9.8 9.8   GLUCOSE mg/dL 126* 167* 128* 91 99   ALBUMIN g/dL 3.87  --   --  3.90 3.91   BILIRUBIN mg/dL 0.6  --   --  0.6 0.5   ALK PHOS U/L 121*  --   --  123* 125*   AST (SGOT) U/L 51*  --   --  24 23   ALT (SGPT) U/L 41*  --   --  20 21   Estimated Creatinine Clearance: 37 mL/min (A) (by C-G formula based on SCr of 1.18 mg/dL (H)).  No results found for: AMMONIA                  ----------------------------------------------------------------------------------------------------------------------  Imaging Results (Last 24 Hours)     Procedure Component Value Units Date/Time    US Carotid Bilateral [[id number]] Collected:  11/11/19 1310     Updated:  11/11/19 1316    Narrative:       US CAROTID BILATERAL-      TECHNIQUE: Multiple real-time color Doppler images were acquired of  bilateral carotid arteries.     Stenosis measurements if obtained, were performed by the NASCET or  similar method.     CLINICAL INDICATION:     Dizziness; R07.9-Chest pain,  unspecified;  N28.9-Disorder of kidney and ureter, unspecified.     COMPARISON:    None.     FINDINGS:        RIGHT:  No significant intimal thickening or plaque is noted. No occlusion.     RIGHT ICA PSV: 1564.00 mm/s  RIGHT ICA EDV: 344.00 mm/s.   Right ICA/CCA Ratio: 1.45  Anterograde flow is demonstrated in RIGHT vertebral artery.     LEFT:  No significant intimal thickening or plaque is noted. No occlusion.     LEFT ICA PSV: 1058.00 mm/s  LEFT EDV: 236.00 mm/s.   Left ICA/CCA Ratio: 0.93  Anterograde flow is demonstrated in LEFT vertebral artery.          Impression:          No evidence of hemodynamically significant plaques or stenosis within  the carotid system at this time.     This report was finalized on 11/11/2019 1:14 PM by Dr. Sal Cheek MD.       XR Shoulder 2+ View Left [[id number]] Collected:  11/11/19 1237     Updated:  11/11/19 1239    Narrative:       EXAMINATION: XR SHOULDER 2+ VW LEFT-      CLINICAL INDICATION:left shoulder pain; R07.9-Chest pain, unspecified;  N28.9-Disorder of kidney and ureter, unspecified     COMPARISON: None      TECHNIQUE: 3 views left shoulder     FINDINGS:   No acute osseous or articular abnormality. No soft tissue abnormality.          Impression:       No acute osseous or articular abnormalities.     This report was finalized on 11/11/2019 12:37 PM by Dr. Brendan Orellana MD.           ----------------------------------------------------------------------------------------------------------------------  Assessment and Plan:    Chest pain with hx of MI: denies any chest pain currently, no events on telemetry. Stress test on 11/8/19 showed low risk study. she continues to have left shoulder pain, will continue to monitor on telemetry and trend troponin, cardiology consulted.      Essential HTN: /58, HR 73, continue coreg 12.5mg PO BID, Hydralazine 50mg TID.      Diabetes Type 2, non-insulin dependent, A1c 6.10:  hypoglycemia protocol initiated, accu checks ac/hs  and prn, diabetic diet, low dose sliding scale ordered, glucoses are well controlled.      Hypovolemic hyponatremia: sodium is 130, IV fluids dc'd yesterday and restarted today due to her drop in sodium and increase in creatinine with associated vomiting and diarrhea, repeat in the am.       Chronic Diastolic HF with preserved EF, 66-70%:  monitor closely for fluid overload. Continue daily weights, down 2 pounds today, no dyspnea, does not clinically appear to be in fluid overload, giving IV fluids due to her above noted labs. Monitor fluid status closely.      Macrocytosis: H/H 13.2/39.1, repeat in the am. Folate on 11/8/19 is 19.50, Vitamin B12 725.      Cholelithiasis: given her nausea, vomiting and diarrhea that started 2 days ago, GB US ordered, monitor.     Nausea/Vomiting/Diarrhea: GB US ordered along with GI PCR, KUB and IV fluids started, monitor closely and await workup. She denies any abdominal pain and is in no distress.      Chronic Debility with hx of falls, dizziness: PT/OT following, she continues to have dizziness and now with nausea/vomiting and diarrhea so participating in PT is difficult. Patient states she has had a few falls in recent months, she states she gets dizzy and falls. CT of the head negative, TSH 1.430, orthostatic v/s were normal yesterday. Carotid US also unremarkable.     Hx of AML and Breast Cancer: continue aromasin daily.      Hx of Splenectomy:no concerns currently, monitor      Left shoulder pain: patient states she had left shoulder pain last night that was relieved with tylenol. She reports pain on ROM, left shoulder xray negative, monitor.      Diet: cardiac, consistent carb  Precautions: falls   DVT prophylaxis: Heparin BID sq  GI prophylaxis: Protonix 40mg PO daily      Disposition: patient wishes to go home at [place], will continue to discuss rehab with her when she is medically stable for [place]    Patient is high risk for the following reasons: hx of falls       Mayra CAR  JENNY Downey  11/12/19  7:12 AM

## 2019-11-13 ENCOUNTER — APPOINTMENT (OUTPATIENT)
Dept: GENERAL RADIOLOGY | Facility: HOSPITAL | Age: 74
End: 2019-11-13

## 2019-11-13 LAB
ALBUMIN SERPL-MCNC: 3.69 G/DL (ref 3.5–5.2)
ALBUMIN/GLOB SERPL: 1.1 G/DL
ALP SERPL-CCNC: 95 U/L (ref 39–117)
ALT SERPL W P-5'-P-CCNC: 39 U/L (ref 1–33)
ANION GAP SERPL CALCULATED.3IONS-SCNC: 17.5 MMOL/L (ref 5–15)
AST SERPL-CCNC: 48 U/L (ref 1–32)
BILIRUB SERPL-MCNC: 0.4 MG/DL (ref 0.2–1.2)
BILIRUB UR QL STRIP: NEGATIVE
BUN BLD-MCNC: 34 MG/DL (ref 8–23)
BUN/CREAT SERPL: 21.9 (ref 7–25)
CALCIUM SPEC-SCNC: 8.9 MG/DL (ref 8.6–10.5)
CHLORIDE SERPL-SCNC: 100 MMOL/L (ref 98–107)
CLARITY UR: CLEAR
CO2 SERPL-SCNC: 16.5 MMOL/L (ref 22–29)
COLOR UR: YELLOW
CREAT BLD-MCNC: 1.55 MG/DL (ref 0.57–1)
CRP SERPL-MCNC: 10.2 MG/DL (ref 0–0.5)
DEPRECATED RDW RBC AUTO: 49.6 FL (ref 37–54)
ERYTHROCYTE [DISTWIDTH] IN BLOOD BY AUTOMATED COUNT: 13.3 % (ref 12.3–15.4)
GFR SERPL CREATININE-BSD FRML MDRD: 33 ML/MIN/1.73
GLOBULIN UR ELPH-MCNC: 3.4 GM/DL
GLUCOSE BLD-MCNC: 139 MG/DL (ref 65–99)
GLUCOSE BLDC GLUCOMTR-MCNC: 102 MG/DL (ref 70–130)
GLUCOSE BLDC GLUCOMTR-MCNC: 105 MG/DL (ref 70–130)
GLUCOSE BLDC GLUCOMTR-MCNC: 108 MG/DL (ref 70–130)
GLUCOSE BLDC GLUCOMTR-MCNC: 79 MG/DL (ref 70–130)
GLUCOSE UR STRIP-MCNC: NEGATIVE MG/DL
HCT VFR BLD AUTO: 34.9 % (ref 34–46.6)
HGB BLD-MCNC: 12.3 G/DL (ref 12–15.9)
HGB UR QL STRIP.AUTO: NEGATIVE
KETONES UR QL STRIP: NEGATIVE
LEUKOCYTE ESTERASE UR QL STRIP.AUTO: NEGATIVE
LYMPHOCYTES # BLD MANUAL: 1.08 10*3/MM3 (ref 0.7–3.1)
LYMPHOCYTES NFR BLD MANUAL: 14 % (ref 5–12)
LYMPHOCYTES NFR BLD MANUAL: 9 % (ref 19.6–45.3)
MACROCYTES BLD QL SMEAR: ABNORMAL
MAGNESIUM SERPL-MCNC: 1.7 MG/DL (ref 1.6–2.4)
MCH RBC QN AUTO: 35.8 PG (ref 26.6–33)
MCHC RBC AUTO-ENTMCNC: 35.2 G/DL (ref 31.5–35.7)
MCV RBC AUTO: 101.5 FL (ref 79–97)
METAMYELOCYTES NFR BLD MANUAL: 3 % (ref 0–0)
MONOCYTES # BLD AUTO: 1.68 10*3/MM3 (ref 0.1–0.9)
NEUTROPHILS # BLD AUTO: 8.86 10*3/MM3 (ref 1.7–7)
NEUTROPHILS NFR BLD MANUAL: 61 % (ref 42.7–76)
NEUTS BAND NFR BLD MANUAL: 13 % (ref 0–5)
NITRITE UR QL STRIP: NEGATIVE
PH UR STRIP.AUTO: <=5 [PH] (ref 5–8)
PHOSPHATE SERPL-MCNC: 4.7 MG/DL (ref 2.5–4.5)
PLAT MORPH BLD: NORMAL
PLATELET # BLD AUTO: 264 10*3/MM3 (ref 140–450)
PMV BLD AUTO: 10 FL (ref 6–12)
POTASSIUM BLD-SCNC: 4.1 MMOL/L (ref 3.5–5.2)
PROT SERPL-MCNC: 7.1 G/DL (ref 6–8.5)
PROT UR QL STRIP: ABNORMAL
RBC # BLD AUTO: 3.44 10*6/MM3 (ref 3.77–5.28)
SCAN SLIDE: NORMAL
SODIUM BLD-SCNC: 134 MMOL/L (ref 136–145)
SP GR UR STRIP: 1.02 (ref 1–1.03)
TROPONIN T SERPL-MCNC: <0.01 NG/ML (ref 0–0.03)
TROPONIN T SERPL-MCNC: <0.01 NG/ML (ref 0–0.03)
UROBILINOGEN UR QL STRIP: ABNORMAL
WBC NRBC COR # BLD: 11.97 10*3/MM3 (ref 3.4–10.8)

## 2019-11-13 PROCEDURE — 71045 X-RAY EXAM CHEST 1 VIEW: CPT | Performed by: RADIOLOGY

## 2019-11-13 PROCEDURE — 84484 ASSAY OF TROPONIN QUANT: CPT | Performed by: NURSE PRACTITIONER

## 2019-11-13 PROCEDURE — 71045 X-RAY EXAM CHEST 1 VIEW: CPT

## 2019-11-13 PROCEDURE — 25010000002 PROMETHAZINE PER 50 MG: Performed by: NURSE PRACTITIONER

## 2019-11-13 PROCEDURE — 84100 ASSAY OF PHOSPHORUS: CPT | Performed by: INTERNAL MEDICINE

## 2019-11-13 PROCEDURE — 85025 COMPLETE CBC W/AUTO DIFF WBC: CPT | Performed by: INTERNAL MEDICINE

## 2019-11-13 PROCEDURE — 80053 COMPREHEN METABOLIC PANEL: CPT | Performed by: INTERNAL MEDICINE

## 2019-11-13 PROCEDURE — 85007 BL SMEAR W/DIFF WBC COUNT: CPT | Performed by: INTERNAL MEDICINE

## 2019-11-13 PROCEDURE — 87040 BLOOD CULTURE FOR BACTERIA: CPT | Performed by: NURSE PRACTITIONER

## 2019-11-13 PROCEDURE — 99233 SBSQ HOSP IP/OBS HIGH 50: CPT | Performed by: NURSE PRACTITIONER

## 2019-11-13 PROCEDURE — 83735 ASSAY OF MAGNESIUM: CPT | Performed by: INTERNAL MEDICINE

## 2019-11-13 PROCEDURE — 94799 UNLISTED PULMONARY SVC/PX: CPT

## 2019-11-13 PROCEDURE — 82962 GLUCOSE BLOOD TEST: CPT

## 2019-11-13 PROCEDURE — 99232 SBSQ HOSP IP/OBS MODERATE 35: CPT | Performed by: INTERNAL MEDICINE

## 2019-11-13 PROCEDURE — 25010000002 CEFTRIAXONE: Performed by: INTERNAL MEDICINE

## 2019-11-13 PROCEDURE — 25010000002 PROMETHAZINE PER 50 MG: Performed by: PHYSICIAN ASSISTANT

## 2019-11-13 PROCEDURE — 81003 URINALYSIS AUTO W/O SCOPE: CPT | Performed by: NURSE PRACTITIONER

## 2019-11-13 PROCEDURE — 25010000002 HEPARIN (PORCINE) PER 1000 UNITS: Performed by: INTERNAL MEDICINE

## 2019-11-13 PROCEDURE — 86140 C-REACTIVE PROTEIN: CPT | Performed by: INTERNAL MEDICINE

## 2019-11-13 PROCEDURE — 25010000002 MAGNESIUM SULFATE 2 GM/50ML SOLUTION: Performed by: NURSE PRACTITIONER

## 2019-11-13 RX ORDER — MAGNESIUM SULFATE HEPTAHYDRATE 40 MG/ML
2 INJECTION, SOLUTION INTRAVENOUS AS NEEDED
Status: DISCONTINUED | OUTPATIENT
Start: 2019-11-13 | End: 2019-11-13

## 2019-11-13 RX ORDER — MAGNESIUM SULFATE HEPTAHYDRATE 40 MG/ML
2 INJECTION, SOLUTION INTRAVENOUS AS NEEDED
Status: DISCONTINUED | OUTPATIENT
Start: 2019-11-13 | End: 2019-11-21 | Stop reason: HOSPADM

## 2019-11-13 RX ORDER — MAGNESIUM SULFATE HEPTAHYDRATE 40 MG/ML
2 INJECTION, SOLUTION INTRAVENOUS ONCE
Status: COMPLETED | OUTPATIENT
Start: 2019-11-13 | End: 2019-11-13

## 2019-11-13 RX ORDER — MAGNESIUM SULFATE HEPTAHYDRATE 40 MG/ML
4 INJECTION, SOLUTION INTRAVENOUS AS NEEDED
Status: DISCONTINUED | OUTPATIENT
Start: 2019-11-13 | End: 2019-11-21 | Stop reason: HOSPADM

## 2019-11-13 RX ORDER — MAGNESIUM SULFATE HEPTAHYDRATE 40 MG/ML
4 INJECTION, SOLUTION INTRAVENOUS AS NEEDED
Status: DISCONTINUED | OUTPATIENT
Start: 2019-11-13 | End: 2019-11-13

## 2019-11-13 RX ORDER — MAGNESIUM SULFATE 1 G/100ML
1 INJECTION INTRAVENOUS AS NEEDED
Status: DISCONTINUED | OUTPATIENT
Start: 2019-11-13 | End: 2019-11-21 | Stop reason: HOSPADM

## 2019-11-13 RX ADMIN — METRONIDAZOLE 500 MG: 500 INJECTION, SOLUTION INTRAVENOUS at 23:38

## 2019-11-13 RX ADMIN — PROMETHAZINE HYDROCHLORIDE 12.5 MG: 25 INJECTION INTRAMUSCULAR; INTRAVENOUS at 22:58

## 2019-11-13 RX ADMIN — ASPIRIN 81 MG: 81 TABLET, COATED ORAL at 09:33

## 2019-11-13 RX ADMIN — SODIUM CHLORIDE, PRESERVATIVE FREE 10 ML: 5 INJECTION INTRAVENOUS at 19:51

## 2019-11-13 RX ADMIN — EXEMESTANE 25 MG: 25 TABLET ORAL at 09:37

## 2019-11-13 RX ADMIN — CARVEDILOL 12.5 MG: 6.25 TABLET, FILM COATED ORAL at 19:51

## 2019-11-13 RX ADMIN — SODIUM CHLORIDE, PRESERVATIVE FREE 10 ML: 5 INJECTION INTRAVENOUS at 09:36

## 2019-11-13 RX ADMIN — DOXYCYCLINE 100 MG: 100 INJECTION, POWDER, LYOPHILIZED, FOR SOLUTION INTRAVENOUS at 19:48

## 2019-11-13 RX ADMIN — ROPINIROLE HYDROCHLORIDE 0.25 MG: 0.25 TABLET, FILM COATED ORAL at 19:51

## 2019-11-13 RX ADMIN — SODIUM CHLORIDE 125 ML/HR: 9 INJECTION, SOLUTION INTRAVENOUS at 23:04

## 2019-11-13 RX ADMIN — CETIRIZINE HYDROCHLORIDE 5 MG: 10 TABLET, FILM COATED ORAL at 09:33

## 2019-11-13 RX ADMIN — SODIUM CHLORIDE 125 ML/HR: 9 INJECTION, SOLUTION INTRAVENOUS at 11:45

## 2019-11-13 RX ADMIN — Medication 1 TABLET: at 09:33

## 2019-11-13 RX ADMIN — PROMETHAZINE HYDROCHLORIDE 12.5 MG: 25 INJECTION INTRAMUSCULAR; INTRAVENOUS at 06:08

## 2019-11-13 RX ADMIN — HEPARIN SODIUM 5000 UNITS: 5000 INJECTION INTRAVENOUS; SUBCUTANEOUS at 09:37

## 2019-11-13 RX ADMIN — PANTOPRAZOLE SODIUM 40 MG: 40 TABLET, DELAYED RELEASE ORAL at 05:42

## 2019-11-13 RX ADMIN — VALACYCLOVIR HYDROCHLORIDE 1000 MG: 500 TABLET, FILM COATED ORAL at 15:00

## 2019-11-13 RX ADMIN — ATORVASTATIN CALCIUM 10 MG: 10 TABLET, FILM COATED ORAL at 19:51

## 2019-11-13 RX ADMIN — SODIUM CHLORIDE 75 ML/HR: 9 INJECTION, SOLUTION INTRAVENOUS at 00:56

## 2019-11-13 RX ADMIN — MAGNESIUM SULFATE IN WATER 2 G: 40 INJECTION, SOLUTION INTRAVENOUS at 11:45

## 2019-11-13 RX ADMIN — CEFTRIAXONE 1 G: 1 INJECTION, POWDER, FOR SOLUTION INTRAMUSCULAR; INTRAVENOUS at 19:47

## 2019-11-13 RX ADMIN — HEPARIN SODIUM 5000 UNITS: 5000 INJECTION INTRAVENOUS; SUBCUTANEOUS at 19:50

## 2019-11-13 RX ADMIN — CARVEDILOL 12.5 MG: 6.25 TABLET, FILM COATED ORAL at 09:32

## 2019-11-13 NOTE — PROGRESS NOTES
LOS: 5 days     Name: Tahmina Martinez  Age/Sex: 74 y.o. female  :  1945        PCP: Noe Bower MD  REF: No ref. provider found    Principal Problem:    Chest pain in adult  Active Problems:    History of Non-STEMI (non-ST elevated myocardial infarction) with no coronary intervention needed in , clinically stable.     Essential hypertension    History of Acute myelocytic leukemia,status post chemotherapy and bone marrow transplant in .    Breast cancer (right), status post radiation therapy in 2015    RBBB unchanged from     Dyslipidemia    History of splenectomy    Cholelithiasis    Acute kidney injury (CMS/HCC)    Hyperkalemia    SIRS (systemic inflammatory response syndrome) (CMS/HCC)    Hyperglycemia    First degree AV block    Macrocytosis without anemia    Hypocarbia    Type II diabetes mellitus (CMS/HCC)      Reason for follow-up:    Subjective       Subjective  Tahmina Floyd a 74-year-old female who presented to [facility] ED on 10/8/2019 with complaints of chest pain abdominal pain.  She was admitted to [facility] for further evaluation.  Cardiology was consulted for chest pain and dizziness.  Per chart review, patient was recently admitted to this facility on 2019 with a chief complaint of headache and uncontrolled hypertension.  Her home regimen was restarted and hydralazine was increased from 25 mg 3 times daily to 50 mg 3 times daily during that admission and her blood pressure was reasonably controlled.     Upon evaluation today, the patient states that she has felt overall poorly the last couple days. She has been having nausea and vomiting today with episodes of diarrhea. She also complains of intermittent chest tightness in the middle of her chest with left shoulder pain that is achy in nature. She has associated shortness of breath. She also reports dizziness when she gets up out of the bed and goes from sitting to standing.  She states that she has a  history of an NSTEMI for which she underwent left heart catheterization 2008 that showed nonobstructive CAD with 50% diffuse narrowing in the distal RCA.  She states that she takes all of her medications as prescribed.She has had recent falls due to dizziness and near syncope.      Troponin has been negative.  Creatinine 1.18.  ALT 41 AST 51.  EKG reveals sinus rhythm with first-degree AV block and right bundle branch block with no acute ischemia.  Echocardiogram on 11/3/2019 revealed EF 66 to 70%, mild aortic valve regurgitation, mild mitral valve regurgitation and mild tricuspid valve regurgitation. Stress test on 11/8/2019 revealed no evidence of ischemia. GI panel shows stool positive for E.coli and Rotavirus.  Carotid ultrasound reveals no evidence of significant plaques or stenosis. Holter monitor on 9/4/2019 showed sinus rhythm with one 6 beat run of SVT with a rate of 140 bpm.       Interval History: She complains of being sick and has nausea and vomiting's and diarrhea.  She denies any chest pains or shortness of breath.  She has chronic left arm pains that seem to get worse sometimes with movement.    ROS    Vital Signs  Temp:  [98.2 °F (36.8 °C)-101.4 °F (38.6 °C)] 98.4 °F (36.9 °C)  Heart Rate:  [] 86  Resp:  [18] 18  BP: ()/(46-70) 142/68  Vital Signs (last 72 hrs)       11/10 0700  -  11/11 0659 11/11 0700  -  11/12 0659 11/12 0700  -  11/13 0659 11/13 0700  -  11/13 0951   Most Recent    Temp (°F) 97.7 -  98.4    97.9 -  98.1    98.2 -  (!)101.4       98.4 (36.9)    Heart Rate 65 -  80    66 -  81    68 -  108      86     86    Resp 16 -  20    17 -  20      18       18    /53 -  144/73    99/44 -  166/74    98/46 -  140/56      142/68     142/68    SpO2 (%) 97 -  98    92 -  98    92 -  98       92        Body mass index is 26.93 kg/m².    Intake/Output Summary (Last 24 hours) at 11/13/2019 0951  Last data filed at 11/13/2019 0935  Gross per 24 hour   Intake 2690 ml   Output 350 ml    Net 2340 ml     Objective    Objective       Physical Exam:     General Appearance:    Alert, cooperative, in no acute distress   Head:    Normocephalic, without obvious abnormality, atraumatic.  Mucous members are dry.   Eyes:            Conjunctivae and sclerae normal, no   icterus, no pallor, corneas clear.   Neck:   No adenopathy, supple, trachea midline, no thyromegaly, no   carotid bruit, no JVD   Lungs:     Clear to auscultation,respirations regular, even and                  unlabored    Heart:    Regular rhythm and normal rate, normal S1 and S2, no            murmur, no gallop, no rub, no click   Chest Wall:    No abnormalities observed   Abdomen:     Normal bowel sounds, no masses, no organomegaly, soft        non-tender, non-distended, no guarding, no rebound                tenderness   Extremities:   Moves all extremities well, no edema, no cyanosis, no             redness   Pulses:   Pulses palpable and equal bilaterally   Skin:   No bleeding, bruising or rash              Procedures    Results review       Results Review:   Results from last 7 days   Lab Units 11/13/19  0045 11/12/19  0443 11/08/19  0759 11/08/19  0111 11/07/19  1832   WBC 10*3/mm3 11.97* 9.89 7.53 10.10 8.24   HEMOGLOBIN g/dL 12.3 13.2 13.3 12.9 14.2   PLATELETS 10*3/mm3 264 293 356 359 395     Results from last 7 days   Lab Units 11/13/19  0045 11/12/19  1645 11/12/19  0443 11/11/19  0837 11/09/19  0432 11/08/19  0759 11/08/19  0111 11/07/19  1832   SODIUM mmol/L 134*  --  130* 134* 136 135* 134* 131*   POTASSIUM mmol/L 4.1 4.5 4.6 4.8 4.5 4.8 4.9 5.3*   CHLORIDE mmol/L 100  --  100 100 106 102 102 95*   CO2 mmol/L 16.5*  --  16.3* 21.3* 19.8* 20.3* 20.5* 20.6*   BUN mg/dL 34*  --  36* 28* 37* 34* 37* 41*   CREATININE mg/dL 1.55*  --  1.18* 0.94 0.99 1.09* 1.23* 1.42*   CALCIUM mg/dL 8.9  --  9.8 10.1 9.3 9.8 9.8 10.8*   GLUCOSE mg/dL 139*  --  126* 167* 128* 91 99 133*   ALT (SGPT) U/L 39*  --  41*  --   --  20 21 26   AST (SGOT)  U/L 48*  --  51*  --   --  24 23 30     Results from last 7 days   Lab Units 11/13/19  0704 11/13/19  0045 11/12/19  1855 11/12/19  1645 11/12/19  1026   TROPONIN T ng/mL <0.010 <0.010 <0.010 <0.010 <0.010     Lab Results   Component Value Date    INR 0.97 11/02/2019    INR 0.90 07/20/2015    INR <0.90 12/20/2014     Lab Results   Component Value Date    MG 1.7 11/13/2019    MG 1.8 11/12/2019    MG 2.1 11/08/2019     Lab Results   Component Value Date    TSH 1.430 11/08/2019    TRIG 104 11/03/2019    HDL 57 11/03/2019    LDL 64 11/03/2019      Imaging Results (Last 48 Hours)     Procedure Component Value Units Date/Time    US Gallbladder [[id number]] Collected:  11/12/19 1935     Updated:  11/12/19 1938    Narrative:       EXAMINATION: US GALLBLADDER-         CLINICAL INDICATION:     Gallstones now with nausea and vomiting;  R07.9-Chest pain, unspecified; N28.9-Disorder of kidney and ureter,  unspecified     TECHNIQUE: Multiplanar gray scale ultrasound of the abdomen.     COMPARISON: CT from 11/07/2019.      FINDINGS:   Visualized pancreas is unremarkable.   Multiple layering gallstones near the neck of the gallbladder. No  gallbladder wall thickening or pericholecystic fluid identified.   The CBD measures 2.5 mm.  The liver demonstrates normal echogenicity without focal lesion.    No ascites demonstrated.   There is no sonographic Sebastian sign.       Impression:       1. Cholelithiasis.  2. No sonographic evidence of inflammation.     This report was finalized on 11/12/2019 7:35 PM by Dr. Brendan Orellana MD.       XR Abdomen KUB [[id number]] Collected:  11/12/19 1136     Updated:  11/12/19 1146    Narrative:       EXAMINATION: XR ABDOMEN KUB-      TECHNIQUE: Single KUB     CLINICAL INDICATION:     nausea/vomiting/diarrhea; R07.9-Chest pain,  unspecified; N28.9-Disorder of kidney and ureter, unspecified      COMPARISON:    None available.     FINDINGS:    There are no calcifications projecting over the kidneys or along  the  expected course of the ureters.  Nonobstructive bowel gas pattern is noted.  Scattered fecal material seen throughout colon.  The bony structures are unremarkable.  No definite radiographic evidence of soft tissue mass.  Midline laparotomy staples.       Impression:       Nonobstructive bowel gas pattern.     This report was finalized on 11/12/2019 11:37 AM by Dr. Brendan Orellana MD.       US Carotid Bilateral [[id number]] Collected:  11/11/19 1310     Updated:  11/11/19 1316    Narrative:       US CAROTID BILATERAL-      TECHNIQUE: Multiple real-time color Doppler images were acquired of  bilateral carotid arteries.     Stenosis measurements if obtained, were performed by the NASCET or  similar method.     CLINICAL INDICATION:     Dizziness; R07.9-Chest pain, unspecified;  N28.9-Disorder of kidney and ureter, unspecified.     COMPARISON:    None.     FINDINGS:        RIGHT:  No significant intimal thickening or plaque is noted. No occlusion.     RIGHT ICA PSV: 1564.00 mm/s  RIGHT ICA EDV: 344.00 mm/s.   Right ICA/CCA Ratio: 1.45  Anterograde flow is demonstrated in RIGHT vertebral artery.     LEFT:  No significant intimal thickening or plaque is noted. No occlusion.     LEFT ICA PSV: 1058.00 mm/s  LEFT EDV: 236.00 mm/s.   Left ICA/CCA Ratio: 0.93  Anterograde flow is demonstrated in LEFT vertebral artery.          Impression:          No evidence of hemodynamically significant plaques or stenosis within  the carotid system at this time.     This report was finalized on 11/11/2019 1:14 PM by Dr. Sal Cheek MD.       XR Shoulder 2+ View Left [[id number]] Collected:  11/11/19 1237     Updated:  11/11/19 1239    Narrative:       EXAMINATION: XR SHOULDER 2+ VW LEFT-      CLINICAL INDICATION:left shoulder pain; R07.9-Chest pain, unspecified;  N28.9-Disorder of kidney and ureter, unspecified     COMPARISON: None      TECHNIQUE: 3 views left shoulder     FINDINGS:   No acute osseous or articular abnormality. No soft  tissue abnormality.          Impression:       No acute osseous or articular abnormalities.     This report was finalized on 11/11/2019 12:37 PM by Dr. Brendan Orellana MD.           No results found for: BNP      ECG      Echo   Results for orders placed during the hospital encounter of 11/02/19   Transthoracic Echo Complete With Contrast if Necessary Per Protocol    Narrative · Left ventricular wall thickness is consistent with borderline concentric   hypertrophy.  · Left ventricular systolic function is normal.  · Estimated EF appears to be in the range of 66 - 70%.  · Left ventricular diastolic dysfunction.  · Normal cardiac chamber dimensions  · Mild aortic valve regurgitation is present.  · Mild mitral valve regurgitation is present  · Mild tricuspid valve regurgitation is present. No evidence of pulmonary   hypertension is present  · There is no evidence of pericardial effusion               I reviewed the patient's new clinical results.    Telemetry: Sinus rhythm in the 60s.       Medication Review:     aspirin 81 mg Oral Daily   atorvastatin 10 mg Oral Nightly   calcium carb-cholecalciferol 1 tablet Oral Daily   carvedilol 12.5 mg Oral BID   cetirizine 5 mg Oral Daily   cholecalciferol 50,000 Units Oral Weekly   exemestane 25 mg Oral Daily   heparin (porcine) 5,000 Units Subcutaneous Q12H   insulin aspart 0-7 Units Subcutaneous 4x Daily AC & at Bedtime   pantoprazole 40 mg Oral Q AM   rOPINIRole 0.25 mg Oral Nightly   sodium chloride 10 mL Intravenous Q12H   valACYclovir 1,000 mg Oral Daily         sodium chloride 100 mL/hr Last Rate: 100 mL/hr (11/13/19 0935)       Assessment    1. Left arm pains which are somewhat atypical and sound musculoskeletal but potentially could represent angina pectoris.  2. Chronic diastolic heart failure, well compensated.  3. Acute kidney injury with worsening creatinine up to 1.55 and BUN up to 34 probably due to dehydration from vomiting's and diarrhea.  4. Essential  hypertension with mildly elevated blood pressures.  5. History of AML and breast cancer.      Plan   1. For her left arm pains, given her recent normal nuclear stress test and with her worsening kidney function, no further cardiac evaluation is indicated at this time.  2. For worsening kidney function, would increase her IV fluids 150 cc an hour 1 L and then cut back to 125 cc an hour and monitor for any signs of heart failure.  3. Continue with aspirin and atorvastatin.      I discussed the patients findings and my recommendations with patient .      Dashawn Soler MDLocated within [facility]  11/13/19  9:51 AM    Please note that portions of this note were completed with a voice recognition program.

## 2019-11-13 NOTE — PLAN OF CARE
Problem: Patient Care Overview  Goal: Plan of Care Review  Outcome: Ongoing (interventions implemented as appropriate)    Goal: Individualization and Mutuality  Outcome: Ongoing (interventions implemented as appropriate)    Goal: Discharge Needs Assessment  Outcome: Ongoing (interventions implemented as appropriate)    Goal: Interprofessional Rounds/Family Conf  Outcome: Ongoing (interventions implemented as appropriate)      Problem: Fall Risk (Adult)  Goal: Identify Related Risk Factors and Signs and Symptoms  Outcome: Ongoing (interventions implemented as appropriate)      Problem: Pain, Acute (Adult)  Goal: Identify Related Risk Factors and Signs and Symptoms  Outcome: Ongoing (interventions implemented as appropriate)    Goal: Acceptable Pain Control/Comfort Level  Outcome: Ongoing (interventions implemented as appropriate)    Goal: Identify Related Risk Factors and Signs and Symptoms  Outcome: Ongoing (interventions implemented as appropriate)

## 2019-11-13 NOTE — PROGRESS NOTES
Discharge Planning Assessment  [facility]     Patient Name: Tahmina Martinez  MRN: [id number]  Today's Date: 11/13/2019    Admit Date: 11/7/2019        Discharge Plan     Row Name 11/13/19 1609       Plan    Plan  [facility] continues to follow pt for possible admit when medically stable.  SS will follow.         Destination      Service Provider Request Status Selected Services Address Phone Number Fax Number    [facility] Pending - Request Sent N/A [address], [place] [address] [phone]       CHENG MalinW

## 2019-11-13 NOTE — PROGRESS NOTES
Patient Identification:  Name:  Tahmina Martinez  Age:  74 y.o.  Sex:  female  :  1945  MRN:  [id number]  Visit Number:  [id number]  Primary Care Provider:  Noe Bower MD    Length of stay:  5    Chief Complaint: dizziness/diarrhea/vomiting    HPI:      Mrs. Martniez is a 74 year old female patient who was admitted to [facility] on 19 for unstable angina and CADENCE on probable CKD stage II. 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    Subjective:      Mrs. Martinez is doing well this morning. She continues to have nausea, no vomiting and several episodes of diarrhea. She has not been able to eat or drink. Discussed with ANGEL davies.  ----------------------------------------------------------------------------------------------------------------------  Current [facility] Meds:    aspirin 81 mg Oral Daily   atorvastatin 10 mg Oral Nightly   calcium carb-cholecalciferol 1 tablet Oral Daily   carvedilol 12.5 mg Oral BID   cetirizine 5 mg Oral Daily   cholecalciferol 50,000 Units Oral Weekly   exemestane 25 mg Oral Daily   heparin (porcine) 5,000 Units Subcutaneous Q12H   insulin aspart 0-7 Units Subcutaneous 4x Daily AC & at Bedtime   magnesium sulfate 2 g Intravenous Once   pantoprazole 40 mg Oral Q AM   rOPINIRole 0.25 mg Oral Nightly   sodium chloride 10 mL Intravenous Q12H   valACYclovir 1,000 mg Oral Daily       sodium chloride 125 mL/hr Last Rate: 100 mL/hr (19 0935)     ----------------------------------------------------------------------------------------------------------------------  Vital Signs:  Temp:  [98 °F (36.7 °C)-101.4 °F (38.6 °C)] 98 °F (36.7 °C)  Heart Rate:  [] 74  Resp:  [18] 18  BP: ()/(46-70) 122/60      19  0500 19  0500 19  0500   Weight: 66.7 kg (147 lb 1.6 oz) 65.8 kg (145 lb) 68.9 kg (152 lb)     Body  mass index is 26.93 kg/m².    Intake/Output Summary (Last 24 hours) at 11/13/2019 1121  Last data filed at 11/13/2019 0935  Gross per 24 hour   Intake 1690 ml   Output 350 ml   Net 1340 ml     I/O this shift:  In: 620 [P.O.:120; I.V.:500]  Out: -   Diet Regular; Cardiac, Consistent Carbohydrate  ----------------------------------------------------------------------------------------------------------------------  Physical exam:  Constitutional:  Appears acutely ill, weak, and slightly pale.  No respiratory distress.      HENT:  Head:  Normocephalic and atraumatic.  Mouth:  Moist mucous membranes.    Eyes: Pupils are equal, round, and reactive to light. .    Neck:  Neck supple.  No JVD present.    Cardiovascular:  Normal rate, regular rhythm and normal heart sounds with no murmur.  Pulmonary/Chest:  No respiratory distress, no wheezes, no crackles, with normal breath sounds and good air movement.  Abdominal:  Soft.  Bowel sounds are normal.  No distension and no tenderness.   Musculoskeletal:  No edema, no tenderness, and no deformity.  No red or swollen joints anywhere.    Neurological:  Alert and oriented to person, place, and time.  No tongue deviation.  No facial droop.  No slurred speech.   Skin:  Skin is warm and dry. No rash noted. No pallor.   ----------------------------------------------------------------------------------------------------------------------  Tele:        ----------------------------------------------------------------------------------------------------------------------  Results from last 7 days   Lab Units 11/13/19  0704 11/13/19  0045 11/12/19  1855  11/07/19  1832   TROPONIN T ng/mL <0.010 <0.010 <0.010   < > <0.010   PROBNP pg/mL  --   --   --   --  127.2    < > = values in this interval not displayed.     Results from last 7 days   Lab Units 11/13/19  0045 11/12/19  9903 11/08/19  0759   CRP mg/dL 10.20*  --   --    WBC 10*3/mm3 11.97* 9.89 7.53   HEMOGLOBIN g/dL 12.3 13.2 13.3    HEMATOCRIT % 34.9 39.1 39.0   MCV fL 101.5* 103.2* 103.2*   MCHC g/dL 35.2 33.8 34.1   PLATELETS 10*3/mm3 264 293 356         Results from last 7 days   Lab Units 11/13/19  0045 11/12/19  1645 11/12/19  0443 11/11/19  0837  11/08/19  0759   SODIUM mmol/L 134*  --  130* 134*   < > 135*   POTASSIUM mmol/L 4.1 4.5 4.6 4.8   < > 4.8   MAGNESIUM mg/dL 1.7 1.8  --   --   --  2.1   CHLORIDE mmol/L 100  --  100 100   < > 102   CO2 mmol/L 16.5*  --  16.3* 21.3*   < > 20.3*   BUN mg/dL 34*  --  36* 28*   < > 34*   CREATININE mg/dL 1.55*  --  1.18* 0.94   < > 1.09*   EGFR IF NONAFRICN AM mL/min/1.73 33*  --  45* 58*   < > 49*   CALCIUM mg/dL 8.9  --  9.8 10.1   < > 9.8   GLUCOSE mg/dL 139*  --  126* 167*   < > 91   ALBUMIN g/dL 3.69  --  3.87  --   --  3.90   BILIRUBIN mg/dL 0.4  --  0.6  --   --  0.6   ALK PHOS U/L 95  --  121*  --   --  123*   AST (SGOT) U/L 48*  --  51*  --   --  24   ALT (SGPT) U/L 39*  --  41*  --   --  20    < > = values in this interval not displayed.   Estimated Creatinine Clearance: 28.8 mL/min (A) (by C-G formula based on SCr of 1.55 mg/dL (H)).  No results found for: AMMONIA                  ----------------------------------------------------------------------------------------------------------------------  Imaging Results (Last 24 Hours)     Procedure Component Value Units Date/Time    US Gallbladder [[id number]] Collected:  11/12/19 1935     Updated:  11/12/19 1938    Narrative:       EXAMINATION: US GALLBLADDER-         CLINICAL INDICATION:     Gallstones now with nausea and vomiting;  R07.9-Chest pain, unspecified; N28.9-Disorder of kidney and ureter,  unspecified     TECHNIQUE: Multiplanar gray scale ultrasound of the abdomen.     COMPARISON: CT from 11/07/2019.      FINDINGS:   Visualized pancreas is unremarkable.   Multiple layering gallstones near the neck of the gallbladder. No  gallbladder wall thickening or pericholecystic fluid identified.   The CBD measures 2.5 mm.  The liver  demonstrates normal echogenicity without focal lesion.    No ascites demonstrated.   There is no sonographic Sebastian sign.       Impression:       1. Cholelithiasis.  2. No sonographic evidence of inflammation.     This report was finalized on 11/12/2019 7:35 PM by Dr. Brendan Orellana MD.       XR Abdomen KUB [[id number]] Collected:  11/12/19 1136     Updated:  11/12/19 1146    Narrative:       EXAMINATION: XR ABDOMEN KUB-      TECHNIQUE: Single KUB     CLINICAL INDICATION:     nausea/vomiting/diarrhea; R07.9-Chest pain,  unspecified; N28.9-Disorder of kidney and ureter, unspecified      COMPARISON:    None available.     FINDINGS:    There are no calcifications projecting over the kidneys or along the  expected course of the ureters.  Nonobstructive bowel gas pattern is noted.  Scattered fecal material seen throughout colon.  The bony structures are unremarkable.  No definite radiographic evidence of soft tissue mass.  Midline laparotomy staples.       Impression:       Nonobstructive bowel gas pattern.     This report was finalized on 11/12/2019 11:37 AM by Dr. Brendan Orellana MD.           ----------------------------------------------------------------------------------------------------------------------  Assessment and Plan:    Sepsis r/t Rotavirus and Enteroaggregative E.Coli in stool (Leukocytosis, fever, elevated CRP, tachycardia (106)): patient has had diarrhea for 3 days today, GI PCR panel showed rotavirus and E.coli. She did develop fever of 101.4 last night. Blood cultures were ordered this am along with chest xray and UA to rule out any other sources of infection. Continue supportive treatment with IV fluids and clear liquid diet as tolerated, anti-emetics. Monitor and repeat labs in the am.     Rotavirus and Enteroaggregative E.Coli in the stool: continue with plan as outlined above, isolation per hospital policy.     CADENCE: creatinine is 1.55, increased from yesterday, IV fluids increased likely  worsened due to two borderline hypotensive episodes and continued GI losses and not eating or drinking. Hydralazine discontinued for now, repeat labs in the am, monitor urine output closely.     Chest pain with hx of MI: denies any chest pain currently, no events on telemetry. Stress test on 11/8/19 showed low risk study.  She was reconsulted yesterday given her left shoulder pain and her ongoing dizziness, her troponin trend was negative x3, cardiology was reconsulted yesterday, and did not feel any further cardiac evaluation was warranted at this time.     Essential HTN: /60, HR 74, continue coreg 12.5mg PO BID, Hydralazine continue this morning as she did have 2 borderline blood pressures yesterday, allow for better renal perfusion and monitor closely.     Diabetes Type 2, non-insulin dependent, A1c 6.10:  hypoglycemia protocol initiated, accu checks ac/hs and prn, diabetic diet, low dose sliding scale ordered, glucoses remain controlled.      Hypovolemic hyponatremia: sodium is 134, IV fluids dc'd 11/11/19 and restarted 11/12/19 due to her drop in sodium and increase in creatinine with associated vomiting and diarrhea.  IV fluids again increased today to 125 due to her slightly worsened creatinine and GI loss, monitor closely and repeat in the morning     Chronic Diastolic HF with preserved EF, 66-70%:  monitor closely for fluid overload. Continue daily weights, no dyspnea, does not clinically appear to be in fluid overload, giving IV fluids due to her above noted labs. Monitor fluid status closely.      Macrocytosis: H/H 12.3/34.9, repeat in the am. Folate on 11/8/19 is 19.50, Vitamin B12 725.      Cholelithiasis:  Bladder ultrasound showed audible layering gallstones near the neck of the gallbladder, no gallbladder wall thickening or pericholecystic fluid.  Continue to monitor for now.     Chronic Debility with hx of falls, dizziness: PT/OT following, she continues to have dizziness which is likely  related to dehydration.  CT of the head negative, TSH 1.430,  Carotid US also unremarkable.   Continue to monitor will likely improve as her hydration and GI symptoms improve.     Hx of AML and Breast Cancer: continue aromasin daily.      Hx of Splenectomy:no concerns currently, monitor      Left shoulder pain: Patient does not complain of any shoulder pain today, cardiac work-up was negative, x-ray of the left shoulder was also unremarkable, continue to monitor.     Diet: cardiac, consistent carb  Precautions: falls   DVT prophylaxis: Heparin BID sq  GI prophylaxis: Protonix 40mg PO daily      Disposition: patient wishes to go home at [place], will continue to discuss rehab with her when she is medically stable for [place]     Patient is high risk for the following reasons: hx of falls       Mayra Downey, JENNY  11/13/19  11:21 AM

## 2019-11-14 PROBLEM — R65.10 SIRS (SYSTEMIC INFLAMMATORY RESPONSE SYNDROME) (HCC): Status: RESOLVED | Noted: 2019-11-08 | Resolved: 2019-11-14

## 2019-11-14 LAB
ALBUMIN SERPL-MCNC: 2.7 G/DL (ref 3.5–5.2)
ALBUMIN/GLOB SERPL: 0.9 G/DL
ALP SERPL-CCNC: 72 U/L (ref 39–117)
ALT SERPL W P-5'-P-CCNC: 34 U/L (ref 1–33)
ANION GAP SERPL CALCULATED.3IONS-SCNC: 12.6 MMOL/L (ref 5–15)
AST SERPL-CCNC: 40 U/L (ref 1–32)
B PERT DNA SPEC QL NAA+PROBE: NOT DETECTED
BILIRUB SERPL-MCNC: <0.2 MG/DL (ref 0.2–1.2)
BUN BLD-MCNC: 23 MG/DL (ref 8–23)
BUN/CREAT SERPL: 28.8 (ref 7–25)
C PNEUM DNA NPH QL NAA+NON-PROBE: NOT DETECTED
CALCIUM SPEC-SCNC: 8.2 MG/DL (ref 8.6–10.5)
CHLORIDE SERPL-SCNC: 105 MMOL/L (ref 98–107)
CO2 SERPL-SCNC: 15.4 MMOL/L (ref 22–29)
CREAT BLD-MCNC: 0.8 MG/DL (ref 0.57–1)
CRP SERPL-MCNC: 19.46 MG/DL (ref 0–0.5)
DEPRECATED RDW RBC AUTO: 52.3 FL (ref 37–54)
ERYTHROCYTE [DISTWIDTH] IN BLOOD BY AUTOMATED COUNT: 13.7 % (ref 12.3–15.4)
FLUAV H1 2009 PAND RNA NPH QL NAA+PROBE: NOT DETECTED
FLUAV H1 HA GENE NPH QL NAA+PROBE: NOT DETECTED
FLUAV H3 RNA NPH QL NAA+PROBE: NOT DETECTED
FLUAV SUBTYP SPEC NAA+PROBE: NOT DETECTED
FLUBV RNA ISLT QL NAA+PROBE: NOT DETECTED
GFR SERPL CREATININE-BSD FRML MDRD: 70 ML/MIN/1.73
GLOBULIN UR ELPH-MCNC: 3.1 GM/DL
GLUCOSE BLD-MCNC: 116 MG/DL (ref 65–99)
GLUCOSE BLDC GLUCOMTR-MCNC: 103 MG/DL (ref 70–130)
GLUCOSE BLDC GLUCOMTR-MCNC: 72 MG/DL (ref 70–130)
GLUCOSE BLDC GLUCOMTR-MCNC: 78 MG/DL (ref 70–130)
GLUCOSE BLDC GLUCOMTR-MCNC: 92 MG/DL (ref 70–130)
GLUCOSE BLDC GLUCOMTR-MCNC: 96 MG/DL (ref 70–130)
GLUCOSE BLDC GLUCOMTR-MCNC: 99 MG/DL (ref 70–130)
HADV DNA SPEC NAA+PROBE: NOT DETECTED
HCOV 229E RNA SPEC QL NAA+PROBE: NOT DETECTED
HCOV HKU1 RNA SPEC QL NAA+PROBE: NOT DETECTED
HCOV NL63 RNA SPEC QL NAA+PROBE: NOT DETECTED
HCOV OC43 RNA SPEC QL NAA+PROBE: NOT DETECTED
HCT VFR BLD AUTO: 30.9 % (ref 34–46.6)
HGB BLD-MCNC: 10.5 G/DL (ref 12–15.9)
HMPV RNA NPH QL NAA+NON-PROBE: NOT DETECTED
HPIV1 RNA SPEC QL NAA+PROBE: NOT DETECTED
HPIV2 RNA SPEC QL NAA+PROBE: NOT DETECTED
HPIV3 RNA NPH QL NAA+PROBE: NOT DETECTED
HPIV4 P GENE NPH QL NAA+PROBE: NOT DETECTED
HYPOCHROMIA BLD QL: ABNORMAL
LYMPHOCYTES # BLD MANUAL: 0.97 10*3/MM3 (ref 0.7–3.1)
LYMPHOCYTES NFR BLD MANUAL: 8.5 % (ref 5–12)
LYMPHOCYTES NFR BLD MANUAL: 9.5 % (ref 19.6–45.3)
M PNEUMO IGG SER IA-ACNC: NOT DETECTED
M PNEUMO IGM SER QL: NEGATIVE
MACROCYTES BLD QL SMEAR: ABNORMAL
MAGNESIUM SERPL-MCNC: 1.8 MG/DL (ref 1.6–2.4)
MCH RBC QN AUTO: 35.6 PG (ref 26.6–33)
MCHC RBC AUTO-ENTMCNC: 34 G/DL (ref 31.5–35.7)
MCV RBC AUTO: 104.7 FL (ref 79–97)
METAMYELOCYTES NFR BLD MANUAL: 5.5 % (ref 0–0)
MONOCYTES # BLD AUTO: 0.86 10*3/MM3 (ref 0.1–0.9)
MYELOCYTES NFR BLD MANUAL: 0.5 % (ref 0–0)
NEUTROPHILS # BLD AUTO: 7.72 10*3/MM3 (ref 1.7–7)
NEUTROPHILS NFR BLD MANUAL: 18 % (ref 42.7–76)
NEUTS BAND NFR BLD MANUAL: 58 % (ref 0–5)
PHOSPHATE SERPL-MCNC: 2.4 MG/DL (ref 2.5–4.5)
PLATELET # BLD AUTO: 205 10*3/MM3 (ref 140–450)
PMV BLD AUTO: 10.2 FL (ref 6–12)
POTASSIUM BLD-SCNC: 3.4 MMOL/L (ref 3.5–5.2)
PROT SERPL-MCNC: 5.8 G/DL (ref 6–8.5)
RBC # BLD AUTO: 2.95 10*6/MM3 (ref 3.77–5.28)
RHINOVIRUS RNA SPEC NAA+PROBE: NOT DETECTED
RSV RNA NPH QL NAA+NON-PROBE: NOT DETECTED
SCAN SLIDE: NORMAL
SMALL PLATELETS BLD QL SMEAR: ADEQUATE
SODIUM BLD-SCNC: 133 MMOL/L (ref 136–145)
WBC NRBC COR # BLD: 10.16 10*3/MM3 (ref 3.4–10.8)

## 2019-11-14 PROCEDURE — 0099U HC BIOFIRE FILMARRAY RESP PANEL 1: CPT | Performed by: PHYSICIAN ASSISTANT

## 2019-11-14 PROCEDURE — 86738 MYCOPLASMA ANTIBODY: CPT | Performed by: PHYSICIAN ASSISTANT

## 2019-11-14 PROCEDURE — 86140 C-REACTIVE PROTEIN: CPT | Performed by: INTERNAL MEDICINE

## 2019-11-14 PROCEDURE — 82962 GLUCOSE BLOOD TEST: CPT

## 2019-11-14 PROCEDURE — 25010000002 MAGNESIUM SULFATE 2 GM/50ML SOLUTION: Performed by: INTERNAL MEDICINE

## 2019-11-14 PROCEDURE — 99232 SBSQ HOSP IP/OBS MODERATE 35: CPT | Performed by: NURSE PRACTITIONER

## 2019-11-14 PROCEDURE — 87899 AGENT NOS ASSAY W/OPTIC: CPT | Performed by: INTERNAL MEDICINE

## 2019-11-14 PROCEDURE — 80053 COMPREHEN METABOLIC PANEL: CPT | Performed by: INTERNAL MEDICINE

## 2019-11-14 PROCEDURE — 25010000002 CEFTRIAXONE: Performed by: INTERNAL MEDICINE

## 2019-11-14 PROCEDURE — 83735 ASSAY OF MAGNESIUM: CPT | Performed by: NURSE PRACTITIONER

## 2019-11-14 PROCEDURE — 85007 BL SMEAR W/DIFF WBC COUNT: CPT | Performed by: NURSE PRACTITIONER

## 2019-11-14 PROCEDURE — 85025 COMPLETE CBC W/AUTO DIFF WBC: CPT | Performed by: NURSE PRACTITIONER

## 2019-11-14 PROCEDURE — 97161 PT EVAL LOW COMPLEX 20 MIN: CPT

## 2019-11-14 PROCEDURE — 25010000002 HEPARIN (PORCINE) PER 1000 UNITS: Performed by: INTERNAL MEDICINE

## 2019-11-14 PROCEDURE — 94799 UNLISTED PULMONARY SVC/PX: CPT

## 2019-11-14 PROCEDURE — 92610 EVALUATE SWALLOWING FUNCTION: CPT

## 2019-11-14 PROCEDURE — 84100 ASSAY OF PHOSPHORUS: CPT | Performed by: INTERNAL MEDICINE

## 2019-11-14 PROCEDURE — 99232 SBSQ HOSP IP/OBS MODERATE 35: CPT | Performed by: PHYSICIAN ASSISTANT

## 2019-11-14 RX ORDER — POTASSIUM CHLORIDE 1.5 G/1.77G
40 POWDER, FOR SOLUTION ORAL AS NEEDED
Status: DISCONTINUED | OUTPATIENT
Start: 2019-11-14 | End: 2019-11-21 | Stop reason: HOSPADM

## 2019-11-14 RX ORDER — POTASSIUM CHLORIDE 20 MEQ/1
40 TABLET, EXTENDED RELEASE ORAL EVERY 4 HOURS
Status: COMPLETED | OUTPATIENT
Start: 2019-11-14 | End: 2019-11-14

## 2019-11-14 RX ORDER — POTASSIUM CHLORIDE 750 MG/1
40 CAPSULE, EXTENDED RELEASE ORAL AS NEEDED
Status: DISCONTINUED | OUTPATIENT
Start: 2019-11-14 | End: 2019-11-21 | Stop reason: HOSPADM

## 2019-11-14 RX ORDER — MAGNESIUM SULFATE HEPTAHYDRATE 40 MG/ML
2 INJECTION, SOLUTION INTRAVENOUS ONCE
Status: COMPLETED | OUTPATIENT
Start: 2019-11-14 | End: 2019-11-14

## 2019-11-14 RX ORDER — POTASSIUM CHLORIDE 7.45 MG/ML
10 INJECTION INTRAVENOUS
Status: DISCONTINUED | OUTPATIENT
Start: 2019-11-14 | End: 2019-11-21 | Stop reason: HOSPADM

## 2019-11-14 RX ADMIN — PANTOPRAZOLE SODIUM 40 MG: 40 TABLET, DELAYED RELEASE ORAL at 05:40

## 2019-11-14 RX ADMIN — CEFTRIAXONE 1 G: 1 INJECTION, POWDER, FOR SOLUTION INTRAMUSCULAR; INTRAVENOUS at 20:38

## 2019-11-14 RX ADMIN — SODIUM CHLORIDE 125 ML/HR: 9 INJECTION, SOLUTION INTRAVENOUS at 15:16

## 2019-11-14 RX ADMIN — METRONIDAZOLE 500 MG: 500 INJECTION, SOLUTION INTRAVENOUS at 15:16

## 2019-11-14 RX ADMIN — SODIUM CHLORIDE, PRESERVATIVE FREE 10 ML: 5 INJECTION INTRAVENOUS at 09:00

## 2019-11-14 RX ADMIN — METRONIDAZOLE 500 MG: 500 INJECTION, SOLUTION INTRAVENOUS at 05:40

## 2019-11-14 RX ADMIN — CARVEDILOL 12.5 MG: 6.25 TABLET, FILM COATED ORAL at 20:38

## 2019-11-14 RX ADMIN — ASPIRIN 81 MG: 81 TABLET, COATED ORAL at 10:34

## 2019-11-14 RX ADMIN — METRONIDAZOLE 500 MG: 500 INJECTION, SOLUTION INTRAVENOUS at 21:57

## 2019-11-14 RX ADMIN — SODIUM CHLORIDE, PRESERVATIVE FREE 10 ML: 5 INJECTION INTRAVENOUS at 20:39

## 2019-11-14 RX ADMIN — CARVEDILOL 12.5 MG: 6.25 TABLET, FILM COATED ORAL at 10:35

## 2019-11-14 RX ADMIN — ATORVASTATIN CALCIUM 10 MG: 10 TABLET, FILM COATED ORAL at 20:38

## 2019-11-14 RX ADMIN — ROPINIROLE HYDROCHLORIDE 0.25 MG: 0.25 TABLET, FILM COATED ORAL at 20:38

## 2019-11-14 RX ADMIN — HEPARIN SODIUM 5000 UNITS: 5000 INJECTION INTRAVENOUS; SUBCUTANEOUS at 10:35

## 2019-11-14 RX ADMIN — MAGNESIUM SULFATE IN WATER 2 G: 40 INJECTION, SOLUTION INTRAVENOUS at 10:33

## 2019-11-14 RX ADMIN — TRAMADOL HYDROCHLORIDE 50 MG: 50 TABLET, FILM COATED ORAL at 22:05

## 2019-11-14 RX ADMIN — VALACYCLOVIR HYDROCHLORIDE 1000 MG: 500 TABLET, FILM COATED ORAL at 10:34

## 2019-11-14 RX ADMIN — CETIRIZINE HYDROCHLORIDE 5 MG: 10 TABLET, FILM COATED ORAL at 10:34

## 2019-11-14 RX ADMIN — POTASSIUM CHLORIDE 40 MEQ: 1500 TABLET, EXTENDED RELEASE ORAL at 15:21

## 2019-11-14 RX ADMIN — POTASSIUM CHLORIDE 40 MEQ: 1500 TABLET, EXTENDED RELEASE ORAL at 12:55

## 2019-11-14 RX ADMIN — DOXYCYCLINE 100 MG: 100 INJECTION, POWDER, LYOPHILIZED, FOR SOLUTION INTRAVENOUS at 10:34

## 2019-11-14 RX ADMIN — DOXYCYCLINE 100 MG: 100 INJECTION, POWDER, LYOPHILIZED, FOR SOLUTION INTRAVENOUS at 20:39

## 2019-11-14 RX ADMIN — EXEMESTANE 25 MG: 25 TABLET ORAL at 10:34

## 2019-11-14 RX ADMIN — SODIUM CHLORIDE, PRESERVATIVE FREE 10 ML: 5 INJECTION INTRAVENOUS at 10:37

## 2019-11-14 RX ADMIN — Medication 1 TABLET: at 10:35

## 2019-11-14 RX ADMIN — HEPARIN SODIUM 5000 UNITS: 5000 INJECTION INTRAVENOUS; SUBCUTANEOUS at 20:38

## 2019-11-14 NOTE — PLAN OF CARE
Problem: Pain, Acute (Adult)  Goal: Identify Related Risk Factors and Signs and Symptoms  Outcome: Ongoing (interventions implemented as appropriate)    Goal: Acceptable Pain Control/Comfort Level  Outcome: Ongoing (interventions implemented as appropriate)    Goal: Identify Related Risk Factors and Signs and Symptoms  Outcome: Ongoing (interventions implemented as appropriate)    Goal: Acceptable Pain Control/Comfort Level  Outcome: Ongoing (interventions implemented as appropriate)

## 2019-11-14 NOTE — PLAN OF CARE
Problem: Dysphagia (Adult)  Goal: Functional/Safe Swallow  Outcome: Outcome(s) achieved Date Met: 11/14/19 11/14/19 3790   Dysphagia (Adult)   Functional/Safe Swallow achieves outcome    Clinical Dysphagia Assessment w/ wfl oropharyngeal swallow. No evidence of aspiration.

## 2019-11-14 NOTE — PROGRESS NOTES
LOS: 6 days     Name: Tahmina Martinez  Age/Sex: 74 y.o. female  :  1945        PCP: Noe Bower MD  REF: No ref. provider found    Principal Problem:    Chest pain in adult  Active Problems:    History of Non-STEMI (non-ST elevated myocardial infarction) with no coronary intervention needed in , clinically stable.     Essential hypertension    History of Acute myelocytic leukemia,status post chemotherapy and bone marrow transplant in .    Breast cancer (right), status post radiation therapy in 2015    RBBB unchanged from     Dyslipidemia    History of splenectomy    Cholelithiasis    Acute kidney injury (CMS/HCC)    Hyperkalemia    Hyperglycemia    First degree AV block    Macrocytosis without anemia    Hypocarbia    Type II diabetes mellitus (CMS/HCC)      Reason for follow-up: Chest pain and dizziness     Subjective       Interval History:  Patient is resting in bed. She states that she feels some better today. She does continue to have nausea and vomiting. She denies any chest pain or shortness of breath.       Vital Signs  Temp:  [97.8 °F (36.6 °C)-100 °F (37.8 °C)] (P) 98.5 °F (36.9 °C)  Heart Rate:  [87-94] (P) 93  Resp:  [16-18] (P) 18  BP: (108-135)/(60-74) (P) 138/80     Vital Signs (last 72 hrs)        0700  -   0659  07  -   0659  07  -   0659  07  -   1138   Most Recent    Temp (°F) 97.9 -  98.1    98.2 -  (!)101.4    97.8 -  100       98.5 (36.9)    Heart Rate 66 -  81    68 -  108    74 -  94       92    Resp 17 -  20      18    16 -  18       18    BP 99/44 -  166/74    98/46 -  140/56    108/60 -  142/68       135/61    SpO2 (%) 92 -  98    92 -  98    94 -  97       97        Body mass index is 26.58 kg/m².    Intake/Output Summary (Last 24 hours) at 2019 1138  Last data filed at 2019 0800  Gross per 24 hour   Intake 2730 ml   Output --   Net 2730 ml     Objective    Objective       Physical Exam:     General  Appearance:    Alert, cooperative, in no acute distress   Head:    Normocephalic, without obvious abnormality, atraumatic   Eyes:            Conjunctivae and sclerae normal, no   icterus, no pallor, corneas clear.   Neck:   No adenopathy, supple, trachea midline, no thyromegaly, no   carotid bruit, no JVD   Lungs:     Clear to auscultation,respirations regular, even and                  unlabored    Heart:    Regular rhythm and normal rate, normal S1 and S2, no            murmur, no gallop, no rub, no click   Chest Wall:    No abnormalities observed   Abdomen:     Normal bowel sounds, no masses, no organomegaly, soft        non-tender, non-distended, no guarding, no rebound                tenderness   Extremities:   Moves all extremities well, no edema, no cyanosis, no             redness   Pulses:   Pulses palpable and equal bilaterally   Skin:   No bleeding, bruising or rash       Neurologic:   Alert and oriented    I have seen the patient today and performed a physical examination today. No changes noted from previous physical exam.        Procedures    Results review       Results Review:   Results from last 7 days   Lab Units 11/14/19  0515 11/13/19  0045 11/12/19  0443 11/08/19  0759 11/08/19  0111 11/07/19  1832   WBC 10*3/mm3 10.16 11.97* 9.89 7.53 10.10 8.24   HEMOGLOBIN g/dL 10.5* 12.3 13.2 13.3 12.9 14.2   PLATELETS 10*3/mm3 205 264 293 356 359 395     Results from last 7 days   Lab Units 11/14/19  0515 11/13/19  0045 11/12/19  1645 11/12/19  0443 11/11/19  0837 11/09/19  0432 11/08/19  0759 11/08/19  0111 11/07/19  1832   SODIUM mmol/L 133* 134*  --  130* 134* 136 135* 134* 131*   POTASSIUM mmol/L 3.4* 4.1 4.5 4.6 4.8 4.5 4.8 4.9 5.3*   CHLORIDE mmol/L 105 100  --  100 100 106 102 102 95*   CO2 mmol/L 15.4* 16.5*  --  16.3* 21.3* 19.8* 20.3* 20.5* 20.6*   BUN mg/dL 23 34*  --  36* 28* 37* 34* 37* 41*   CREATININE mg/dL 0.80 1.55*  --  1.18* 0.94 0.99 1.09* 1.23* 1.42*   CALCIUM mg/dL 8.2* 8.9  --  9.8  10.1 9.3 9.8 9.8 10.8*   GLUCOSE mg/dL 116* 139*  --  126* 167* 128* 91 99 133*   ALT (SGPT) U/L 34* 39*  --  41*  --   --  20 21 26   AST (SGOT) U/L 40* 48*  --  51*  --   --  24 23 30     Results from last 7 days   Lab Units 11/13/19  0704 11/13/19  0045 11/12/19  1855 11/12/19  1645 11/12/19  1026   TROPONIN T ng/mL <0.010 <0.010 <0.010 <0.010 <0.010     Lab Results   Component Value Date    INR 0.97 11/02/2019    INR 0.90 07/20/2015    INR <0.90 12/20/2014     Lab Results   Component Value Date    MG 1.8 11/14/2019    MG 1.7 11/13/2019    MG 1.8 11/12/2019     Lab Results   Component Value Date    TSH 1.430 11/08/2019    TRIG 104 11/03/2019    HDL 57 11/03/2019    LDL 64 11/03/2019      Imaging Results (Last 48 Hours)     Procedure Component Value Units Date/Time    XR Chest 1 View [[id number]] Collected:  11/13/19 1135     Updated:  11/13/19 1155    Narrative:       EXAMINATION: XR CHEST 1 VW-      CLINICAL INDICATION:     fever, leukocytosis; R07.9-Chest pain,  unspecified; N28.9-Disorder of kidney and ureter, unspecified     TECHNIQUE:  XR CHEST 1 VW-      COMPARISON: 11/07/2019      FINDINGS:      Left basilar airspace disease noted.   Heart size, mediastinum, and pulmonary vascularity are unremarkable.   No pneumothorax.   No effusions.   No acute osseous findings.          Impression:       Minimal left lower lobe airspace disease. Otherwise stable  chest.     This report was finalized on 11/13/2019 11:36 AM by Dr. Brendan Orellana MD.       US Gallbladder [[id number]] Collected:  11/12/19 1935     Updated:  11/12/19 1938    Narrative:       EXAMINATION: US GALLBLADDER-         CLINICAL INDICATION:     Gallstones now with nausea and vomiting;  R07.9-Chest pain, unspecified; N28.9-Disorder of kidney and ureter,  unspecified     TECHNIQUE: Multiplanar gray scale ultrasound of the abdomen.     COMPARISON: CT from 11/07/2019.      FINDINGS:   Visualized pancreas is unremarkable.   Multiple layering gallstones  near the neck of the gallbladder. No  gallbladder wall thickening or pericholecystic fluid identified.   The CBD measures 2.5 mm.  The liver demonstrates normal echogenicity without focal lesion.    No ascites demonstrated.   There is no sonographic Sebastian sign.       Impression:       1. Cholelithiasis.  2. No sonographic evidence of inflammation.     This report was finalized on 11/12/2019 7:35 PM by Dr. Brendan Orellana MD.       XR Abdomen KUB [[id number]] Collected:  11/12/19 1136     Updated:  11/12/19 1146    Narrative:       EXAMINATION: XR ABDOMEN KUB-      TECHNIQUE: Single KUB     CLINICAL INDICATION:     nausea/vomiting/diarrhea; R07.9-Chest pain,  unspecified; N28.9-Disorder of kidney and ureter, unspecified      COMPARISON:    None available.     FINDINGS:    There are no calcifications projecting over the kidneys or along the  expected course of the ureters.  Nonobstructive bowel gas pattern is noted.  Scattered fecal material seen throughout colon.  The bony structures are unremarkable.  No definite radiographic evidence of soft tissue mass.  Midline laparotomy staples.       Impression:       Nonobstructive bowel gas pattern.     This report was finalized on 11/12/2019 11:37 AM by Dr. Brendan Orellana MD.           Echo   Results for orders placed during the hospital encounter of 11/02/19   Transthoracic Echo Complete With Contrast if Necessary Per Protocol    Narrative · Left ventricular wall thickness is consistent with borderline concentric   hypertrophy.  · Left ventricular systolic function is normal.  · Estimated EF appears to be in the range of 66 - 70%.  · Left ventricular diastolic dysfunction.  · Normal cardiac chamber dimensions  · Mild aortic valve regurgitation is present.  · Mild mitral valve regurgitation is present  · Mild tricuspid valve regurgitation is present. No evidence of pulmonary   hypertension is present  · There is no evidence of pericardial effusion         I reviewed the  patient's new clinical results.    Telemetry: Normal sinus rhythm 70-80 bpm        Medication Review:     aspirin 81 mg Oral Daily   atorvastatin 10 mg Oral Nightly   calcium carb-cholecalciferol 1 tablet Oral Daily   carvedilol 12.5 mg Oral BID   cefTRIAXone 1 g Intravenous Q24H   cetirizine 5 mg Oral Daily   cholecalciferol 50,000 Units Oral Weekly   doxycycline 100 mg Intravenous Q12H   exemestane 25 mg Oral Daily   heparin (porcine) 5,000 Units Subcutaneous Q12H   insulin aspart 0-7 Units Subcutaneous 4x Daily AC & at Bedtime   magnesium sulfate 2 g Intravenous Once   metroNIDAZOLE 500 mg Intravenous Q8H   pantoprazole 40 mg Oral Q AM   potassium chloride 40 mEq Oral Q4H   rOPINIRole 0.25 mg Oral Nightly   sodium chloride 10 mL Intravenous Q12H   valACYclovir 1,000 mg Oral Daily         sodium chloride 125 mL/hr Last Rate: 125 mL/hr (11/13/19 7132)       Assessment      Assessment:    Chest pain in adult    History of Non-STEMI (non-ST elevated myocardial infarction) with no coronary intervention needed in 2008, clinically stable.     Essential hypertension    History of Acute myelocytic leukemia,status post chemotherapy and bone marrow transplant in 2005.    Breast cancer (right), status post radiation therapy in 08/2015    RBBB unchanged from 2015    Dyslipidemia    History of splenectomy    Cholelithiasis    Acute kidney injury (CMS/HCC)    Hyperkalemia    Hyperglycemia    First degree AV block    Macrocytosis without anemia    Hypocarbia    Type II diabetes mellitus (CMS/HCC)    Plan     Recommendations:  1. Chest pains  · She denies any chest pain today. Ruled out for MI. Recent nuclear stress test shows no evidence of ischemia. No further cardiac workup needed.   · Will sign off and see as needed.     2. Acute kidney injury  · Creatinine improved today with gentle IV fluids. Continue to monitor.     3. Dizziness  · Improving. She reports no dizziness today. Orthostatics within normal limits. Carotid US  shows no significant stenosis. Head CT without any acute abnormalities. Echo revealed no valvular abnormalities.       I discussed the patients findings and my recommendations with patient and family      ElyseJENNY Oneill  11/14/19  11:38 AM    Please note that portions of this note were completed with a voice recognition program.

## 2019-11-14 NOTE — NURSING NOTE
Patient will have to be cleared out of isolation by MD and infection control prior to considering her for rehab admission.

## 2019-11-14 NOTE — NURSING NOTE
Will continue to follow patient from a rehab standpoint, but she declined to do PT yesterday per notes.  Patient must be willing to perform 3 hours of therapy per day if she is to be admitted to the rehab unit.

## 2019-11-14 NOTE — PROGRESS NOTES
[facility] Medicine Services  PROGRESS NOTE     Patient Identification:  Name:  Tahmina Martinez  Age:  74 y.o.  Sex:  female  :  1945  MRN:  [id number]  Visit Number:  [id number]  Primary Care Provider:  Noe Bower MD    Length of stay:  6    ----------------------------------------------------------------------------------------------------------------------  Subjective     Chief Complaint:  Follow up for N/V/D, pneumonia, weakness/debility, chest pain (ruled out for acute MI)     History of Presenting Illness/Hospital Course:  Patient is a 74-year-old female with past medical history significant for essential hypertension, hyperlipidemia, non-insulin-dependent type 2 diabetes mellitus, history of splenectomy, 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  Patient was also noted to have acute kidney injury on top of probable stage II CKD.  Patient was admitted to the inpatient floor, serial EKGs were obtained as well as troponins.  EKG remained with normal sinus rhythm, troponins remain negative.  Her blood pressure was uncontrolled, part of her chest pain was felt to be hypertensive urgency as etiology. Stress test was obtained revealing a low risk study, unlikely cardiac etiology.  Her blood pressure medications were adjusted.  Cardiology did evaluate the patient with recommendations to continue medical management. She did c/o of dizziness, echo reviewed, orthostatics normal, carotid dopplers negative for significant stenoses. Creatinine did initially improve with gentle IV fluids, however patient developed worsening nausea, vomiting, and diarrhea and CADENCE worsened. GI PCR resulted positive for Enteroaggregative E. Coli, she was placed in isolation. Patient did then meet sepsis  criteria with leukocytosis, elevated CRP, fever (night of 11/12/2019), and tachycardia. CXR was obtained revealing possible pneumonia, UA unremarkable. She was started on IV antibiotics with IV rocephin, flagyl, and doxycycline 11/13/2019).     Subjective:  Today, the patient was resting in bed per my evaluation this morning.  Patient states she feels some better this morning, however she continues to have nausea.  She reports last emesis was overnight, states it was yellow and very minimal.  She does report that she is continued to have loose stools, she states they are not solidifying.  She denies any blood in stool, no hematemesis.  She denies any fevers or chills.  She denies any chest pain or dyspnea, no cough.  She denies any urinary symptoms.  She does complain of generalized fatigue and weakness.  She was tearful on exam, stating she just wants to get stronger and feeling better so she can go home.    Present during exam: N/A   ----------------------------------------------------------------------------------------------------------------------  Objective     Consults:  · Cardiology     Procedures:  · 11/3/2019: Transthoracic echocardiogram   · Left ventricular wall thickness is consistent with borderline concentric hypertrophy.  · Left ventricular systolic function is normal.  · Estimated EF appears to be in the range of 66 - 70%.  · Left ventricular diastolic dysfunction.  · Normal cardiac chamber dimensions  · Mild aortic valve regurgitation is present.  · Mild mitral valve regurgitation is present  · Mild tricuspid valve regurgitation is present. No evidence of pulmonary hypertension is present  · There is no evidence of pericardial effusion  · 11/8/2019: Stress Test with Myocardial Perfusion  · A pharmacological stress test was performed using regadenoson with low-level exercise.  · Findings consistent with an indeterminate ECG stress test.  · Myocardial perfusion imaging indicates a normal myocardial  perfusion study with no evidence of ischemia.  · Normal LV cavity size. Normal LV wall motion noted.  · Left ventricular ejection fraction is hyperdynamic (Calculated EF > 70%).  · Impressions are consistent with a low risk study.    Current Hospital Meds:    aspirin 81 mg Oral Daily   atorvastatin 10 mg Oral Nightly   calcium carb-cholecalciferol 1 tablet Oral Daily   carvedilol 12.5 mg Oral BID   cefTRIAXone 1 g Intravenous Q24H   cetirizine 5 mg Oral Daily   cholecalciferol 50,000 Units Oral Weekly   doxycycline 100 mg Intravenous Q12H   exemestane 25 mg Oral Daily   heparin (porcine) 5,000 Units Subcutaneous Q12H   insulin aspart 0-7 Units Subcutaneous 4x Daily AC & at Bedtime   magnesium sulfate 2 g Intravenous Once   metroNIDAZOLE 500 mg Intravenous Q8H   pantoprazole 40 mg Oral Q AM   rOPINIRole 0.25 mg Oral Nightly   sodium chloride 10 mL Intravenous Q12H   valACYclovir 1,000 mg Oral Daily       sodium chloride 125 mL/hr Last Rate: 125 mL/hr (11/13/19 1664)     ----------------------------------------------------------------------------------------------------------------------  Vital Signs:  Temp:  [97.8 °F (36.6 °C)-100 °F (37.8 °C)] (P) 98.5 °F (36.9 °C)  Heart Rate:  [74-94] (P) 93  Resp:  [16-18] (P) 18  BP: (108-142)/(60-74) (P) 138/80  Mean Arterial Pressure (Non-Invasive) for the past 24 hrs (Last 3 readings):   Noninvasive MAP (mmHg)   11/14/19 0620 91     SpO2 Percentage    11/13/19 2052 11/14/19 0250 11/14/19 0620   SpO2: 96% 96% 97%     SpO2:  [94 %-97 %] 97 %  on   ;   Device (Oxygen Therapy): room air    Body mass index is 26.58 kg/m².  Wt Readings from Last 3 Encounters:   11/14/19 68 kg (150 lb)   11/04/19 65.3 kg (143 lb 15.4 oz)   10/29/19 71 kg (156 lb 9.6 oz)        Intake/Output Summary (Last 24 hours) at 11/14/2019 0821  Last data filed at 11/14/2019 0251  Gross per 24 hour   Intake 3230 ml   Output --   Net 3230 ml     Diet Clear Liquid; Cardiac, Consistent  Carbohydrate  ----------------------------------------------------------------------------------------------------------------------  Physical exam:  Physical Exam   Constitutional: She is oriented to person, place, and time. She appears well-developed and well-nourished.  Non-toxic appearance. She appears ill. No distress.   Appears weak and fatigued. Pleasant.    HENT:   Head: Normocephalic and atraumatic.   Right Ear: External ear normal.   Left Ear: External ear normal.   Nose: Nose normal.   Mouth/Throat: Oropharynx is clear and moist and mucous membranes are normal. No oropharyngeal exudate.   Eyes: Conjunctivae are normal. Pupils are equal, round, and reactive to light. No scleral icterus.   Neck: Neck supple. Carotid bruit is not present.   Cardiovascular: Normal rate, regular rhythm, normal heart sounds and intact distal pulses. Exam reveals no gallop and no friction rub.   No murmur heard.  Pulses:       Dorsalis pedis pulses are 2+ on the right side, and 2+ on the left side.        Posterior tibial pulses are 2+ on the right side, and 2+ on the left side.   Pulmonary/Chest: Effort normal and breath sounds normal. No respiratory distress. She has no wheezes. She has no rhonchi. She has no rales. She exhibits no tenderness.   Abdominal: Soft. Bowel sounds are normal. She exhibits no distension and no mass. There is no tenderness. There is no rebound, no guarding and negative Sebastian's sign.   Genitourinary:   Genitourinary Comments: No arguelles catheter in place, making urine.    Musculoskeletal: She exhibits no edema, tenderness or deformity.        Right lower leg: She exhibits no edema.        Left lower leg: She exhibits no edema.     Vascular Status -  Her right foot exhibits normal foot vasculature  and no edema. Her left foot exhibits normal foot vasculature  and no edema.  Neurological: She is alert and oriented to person, place, and time. She displays atrophy (Generalized ). No cranial nerve deficit or  sensory deficit. GCS eye subscore is 4. GCS verbal subscore is 5. GCS motor subscore is 6.   Moves extremities equally, generalized weakness and atrophy of upper and lower extremities. Follows commands, answers questions appropriately. No focal neuro deficits on examination.    Skin: Skin is warm and dry. Capillary refill takes less than 2 seconds. No rash noted. No erythema. No pallor.   Psychiatric: She has a normal mood and affect. Her speech is normal and behavior is normal. Judgment and thought content normal. Cognition and memory are normal.   Nursing note and vitals reviewed.     ----------------------------------------------------------------------------------------------------------------------  Tele:    Sinus 70s-80s    I have personally reviewed the EKG/Telemetry strips   ----------------------------------------------------------------------------------------------------------------------  Results from last 7 days   Lab Units 11/13/19  0704 11/13/19  0045 11/12/19  1855   TROPONIN T ng/mL <0.010 <0.010 <0.010     Results from last 7 days   Lab Units 11/07/19  1832   PROBNP pg/mL 127.2         Results from last 7 days   Lab Units 11/14/19  0515 11/13/19  0045 11/12/19  0443 11/08/19  0759   CRP mg/dL 19.46* 10.20*  --   --    WBC 10*3/mm3  --  11.97* 9.89 7.53   HEMOGLOBIN g/dL  --  12.3 13.2 13.3   HEMATOCRIT %  --  34.9 39.1 39.0   MCV fL  --  101.5* 103.2* 103.2*   MCHC g/dL  --  35.2 33.8 34.1   PLATELETS 10*3/mm3  --  264 293 356     Results from last 7 days   Lab Units 11/14/19  0515 11/13/19  0045 11/12/19  1645 11/12/19  0443   SODIUM mmol/L 133* 134*  --  130*   POTASSIUM mmol/L 3.4* 4.1 4.5 4.6   MAGNESIUM mg/dL 1.8 1.7 1.8  --    CHLORIDE mmol/L 105 100  --  100   CO2 mmol/L 15.4* 16.5*  --  16.3*   BUN mg/dL 23 34*  --  36*   CREATININE mg/dL 0.80 1.55*  --  1.18*   EGFR IF NONAFRICN AM mL/min/1.73 70 33*  --  45*   CALCIUM mg/dL 8.2* 8.9  --  9.8   GLUCOSE mg/dL 116* 139*  --  126*   ALBUMIN  g/dL 2.70* 3.69  --  3.87   BILIRUBIN mg/dL <0.2* 0.4  --  0.6   ALK PHOS U/L 72 95  --  121*   AST (SGOT) U/L 40* 48*  --  51*   ALT (SGPT) U/L 34* 39*  --  41*   Estimated Creatinine Clearance: 55.3 mL/min (by C-G formula based on SCr of 0.8 mg/dL).    Glucose   Date/Time Value Ref Range Status   11/14/2019 0620 92 70 - 130 mg/dL Final   11/13/2019 1947 108 70 - 130 mg/dL Final   11/13/2019 1620 102 70 - 130 mg/dL Final   11/13/2019 1118 79 70 - 130 mg/dL Final   11/13/2019 0636 105 70 - 130 mg/dL Final   11/12/2019 2205 144 (H) 70 - 130 mg/dL Final   11/12/2019 1950 155 (H) 70 - 130 mg/dL Final   11/12/2019 1603 184 (H) 70 - 130 mg/dL Final     Lab Results   Component Value Date    HGBA1C 6.10 (H) 11/08/2019     Lab Results   Component Value Date    TSH 1.430 11/08/2019     Microbiology Results (last 10 days)     Procedure Component Value - Date/Time    Mycoplasma Pneumoniae Antibody, IgM - Blood, [[id number]]  (Normal) Collected:  11/14/19 0928    Lab Status:  Final result Specimen:  Blood Updated:  11/14/19 1005     Mycoplasma pneumo IgM Negative    Gastrointestinal Panel, PCR - Stool, Per Rectum [[id number]]  (Abnormal) Collected:  11/12/19 1056    Lab Status:  Final result Specimen:  Stool from Per Rectum Updated:  11/12/19 1252     Campylobacter Not Detected     Plesiomonas shigelloides Not Detected     Salmonella Not Detected     Vibrio Not Detected     Vibrio cholerae Not Detected     Yersinia enterocolitica Not Detected     Enteroaggregative E. coli (EAEC) Detected     Enteropathogenic E. coli (EPEC) Not Detected     Enterotoxigenic E. coli (ETEC) lt/st Not Detected     Shiga-like toxin-producing E. coli (STEC) stx1/stx2 Not Detected     E. coli O157 Not Detected     Shigella/Enteroinvasive E. coli (EIEC) Not Detected     Cryptosporidium Not Detected     Cyclospora cayetanensis Not Detected     Entamoeba histolytica Not Detected     Giardia lamblia Not Detected     Adenovirus F40/41 Not Detected      Astrovirus Not Detected     Norovirus GI/GII Not Detected     Rotavirus A Detected     Sapovirus (I, II, IV or V) Not Detected        I have personally reviewed the above laboratory results.   ----------------------------------------------------------------------------------------------------------------------  Imaging Results (Last 24 Hours)     Procedure Component Value Units Date/Time    XR Chest 1 View [[id number]] Collected:  11/13/19 1135     Updated:  11/13/19 1155    Narrative:       EXAMINATION: XR CHEST 1 VW-      CLINICAL INDICATION:     fever, leukocytosis; R07.9-Chest pain,  unspecified; N28.9-Disorder of kidney and ureter, unspecified     TECHNIQUE:  XR CHEST 1 VW-      COMPARISON: 11/07/2019      FINDINGS:      Left basilar airspace disease noted.   Heart size, mediastinum, and pulmonary vascularity are unremarkable.   No pneumothorax.   No effusions.   No acute osseous findings.          Impression:       Minimal left lower lobe airspace disease. Otherwise stable  chest.     This report was finalized on 11/13/2019 11:36 AM by Dr. Brendan Orellana MD.           I have personally reviewed the above radiology results.   ----------------------------------------------------------------------------------------------------------------------  Assessment/Plan     · Sepsis, secondary to EAEC enteritis vs hospital acquired pneumonia: Patient continues with diarrhea. Leukocytosis resolved, CRP increased overnight from 10.2 to 19.46. Remains afebrile. HR is controlled. Blood cultures NGTD. Follow cultures. CXR with minimal LLL pneumonia noted yesterday, previously started on IV doxy, flagyl, and rocephin. Will obtain mycoplasma and legionella testing, if negative will discontinue doxycycline. SLP consulted to rule out silent aspiration, she was previously   · Chest pain, ruled out for acute MI: Trop remained negative. Stress test normal. Cardiology recommends no further work up, continue medical management. Pt  without chest pain on my exam today. Continue current medical regimen with ASA and statin. Monitor on tele.   · Acute on probable stage II CKD: resolved. Cont gentle IV fluids. Avoid nephrotoxins. Closely monitor UOP. Repeat chemistry panel in AM.   · Cholelithiasis: Noted on GBUS and CT scan. No noted inflammation noted, patient non tender on exam. Recommend outpatient GS follow up if necessary, not warranted inpatient at this time.   · Dizziness: No dizziness this AM. ECHO with no significant valvular issues. Orthostatics WNL. Bilateral carotid doppler U/S with no hemodynamically significant stenoses. Head CT without any acute intracranial abnormalities. Monitor, up with assistance, fall precautions.   · Debility/Generalized weakness: Cont PT/OT, patient worked with PT this AM and tolerated well. Benefit from prolonged rehab at discharge, SS working on dispo planning.   · Borderline hyperkalemia on admission, now with hypokalemia: Replace per protocol. Replace mag. Repeat chem panel in AM. Tele monitoring.   · Hypomagnesemia: Cont to replace per protocol. Repeat mag level in AM. Tele monitoring.   · Hyponatremia: Likely hypovolemic: Continue gentle IV fluids. Monitor closely, repeat chem panel in AM.   · Essential hypertension: Blood pressure controlled. Cont current antihypertensive regimen. Closely monitor BP per hospital protocol, titrate medications as necessary.   · Hyperlipidemia: Cont statin.   · Non insulin dependent type II diabetes mellitus: Glucose levels controlled. Continue PRN SSI, titrate insulin tx as necessary. Closely monitor blood glucose levels with accuchecks QAC and QHS. Hypoglycemia protocol in place if necessary.   · Restless leg syndrome: Continue Requip.   · History of splenectomy: Supportive care  · History of AML s/p chemo and bone marrow transplant in past: Labs stable. Monitor.   · History of breast cancer, right, s/p radiation  · Former smoker      --------------------------------------------------  DVT Prophylaxis: SQ heparin   GI Prophylaxis: Protonix   FEN: Continue gentle  IV fluids. Replace electrolytes per protocol as necessary. Clear liquid, increase as tolerated to GI soft/bland  Activity: Up with assistance as tolerated, PT/OT  Disposition: Pending possible inpatient rehab placement for prolonged therapy due to debility and weakness  --------------------------------------------------    The patient is high risk due to the following diagnoses/reasons:  Sepsis, enteritis, HCAP, CP, weakness, multiple co-morbidities.      I have discussed the patient's assessment and plan with the patient and AM ANGEL Duque and Rossy.      Dilcia Bryant PA-C  Hospitalist Service -- [facility]   Pager: [phone]    11/14/19  8:21 AM    Attending Physician: David Matute, *    ----------------------------------------------------------------------------------------------------------------------

## 2019-11-14 NOTE — THERAPY EVALUATION
Acute Care - Speech Language Pathology   Swallow Initial Evaluation  Jose   CLINICAL DYSPHAGIA ASSESSMENT     Patient Name: Tahmina Martinez  : 1945  MRN: [id number]  Today's Date: 2019  Onset of Illness/Injury or Date of Surgery: 19     Referring Physician: Dr. Cheek      Admit Date: 2019    Visit Dx:     ICD-10-CM ICD-9-CM   1. Chest pain in adult R07.9 786.50   2. Renal insufficiency N28.9 593.9     Patient Active Problem List   Diagnosis   • History of Non-STEMI (non-ST elevated myocardial infarction) with no coronary intervention needed in , clinically stable.    • Dyspnea, class II-III.    • Essential hypertension   • History of Acute myelocytic leukemia,status post chemotherapy and bone marrow transplant in .   • Breast cancer (right), status post radiation therapy in 2015   • RBBB unchanged from    • Dyslipidemia   • Bifascicular block (RBBB plus LP FB)   • Chest pain in adult   • History of splenectomy   • Cholelithiasis   • Acute kidney injury (CMS/HCC)   • Hyperkalemia   • Hyperglycemia   • First degree AV block   • Macrocytosis without anemia   • Hypocarbia   • Type II diabetes mellitus (CMS/HCC)     Past Medical History:   Diagnosis Date   • Dyslipidemia 2017    On atorvastatin.    • Essential hypertension 10/12/2016   • First degree AV block 2019   • H/O splenectomy    • History of breast cancer        • Hypertension    • Myelocytic leukemia (CMS/HCC)    • Non-STEMI (non-ST elevated myocardial infarction) (CMS/HCC)    • Restless leg    • Type II diabetes mellitus (CMS/HCC) 2019     Past Surgical History:   Procedure Laterality Date   • BONE MARROW TRANSPLANT     •  SECTION     • SPLENECTOMY     • TUBAL ABDOMINAL LIGATION       Tahmina Martinez  is seen at bedside this pm on 3S to assess safety/efficacy of swallowing fnx, determine safest/least restrictive diet tolerance. She is currently on a clear liquid diet 2/2 GI status w/ n/v and  diarrhea. She is familiar to Speech Therapy Department for recent Clinical Dysphagia Assessment 11/8/19 upon admit to [facility] w/ recommendation for regular consistency, thin liquids. She is referred today to further r/o aspiration as she is s/p emesis events, dx w/ LLL PNA.     Social History     Socioeconomic History   • Marital status:      Spouse name: Not on file   • Number of children: Not on file   • Years of education: Not on file   • Highest education level: Not on file   Tobacco Use   • Smoking status: Former Smoker     Types: Cigarettes   • Smokeless tobacco: Never Used   • Tobacco comment: Patient states she smoked 3 cigarettes a day for 1 year as a teenager    Substance and Sexual Activity   • Alcohol use: No   • Drug use: No   • Sexual activity: Defer   Social History Narrative    Patient lives at home with ex-; she is currently on disability       Chest xray 11/13/19 reveals minimal L lower lobe airspace disease per radiologist.     Diet Orders (active) (From admission, onward)    Start     Ordered    11/14/19 1200  Dietary Nutrition Supplements Boost Breeze (Ensure Clear); mixed berry  Daily With Breakfast, Lunch & Dinner     Comments:  Berry flavor, thank you!    11/14/19 1149    11/13/19 1125  Diet Clear Liquid; Cardiac, Consistent Carbohydrate  Diet Effective Now      11/13/19 1124        Pt is observed on ra w/o complications.     Pt is positioned upright and centered in a chair at bedside to accept multiple po presentations of ice chips, solid cracker, puree and thin liquids via spoon, cup and straw. She is able to self feed.     Facial/oral structures are symmetrical upon observation. Lingual protrusion reveals no deviation. Oral mucosa are moist, pink, and clean. Secretions are clear, thin, and well controlled. OROM/MARINE is wfl to imitate oral postures. Gag is not assessed. Volitional cough is intact w/ adequate  intensity, clear in quality, non-productive. Voice is adequate in  intensity, clear in quality w/ intelligible speech. She is oriented to person, place and time, follows simple directives and participates in conversational exchanges.     Upon po presentations, adequate bolus anticipation and acceptance w/ good labial seal for bolus clearance via spoon bowl, cup rim stability and suction via straw. Bolus formation, manipulation and control are wfl w/ rotary mastication pattern. A-p transit is timely w/o oral residue. No overt s/s aspiration evidenced before the swallow.     Pharyngeal swallow is timely w/ adequate hyolaryngeal elevation per palpation. No overt s/s aspiration evidenced across this assessment. No silent aspiration suspected as pt is w/o changes in vocal quality, respirations or secretions post po presentations. Pt denies odynophagia.    Impression: Per this assessment, Ms. Martinez presents w/ wfl oropharyngeal swallow w/o s/s aspiration. No s/s indicative of silent aspiration. No odynophagia reported. She continues on a liquid diet per MD, upgrade to least restrictive regular consistency and thin liquids w/ MD clearance per GI status.     EDUCATION  The patient has been educated in the following areas:   Dysphagia (Swallowing Impairment).    SLP Recommendation and Plan  1. Clear liquids, upgrade to least restrictive regular consistency and thin liquids w/ MD clearance per GI status.   2. Meds whole in puree/thins.   3. Upright and centered for all po intake.  4. IVONNE precautions.  5. Oral care protocol.  No further formal SLP f/u warranted at this time.    D/w pt results and recommendations w/ verbal agreement.    Thank you for allowing me to participate in the care of your patient-  Rhona Marion M.A., CCC-SLP     Time Calculation:     Therapy Charges for Today     Code Description Service Date Service Provider Modifiers Qty    [id number] [facility] EVAL ORAL PHARYNG SWALLOW 4 11/14/2019 Rhona Marion MA,CCC-SLP GN 1        Rhona Marion  MA,CCC-SLP  11/14/2019

## 2019-11-14 NOTE — THERAPY EVALUATION
Acute Care - Physical Therapy Initial Evaluation  KATTY Jose     Patient Name: Tahmina Martinez  : 1945  MRN: [id number]  Today's Date: 2019   Onset of Illness/Injury or Date of Surgery: 19  Date of Referral to PT: 11/10/19  Referring Physician: Shayan      Admit Date: 2019    Visit Dx:     ICD-10-CM ICD-9-CM   1. Chest pain in adult R07.9 786.50   2. Renal insufficiency N28.9 593.9     Patient Active Problem List   Diagnosis   • History of Non-STEMI (non-ST elevated myocardial infarction) with no coronary intervention needed in , clinically stable.    • Dyspnea, class II-III.    • Essential hypertension   • History of Acute myelocytic leukemia,status post chemotherapy and bone marrow transplant in .   • Breast cancer (right), status post radiation therapy in 2015   • RBBB unchanged from    • Dyslipidemia   • Bifascicular block (RBBB plus LP FB)   • Chest pain in adult   • History of splenectomy   • Cholelithiasis   • Acute kidney injury (CMS/HCC)   • Hyperkalemia   • Hyperglycemia   • First degree AV block   • Macrocytosis without anemia   • Hypocarbia   • Type II diabetes mellitus (CMS/HCC)     Past Medical History:   Diagnosis Date   • Dyslipidemia 2017    On atorvastatin.    • Essential hypertension 10/12/2016   • First degree AV block 2019   • H/O splenectomy    • History of breast cancer        • Hypertension    • Myelocytic leukemia (CMS/HCC)    • Non-STEMI (non-ST elevated myocardial infarction) (CMS/HCC)    • Restless leg    • Type II diabetes mellitus (CMS/HCC) 2019     Past Surgical History:   Procedure Laterality Date   • BONE MARROW TRANSPLANT     •  SECTION     • SPLENECTOMY     • TUBAL ABDOMINAL LIGATION          PT ASSESSMENT (last 12 hours)      Physical Therapy Evaluation     Row Name 19 1436          PT Evaluation Time/Intention    Subjective Information  complains of;weakness;fatigue;nausea/vomiting  -CT     Document Type   evaluation  -CT     Mode of Treatment  physical therapy  -CT     Patient Effort  good  -CT     Symptoms Noted During/After Treatment  fatigue  -CT     Comment  Pt evaluated again today d/t reported decline. Pt found to be Min A with all functional mobility and was able to ambulate 20 ft. Pt would benefit from skilled PT services to increase overall functional mobility and return to independence.   -CT     Row Name 11/14/19 1436          General Information    Patient Profile Reviewed?  yes  -CT     Onset of Illness/Injury or Date of Surgery  11/07/19  -CT     Referring Physician  Shayan  -CT     Patient Observations  alert;cooperative;agree to therapy  -CT     Prior Level of Function  independent:;all household mobility;community mobility  -CT     Equipment Currently Used at Home  cane, quad  -CT     Existing Precautions/Restrictions  fall  -CT     Equipment Issued to Patient  gait belt  -CT     Risks Reviewed  patient:;LOB;nausea/vomiting;dizziness;increased discomfort;change in vital signs;increased drainage;lines disloged  -CT     Benefits Reviewed  patient:;improve function;increase independence;increase strength;increase balance;decrease pain;decrease risk of DVT;improve skin integrity;increase knowledge  -CT     Barriers to Rehab  medically complex  -CT     Row Name 11/14/19 1436          Relationship/Environment    Lives With  spouse  -CT     Row Name 11/14/19 1436          Resource/Environmental Concerns    Current Living Arrangements  home/apartment/condo  -CT     Row Name 11/14/19 1436          Living Environment    Home Accessibility  stairs to enter home;stairs within home  -CT     Row Name 11/14/19 1436          Stairs Within Home, Primary    Stairs, Within Home, Primary  14  -CT     Number of Stairs, Within Home, Primary  other (see comments) 14  -CT     Row Name 11/14/19 1436          Cognitive Assessment/Intervention- PT/OT    Orientation Status (Cognition)  oriented x 4  -CT     Follows Commands  (Cognition)  WFL  -CT     Row Name 11/14/19 1436          Safety Issues, Functional Mobility    Impairments Affecting Function (Mobility)  endurance/activity tolerance;strength  -CT     Row Name 11/14/19 1436          Bed Mobility Assessment/Treatment    Bed Mobility Assessment/Treatment  bed mobility (all) activities  -CT     [place] Level (Bed Mobility)  minimum assist (75% patient effort)  -CT     Assistive Device (Bed Mobility)  bed rails  -CT     Row Name 11/14/19 1436          Transfer Assessment/Treatment    Transfer Assessment/Treatment  sit-stand transfer;stand-sit transfer  -CT     Sit-Stand [place] (Transfers)  minimum assist (75% patient effort)  -CT     Stand-Sit [place] (Transfers)  minimum assist (75% patient effort)  -CT     Row Name 11/14/19 1436          Sit-Stand Transfer    Assistive Device (Sit-Stand Transfers)  cane, quad  -CT     Row Name 11/14/19 1436          Stand-Sit Transfer    Assistive Device (Stand-Sit Transfers)  cane, quad  -CT     Row Name 11/14/19 1436          Gait/Stairs Assessment/Training    [place] Level (Gait)  minimum assist (75% patient effort)  -CT     Assistive Device (Gait)  cane, quad  -CT     Distance in Feet (Gait)  20  -CT     Pattern (Gait)  step-to  -CT     Deviations/Abnormal Patterns (Gait)  base of support, narrow;festinating/shuffling;gait speed decreased  -CT     Bilateral Gait Deviations  forward flexed posture;weight shift ability decreased  -CT     Row Name 11/14/19 1436          General ROM    GENERAL ROM COMMENTS  BLE grossly WFL  -CT     Row Name 11/14/19 1436          MMT (Manual Muscle Testing)    General MMT Comments  BLE grossly 4-/5  -CT     Row Name 11/14/19 1436          Pain Assessment    Additional Documentation  -- nausea and vomiting reported but no pain  -CT     Row Name 11/14/19 1436          Plan of Care Review    Plan of Care Reviewed With  patient  -CT     Row Name 11/14/19 1436          Physical Therapy Clinical  Impression    Date of Referral to PT  11/10/19  -CT     PT Diagnosis (PT Clinical Impression)  impaired functional mobility  -CT     Prognosis (PT Clinical Impression)  good  -CT     Functional Level at Time of Evaluation (PT Clinical Impression)  Min A   -CT     Patient/Family Goals Statement (PT Clinical Impression)  Pt goals are to get better and go home  -CT     Criteria for Skilled Interventions Met (PT Clinical Impression)  yes;treatment indicated  -CT     Pathology/Pathophysiology Noted (Describe Specifically for Each System)  musculoskeletal;neuromuscular  -CT     Impairments Found (describe specific impairments)  aerobic capacity/endurance;gait, locomotion, and balance  -CT     Functional Limitations in Following Categories (Describe Specific Limitations)  self-care;home management  -CT     Rehab Potential (PT Clinical Summary)  good, to achieve stated therapy goals  -CT     Predicted Duration of Therapy (PT)  length of stay  -CT     Care Plan Review (PT)  evaluation/treatment results reviewed;care plan/treatment goals reviewed;risks/benefits reviewed;current/potential barriers reviewed;patient/other agree to care plan  -CT     Row Name 11/14/19 1436          Physical Therapy Goals    Bed Mobility Goal Selection (PT)  bed mobility, PT goal 1  -CT     Transfer Goal Selection (PT)  transfer, PT goal 1  -CT     Gait Training Goal Selection (PT)  gait training, PT goal 1  -CT     Row Name 11/14/19 1436          Bed Mobility Goal 1 (PT)    Activity/Assistive Device (Bed Mobility Goal 1, PT)  bed mobility activities, all  -CT     [place] Level/Cues Needed (Bed Mobility Goal 1, PT)  contact guard assist  -CT     Time Frame (Bed Mobility Goal 1, PT)  by discharge  -CT     Row Name 11/14/19 1436          Transfer Goal 1 (PT)    Activity/Assistive Device (Transfer Goal 1, PT)  sit-to-stand/stand-to-sit;bed-to-chair/chair-to-bed  -CT     [place] Level/Cues Needed (Transfer Goal 1, PT)  contact guard assist   -CT     Time Frame (Transfer Goal 1, PT)  by discharge  -CT     Row Name 11/14/19 1436          Gait Training Goal 1 (PT)    Activity/Assistive Device (Gait Training Goal 1, PT)  gait (walking locomotion);assistive device use  -CT     [place] Level (Gait Training Goal 1, PT)  contact guard assist  -CT     Time Frame (Gait Training Goal 1, PT)  by discharge  -CT     Row Name 11/14/19 1436          Positioning and Restraints    Pre-Treatment Position  in bed  -CT     Post Treatment Position  chair  -CT     In Chair  sitting;call light within reach;encouraged to call for assist;notified nsg  -CT       User Key  (r) = Recorded By, (t) = Taken By, (c) = Cosigned By    Initials Name Provider Type    CT Nakia Stone PT Physical Therapist        Physical Therapy Education     Title: PT OT SLP Therapies (Done)     Topic: Physical Therapy (Done)     Point: Mobility training (Done)     Learning Progress Summary           Patient Acceptance, E,TB, VU by CT at 11/14/2019  2:54 PM                   Point: Home exercise program (Done)     Learning Progress Summary           Patient Acceptance, E,TB, VU by CT at 11/14/2019  2:54 PM                   Point: Body mechanics (Done)     Learning Progress Summary           Patient Acceptance, E,TB, VU by CT at 11/14/2019  2:54 PM                   Point: Precautions (Done)     Learning Progress Summary           Patient Acceptance, E,TB, VU by CT at 11/14/2019  2:54 PM                               User Key     Initials Effective Dates Name Provider Type Discipline    CT 04/03/18 -  Nakia Stone PT Physical Therapist PT              PT Recommendation and Plan  Anticipated Discharge Disposition (PT): inpatient rehabilitation facility  Planned Therapy Interventions (PT Eval): balance training, bed mobility training, gait training, home exercise program, manual therapy techniques, motor coordination training, neuromuscular re-education, patient/family education, postural  re-education, ROM (range of motion), stair training, strengthening, transfer training  Therapy Frequency (PT Clinical Impression): 3 times/wk(3-5 times/wk)  Outcome Summary/Treatment Plan (PT)  Anticipated Equipment Needs at Discharge (PT): (tbd)  Anticipated Discharge Disposition (PT): inpatient rehabilitation facility  Plan of Care Reviewed With: patient     Time Calculation:   PT Charges     Row Name 11/14/19 1454             Time Calculation    PT Received On  11/14/19  -CT      PT Goal Re-Cert Due Date  11/28/19  -CT        User Key  (r) = Recorded By, (t) = Taken By, (c) = Cosigned By    Initials Name Provider Type    CT Nakia Stone, PT Physical Therapist        Therapy Charges for Today     Code Description Service Date Service Provider Modifiers Qty    77672365249 HC PT EVAL LOW COMPLEXITY 4 11/14/2019 Nakia Stone, PT GP 1          PT G-Codes  Outcome Measure Options: AM-PAC 6 Clicks Daily Activity (OT)  AM-PAC 6 Clicks Score (OT): 21      Nakia Stone PT  11/14/2019

## 2019-11-15 LAB
ALBUMIN SERPL-MCNC: 2.75 G/DL (ref 3.5–5.2)
ALBUMIN/GLOB SERPL: 1 G/DL
ALP SERPL-CCNC: 66 U/L (ref 39–117)
ALT SERPL W P-5'-P-CCNC: 26 U/L (ref 1–33)
ANION GAP SERPL CALCULATED.3IONS-SCNC: 6.9 MMOL/L (ref 5–15)
AST SERPL-CCNC: 31 U/L (ref 1–32)
BILIRUB SERPL-MCNC: <0.2 MG/DL (ref 0.2–1.2)
BUN BLD-MCNC: 14 MG/DL (ref 8–23)
BUN/CREAT SERPL: 19.2 (ref 7–25)
CALCIUM SPEC-SCNC: 8.9 MG/DL (ref 8.6–10.5)
CHLORIDE SERPL-SCNC: 118 MMOL/L (ref 98–107)
CO2 SERPL-SCNC: 12.1 MMOL/L (ref 22–29)
CREAT BLD-MCNC: 0.73 MG/DL (ref 0.57–1)
CRP SERPL-MCNC: 10.3 MG/DL (ref 0–0.5)
DEPRECATED RDW RBC AUTO: 57.4 FL (ref 37–54)
ERYTHROCYTE [DISTWIDTH] IN BLOOD BY AUTOMATED COUNT: 14.3 % (ref 12.3–15.4)
GFR SERPL CREATININE-BSD FRML MDRD: 78 ML/MIN/1.73
GLOBULIN UR ELPH-MCNC: 2.8 GM/DL
GLUCOSE BLD-MCNC: 95 MG/DL (ref 65–99)
GLUCOSE BLDC GLUCOMTR-MCNC: 112 MG/DL (ref 70–130)
GLUCOSE BLDC GLUCOMTR-MCNC: 113 MG/DL (ref 70–130)
GLUCOSE BLDC GLUCOMTR-MCNC: 87 MG/DL (ref 70–130)
GLUCOSE BLDC GLUCOMTR-MCNC: 97 MG/DL (ref 70–130)
HCT VFR BLD AUTO: 28.9 % (ref 34–46.6)
HGB BLD-MCNC: 9.6 G/DL (ref 12–15.9)
L PNEUMO1 AG UR QL IA: NEGATIVE
LYMPHOCYTES # BLD MANUAL: 1.33 10*3/MM3 (ref 0.7–3.1)
LYMPHOCYTES NFR BLD MANUAL: 13 % (ref 19.6–45.3)
LYMPHOCYTES NFR BLD MANUAL: 13 % (ref 5–12)
MACROCYTES BLD QL SMEAR: ABNORMAL
MAGNESIUM SERPL-MCNC: 1.9 MG/DL (ref 1.6–2.4)
MCH RBC QN AUTO: 36 PG (ref 26.6–33)
MCHC RBC AUTO-ENTMCNC: 33.2 G/DL (ref 31.5–35.7)
MCV RBC AUTO: 108.2 FL (ref 79–97)
MONOCYTES # BLD AUTO: 1.33 10*3/MM3 (ref 0.1–0.9)
NEUTROPHILS # BLD AUTO: 7.56 10*3/MM3 (ref 1.7–7)
NEUTROPHILS NFR BLD MANUAL: 54 % (ref 42.7–76)
NEUTS BAND NFR BLD MANUAL: 20 % (ref 0–5)
PHOSPHATE SERPL-MCNC: 1.4 MG/DL (ref 2.5–4.5)
PLAT MORPH BLD: NORMAL
PLATELET # BLD AUTO: 201 10*3/MM3 (ref 140–450)
PMV BLD AUTO: 10 FL (ref 6–12)
POTASSIUM BLD-SCNC: 3.9 MMOL/L (ref 3.5–5.2)
PROT SERPL-MCNC: 5.5 G/DL (ref 6–8.5)
RBC # BLD AUTO: 2.67 10*6/MM3 (ref 3.77–5.28)
SCAN SLIDE: NORMAL
SODIUM BLD-SCNC: 137 MMOL/L (ref 136–145)
WBC NRBC COR # BLD: 10.22 10*3/MM3 (ref 3.4–10.8)

## 2019-11-15 PROCEDURE — 86140 C-REACTIVE PROTEIN: CPT | Performed by: PHYSICIAN ASSISTANT

## 2019-11-15 PROCEDURE — 94799 UNLISTED PULMONARY SVC/PX: CPT

## 2019-11-15 PROCEDURE — 97535 SELF CARE MNGMENT TRAINING: CPT

## 2019-11-15 PROCEDURE — 85007 BL SMEAR W/DIFF WBC COUNT: CPT | Performed by: PHYSICIAN ASSISTANT

## 2019-11-15 PROCEDURE — 83735 ASSAY OF MAGNESIUM: CPT | Performed by: PHYSICIAN ASSISTANT

## 2019-11-15 PROCEDURE — 25010000002 CEFTRIAXONE: Performed by: INTERNAL MEDICINE

## 2019-11-15 PROCEDURE — 82962 GLUCOSE BLOOD TEST: CPT

## 2019-11-15 PROCEDURE — C1751 CATH, INF, PER/CENT/MIDLINE: HCPCS

## 2019-11-15 PROCEDURE — 97116 GAIT TRAINING THERAPY: CPT

## 2019-11-15 PROCEDURE — 36410 VNPNXR 3YR/> PHY/QHP DX/THER: CPT

## 2019-11-15 PROCEDURE — 25010000002 HEPARIN (PORCINE) PER 1000 UNITS: Performed by: INTERNAL MEDICINE

## 2019-11-15 PROCEDURE — 25010000002 MAGNESIUM SULFATE 2 GM/50ML SOLUTION: Performed by: INTERNAL MEDICINE

## 2019-11-15 PROCEDURE — 97530 THERAPEUTIC ACTIVITIES: CPT

## 2019-11-15 PROCEDURE — 85025 COMPLETE CBC W/AUTO DIFF WBC: CPT | Performed by: PHYSICIAN ASSISTANT

## 2019-11-15 PROCEDURE — 84100 ASSAY OF PHOSPHORUS: CPT | Performed by: PHYSICIAN ASSISTANT

## 2019-11-15 PROCEDURE — 80053 COMPREHEN METABOLIC PANEL: CPT | Performed by: PHYSICIAN ASSISTANT

## 2019-11-15 PROCEDURE — 99232 SBSQ HOSP IP/OBS MODERATE 35: CPT | Performed by: NURSE PRACTITIONER

## 2019-11-15 RX ORDER — SODIUM CHLORIDE 0.9 % (FLUSH) 0.9 %
10 SYRINGE (ML) INJECTION AS NEEDED
Status: DISCONTINUED | OUTPATIENT
Start: 2019-11-15 | End: 2019-11-21 | Stop reason: HOSPADM

## 2019-11-15 RX ORDER — SODIUM CHLORIDE 0.9 % (FLUSH) 0.9 %
10 SYRINGE (ML) INJECTION EVERY 12 HOURS SCHEDULED
Status: DISCONTINUED | OUTPATIENT
Start: 2019-11-15 | End: 2019-11-21 | Stop reason: HOSPADM

## 2019-11-15 RX ORDER — MAGNESIUM SULFATE HEPTAHYDRATE 40 MG/ML
2 INJECTION, SOLUTION INTRAVENOUS ONCE
Status: COMPLETED | OUTPATIENT
Start: 2019-11-15 | End: 2019-11-15

## 2019-11-15 RX ADMIN — ROPINIROLE HYDROCHLORIDE 0.25 MG: 0.25 TABLET, FILM COATED ORAL at 20:50

## 2019-11-15 RX ADMIN — ATORVASTATIN CALCIUM 10 MG: 10 TABLET, FILM COATED ORAL at 20:50

## 2019-11-15 RX ADMIN — MAGNESIUM SULFATE IN WATER 2 G: 40 INJECTION, SOLUTION INTRAVENOUS at 13:03

## 2019-11-15 RX ADMIN — OFLOXACIN 50000 UNITS: 300 TABLET, COATED ORAL at 11:30

## 2019-11-15 RX ADMIN — SODIUM CHLORIDE, PRESERVATIVE FREE 10 ML: 5 INJECTION INTRAVENOUS at 20:52

## 2019-11-15 RX ADMIN — VALACYCLOVIR HYDROCHLORIDE 1000 MG: 500 TABLET, FILM COATED ORAL at 08:59

## 2019-11-15 RX ADMIN — CEFTRIAXONE 1 G: 1 INJECTION, POWDER, FOR SOLUTION INTRAMUSCULAR; INTRAVENOUS at 20:51

## 2019-11-15 RX ADMIN — Medication 1 TABLET: at 09:01

## 2019-11-15 RX ADMIN — DOXYCYCLINE 100 MG: 100 INJECTION, POWDER, LYOPHILIZED, FOR SOLUTION INTRAVENOUS at 13:16

## 2019-11-15 RX ADMIN — CETIRIZINE HYDROCHLORIDE 5 MG: 10 TABLET, FILM COATED ORAL at 09:01

## 2019-11-15 RX ADMIN — HEPARIN SODIUM 5000 UNITS: 5000 INJECTION INTRAVENOUS; SUBCUTANEOUS at 20:50

## 2019-11-15 RX ADMIN — POTASSIUM & SODIUM PHOSPHATES POWDER PACK 280-160-250 MG 2 PACKET: 280-160-250 PACK at 08:59

## 2019-11-15 RX ADMIN — SODIUM CHLORIDE, PRESERVATIVE FREE 10 ML: 5 INJECTION INTRAVENOUS at 20:53

## 2019-11-15 RX ADMIN — PANTOPRAZOLE SODIUM 40 MG: 40 TABLET, DELAYED RELEASE ORAL at 06:43

## 2019-11-15 RX ADMIN — CARVEDILOL 12.5 MG: 6.25 TABLET, FILM COATED ORAL at 20:50

## 2019-11-15 RX ADMIN — CARVEDILOL 12.5 MG: 6.25 TABLET, FILM COATED ORAL at 08:58

## 2019-11-15 RX ADMIN — HEPARIN SODIUM 5000 UNITS: 5000 INJECTION INTRAVENOUS; SUBCUTANEOUS at 08:58

## 2019-11-15 RX ADMIN — EXEMESTANE 25 MG: 25 TABLET ORAL at 08:58

## 2019-11-15 RX ADMIN — SODIUM CHLORIDE 125 ML/HR: 9 INJECTION, SOLUTION INTRAVENOUS at 15:15

## 2019-11-15 RX ADMIN — ACETAMINOPHEN 650 MG: 325 TABLET ORAL at 00:55

## 2019-11-15 RX ADMIN — METRONIDAZOLE 500 MG: 500 INJECTION, SOLUTION INTRAVENOUS at 22:09

## 2019-11-15 RX ADMIN — METRONIDAZOLE 500 MG: 500 INJECTION, SOLUTION INTRAVENOUS at 15:16

## 2019-11-15 RX ADMIN — ASPIRIN 81 MG: 81 TABLET, COATED ORAL at 09:02

## 2019-11-15 RX ADMIN — SODIUM CHLORIDE, PRESERVATIVE FREE 10 ML: 5 INJECTION INTRAVENOUS at 13:10

## 2019-11-15 NOTE — PLAN OF CARE
Problem: Patient Care Overview  Goal: Plan of Care Review  Outcome: Ongoing (interventions implemented as appropriate)    Goal: Discharge Needs Assessment  Outcome: Ongoing (interventions implemented as appropriate)    Goal: Interprofessional Rounds/Family Conf  Outcome: Ongoing (interventions implemented as appropriate)      Problem: Fall Risk (Adult)  Goal: Identify Related Risk Factors and Signs and Symptoms  Outcome: Ongoing (interventions implemented as appropriate)    Goal: Absence of Fall  Outcome: Ongoing (interventions implemented as appropriate)      Problem: Pain, Acute (Adult)  Goal: Identify Related Risk Factors and Signs and Symptoms  Outcome: Ongoing (interventions implemented as appropriate)    Goal: Acceptable Pain Control/Comfort Level  Outcome: Ongoing (interventions implemented as appropriate)    Goal: Identify Related Risk Factors and Signs and Symptoms  Outcome: Ongoing (interventions implemented as appropriate)    Goal: Acceptable Pain Control/Comfort Level  Outcome: Ongoing (interventions implemented as appropriate)

## 2019-11-15 NOTE — PLAN OF CARE
Problem: Patient Care Overview  Goal: Plan of Care Review  Outcome: Ongoing (interventions implemented as appropriate)   11/15/19 1953   Coping/Psychosocial   Plan of Care Reviewed With patient   Plan of Care Review   Progress improving   OTHER   Outcome Summary ADL retraining, bed mobility, fxl mobility. Continue POC.

## 2019-11-15 NOTE — PROGRESS NOTES
Patient Identification:  Name:  Tahmina Martinez  Age:  74 y.o.  Sex:  female  :  1945  MRN:  [id number]  Visit Number:  [id number]  Primary Care Provider:  Noe Bower MD    Length of stay:  7    Subjective/Interval History/Consultants/Procedures     Chief complaint: Chest pain    HPI:  Patient is 74 year old female who was admitted to [facility] on 19 for unstable angina, CADENCE with probable stage II CKD, and mild hyperkalemia.  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     Subjective:    Pt currently lying in bed awake. She states that physical therapy had walked her today in the halls. However, she states that her oxygen level dropped down in the 70's. She is still having diarrhea that is constant, green, and loose. She is requesting for her diet to be advanced. She denies having any abdominal pain, nausea, vomiting, hematemesis, chills, fever, chest pain, dyspnea.     ----------------------------------------------------------------------------------------------------------------------  Current Hospital Meds:    aspirin 81 mg Oral Daily   atorvastatin 10 mg Oral Nightly   calcium carb-cholecalciferol 1 tablet Oral Daily   carvedilol 12.5 mg Oral BID   cefTRIAXone 1 g Intravenous Q24H   cetirizine 5 mg Oral Daily   cholecalciferol 50,000 Units Oral Weekly   doxycycline 100 mg Intravenous Q12H   exemestane 25 mg Oral Daily   heparin (porcine) 5,000 Units Subcutaneous Q12H   insulin aspart 0-7 Units Subcutaneous 4x Daily AC & at Bedtime   magnesium sulfate 2 g Intravenous Once   metroNIDAZOLE 500 mg Intravenous Q8H   pantoprazole 40 mg Oral Q AM   rOPINIRole 0.25 mg Oral Nightly   sodium chloride 10 mL Intravenous Q12H   valACYclovir 1,000 mg Oral Daily       sodium chloride 125  mL/hr Last Rate: 125 mL/hr (11/14/19 1516)     ----------------------------------------------------------------------------------------------------------------------      Objective     Vital Signs:  Temp:  [97 °F (36.1 °C)-97.8 °F (36.6 °C)] 97.6 °F (36.4 °C)  Heart Rate:  [56-92] 75  Resp:  [18-20] 18  BP: (122-142)/(54-84) 138/70      11/13/19  0500 11/14/19  0500 11/15/19  0500   Weight: 68.9 kg (152 lb) 68 kg (150 lb) 68.3 kg (150 lb 9.6 oz)     Body mass index is 26.68 kg/m².    Intake/Output Summary (Last 24 hours) at 11/15/2019 1247  Last data filed at 11/15/2019 0900  Gross per 24 hour   Intake 1080 ml   Output 300 ml   Net 780 ml     I/O this shift:  In: 240 [P.O.:240]  Out: -   Diet Clear Liquid; Cardiac, Consistent Carbohydrate  ----------------------------------------------------------------------------------------------------------------------  Physical exam:    Physical Exam   Constitutional: She is oriented to person, place, and time. She appears well-developed. She appears ill. No distress.   HENT:   Head: Normocephalic and atraumatic.   Eyes: EOM are normal. Pupils are equal, round, and reactive to light. Right eye exhibits no discharge. Left eye exhibits no discharge. No scleral icterus.   Neck: Normal range of motion. Neck supple. No JVD present. No tracheal deviation present. No thyromegaly present.   Cardiovascular: Normal rate, regular rhythm, normal heart sounds and intact distal pulses. Exam reveals no gallop and no friction rub.   No murmur heard.  Pulmonary/Chest: Effort normal and breath sounds normal. No stridor. No respiratory distress. She has no wheezes. She has no rales. She exhibits no tenderness.   Abdominal: Soft. She exhibits no distension and no mass. Bowel sounds are increased. There is no tenderness. There is no rebound and no guarding. No hernia.   Musculoskeletal: Normal range of motion. She exhibits no edema or deformity.   Neurological: She is alert and oriented to person,  place, and time.   Skin: Skin is warm and dry. Capillary refill takes less than 2 seconds. No rash noted. She is not diaphoretic. No erythema. No pallor.   Psychiatric: She has a normal mood and affect. Her behavior is normal. Judgment and thought content normal.       ----------------------------------------------------------------------------------------------------------------------  Tele:  Sinus rhythm 60s  ----------------------------------------------------------------------------------------------------------------------  Results from last 7 days   Lab Units 11/13/19  0704 11/13/19  0045 11/12/19  1855   TROPONIN T ng/mL <0.010 <0.010 <0.010     Results from last 7 days   Lab Units 11/15/19  0509 11/14/19  0515 11/13/19  0045   CRP mg/dL 10.30* 19.46* 10.20*   WBC 10*3/mm3 10.22 10.16 11.97*   HEMOGLOBIN g/dL 9.6* 10.5* 12.3   HEMATOCRIT % 28.9* 30.9* 34.9   MCV fL 108.2* 104.7* 101.5*   MCHC g/dL 33.2 34.0 35.2   PLATELETS 10*3/mm3 201 205 264         Results from last 7 days   Lab Units 11/15/19  0509 11/14/19  0515 11/13/19  0045   SODIUM mmol/L 137 133* 134*   POTASSIUM mmol/L 3.9 3.4* 4.1   MAGNESIUM mg/dL 1.9 1.8 1.7   CHLORIDE mmol/L 118* 105 100   CO2 mmol/L 12.1* 15.4* 16.5*   BUN mg/dL 14 23 34*   CREATININE mg/dL 0.73 0.80 1.55*   EGFR IF NONAFRICN AM mL/min/1.73 78 70 33*   CALCIUM mg/dL 8.9 8.2* 8.9   GLUCOSE mg/dL 95 116* 139*   ALBUMIN g/dL 2.75* 2.70* 3.69   BILIRUBIN mg/dL <0.2* <0.2* 0.4   ALK PHOS U/L 66 72 95   AST (SGOT) U/L 31 40* 48*   ALT (SGPT) U/L 26 34* 39*   Estimated Creatinine Clearance: 55.5 mL/min (by C-G formula based on SCr of 0.73 mg/dL).  No results found for: AMMONIA      Blood Culture   Date Value Ref Range Status   11/13/2019 No growth at 2 days  Preliminary   11/13/2019 No growth at 2 days  Preliminary              ----------------------------------------------------------------------------------------------------------------------  Imaging Results (Last 24 Hours)      ** No results found for the last 24 hours. **        ----------------------------------------------------------------------------------------------------------------------      Assessment/Plan     Active Hospital Problems    Diagnosis POA   • **Chest pain in adult [R07.9] Yes   • History of splenectomy [Z90.81] Not Applicable   • Cholelithiasis [K80.20] Yes   • Acute kidney injury (CMS/HCC) [N17.9] Yes   • Hyperkalemia [E87.5] Yes   • Hyperglycemia [R73.9] Yes   • First degree AV block [I44.0] Yes   • Macrocytosis without anemia [D75.89] Yes   • Hypocarbia [E87.8] Yes   • Type II diabetes mellitus (CMS/[facility]) [E11.9] Yes   • Dyslipidemia [E78.5] Yes     On atorvastatin.      • RBBB unchanged from 2015 [I45.10] Yes   • History of Non-STEMI (non-ST elevated myocardial infarction) with no coronary intervention needed in 2008, clinically stable.  [I21.4] Yes   • History of Acute myelocytic leukemia,status post chemotherapy and bone marrow transplant in 2005. [C92.00] Yes   • Essential hypertension [I10] Yes   • Breast cancer (right), status post radiation therapy in 08/2015 [C50.919] Yes         ASSESSMENT/PLAN:  · Sepsis, secondary to EAEC enteritis vs hospital acquired pneumonia: Resolving, Leukocytosis resolved, CRP elevated at 10.30 but improving from yesterday,  legionella antigen and mycoplasma negative, discontinue doxycycline, SLP was consulted yesterday. Per speech therapy note, pt does not have s/s of silent aspiration, continue supportive treatment with IV fluids and clear liquid diet as tolerated, continue IVF    · Chest pain, ruled out for an acute MI: troponin remained negative, stress test normal, cardiology recommends no further work up and signed off today, continue medical management with aspirin and statin, continuous telemetry monitoring, patient denies chest pain currently    · Acute on probable stage II CKD: resolved, continue gentle IV fluids, avoid nephrotoxins, repeat BMP in AM, monitor  output    · Cholelithiasis: Gallbladder ultrasound revealed cholelithiasis and no evidence of inflammation, continue to monitor for now, recommend general surgery consult outpatient if warranted    · Dizziness: orthostatics normal, bilateral carotid doppler U/S with no hemodynamically significant stenosis, head CT without any acute intracranial abnormalities, fall precautions, up with assistance, monitor for now,     · Debility/generalized weakness: Continue PT/OT, possible discharge to a rehab facility for prolonged physical therapy    · Borderline/hyperkalemia: K+ normal currently, repeat BMP in AM    · Hypomagnesemia: Magnesium slightly low this AM, replace per protocol, repeat magnesium in AM    · Hyponatremia: Resolved, repeat BMP in AM    · Hypophosphatemia: Replace per protocol. Repeat phosphorus in AM.     · Non insulin dependent type II diabetes mellitus:  Continue low dose sliding scale insulin, acuchecks ACHS, hypoglycemia protocol in place    · History of splenectomy: Supportive    · History of AML S/P chemo and bone marrow transplant in past: Monitor      -----------  -DVT prophylaxis: Heparin SQ  -GI prophylaxis: Protonix  -Activity: PT/OT, up with assistance  -Disposition: Pending possible inpatient rehab placement for prolonged therapy due to debility and weakness  -Diet:       The patient is high risk due to the following diagnoses/reasons:  Sepsis, enteritis, HCAP, CP, weakness, multiple co-morbidities    JENNY Shields  11/15/19  12:47 PM

## 2019-11-15 NOTE — PROGRESS NOTES
Discharge Planning Assessment  [facility]     Patient Name: Tahmina Martinez  MRN: [id number]  Today's Date: 11/15/2019    Admit Date: 11/7/2019      Discharge Plan     Row Name 11/15/19 1329       Plan    Plan  Pt admitted on 11/7/19.  [facility] assessing pt for possible admit when medically stable.  SS will continue to follow and assist with discharge needs.         Destination      Service Provider Request Status Selected Services Address Phone Number Fax Number    [facility] Pending - Request Sent N/A [address] [place] [address] [phone]           Carly Elizabeth, CHENGW

## 2019-11-15 NOTE — PROGRESS NOTES
Antibiotic length of therapy :    Rocephin  day 3    Flagyl  Day 3    Doxycycline  Day 3    Valtrex  Chronic

## 2019-11-15 NOTE — THERAPY TREATMENT NOTE
Acute Care - Physical Therapy Treatment Note  KATTY Garcia     Patient Name: Tahmina Martinez  : 1945  MRN: [id number]  Today's Date: 11/15/2019  Onset of Illness/Injury or Date of Surgery: 19  Date of Referral to PT: 11/10/19  Referring Physician: Shayan    Admit Date: 2019    Visit Dx:    ICD-10-CM ICD-9-CM   1. Chest pain in adult R07.9 786.50   2. Renal insufficiency N28.9 593.9     Patient Active Problem List   Diagnosis   • History of Non-STEMI (non-ST elevated myocardial infarction) with no coronary intervention needed in , clinically stable.    • Dyspnea, class II-III.    • Essential hypertension   • History of Acute myelocytic leukemia,status post chemotherapy and bone marrow transplant in .   • Breast cancer (right), status post radiation therapy in 2015   • RBBB unchanged from    • Dyslipidemia   • Bifascicular block (RBBB plus LP FB)   • Chest pain in adult   • History of splenectomy   • Cholelithiasis   • Acute kidney injury (CMS/HCC)   • Hyperkalemia   • Hyperglycemia   • First degree AV block   • Macrocytosis without anemia   • Hypocarbia   • Type II diabetes mellitus (CMS/HCC)       Therapy Treatment    Rehabilitation Treatment Summary     Row Name 11/15/19 1311             Treatment Time/Intention    Discipline  physical therapist  -CT      Document Type  therapy note (daily note)  -CT      Subjective Information  complains of;weakness;fatigue  -CT      Mode of Treatment  physical therapy  -CT      Therapy Frequency (PT Clinical Impression)  3 times/wk 3-5 times/wk  -CT      Patient Effort  good  -CT      Comment  Pt tolerated treatment session well with rest breaks provided as needed.   -CT      Existing Precautions/Restrictions  fall  -CT      Recorded by [CT] Nakia Stone, PT 11/15/19 1323      Row Name 11/15/19 1311             Cognitive Assessment/Intervention- PT/OT    Orientation Status (Cognition)  oriented x 4  -CT      Follows Commands (Cognition)  WFL  -CT       Recorded by [CT] Nakia Stone, PT 11/15/19 1323      Row Name 11/15/19 1311             Safety Issues, Functional Mobility    Impairments Affecting Function (Mobility)  endurance/activity tolerance;strength  -CT      Recorded by [CT] Nakia Stone, PT 11/15/19 1323      Row Name 11/15/19 1311             Bed Mobility Assessment/Treatment    Bed Mobility Assessment/Treatment  bed mobility (all) activities  -CT      [place] Level (Bed Mobility)  minimum assist (75% patient effort)  -CT      Assistive Device (Bed Mobility)  bed rails  -CT      Recorded by [CT] Nakia Stone, PT 11/15/19 1323      Row Name 11/15/19 1311             Transfer Assessment/Treatment    Transfer Assessment/Treatment  sit-stand transfer;stand-sit transfer  -CT      Recorded by [CT] Nakia Stone, PT 11/15/19 1323      Row Name 11/15/19 1311             Sit-Stand Transfer    Sit-Stand [place] (Transfers)  minimum assist (75% patient effort)  -CT      Assistive Device (Sit-Stand Transfers)  walker, front-wheeled  -CT2      Recorded by [CT] Nakia Stone, PT 11/15/19 1323  [CT2] Nakia Stone, PT 11/15/19 1355      Row Name 11/15/19 1311             Stand-Sit Transfer    Stand-Sit [place] (Transfers)  minimum assist (75% patient effort)  -CT      Assistive Device (Stand-Sit Transfers)  walker, front-wheeled  -CT2      Recorded by [CT] Nakia Stone, PT 11/15/19 1323  [CT2] Nakia Stone, PT 11/15/19 1355      Row Name 11/15/19 1311             Gait/Stairs Assessment/Training    [place] Level (Gait)  minimum assist (75% patient effort)  -CT      Assistive Device (Gait)  walker, front-wheeled  -CT2      Distance in Feet (Gait)  40  -CT2      Pattern (Gait)  step-to  -CT      Deviations/Abnormal Patterns (Gait)  base of support, narrow;festinating/shuffling;gait speed decreased  -CT      Bilateral Gait Deviations  forward flexed posture;weight shift ability decreased  -CT      Recorded by [CT] Chase  Nakia, PT 11/15/19 1323  [CT2] Nakia Stone, PT 11/15/19 1355      Row Name 11/15/19 1311             Positioning and Restraints    Pre-Treatment Position  in bed  -CT      Post Treatment Position  chair  -CT      In Chair  sitting;call light within reach;encouraged to call for assist;notified nsg  -CT      Recorded by [CT] Nakia Stone, PT 11/15/19 1355      Row Name 11/15/19 1311             Plan of Care Review    Plan of Care Reviewed With  patient  -CT      Recorded by [CT] Nakia Stone, PT 11/15/19 1355      Row Name 11/15/19 1311             Outcome Summary/Treatment Plan (PT)    Anticipated Equipment Needs at Discharge (PT)  -- tbd  -CT      Anticipated Discharge Disposition (PT)  inpatient rehabilitation facility  -CT      Recorded by [CT] Nakia Stone, PT 11/15/19 1323        User Key  (r) = Recorded By, (t) = Taken By, (c) = Cosigned By    Initials Name Effective Dates Discipline    CT Nakia Stone, PT 04/03/18 -  PT                   Physical Therapy Education     Title: PT OT SLP Therapies (Done)     Topic: Physical Therapy (Done)     Point: Mobility training (Done)     Learning Progress Summary           Patient Acceptance, E,TB, VU by CT at 11/15/2019  1:55 PM    Acceptance, E, VU by EA at 11/14/2019 10:27 PM    Acceptance, E,TB, VU by CT at 11/14/2019  2:54 PM                   Point: Home exercise program (Done)     Learning Progress Summary           Patient Acceptance, E,TB, VU by CT at 11/15/2019  1:55 PM    Acceptance, E, VU by EA at 11/14/2019 10:27 PM    Acceptance, E,TB, VU by CT at 11/14/2019  2:54 PM                   Point: Body mechanics (Done)     Learning Progress Summary           Patient Acceptance, E,TB, VU by CT at 11/15/2019  1:55 PM    Acceptance, E, VU by EA at 11/14/2019 10:27 PM    Acceptance, E,TB, VU by CT at 11/14/2019  2:54 PM                   Point: Precautions (Done)     Learning Progress Summary           Patient Acceptance, E,TB, VU by CT at 11/15/2019   1:55 PM    Acceptance, E, VU by EA at 11/14/2019 10:27 PM    Acceptance, E,TB, VU by CT at 11/14/2019  2:54 PM                               User Key     Initials Effective Dates Name Provider Type Discipline    EA 06/16/16 -  Joy Weiner, RN Registered Nurse Nurse    CT 04/03/18 -  Nakia Stone, PT Physical Therapist PT                PT Recommendation and Plan  Anticipated Discharge Disposition (PT): inpatient rehabilitation facility  Planned Therapy Interventions (PT Eval): balance training, bed mobility training, gait training, home exercise program, manual therapy techniques, motor coordination training, neuromuscular re-education, patient/family education, postural re-education, ROM (range of motion), stair training, strengthening, transfer training  Therapy Frequency (PT Clinical Impression): 3 times/wk(3-5 times/wk)  Outcome Summary/Treatment Plan (PT)  Anticipated Equipment Needs at Discharge (PT): (tbd)  Anticipated Discharge Disposition (PT): inpatient rehabilitation facility  Plan of Care Reviewed With: patient     Time Calculation:   PT Charges     Row Name 11/15/19 1355             Time Calculation    PT Received On  11/15/19  -CT      PT Goal Re-Cert Due Date  11/28/19  -CT         Time Calculation- PT    Total Timed Code Minutes- PT  27 minute(s)  -CT        User Key  (r) = Recorded By, (t) = Taken By, (c) = Cosigned By    Initials Name Provider Type    CT Nakia Stone, PAULINA Physical Therapist        Therapy Charges for Today     Code Description Service Date Service Provider Modifiers Qty    21034638024 HC PT EVAL LOW COMPLEXITY 4 11/14/2019 Nakia Stone, PT GP 1    12139921301 HC GAIT TRAINING EA 15 MIN 11/15/2019 Nakia Stone, PT GP 1    09071493293 HC PT THERAPEUTIC ACT EA 15 MIN 11/15/2019 Nakia Stone, PT GP 1          PT G-Codes  Outcome Measure Options: AM-PAC 6 Clicks Daily Activity (OT)  AM-PAC 6 Clicks Score (OT): 21    Nakia Stone PT  11/15/2019

## 2019-11-15 NOTE — THERAPY TREATMENT NOTE
Acute Care - Occupational Therapy Treatment Note  KATTY Garcia     Patient Name: Tahmina Martinez  : 1945  MRN: [id number]  Today's Date: 11/15/2019  Onset of Illness/Injury or Date of Surgery: 19     Referring Physician: Shayan    Admit Date: 2019       ICD-10-CM ICD-9-CM   1. Chest pain in adult R07.9 786.50   2. Renal insufficiency N28.9 593.9     Patient Active Problem List   Diagnosis   • History of Non-STEMI (non-ST elevated myocardial infarction) with no coronary intervention needed in , clinically stable.    • Dyspnea, class II-III.    • Essential hypertension   • History of Acute myelocytic leukemia,status post chemotherapy and bone marrow transplant in .   • Breast cancer (right), status post radiation therapy in 2015   • RBBB unchanged from    • Dyslipidemia   • Bifascicular block (RBBB plus LP FB)   • Chest pain in adult   • History of splenectomy   • Cholelithiasis   • Acute kidney injury (CMS/HCC)   • Hyperkalemia   • Hyperglycemia   • First degree AV block   • Macrocytosis without anemia   • Hypocarbia   • Type II diabetes mellitus (CMS/HCC)     Past Medical History:   Diagnosis Date   • Dyslipidemia 2017    On atorvastatin.    • Essential hypertension 10/12/2016   • First degree AV block 2019   • H/O splenectomy    • History of breast cancer        • Hypertension    • Myelocytic leukemia (CMS/HCC)    • Non-STEMI (non-ST elevated myocardial infarction) (CMS/HCC)    • Restless leg    • Type II diabetes mellitus (CMS/HCC) 2019     Past Surgical History:   Procedure Laterality Date   • BONE MARROW TRANSPLANT     •  SECTION     • SPLENECTOMY     • TUBAL ABDOMINAL LIGATION         Therapy Treatment    Rehabilitation Treatment Summary     Row Name 11/15/19 1522 11/15/19 1311          Treatment Time/Intention    Discipline  occupational therapist  -LM  physical therapist  -CT     Document Type  therapy note (daily note)  -LM  therapy note (daily note)   -CT     Subjective Information  complains of;weakness;fatigue  -LM  complains of;weakness;fatigue  -CT     Mode of Treatment  occupational therapy  -LM  physical therapy  -CT     Care Plan Review  care plan/treatment goals reviewed;patient/other agree to care plan  -LM  --     Therapy Frequency (PT Clinical Impression)  --  3 times/wk 3-5 times/wk  -CT     Patient Effort  adequate  -LM  good  -CT     Comment  Light TA and ADL retraining.  Patient sat on EOB with supervision, CGA for BSC transfers, Yann/CGA with toileting.  Demonstrates increase in activity tolerance this date.  Continues to experience dizziness at times.    -LM  Pt tolerated treatment session well with rest breaks provided as needed.   -CT     Existing Precautions/Restrictions  fall  -LM  fall  -CT     Recorded by [LM] María Monique, OT 11/15/19 1524 [CT] Nakia Stone, PT 11/15/19 1323     Row Name 11/15/19 1311             Cognitive Assessment/Intervention- PT/OT    Orientation Status (Cognition)  oriented x 4  -CT      Follows Commands (Cognition)  WFL  -CT      Recorded by [CT] Nakia Stone, PT 11/15/19 1323      Row Name 11/15/19 1311             Safety Issues, Functional Mobility    Impairments Affecting Function (Mobility)  endurance/activity tolerance;strength  -CT      Recorded by [CT] Nakia Stone, PT 11/15/19 1323      Row Name 11/15/19 1311             Bed Mobility Assessment/Treatment    Bed Mobility Assessment/Treatment  bed mobility (all) activities  -CT      [place] Level (Bed Mobility)  minimum assist (75% patient effort)  -CT      Assistive Device (Bed Mobility)  bed rails  -CT      Recorded by [CT] Nakia Stone, PT 11/15/19 1323      Row Name 11/15/19 1311             Transfer Assessment/Treatment    Transfer Assessment/Treatment  sit-stand transfer;stand-sit transfer  -CT      Recorded by [CT] Nakia Stone, PT 11/15/19 1323      Row Name 11/15/19 1311             Sit-Stand Transfer    Sit-Stand [place]  (Transfers)  minimum assist (75% patient effort)  -CT      Assistive Device (Sit-Stand Transfers)  walker, front-wheeled  -CT2      Recorded by [CT] Nakia Stone, PT 11/15/19 1323  [CT2] Nakia Stone, PT 11/15/19 1355      Row Name 11/15/19 1311             Stand-Sit Transfer    Stand-Sit [place] (Transfers)  minimum assist (75% patient effort)  -CT      Assistive Device (Stand-Sit Transfers)  walker, front-wheeled  -CT2      Recorded by [CT] Nakia Stone, PT 11/15/19 1323  [CT2] Nakia Stone, PT 11/15/19 1355      Row Name 11/15/19 1311             Gait/Stairs Assessment/Training    [place] Level (Gait)  minimum assist (75% patient effort)  -CT      Assistive Device (Gait)  walker, front-wheeled  -CT2      Distance in Feet (Gait)  40  -CT2      Pattern (Gait)  step-to  -CT      Deviations/Abnormal Patterns (Gait)  base of support, narrow;festinating/shuffling;gait speed decreased  -CT      Bilateral Gait Deviations  forward flexed posture;weight shift ability decreased  -CT      Recorded by [CT] Nakia Stone, PT 11/15/19 1323  [CT2] Nakia Stone, PT 11/15/19 1355      Row Name 11/15/19 1522 11/15/19 1311          Positioning and Restraints    Pre-Treatment Position  --  in bed  -CT     Post Treatment Position  bed  -LM  chair  -CT     In Bed  call light within reach;encouraged to call for assist  -LM  --     In Chair  --  sitting;call light within reach;encouraged to call for assist;notified nsg  -CT     Recorded by [LM] María Monique OT 11/15/19 1524 [CT] Nakia Stone, PT 11/15/19 1355     Row Name 11/15/19 1311             Plan of Care Review    Plan of Care Reviewed With  patient  -CT      Recorded by [CT] Nakia Stone, PT 11/15/19 1355      Row Name 11/15/19 1311             Outcome Summary/Treatment Plan (PT)    Anticipated Equipment Needs at Discharge (PT)  -- tbd  -CT      Anticipated Discharge Disposition (PT)  inpatient rehabilitation facility  -CT      Recorded by [CT]  Nakia Stone, PT 11/15/19 1323        User Key  (r) = Recorded By, (t) = Taken By, (c) = Cosigned By    Initials Name Effective Dates Discipline    CT Nakia Stone, PT 04/03/18 -  PT    María Allen, OT 03/14/16 -  OT             Occupational Therapy Education     Title: PT OT SLP Therapies (Done)     Topic: Occupational Therapy (Done)     Point: ADL training (Done)     Description: Instruct learner(s) on proper safety adaptation and remediation techniques during self care or transfers.   Instruct in proper use of assistive devices.    Learning Progress Summary           Patient Acceptance, E, VU by NINA at 11/14/2019 10:27 PM    Acceptance, E, VU by COSTA at 11/14/2019 12:11 PM    Acceptance, E,TB, VU by ADITI at 11/13/2019  9:41 PM    Acceptance, E, VU by COSTA at 11/13/2019  3:32 PM    Acceptance, E, VU by TYSON at 11/11/2019 10:50 PM    Acceptance, E, VU,NR by AS at 11/11/2019  9:09 AM    Acceptance, E,TB, VU by JANETH at 11/11/2019 12:19 AM    Acceptance, E, VU by BC at 11/8/2019  3:42 PM    Acceptance, E,D, VU,DU,NR by BILL at 11/8/2019  3:38 PM                   Point: Home exercise program (Done)     Description: Instruct learner(s) on appropriate technique for monitoring, assisting and/or progressing therapeutic exercises/activities.    Learning Progress Summary           Patient Acceptance, E, VU by NINA at 11/14/2019 10:27 PM    Acceptance, E, VU by DG at 11/14/2019 12:11 PM    Acceptance, E,TB, VU by ADITI at 11/13/2019  9:41 PM    Acceptance, E, VU by COSTA at 11/13/2019  3:32 PM    Acceptance, E, VU by TYSON at 11/11/2019 10:50 PM    Acceptance, E, VU,NR by AS at 11/11/2019  9:09 AM    Acceptance, E,TB, VU by KF at 11/11/2019 12:19 AM    Acceptance, E, VU by BC at 11/8/2019  3:42 PM    Acceptance, E,D, VU,DU,NR by BILL at 11/8/2019  3:38 PM                   Point: Precautions (Done)     Description: Instruct learner(s) on prescribed precautions during self-care and functional transfers.    Learning Progress Summary            Patient Acceptance, E, VU by EA at 11/14/2019 10:27 PM    Acceptance, E, VU by DG at 11/14/2019 12:11 PM    Acceptance, E,TB, VU by  at 11/13/2019  9:41 PM    Acceptance, E, VU by DG at 11/13/2019  3:32 PM    Acceptance, E, VU by SM1 at 11/11/2019 10:50 PM    Acceptance, E, VU,NR by AS at 11/11/2019  9:09 AM    Acceptance, E,TB, VU by KF at 11/11/2019 12:19 AM    Acceptance, E, VU by BC at 11/8/2019  3:42 PM    Acceptance, E,D, VU,DU,NR by  at 11/8/2019  3:38 PM                   Point: Body mechanics (Done)     Description: Instruct learner(s) on proper positioning and spine alignment during self-care, functional mobility activities and/or exercises.    Learning Progress Summary           Patient Acceptance, E, VU by EA at 11/14/2019 10:27 PM    Acceptance, E, VU by DG at 11/14/2019 12:11 PM    Acceptance, E,TB, VU by  at 11/13/2019  9:41 PM    Acceptance, E, VU by DG at 11/13/2019  3:32 PM    Acceptance, E, VU by [facility] at 11/11/2019 10:50 PM    Acceptance, E, VU,NR by AS at 11/11/2019  9:09 AM    Acceptance, E,TB, VU by  at 11/11/2019 12:19 AM    Acceptance, E, VU by BC at 11/8/2019  3:42 PM    Acceptance, E,D, VU,DU,NR by  at 11/8/2019  3:38 PM                               User Key     Initials Effective Dates Name Provider Type Discipline     06/16/16 -  Joy Weiner, RN Registered Nurse Nurse    BC 03/14/16 -  Karly Carranza PT Physical Therapist PT     03/14/16 -  María Monique OT Occupational Therapist OT     09/18/19 -  Millie Reeves, RN Registered Nurse Nurse     04/03/17 -  Dariela Barron RN Registered Nurse Nurse     07/08/19 -  Elsie Hernandez RN Registered Nurse Nurse    [facility] 09/12/18 -  Sahra, Jose Francisco, RN Registered Nurse Nurse    AS 08/27/19 -  Jones Toussaint, RN Registered Nurse Nurse                OT Recommendation and Plan  Planned Therapy Interventions (OT Eval): activity tolerance training, BADL retraining, transfer/mobility retraining,  ROM/therapeutic exercise, strengthening exercise  Therapy Frequency (OT Eval): (3-5 times week)  Plan of Care Review  Plan of Care Reviewed With: patient  Plan of Care Reviewed With: patient  Outcome Summary: ADL retraining, bed mobility, fxl mobility.  Continue POC.       Time Calculation:   Time Calculation- OT     Row Name 11/15/19 1525             Time Calculation- OT    Total Timed Code Minutes- OT  25 minute(s)  -        User Key  (r) = Recorded By, (t) = Taken By, (c) = Cosigned By    Initials Name Provider Type     María Monique OT Occupational Therapist        Therapy Charges for Today     Code Description Service Date Service Provider Modifiers Qty    22858392536 HC OT SELF CARE/MGMT/TRAIN EA 15 MIN 11/15/2019 María Monique OT GO 2               María Monique OT  11/15/2019

## 2019-11-16 LAB
ANION GAP SERPL CALCULATED.3IONS-SCNC: 11.4 MMOL/L (ref 5–15)
BUN BLD-MCNC: 8 MG/DL (ref 8–23)
BUN/CREAT SERPL: 12.3 (ref 7–25)
CALCIUM SPEC-SCNC: 8.5 MG/DL (ref 8.6–10.5)
CHLORIDE SERPL-SCNC: 117 MMOL/L (ref 98–107)
CO2 SERPL-SCNC: 11.6 MMOL/L (ref 22–29)
CREAT BLD-MCNC: 0.65 MG/DL (ref 0.57–1)
CRP SERPL-MCNC: 4.18 MG/DL (ref 0–0.5)
DEPRECATED RDW RBC AUTO: 56.6 FL (ref 37–54)
ERYTHROCYTE [DISTWIDTH] IN BLOOD BY AUTOMATED COUNT: 14.4 % (ref 12.3–15.4)
GFR SERPL CREATININE-BSD FRML MDRD: 89 ML/MIN/1.73
GLUCOSE BLD-MCNC: 99 MG/DL (ref 65–99)
GLUCOSE BLDC GLUCOMTR-MCNC: 101 MG/DL (ref 70–130)
GLUCOSE BLDC GLUCOMTR-MCNC: 116 MG/DL (ref 70–130)
GLUCOSE BLDC GLUCOMTR-MCNC: 130 MG/DL (ref 70–130)
GLUCOSE BLDC GLUCOMTR-MCNC: 85 MG/DL (ref 70–130)
HCT VFR BLD AUTO: 28.6 % (ref 34–46.6)
HGB BLD-MCNC: 9.3 G/DL (ref 12–15.9)
MAGNESIUM SERPL-MCNC: 1.7 MG/DL (ref 1.6–2.4)
MCH RBC QN AUTO: 35.1 PG (ref 26.6–33)
MCHC RBC AUTO-ENTMCNC: 32.5 G/DL (ref 31.5–35.7)
MCV RBC AUTO: 107.9 FL (ref 79–97)
PHOSPHATE SERPL-MCNC: 2.3 MG/DL (ref 2.5–4.5)
PHOSPHATE SERPL-MCNC: 2.8 MG/DL (ref 2.5–4.5)
PLATELET # BLD AUTO: 212 10*3/MM3 (ref 140–450)
PMV BLD AUTO: 10.1 FL (ref 6–12)
POTASSIUM BLD-SCNC: 3 MMOL/L (ref 3.5–5.2)
POTASSIUM BLD-SCNC: 4 MMOL/L (ref 3.5–5.2)
RBC # BLD AUTO: 2.65 10*6/MM3 (ref 3.77–5.28)
SODIUM BLD-SCNC: 140 MMOL/L (ref 136–145)
WBC NRBC COR # BLD: 9.6 10*3/MM3 (ref 3.4–10.8)

## 2019-11-16 PROCEDURE — 84100 ASSAY OF PHOSPHORUS: CPT | Performed by: INTERNAL MEDICINE

## 2019-11-16 PROCEDURE — 83735 ASSAY OF MAGNESIUM: CPT | Performed by: INTERNAL MEDICINE

## 2019-11-16 PROCEDURE — 84132 ASSAY OF SERUM POTASSIUM: CPT | Performed by: INTERNAL MEDICINE

## 2019-11-16 PROCEDURE — 84100 ASSAY OF PHOSPHORUS: CPT | Performed by: NURSE PRACTITIONER

## 2019-11-16 PROCEDURE — 25010000002 CEFTRIAXONE: Performed by: INTERNAL MEDICINE

## 2019-11-16 PROCEDURE — 82962 GLUCOSE BLOOD TEST: CPT

## 2019-11-16 PROCEDURE — 25010000002 MAGNESIUM SULFATE 2 GM/50ML SOLUTION: Performed by: INTERNAL MEDICINE

## 2019-11-16 PROCEDURE — 94799 UNLISTED PULMONARY SVC/PX: CPT

## 2019-11-16 PROCEDURE — 86140 C-REACTIVE PROTEIN: CPT | Performed by: NURSE PRACTITIONER

## 2019-11-16 PROCEDURE — 99232 SBSQ HOSP IP/OBS MODERATE 35: CPT | Performed by: NURSE PRACTITIONER

## 2019-11-16 PROCEDURE — 85027 COMPLETE CBC AUTOMATED: CPT | Performed by: NURSE PRACTITIONER

## 2019-11-16 PROCEDURE — 25010000002 HEPARIN (PORCINE) PER 1000 UNITS: Performed by: INTERNAL MEDICINE

## 2019-11-16 PROCEDURE — 80048 BASIC METABOLIC PNL TOTAL CA: CPT | Performed by: NURSE PRACTITIONER

## 2019-11-16 RX ORDER — MAGNESIUM SULFATE HEPTAHYDRATE 40 MG/ML
2 INJECTION, SOLUTION INTRAVENOUS ONCE
Status: COMPLETED | OUTPATIENT
Start: 2019-11-16 | End: 2019-11-16

## 2019-11-16 RX ORDER — FLUTICASONE PROPIONATE 50 MCG
2 SPRAY, SUSPENSION (ML) NASAL DAILY
Status: DISCONTINUED | OUTPATIENT
Start: 2019-11-16 | End: 2019-11-21 | Stop reason: HOSPADM

## 2019-11-16 RX ORDER — POTASSIUM CHLORIDE 1.5 G/1.77G
40 POWDER, FOR SOLUTION ORAL EVERY 4 HOURS
Status: COMPLETED | OUTPATIENT
Start: 2019-11-16 | End: 2019-11-16

## 2019-11-16 RX ADMIN — POTASSIUM CHLORIDE 40 MEQ: 1.5 POWDER, FOR SOLUTION ORAL at 15:51

## 2019-11-16 RX ADMIN — POTASSIUM CHLORIDE 40 MEQ: 1.5 POWDER, FOR SOLUTION ORAL at 12:13

## 2019-11-16 RX ADMIN — ROPINIROLE HYDROCHLORIDE 0.25 MG: 0.25 TABLET, FILM COATED ORAL at 20:21

## 2019-11-16 RX ADMIN — ASPIRIN 81 MG: 81 TABLET, COATED ORAL at 08:17

## 2019-11-16 RX ADMIN — MAGNESIUM SULFATE HEPTAHYDRATE 2 G: 40 INJECTION, SOLUTION INTRAVENOUS at 08:17

## 2019-11-16 RX ADMIN — HEPARIN SODIUM 5000 UNITS: 5000 INJECTION INTRAVENOUS; SUBCUTANEOUS at 20:21

## 2019-11-16 RX ADMIN — METRONIDAZOLE 500 MG: 500 INJECTION, SOLUTION INTRAVENOUS at 05:48

## 2019-11-16 RX ADMIN — METRONIDAZOLE 500 MG: 500 INJECTION, SOLUTION INTRAVENOUS at 14:20

## 2019-11-16 RX ADMIN — CARVEDILOL 12.5 MG: 6.25 TABLET, FILM COATED ORAL at 08:17

## 2019-11-16 RX ADMIN — PANTOPRAZOLE SODIUM 40 MG: 40 TABLET, DELAYED RELEASE ORAL at 05:48

## 2019-11-16 RX ADMIN — EXEMESTANE 25 MG: 25 TABLET ORAL at 08:30

## 2019-11-16 RX ADMIN — Medication 1 TABLET: at 08:17

## 2019-11-16 RX ADMIN — FLUTICASONE PROPIONATE 2 SPRAY: 50 SPRAY, METERED NASAL at 12:14

## 2019-11-16 RX ADMIN — SODIUM CHLORIDE, PRESERVATIVE FREE 10 ML: 5 INJECTION INTRAVENOUS at 08:17

## 2019-11-16 RX ADMIN — SODIUM CHLORIDE, PRESERVATIVE FREE 10 ML: 5 INJECTION INTRAVENOUS at 08:16

## 2019-11-16 RX ADMIN — CEFTRIAXONE 1 G: 1 INJECTION, POWDER, FOR SOLUTION INTRAMUSCULAR; INTRAVENOUS at 20:21

## 2019-11-16 RX ADMIN — POTASSIUM CHLORIDE 40 MEQ: 1.5 POWDER, FOR SOLUTION ORAL at 08:16

## 2019-11-16 RX ADMIN — POTASSIUM & SODIUM PHOSPHATES POWDER PACK 280-160-250 MG 2 PACKET: 280-160-250 PACK at 12:14

## 2019-11-16 RX ADMIN — CETIRIZINE HYDROCHLORIDE 5 MG: 10 TABLET, FILM COATED ORAL at 08:17

## 2019-11-16 RX ADMIN — CARVEDILOL 12.5 MG: 6.25 TABLET, FILM COATED ORAL at 20:21

## 2019-11-16 RX ADMIN — ATORVASTATIN CALCIUM 10 MG: 10 TABLET, FILM COATED ORAL at 20:21

## 2019-11-16 RX ADMIN — VALACYCLOVIR HYDROCHLORIDE 1000 MG: 500 TABLET, FILM COATED ORAL at 08:17

## 2019-11-16 RX ADMIN — SODIUM CHLORIDE 75 ML/HR: 9 INJECTION, SOLUTION INTRAVENOUS at 12:14

## 2019-11-16 RX ADMIN — HEPARIN SODIUM 5000 UNITS: 5000 INJECTION INTRAVENOUS; SUBCUTANEOUS at 08:16

## 2019-11-16 RX ADMIN — METRONIDAZOLE 500 MG: 500 INJECTION, SOLUTION INTRAVENOUS at 22:34

## 2019-11-16 NOTE — PLAN OF CARE
Problem: Patient Care Overview  Goal: Plan of Care Review  Outcome: Ongoing (interventions implemented as appropriate)    Goal: Discharge Needs Assessment  Outcome: Ongoing (interventions implemented as appropriate)    Goal: Interprofessional Rounds/Family Conf  Outcome: Ongoing (interventions implemented as appropriate)      Problem: Fall Risk (Adult)  Goal: Identify Related Risk Factors and Signs and Symptoms  Outcome: Ongoing (interventions implemented as appropriate)    Goal: Absence of Fall  Outcome: Ongoing (interventions implemented as appropriate)      Problem: Pain, Acute (Adult)  Goal: Acceptable Pain Control/Comfort Level  Outcome: Ongoing (interventions implemented as appropriate)    Goal: Identify Related Risk Factors and Signs and Symptoms  Outcome: Ongoing (interventions implemented as appropriate)    Goal: Acceptable Pain Control/Comfort Level  Outcome: Ongoing (interventions implemented as appropriate)

## 2019-11-16 NOTE — DISCHARGE PLACEMENT REQUEST
"Erica Martinez (74 y.o. Female)     Date of Birth Social Security Number Address Home Phone MRN    1945  [address] [facility] [address] [phone] [id number]    Religious Marital Status          Episcopalian        Admission Date Admission Type Admitting Provider Attending Provider Department, Room/Bed    11/7/19 Emergency Didi Ulloa DO Troxell, Christopher A, DO [facility] 3 [facility], 3304/1S    Discharge Date Discharge Disposition Discharge Destination                       Attending Provider:  David Matute DO    Allergies:  Latex, Penicillins, Zithromax [Azithromycin]    Isolation:  Contact   Infection:  Rotavirus (11/12/19)   Code Status:  CPR    Ht:  160 cm (62.99\")   Wt:  69.9 kg (154 lb 1.6 oz)    Admission Cmt:  None   Principal Problem:  Chest pain in adult [R07.9]                 Active Insurance as of 11/7/2019     Primary Coverage     Payor Plan Insurance Group Employer/Plan Group    MEDICARE MEDICARE A & B      Payor Plan Address Payor Plan Phone Number Payor Plan Fax Number Effective Dates    [address] [phone]  3/1/2007 - None Entered    [facility] [address]       Subscriber Name Subscriber Birth Date Member ERICA TRIMBLE 1945 [id number]           Secondary Coverage     Payor Plan Insurance Group Employer/Plan Group    [place]      Payor Plan Address Payor Plan Phone Number Payor Plan Fax Number Effective Dates    [address] [phone]  5/31/2017 - None Entered    [place] [address]       Subscriber Name Subscriber Birth Date Member ERICA TRIMBLE 1945 [id number]                 Emergency Contacts      (Rel.) Home Phone Work Phone Mobile Phone    Bala Martinez (Spouse) [phone] -- --    Kamari Cummins (Son) [phone] -- [phone]            Emergency Contact Information     Name Relation Home Work Mobile    Bala Martinez Spouse [phone]      Kamari Cummins " Son [phone]          Insurance Information                MEDICARE/MEDICARE A & B Phone: [phone]    Subscriber: Tahmina Martinez Subscriber#: [id number]    Group#:  Precert#:         [place] Phone: [phone]    Subscriber: Tahmina Martinez Subscriber#: [id number]    Group#:  Precert#:           Treatment Team     Provider Relationship Specialty Contact    David Matute DO Attending -- [phone]    Cassia Andersen, SU Respiratory Therapist --     Elvin Hardy Patient Care Technician -- [phone]    Nakia Stone PT Physical Therapist Physical Therapy     Azul Jerez, RN Registered Nurse --           Problem List           Codes Noted - Resolved       Hospital    * (Principal) Chest pain in adult ICD-10-CM: R07.9  ICD-9-CM: 786.50 11/8/2019 - Present    History of splenectomy (Chronic) ICD-10-CM: Z90.81  ICD-9-CM: V45.79 11/8/2019 - Present    Cholelithiasis ICD-10-CM: K80.20  ICD-9-CM: 574.20 11/8/2019 - Present    Acute kidney injury (CMS/HCC) ICD-10-CM: N17.9  ICD-9-CM: 584.9 11/8/2019 - Present    Hyperkalemia ICD-10-CM: E87.5  ICD-9-CM: 276.7 11/8/2019 - Present    Hyperglycemia ICD-10-CM: R73.9  ICD-9-CM: 790.29 11/8/2019 - Present    First degree AV block ICD-10-CM: I44.0  ICD-9-CM: 426.11 11/8/2019 - Present    Macrocytosis without anemia ICD-10-CM: D75.89  ICD-9-CM: 289.89 11/8/2019 - Present    Hypocarbia ICD-10-CM: E87.8  ICD-9-CM: 276.9 11/8/2019 - Present    Type II diabetes mellitus (CMS/HCC) (Chronic) ICD-10-CM: E11.9  ICD-9-CM: 250.00 11/8/2019 - Present    Dyslipidemia (Chronic) ICD-10-CM: E78.5  ICD-9-CM: 272.4 11/14/2017 - Present    RBBB unchanged from 2015 (Chronic) ICD-10-CM: I45.10  ICD-9-CM: 426.4 9/1/2017 - Present    History of Non-STEMI (non-ST elevated myocardial infarction) with no coronary intervention needed in 2008, clinically stable.  (Chronic) ICD-10-CM: I21.4  ICD-9-CM: 410.70 10/12/2016 - Present  "   Essential hypertension (Chronic) ICD-10-CM: I10  ICD-9-CM: 401.9 10/12/2016 - Present    History of Acute myelocytic leukemia,status post chemotherapy and bone marrow transplant in 2005. (Chronic) ICD-10-CM: C92.00  ICD-9-CM: 205.00 10/12/2016 - Present    Breast cancer (right), status post radiation therapy in 08/2015 (Chronic) ICD-10-CM: C50.919  ICD-9-CM: 174.9 10/12/2016 - Present       Non-Hospital    Bifascicular block (RBBB plus LP FB) ICD-10-CM: I45.2  ICD-9-CM: 426.53 3/29/2018 - Present    Dyspnea, class II-III.  ICD-10-CM: R06.00  ICD-9-CM: 786.09 10/12/2016 - Present             History & Physical      TammyStephenie PA-C at 11/08/19 0051     Attestation signed by Didi Ulloa DO at 11/08/19 0635    Patient seen and examined.  Patient was resting in bed.  Patient currently denies any chest pain and/or left upper extremity pain.  Patient reports that she was awakened with left upper extremity pain and chest pain on Thursday morning.  The patient states that the pain is described as an ache.  Patient states that she also felt very weak.  She reports that she took her medications and attempted to eat breakfast when she became very nauseous and had an episode of vomiting.  She also reports chills and diaphoresis.  She stated that she also felt dizzy when ambulating back to her bedroom and for a moment felt \"confused.\"  The patient told me that her blood pressure was 189/86 yesterday morning but stated that since her most recent discharge her systolic blood pressure will at times be as low as the 1 teens which for her causes dizziness.    Brief data: Patient's maximum blood pressure since this admission has been 182/76.  She has had 2- troponin T levels.  BUN 41 upon admission with a creatinine of 1.42.  Baseline appears to be around 0.73.  Potassium 5.3, sodium 131, BUN to creatinine ratio 28.9.  CBC largely unremarkable with the exception of an MCV of 101.7.  EKG has been reviewed and " reveals a normal sinus rhythm with a first-degree AV block and right bundle branch block.  QTc 469 MS.    Brief exam: Patient is pleasant, well-nourished and well-developed and resting in bed.  HEENT exam is unremarkable.  Sclerae anicteric and oropharynx is clear.  No JVD.  No carotid bruits.  Heart is regular rate and rhythm without obvious murmur/gallop/rub.  Patient does have some mild tenderness to palpation across the anterior chest wall left greater than right.  Lungs are clear to auscultation anteriorly.  Abdomen is soft and nontender, nondistended during my exam.  No hepatomegaly is palpated.  Bowel sounds are slightly hypoactive.  Lower extremities are with no significant edema.  Neurologically Patient Is Oriented x3.  Strength Is Equal Bilaterally.  No Focal Neurologic Deficits.    Assessment and Plan:  -Chest pain with mixed features in the setting of known MI, hypertension and dyslipidemia: Patient has been admitted to the telemetry unit.  She has ruled out for acute myocardial infarction.  Plan to schedule a Lexiscan today.  Patient had a recent echocardiogram that revealed some borderline concentric hypertrophy.  Systolic function was normal with an EF of 66 to 70%.  She had some left ventricular diastolic dysfunction with mild aortic, mitral and tricuspid valve regurgitation.  Recent lipid panel was acceptable.  Obtain hemoglobin A1c.  Consider cardiology evaluation.    -Acute kidney injury on top of probable stage II CKD: Patient's baseline creatinine appears to be around 0.7.  Patient is receiving very gentle IV fluid hydration.  Hold nephrotoxic medications and repeat chemistry panel.    -Mild hyperkalemia at 5.3: Gentle IV fluids as noted above.  Repeat chemistry panel.  Monitor on telemetry.    -Essential hypertension, currently with fair control: Await patient's home medication list. Place holding parameters given patient's complaint of dizziness when systolic blood pressure in the range of the  "low 100s to 1-teens.                       [facility] Medicine Services  HISTORY & PHYSICAL    Patient Identification:  Name:  Tahmina Martinez  Age:  74 y.o.  Sex:  female  :  1945  MRN:  [id number]   Visit Number:  [id number]  Primary Care Physician:  Noe Bower MD     Subjective     Chief complaint:   Chief Complaint   Patient presents with   • Abdominal Pain   • Chest Pain     History of presenting illness:   Patient is a 74 y.o. female with past medical history significant for history of NSTEMI with no coronary intervention (), type II diabetes mellitus (non-insulin dependent), history of acute myelocytic leukemia status post chemotherapy and bone marrow transplant (), essential hypertension, hyperlipidemia, first-degree AV block, right bundle branch block, history of splenectomy, and history of breast cancer as post radiation, that presented to the [facility] emergency department for evaluation of chest pain and abdominal pain. The patient states that she started  experiencing a severe headache with chest pain that radiates to her to left arm last night. She describes the chest pain as a \"tingling\" with \"knots\" that is located below her left breast that occurs when she is active and occasionally at rest. She states that once she began having the headache she went to check her blood pressure and take her blood pressure medications. Per the patient, her BP was 170/90 at that time. She states she tried to eat and suddenly felt nauseous with one episode of emesis with subsequent \"weakness\" and \"\"fogginess.\" She admits to having no energy, fatigue, chills, chest tightness, occasional choking, shortness of breath, chest pain described as \"tingling/knots,\" intermittent leg edema, abdominal distention, abdominal pain with greasy foods, nausea and vomiting x 1, vertigo when going from sitting to standing, headaches, light headedness, and weakness. The patient states " "that she has become more unsteady on her feet and has fallen at least 3 times within the past year but denies any recent falls, syncope, or trauma. She uses a cane at home to ambulate. She denies fever, diaphoresis, congestion, sore throat, cough, wheezing, palpitations, constipation, diarrhea, dysuria, wounds, numbness/tingling, syncope, or confusion. The patient has a history of NSTEMI in the past with no coronary intervention and a significant family history with her mother and father having heart disease and hypertension and her two brother's and sister having hypertension as well.The patient states she had \"at least 13 medications\" that she takes regularly and states that she takes them as directed and does not get them confused.     Furthermore, the patient was recently admitted to [facility] on 11/2/2019 with a chief complaint of headache and uncontrolled hypertension.  Patient was found to have hypertensive urgency with blood pressure 231/100 on arrival to the emergency department.  Upon evaluation a new bifascicular block was found but patient denied any episodes of syncope or concerning symptoms that warranted a pacemaker. Patient had been seen by Dr. Soler in the clinic who transitioned her HCTZ/triamterene.  Her home regimen was restarted and hydralazine was increased from 25 mg 3 times daily to 50 mg 3 times daily on this admission and at that time her blood pressure was reasonably controlled.  Patient's severe headache had resolved with blood pressure control and she was educated to continue her current regimen.  Patient was to follow-up with PCP and Dr. Soler in 1 week and outpatient evaluation into secondary causes of her hypertension including a renal artery duplex was suggested. The patient states she has an appointment with Dr. Soler on 11/14/19 and her PCP, Dr. Bower, on 11/12/19.     Upon arrival to the ED, vitals were temperature 97.9 °F, pulse 106, respirations 20, /76, SPO2 " "95% on room air.  Troponin negative x2.  proBNP 127.2.  CMP with glucose 133, sodium 131, potassium 5.3, CO2 20.6, chloride 95, anion gap 15.4, creatinine 1.42, BUN 41, BUN/creatinine ratio 28.9, calcium 10.8, GFR 36, alkaline phosphatase 150, total protein 8.8.  CBC with .7, MCH 35.2.  Urinalysis shows trace protein, otherwise unremarkable.  Chest x-ray shows no evidence of acute or active cardiopulmonary disease.  Noncontrast CT of abdomen/pelvis shows cholelithiasis without CT evidence of cholecystitis or biliary obstruction.  CT of head without contrast shows no acute intracranial pathology.  EKG shows sinus rhythm with first-degree AV block, right bundle branch block, 75 bpm, QTc 469 MS.  Patient received p.o. aspirin 324 mg, IV morphine 2 mg x 2, IV Zofran 4 mg x 1, normal saline thousand millimeter bolus, and emergency department.    Patient has been admitted to the telemetry floor for further evaluation and treatment.  ---------------------------------------------------------------------------------------------------------------------   Review of Systems   Constitutional: Positive for activity change (no energy ), appetite change (unable to eat greasy foods ), chills and fatigue. Negative for diaphoresis and fever.   HENT: Negative for congestion and sore throat.    Eyes: Negative for photophobia and visual disturbance.   Respiratory: Positive for choking (occasionally on foods ), chest tightness and shortness of breath. Negative for cough and wheezing.    Cardiovascular: Positive for chest pain (described as a \"tingling/knotting\" feeling) and leg swelling (intermittent). Negative for palpitations.   Gastrointestinal: Positive for abdominal distention, abdominal pain (after eating greasy foods ), nausea and vomiting (x1 episode yesterday ). Negative for blood in stool, constipation and diarrhea.   Endocrine: Negative for polydipsia and polyphagia.   Genitourinary: Negative for dysuria and frequency. "   Musculoskeletal: Negative for arthralgias.   Skin: Negative for wound.   Allergic/Immunologic: Negative for environmental allergies.   Neurological: Positive for dizziness (when going from sitting to standing ), weakness, light-headedness and headaches. Negative for syncope and numbness.   Hematological: Does not bruise/bleed easily.   Psychiatric/Behavioral: Negative for confusion.      ---------------------------------------------------------------------------------------------------------------------   Past Medical History:   Diagnosis Date   • Breast cancer (CMS/[facility])    • Dyspnea    • H/O splenectomy    • Hypertension    • Myelocytic leukemia (CMS/[facility]ly [facility])    • Non-STEMI (non-ST elevated myocardial infarction) (CMS/[facility])      Past Surgical History:   Procedure Laterality Date   • BONE MARROW TRANSPLANT     •  SECTION     • SPLENECTOMY     • TUBAL ABDOMINAL LIGATION       Family History   Problem Relation Age of Onset   • Heart disease Mother    • Heart disease Father      Social History     Socioeconomic History   • Marital status:      Spouse name: Not on file   • Number of children: Not on file   • Years of education: Not on file   • Highest education level: Not on file   Tobacco Use   • Smoking status: Never Smoker   • Smokeless tobacco: Never Used   Substance and Sexual Activity   • Alcohol use: No   • Drug use: No   • Sexual activity: Defer     ---------------------------------------------------------------------------------------------------------------------   Allergies:  Latex; Penicillins; and Zithromax [azithromycin]  ---------------------------------------------------------------------------------------------------------------------   Medications below are reported home medications pulling from within the system; at this time, these medications have not been reconciled unless otherwise specified and are in the verification process for further verifcation as current home  medications.    Prior to Admission Medications     Prescriptions Last Dose Informant Patient Reported? Taking?    aspirin 81 MG EC tablet  Pharmacy Yes No    Take 81 mg by mouth Daily.    atorvastatin (LIPITOR) 10 MG tablet  Pharmacy No No    Take 1 tablet by mouth Every Night.    Calcium Citrate-Vitamin D (CALCIUM + D PO)  Pharmacy Yes No    Take 600 mg by mouth.    carvedilol (COREG) 12.5 MG tablet  Pharmacy No No    Take 1 tablet by mouth 2 (Two) Times a Day.    Coenzyme Q10 (CO Q-10) 200 MG capsule  Pharmacy Yes No    Take 200 mg by mouth Daily.    exemestane (AROMASIN) 25 MG chemo tablet  Pharmacy Yes No    Take 25 mg by mouth Daily.    furosemide (LASIX) 40 MG tablet  Pharmacy Yes No    Take 40 mg by mouth Daily.    hydrALAZINE (APRESOLINE) 50 MG tablet   No No    Take 1 tablet by mouth Every 8 (Eight) Hours.    lisinopril (PRINIVIL,ZESTRIL) 40 MG tablet  Pharmacy No No    Take 1 tablet by mouth Daily.    Loratadine 10 MG capsule  Pharmacy Yes No    Take  by mouth.    metFORMIN (GLUCOPHAGE) 500 MG tablet  Pharmacy Yes No    Take 500 mg by mouth Every Morning.    nitroglycerin (NITROSTAT) 0.4 MG SL tablet  Pharmacy Yes No    Place 0.4 mg under the tongue Every 5 (Five) Minutes As Needed for chest pain. Take no more than 3 doses in 15 minutes.    pantoprazole (PROTONIX) 40 MG EC tablet  Pharmacy Yes No    Take 40 mg by mouth Daily.    rOPINIRole (REQUIP) 0.25 MG tablet  Pharmacy Yes No    Take 0.25 mg by mouth Every Night. Take 1 hour before bedtime.    triamterene-hydrochlorothiazide (MAXZIDE-25) 37.5-25 MG per tablet  Pharmacy No No    Take 1 tablet by mouth Daily.    valACYclovir (VALTREX) 500 MG tablet  Pharmacy Yes No    Take 1 g by mouth Daily.    vitamin D (ERGOCALCIFEROL) 51917 UNITS capsule capsule  Pharmacy Yes No    Take 50,000 Units by mouth Every 14 (Fourteen) Days.         ---------------------------------------------------------------------------------------------------------------------    Objective     Hospital Scheduled Meds:    heparin (porcine) 5,000 Units Subcutaneous Q12H   pantoprazole 40 mg Oral Q AM   sodium chloride 10 mL Intravenous Q12H       sodium chloride 50 mL/hr Last Rate: 50 mL/hr (11/08/19 0136)       Current listed hospital scheduled medications may not yet reflect those currently placed in orders that are signed and held, awaiting patient's arrival to floor/unit.    ---------------------------------------------------------------------------------------------------------------------   Vital Signs:  Temp:  [97.9 °F (36.6 °C)-98.3 °F (36.8 °C)] 97.9 °F (36.6 °C)  Heart Rate:  [] 93  Resp:  [16-20] 18  BP: (106-182)/(62-90) 131/62  Mean Arterial Pressure (Non-Invasive) for the past 24 hrs (Last 3 readings):   Noninvasive MAP (mmHg)   11/08/19 0049 110   11/08/19 0002 116   11/07/19 2347 117     SpO2 Percentage    11/08/19 0002 11/08/19 0049 11/08/19 0330   SpO2: 99% 95% 96%     SpO2:  [95 %-100 %] 96 %  on   ;        Body mass index is 25.95 kg/m².  Wt Readings from Last 3 Encounters:   11/08/19 66.5 kg (146 lb 8 oz)   11/04/19 65.3 kg (143 lb 15.4 oz)   10/29/19 71 kg (156 lb 9.6 oz)     ---------------------------------------------------------------------------------------------------------------------   Physical Exam:  Physical Exam   Constitutional: She is oriented to person, place, and time. She appears well-developed and well-nourished. She is cooperative.  Non-toxic appearance. She does not have a sickly appearance. She does not appear ill. No distress.   Upon evaluation patient lying in bed in no acute distress    HENT:   Head: Normocephalic and atraumatic.   Nose: Nose normal.   Mouth/Throat: Mucous membranes are normal. Mucous membranes are not pale, not dry and not cyanotic.   Eyes: Conjunctivae and lids are normal. Right eye exhibits no  discharge. Left eye exhibits no discharge. Right conjunctiva is not injected. Left conjunctiva is not injected.   Cardiovascular: Regular rhythm, normal heart sounds, intact distal pulses and normal pulses. Exam reveals no decreased pulses.   Pulses:       Dorsalis pedis pulses are 2+ on the right side, and 2+ on the left side.        Posterior tibial pulses are 2+ on the right side, and 2+ on the left side.   Pulmonary/Chest: Effort normal and breath sounds normal. No stridor. No tachypnea and no bradypnea. No respiratory distress. She has no decreased breath sounds. She has no wheezes. She has no rhonchi. She has no rales.   Abdominal: Soft. Normal appearance and bowel sounds are normal. She exhibits no ascites and no mass. There is no tenderness. There is no rigidity, no guarding and negative Sebastian's sign.   Musculoskeletal:        Right lower leg: She exhibits no edema.        Left lower leg: She exhibits no edema.     Vascular Status -  Her right foot exhibits no edema. Her left foot exhibits no edema.  Skin Integrity  -  Her right foot skin is intact.Her left foot skin is intact..  Neurological: She is alert and oriented to person, place, and time. She has normal strength. She is not disoriented. No sensory deficit.   No focal neurological deficits. Patient alert and oriented to time and place, answers questions appropriately. Strength equal bilaterally in upper and lower extremities. Sensation intact bilaterally in upper and lower extremities. Able to move arms and legs equal bilaterally in both upper and lower extremities.    Skin: No abrasion, no lesion and no rash noted.   Psychiatric: She has a normal mood and affect. Her behavior is normal. Thought content normal. Her mood appears not anxious. Her speech is not delayed and not slurred. Cognition and memory are normal. Cognition and memory are not impaired. She does not exhibit a depressed mood.      ---------------------------------------------------------------------------------------------------------------------  EKG:    Pending cardiology read.  Per my evaluation, EKG shows sinus rhythm with first-degree AV block, right bundle branch block, heart rate 75 bpm, QTc 469 MS.    Telemetry:    Normal sinus rhythm.  Heart rate 92 bpm, SPO2 99% on room air.    I have personally reviewed the EKG/Telemetry strip  ---------------------------------------------------------------------------------------------------------------------   Results from last 7 days   Lab Units 11/08/19 0111 11/07/19 2032 11/07/19  1832   TROPONIN T ng/mL <0.010 <0.010 <0.010     Results from last 7 days   Lab Units 11/07/19 1832 11/02/19  2249   PROBNP pg/mL 127.2 795.4     Results from last 7 days   Lab Units 11/03/19  0531   CHOLESTEROL mg/dL 142   TRIGLYCERIDES mg/dL 104   HDL CHOL mg/dL 57   LDL CHOL mg/dL 64       Results from last 7 days   Lab Units 11/08/19 0111 11/07/19 1832 11/03/19  0531 11/02/19  2249   CRP mg/dL  --   --   --  0.33   WBC 10*3/mm3 10.10 8.24 8.03 7.56   HEMOGLOBIN g/dL 12.9 14.2 12.8 12.7   HEMATOCRIT % 38.6 41.0 38.1 37.8   MCV fL 104.6* 101.7* 105.5* 104.4*   MCHC g/dL 33.4 34.6 33.6 33.6   PLATELETS 10*3/mm3 359 395 394 378   INR   --   --   --  0.97     Results from last 7 days   Lab Units 11/08/19  0111 11/07/19  1832 11/03/19  0531   SODIUM mmol/L 134* 131* 139   POTASSIUM mmol/L 4.9 5.3* 3.9   CHLORIDE mmol/L 102 95* 103   CO2 mmol/L 20.5* 20.6* 23.1   BUN mg/dL 37* 41* 10   CREATININE mg/dL 1.23* 1.42* 0.73   EGFR IF NONAFRICN AM mL/min/1.73 42* 36* 78   CALCIUM mg/dL 9.8 10.8* 9.7   GLUCOSE mg/dL 99 133* 112*   ALBUMIN g/dL 3.91 4.79 4.00   BILIRUBIN mg/dL 0.5 0.7 0.3   ALK PHOS U/L 125* 150* 122*   AST (SGOT) U/L 23 30 26   ALT (SGPT) U/L 21 26 20   Estimated Creatinine Clearance: 35.6 mL/min (A) (by C-G formula based on SCr of 1.23 mg/dL (H)).  No results found for: AMMONIA    Hemoglobin A1C    Date/Time Value Ref Range Status   11/08/2019 0111 6.10 (H) 4.80 - 5.60 % Final     Lab Results   Component Value Date    HGBA1C 6.10 (H) 11/08/2019     Lab Results   Component Value Date    TSH 1.430 11/08/2019       Pain Management Panel     There is no flowsheet data to display.        I have personally reviewed the above laboratory results.   ---------------------------------------------------------------------------------------------------------------------  Imaging Results (Last 7 Days)     Procedure Component Value Units Date/Time    CT Abdomen Pelvis Without Contrast [[id number]] Collected:  11/07/19 2320     Updated:  11/07/19 2322    Narrative:       CT Abdomen Pelvis WO    INDICATION:   Nausea and vomiting    TECHNIQUE:   CT of the abdomen and pelvis without contrast. Coronal and sagittal reconstructions were obtained.  Radiation dose reduction techniques included automated exposure control or exposure modulation based on body size. Radiation audit for number of CT and  nuclear cardiology exams performed in the last year: 3.      COMPARISON:   None available.    FINDINGS:    Visualized lung bases are unremarkable.    Abdomen: Unenhanced images of the liver are normal. There is cholelithiasis, but no CT evidence of cholecystitis or biliary obstruction. The spleen is surgically absent. The pancreas appears normal. The adrenal glands are normal. There are areas of left  renal cortical scarring but the right kidney is normal. The aorta is normal in caliber and there is no evidence of bowel obstruction.    Pelvis:  The appendix is normal. There is no pelvic mass or inflammatory change or abnormal fluid collection. There is no hernia or bowel obstruction.    There are spinal degenerative and postoperative changes, but there is no acute bony abnormality.      Impression:       Cholelithiasis without CT evidence of cholecystitis or biliary obstruction.    No renal or bowel obstruction or other acute abnormality.  Normal appendix.      Signer Name: Fabio Ambrose MD   Signed: 11/7/2019 11:20 PM   Workstation Name: [email or web address]    [facility]    CT Head Without Contrast [[id number]] Collected:  11/07/19 2000     Updated:  11/07/19 2004    Narrative:       CT HEAD WO CONTRAST-     CLINICAL INDICATION: Decreased alertness        COMPARISON: 11/02/2019      TECHNIQUE: Axial images of the brain were obtained with out intravenous  contrast.  Reformatted images were created in the sagittal and coronal  planes.     DOSE:     Radiation dose reduction techniques were utilized per ALARA protocol.  Automated exposure control was initiated through either or [place] [employer] or  [employer] software packages by  protocol.           FINDINGS:   Today's study shows no mass, hemorrhage, or midline shift.   The ventricles, cisterns, and sulci are unremarkable. There is no  hydrocephalus.   There is no evidence of acute ischemia.  I do not see epidural or subdural hematoma.  The gray-white differentiation is appropriate.   The bone window setting images show no destructive calvarial lesion or  acute calvarial fracture.   The posterior fossa is unremarkable.          Impression:       No acute intracranial pathology. Nothing is seen on this exam to  specifically account for the patient's symptoms.     This report was finalized on 11/7/2019 8:00 PM by Dr. Sal Cheek MD.       XR Chest 1 View [[id number]] Collected:  11/07/19 1919     Updated:  11/07/19 1922    Narrative:       XR CHEST 1 VW-     CLINICAL INDICATION: Chest pain protocol        COMPARISON: 11/02/2019      TECHNIQUE: Single frontal view of the chest.     FINDINGS:     There is no focal alveolar infiltrate or effusion.  The cardiac silhouette is normal. The pulmonary vasculature is  unremarkable.  There is no evidence of an acute osseous abnormality.   There are no suspicious-appearing parenchymal soft tissue nodules.          Impression:       No  evidence of active or acute cardiopulmonary disease on today's chest  radiograph.     This report was finalized on 11/7/2019 7:19 PM by Dr. Sal Cheek MD.           I have personally reviewed the above radiology results.     Last Echocardiogram:  Results for orders placed during the hospital encounter of 11/02/19   Transthoracic Echo Complete With Contrast if Necessary Per Protocol    Narrative · Left ventricular wall thickness is consistent with borderline concentric   hypertrophy.  · Left ventricular systolic function is normal.  · Estimated EF appears to be in the range of 66 - 70%.  · Left ventricular diastolic dysfunction.  · Normal cardiac chamber dimensions  · Mild aortic valve regurgitation is present.  · Mild mitral valve regurgitation is present  · Mild tricuspid valve regurgitation is present. 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      • RBBB unchanged from 2015 [I45.10] Yes   • History of Non-STEMI (non-ST elevated myocardial infarction) with no coronary intervention needed in 2008, clinically stable.  [I21.4] Yes   • History of Acute  myelocytic leukemia,status post chemotherapy and bone marrow transplant in 2005. [C92.00] Yes   • Essential hypertension [I10] Yes   • Breast cancer (right), status post radiation therapy in 08/2015 [C50.919] Yes     · SIRS criteria present upon admission: patient meets SIRS criteria with pulse 106 and HR 20. Likely due to patient's elevated blood pressure.  PRN hydralazine for SBP greater than 160.  Continue home medications once reconciled per pharmacy. Will adjust as necessary.  Continue to monitor blood pressure closely.    · Unstable angina with known history of NSTEMI in the past without intervention: Patient is currently chest pain-free.  EKG shows normal sinus rhythm.  Troponin negative x2.  Continue to trend troponin.  Patient stress test in 2017 was normal with no significant echo cardiographic evidence for myocardial ischemia. Continue daily aspirin and statin.  Monitor closely on telemetry.  Repeat a.m. EKG.    · Acute kidney injury: Gentle IV fluids. Avoid nephrotoxic agents as much as possible. Hold home lasix. Repeat AM CMP.     · Hyponatremia: likely due to dehydration in setting of hypochloremia. Will give gentle IV fluids. Repeat AM CMP. Monitor I's and O's. Monitor closely.     · Acute hyperkalemia, mild: only slightly elevated. Will hold off on treatment for now. Monitor closely and repeat AM CMP.     · Acute hypercalcemia, mild: only slightly elevated. Will repeat AM CMP. Monitor closely.     · Type II diabetes mellitus (non-insulin dependent): Hemoglobin A1c ordered. Hold home oral hypoglycemics to prevent hypoglycemia. Will add SSI for now. Accu-cheks qAC and qhs. Titrate insulin therapy as necessary.     · Hypocarbia: Likely secondary to patient's increased respiratory rate.  Repeat a.m. CMP.  Monitor closely.    · Macrocytosis without anemia present upon admission: No active signs of bleeding. H&H stable. Vitamin b12, folate and vitamin D levels ordered, replace as necessary. Monitor H&H  closely. Repeat AM CBC.     · Right bundle branch block with first degree AV block: patient in normal sinus rhythm and currently chest pain free.  Bifascicular block was seen on past admission on 11/2/2019, not present on this admission.  Patient denies any recent syncopal episodes or other concerning symptoms.  Monitor closely on telemetry.      · Cholelithiasis without evidence of cholecystitis or biliary obstruction, present upon admission: incidental finding on CT of abdomen. Patient is not complaining of any abdominal pain at this time. No evidence of an acute abdomen present, Sebastian's sign negative. Monitor closely. Recommend outpatient follow up with PCP.     · Essential hypertension, poorly controlled: PRN hydralazine for SBP greater than 160. Continue home medications once reconciled per pharmacy with holding parameters. Obtain orthostatics in setting of vertigo when going from a sitting to standing position.  Continue to monitor blood pressure closely. Will adjust medication as necessary. Recommend patient keep appointment with Dr. Soler on 11/14/19 and with PCP on 11/12/19.     · Hyperlipidemia: continue patients home statin.  Lipid panel on 11/3/2019 was unremarkable, controlled. Recommend continued outpatient follow up with PCP.     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 continue home medication once reconciled per pharmacy. Repeat AM CBC.     · History of splenectomy: patient has not received flu shot this year, requesting one while admitted. States she has tolerated flu shot before with no adverse side effects.       · F/E/N: IV fluids.  Replace electrolytes as necessary per protocol.  N.p.o.    ---------------------------------------------------  DVT Prophylaxis: Subcutaneous heparin   GI Prophylaxis: Protonix   Activity: Up with assistance     The patient is considered to be a high risk patient due to:  Sirs criteria present upon admission, chest pain with typical features with known history of an STEMI in the past, acute kidney injury.    INPATIENT status due to the need for care which can only be reasonably provided in an hospital setting such as aggressive/expedited ancillary services and/or consultation services, the necessity for IV medications, close physician monitoring and/or the possible need for procedures.  In such, I feel patient’s risk for adverse outcomes and need for care warrant INPATIENT evaluation and predict the patient’s care encounter to likely last beyond 2 midnights.      Code Status: FULL CODE     I have discussed the patient's assessment and plan with the patient and attending physician.       Stephenie Munoz PA-C  Hospitalist Service -- [facility]       11/08/19  4:40 AM    Attending Physician: Didi Ulloa DO       Electronically signed by Didi Ulloa DO at 11/08/19 0635       Lines, Drains & Airways    Active LDAs     Name:   Placement date:   Placement time:   Site:   Days:    Midline Catheter - Single Lumen 11/15/19 Right Basilic   11/15/19    1200     1                Hospital Medications (active)       Dose Frequency Start End    acetaminophen (TYLENOL) tablet 650 mg 650 mg Every 6 Hours PRN 11/8/2019     Sig - Route: Take 2 tablets by mouth Every 6 (Six) Hours As Needed for Mild Pain . - Oral    Cosign for Ordering: Accepted by Didi Ulloa DO on 11/8/2019  4:01 AM    aspirin EC tablet 81 mg 81 mg Daily 11/8/2019     Sig - Route: Take 1 tablet by mouth Daily. - Oral    Cosign for Ordering: Accepted by Didi Ulloa DO on 11/8/2019  6:39 AM    atorvastatin (LIPITOR) tablet 10 mg 10 mg Nightly 11/8/2019     Sig - Route: Take 1 tablet by mouth Every Night. - Oral    Cosign for Ordering: Accepted by Didi Ulloa DO on 11/8/2019  6:39 AM    calcium carb-cholecalciferol 600-800 MG-UNIT tablet 1 tablet 1 tablet Daily 11/8/2019      Sig - Route: Take 1 tablet by mouth Daily. - Oral    Cosign for Ordering: Accepted by Didi Ulloa DO on 11/8/2019  6:39 AM    carvedilol (COREG) tablet 12.5 mg 12.5 mg 2 Times Daily 11/8/2019     Sig - Route: Take 2 tablets by mouth 2 (Two) Times a Day. - Oral    Cosign for Ordering: Accepted by Didi Ulloa DO on 11/8/2019  6:39 AM    cefTRIAXone (ROCEPHIN) 1 g/100 mL 0.9% NS (MBP) 1 g Every 24 Hours 11/13/2019 11/20/2019    Sig - Route: Infuse 100 mL into a venous catheter Daily. - Intravenous    cetirizine (zyrTEC) tablet 5 mg 5 mg Daily 11/8/2019     Sig - Route: Take 0.5 tablets by mouth Daily. - Oral    Cosign for Ordering: Accepted by Didi Ulloa DO on 11/8/2019  6:39 AM    cholecalciferol (VITAMIN D3) capsule 50,000 Units 50,000 Units Weekly 11/8/2019     Sig - Route: Take 1 capsule by mouth 1 (One) Time Per Week. - Oral    Cosign for Ordering: Accepted by Didi Ulloa DO on 11/8/2019  6:39 AM    dextrose (D50W) 25 g/ 50mL Intravenous Solution 25 g 25 g Every 15 Minutes PRN 11/8/2019     Sig - Route: Infuse 50 mL into a venous catheter Every 15 (Fifteen) Minutes As Needed for Low Blood Sugar (Blood Sugar Less Than 70). - Intravenous    Cosign for Ordering: Accepted by Didi Ulloa DO on 11/8/2019  6:39 AM    dextrose (GLUTOSE) oral gel 15 g 15 g Every 15 Minutes PRN 11/8/2019     Sig - Route: Take 15 application by mouth Every 15 (Fifteen) Minutes As Needed for Low Blood Sugar (Blood sugar less than 70). - Oral    Cosign for Ordering: Accepted by Didi Ulloa DO on 11/8/2019  6:39 AM    exemestane (AROMASIN) chemo tablet 25 mg 25 mg Daily 11/8/2019     Sig - Route: Take 1 tablet by mouth Daily. - Oral    Cosign for Ordering: Accepted by Didi Ulloa DO on 11/8/2019  6:39 AM    fluticasone (FLONASE) 50 MCG/ACT nasal spray 2 spray 2 spray Daily 11/16/2019     Sig - Route: 2 sprays by Each Nare route Daily. - Each Nare    glucagon (human  "recombinant) (GLUCAGEN DIAGNOSTIC) injection 1 mg 1 mg Every 15 Minutes PRN 11/8/2019     Sig - Route: Inject 1 mg under the skin into the appropriate area as directed Every 15 (Fifteen) Minutes As Needed for Low Blood Sugar (Blood Glucose Less Than 70). - Subcutaneous    Cosign for Ordering: Accepted by Didi Ulloa DO on 11/8/2019  6:39 AM    heparin (porcine) 5000 UNIT/ML injection 5,000 Units 5,000 Units Every 12 Hours Scheduled 11/8/2019     Sig - Route: Inject 1 mL under the skin into the appropriate area as directed Every 12 (Twelve) Hours. - Subcutaneous    hydrALAZINE (APRESOLINE) injection 10 mg 10 mg Every 6 Hours PRN 11/8/2019     Sig - Route: Infuse 0.5 mL into a venous catheter Every 6 (Six) Hours As Needed for High Blood Pressure. - Intravenous    Cosign for Ordering: Accepted by Didi Ulloa DO on 11/8/2019  4:01 AM    insulin aspart (novoLOG) injection 0-7 Units 0-7 Units 4 Times Daily Before Meals & Nightly 11/8/2019     Sig - Route: Inject 0-7 Units under the skin into the appropriate area as directed 4 (Four) Times a Day Before Meals & at Bedtime. - Subcutaneous    Cosign for Ordering: Accepted by Didi Ulloa DO on 11/8/2019  6:39 AM    Magnesium Sulfate 2 gram infusion- Mg 1.6 - 1.9 mg/dL 2 g As Needed 11/13/2019     Sig - Route: Infuse 50 mL into a venous catheter As Needed (Mg 1.6 - 1.9 mg/dL). - Intravenous    Linked Group 1:  \"Or\" Linked Group Details        Magnesium Sulfate 2 gram infusion- Mg 1.6 - 1.9 mg/dL 2 g Once 11/15/2019 11/15/2019    Sig - Route: Infuse 50 mL into a venous catheter 1 (One) Time. - Intravenous    Magnesium Sulfate 2 gram infusion- Mg 1.6 - 1.9 mg/dL 2 g Once 11/16/2019 11/16/2019    Sig - Route: Infuse 50 mL into a venous catheter 1 (One) Time. - Intravenous    magnesium sulfate 3 gram infusion (1gm x 3) - Mg 1.1 - 1.5 mg/dL 1 g As Needed 11/13/2019     Sig - Route: Infuse 100 mL into a venous catheter As Needed (Mg 1.1 - 1.5 mg/dL). - " "Intravenous    Linked Group 1:  \"Or\" Linked Group Details        magnesium sulfate 4 gram infusion - Mg less than or equal to 1mg/dL 4 g As Needed 11/13/2019     Sig - Route: Infuse 100 mL into a venous catheter As Needed (Mg less than or equal to 1mg/dL). - Intravenous    Linked Group 1:  \"Or\" Linked Group Details        metroNIDAZOLE (FLAGYL) 500 mg/100mL IVPB 500 mg Every 8 Hours 11/13/2019 11/20/2019    Sig - Route: Infuse 100 mL into a venous catheter Every 8 (Eight) Hours. - Intravenous    nitroglycerin (NITROSTAT) SL tablet 0.4 mg 0.4 mg Every 5 Minutes PRN 11/8/2019     Sig - Route: Place 1 tablet under the tongue Every 5 (Five) Minutes As Needed for Chest Pain (Only if SBP Greater Than 100). - Sublingual    pantoprazole (PROTONIX) EC tablet 40 mg 40 mg Every Early Morning 11/8/2019     Sig - Route: Take 1 tablet by mouth Every Morning. - Oral    Cosign for Ordering: Accepted by Didi Ulloa DO on 11/8/2019  4:01 AM    potassium & sodium phosphates (PHOS-NAK) 280-160-250 MG packet - for Phosphorus 1.25 - 2.5 mg/dL 2 packet Every 6 Hours PRN 11/15/2019     Sig - Route: Take 2 packets by mouth Every 6 (Six) Hours As Needed (Phosphorus 1.25 - 2.5 mg/dL). - Oral    Linked Group 2:  \"Or\" Linked Group Details        potassium & sodium phosphates (PHOS-NAK) 280-160-250 MG packet - for Phosphorus less than 1.25 mg/dL 2 packet Every 6 Hours PRN 11/15/2019     Sig - Route: Take 2 packets by mouth Every 6 (Six) Hours As Needed (Phosphorus less than 1.25 mg/dL). - Oral    Linked Group 2:  \"Or\" Linked Group Details        potassium & sodium phosphates (PHOS-NAK) oral packet 2 packet Every 6 Hours 11/16/2019 11/16/2019    Sig - Route: Take 2 packets by mouth Every 6 (Six) Hours. - Oral    potassium chloride (KLOR-CON) packet 40 mEq 40 mEq As Needed 11/14/2019     Sig - Route: Take 40 mEq by mouth As Needed (potassium replacement, see admin instructions). - Oral    Cosign for Ordering: Accepted by Juju, " "David GUERRA DO on 11/14/2019 12:43 PM    Linked Group 3:  \"Or\" Linked Group Details        potassium chloride (KLOR-CON) packet 40 mEq 40 mEq Every 4 Hours 11/16/2019 11/16/2019    Sig - Route: Take 40 mEq by mouth Every 4 (Four) Hours. - Oral    potassium chloride (MICRO-K) CR capsule 40 mEq 40 mEq As Needed 11/14/2019     Sig - Route: Take 4 capsules by mouth As Needed (Potassium Replacement.  See Admin Instructions). - Oral    Cosign for Ordering: Accepted by David Matute DO on 11/14/2019 12:43 PM    Linked Group 3:  \"Or\" Linked Group Details        potassium chloride 10 mEq in 100 mL IVPB 10 mEq Every 1 Hour PRN 11/14/2019     Sig - Route: Infuse 100 mL into a venous catheter Every 1 (One) Hour As Needed (Potassium Replacement - See Admin Instructions). - Intravenous    Cosign for Ordering: Accepted by David Matute DO on 11/14/2019 12:43 PM    Linked Group 3:  \"Or\" Linked Group Details        promethazine (PHENERGAN) 12.5 mg in sodium chloride 0.9 % 50 mL 12.5 mg Every 6 Hours PRN 11/13/2019     Sig - Route: Infuse 12.5 mg into a venous catheter Every 6 (Six) Hours As Needed for Nausea or Vomiting. - Intravenous    rOPINIRole (REQUIP) tablet 0.25 mg 0.25 mg Nightly 11/8/2019     Sig - Route: Take 1 tablet by mouth Every Night. - Oral    Cosign for Ordering: Accepted by Didi Ulloa DO on 11/8/2019  6:39 AM    sodium chloride 0.9 % flush 10 mL 10 mL As Needed 11/7/2019     Sig - Route: Infuse 10 mL into a venous catheter As Needed for Line Care. - Intravenous    Cosign for Ordering: Accepted by Suresh Thomas MD on 11/7/2019  6:24 PM    sodium chloride 0.9 % flush 10 mL 10 mL Every 12 Hours Scheduled 11/8/2019     Sig - Route: Infuse 10 mL into a venous catheter Every 12 (Twelve) Hours. - Intravenous    sodium chloride 0.9 % flush 10 mL 10 mL As Needed 11/8/2019     Sig - Route: Infuse 10 mL into a venous catheter As Needed for Line Care. - Intravenous    sodium chloride " 0.9 % flush 10 mL 10 mL Every 12 Hours Scheduled 11/15/2019     Sig - Route: Infuse 10 mL into a venous catheter Every 12 (Twelve) Hours. - Intravenous    sodium chloride 0.9 % flush 10 mL 10 mL As Needed 11/15/2019     Sig - Route: Infuse 10 mL into a venous catheter As Needed for Line Care (After Medication Administration). - Intravenous    sodium chloride 0.9 % infusion 75 mL/hr Continuous 11/12/2019     Sig - Route: Infuse 75 mL/hr into a venous catheter Continuous. - Intravenous    traMADol (ULTRAM) tablet 50 mg 50 mg Daily PRN 11/9/2019     Sig - Route: Take 1 tablet by mouth Daily As Needed for Moderate Pain . - Oral    valACYclovir (VALTREX) tablet 1,000 mg 1,000 mg Daily 11/8/2019 11/7/2020    Sig - Route: Take 2 tablets by mouth Daily. - Oral    Cosign for Ordering: Accepted by Didi Ulloa DO on 11/8/2019  6:39 AM          Orders (last 24 hrs)     Start     Ordered    11/17/19 0600  CBC & Differential  Morning Draw      11/16/19 1031    11/17/19 0600  Comprehensive Metabolic Panel  Morning Draw      11/16/19 1031    11/17/19 0600  C-reactive Protein  Morning Draw      11/16/19 1031    11/17/19 0600  Magnesium  Morning Draw      11/16/19 1031    11/17/19 0600  Phosphorus  Morning Draw      11/16/19 1031    11/17/19 0600  CBC (No Diff)  Morning Draw,   Status:  Canceled      11/16/19 1048    11/17/19 0600  Basic Metabolic Panel  Morning Draw,   Status:  Canceled      11/16/19 1048    11/16/19 1300  fluticasone (FLONASE) 50 MCG/ACT nasal spray 2 spray  Daily      11/16/19 1104    11/16/19 1200  potassium & sodium phosphates (PHOS-NAK) oral packet  Every 6 Hours      11/16/19 1048    11/16/19 1133  POC Glucose Once  Once      11/16/19 1049    11/16/19 0733  POC Glucose Once  Once      11/16/19 0633    11/16/19 0730  Magnesium Sulfate 2 gram infusion- Mg 1.6 - 1.9 mg/dL  Once      11/16/19 0630    11/16/19 0730  potassium chloride (KLOR-CON) packet 40 mEq  Every 4 Hours      11/16/19 0631    11/16/19  0600  CBC (No Diff)  Morning Draw      11/15/19 1406    11/16/19 0600  Basic Metabolic Panel  Morning Draw      11/15/19 1406    11/16/19 0600  Phosphorus  Morning Draw      11/15/19 1406    11/16/19 0600  C-reactive Protein  Morning Draw      11/15/19 1406    11/16/19 0600  Magnesium  Morning Draw      11/15/19 1934    11/15/19 2005  POC Glucose Once  Once      11/15/19 1944    11/15/19 1635  POC Glucose Once  Once      11/15/19 1618    11/15/19 1530  Diet Regular; GI Soft  Diet Effective Now      11/15/19 1530    11/15/19 1400  sodium chloride 0.9 % flush 10 mL  Every 12 Hours Scheduled      11/15/19 1303    11/15/19 1302  sodium chloride 0.9 % flush 10 mL  As Needed      11/15/19 1303    11/15/19 0800  Magnesium Sulfate 2 gram infusion- Mg 1.6 - 1.9 mg/dL  Once      11/15/19 0629    11/15/19 0622  potassium & sodium phosphates (PHOS-NAK) 280-160-250 MG packet - for Phosphorus less than 1.25 mg/dL  Every 6 Hours PRN      11/15/19 0622    11/15/19 0622  potassium & sodium phosphates (PHOS-NAK) 280-160-250 MG packet - for Phosphorus 1.25 - 2.5 mg/dL  Every 6 Hours PRN      11/15/19 0622    11/14/19 1200  Dietary Nutrition Supplements Boost Breeze (Ensure Clear); mixed berry  Daily With Breakfast, Lunch & Dinner     Comments:  Berry flavor, thank you!    11/14/19 1149    11/14/19 0913  potassium chloride (MICRO-K) CR capsule 40 mEq  As Needed      11/14/19 0913    11/14/19 0913  potassium chloride (KLOR-CON) packet 40 mEq  As Needed      11/14/19 0913    11/14/19 0913  potassium chloride 10 mEq in 100 mL IVPB  Every 1 Hour PRN      11/14/19 0913    11/13/19 2240  promethazine (PHENERGAN) 12.5 mg in sodium chloride 0.9 % 50 mL  Every 6 Hours PRN      11/13/19 2240    11/13/19 2200  metroNIDAZOLE (FLAGYL) 500 mg/100mL IVPB  Every 8 Hours      11/13/19 1825 11/13/19 2000  cefTRIAXone (ROCEPHIN) 1 g/100 mL 0.9% NS (MBP)  Every 24 Hours      11/13/19 1825 11/13/19 0554  magnesium sulfate 4 gram infusion - Mg less  than or equal to 1mg/dL  As Needed      11/13/19 0554    11/13/19 0554  magnesium sulfate 3 gram infusion (1gm x 3) - Mg 1.1 - 1.5 mg/dL  As Needed      11/13/19 0554    11/13/19 0554  Magnesium Sulfate 2 gram infusion- Mg 1.6 - 1.9 mg/dL  As Needed      11/13/19 0554    11/12/19 1030  sodium chloride 0.9 % infusion  Continuous      11/12/19 0931    11/09/19 1052  traMADol (ULTRAM) tablet 50 mg  Daily PRN      11/09/19 1052    11/08/19 2100  rOPINIRole (REQUIP) tablet 0.25 mg  Nightly      11/08/19 0634    11/08/19 2100  atorvastatin (LIPITOR) tablet 10 mg  Nightly      11/08/19 0634    11/08/19 0900  cetirizine (zyrTEC) tablet 5 mg  Daily      11/08/19 0634    11/08/19 0900  valACYclovir (VALTREX) tablet 1,000 mg  Daily      11/08/19 0634    11/08/19 0900  aspirin EC tablet 81 mg  Daily      11/08/19 0634    11/08/19 0900  cholecalciferol (VITAMIN D3) capsule 50,000 Units  Weekly      11/08/19 0634    11/08/19 0900  exemestane (AROMASIN) chemo tablet 25 mg  Daily      11/08/19 0634    11/08/19 0900  calcium carb-cholecalciferol 600-800 MG-UNIT tablet 1 tablet  Daily      11/08/19 0634    11/08/19 0900  carvedilol (COREG) tablet 12.5 mg  2 Times Daily      11/08/19 0634    11/08/19 0730  insulin aspart (novoLOG) injection 0-7 Units  4 Times Daily Before Meals & Nightly      11/08/19 0454    11/08/19 0700  POC Glucose 4x Daily AC & at Bedtime  4 Times Daily Before Meals & at Bedtime      11/08/19 0454    11/08/19 0600  pantoprazole (PROTONIX) EC tablet 40 mg  Every Early Morning      11/08/19 0214    11/08/19 0453  dextrose (GLUTOSE) oral gel 15 g  Every 15 Minutes PRN      11/08/19 0454    11/08/19 0453  dextrose (D50W) 25 g/ 50mL Intravenous Solution 25 g  Every 15 Minutes PRN      11/08/19 0454    11/08/19 0453  glucagon (human recombinant) (GLUCAGEN DIAGNOSTIC) injection 1 mg  Every 15 Minutes PRN      11/08/19 0454    11/08/19 0214  acetaminophen (TYLENOL) tablet 650 mg  Every 6 Hours PRN      11/08/19 0215     11/08/19 0214  hydrALAZINE (APRESOLINE) injection 10 mg  Every 6 Hours PRN      11/08/19 0214    11/08/19 0145  sodium chloride 0.9 % flush 10 mL  Every 12 Hours Scheduled      11/08/19 0046    11/08/19 0145  heparin (porcine) 5000 UNIT/ML injection 5,000 Units  Every 12 Hours Scheduled      11/08/19 0046    11/08/19 0046  sodium chloride 0.9 % flush 10 mL  As Needed      11/08/19 0046    11/08/19 0046  nitroglycerin (NITROSTAT) SL tablet 0.4 mg  Every 5 Minutes PRN      11/08/19 0046    11/07/19 1819  sodium chloride 0.9 % flush 10 mL  As Needed      11/07/19 1819    Unscheduled  ECG 12 Lead  As Needed      11/07/19 1819    Unscheduled  Telemetry - Pulse Oximetry  Continuous PRN     Comments:  If Patient Develops Unresponsiveness, Acute Dyspnea, Cyanosis or Suspected Hypoxemia Start Continuous Pulse Ox Monitoring, Apply Oxygen & Notify Provider    11/08/19 0046    Unscheduled  Oxygen Therapy- Nasal Cannula; Titrate for SPO2: 90% - 95%  Continuous PRN     Comments:  If Patient Develops Unresponsiveness, Acute Dyspnea, Cyanosis or Suspected Hypoxemia Start Continuous Pulse Ox Monitoring, Apply Oxygen & Notify Provider    11/08/19 0046    Unscheduled  ECG 12 Lead  As Needed     Comments:  Nurse to Release if Patient Expericences Acute Chest Pain or Dysrhythmias    11/08/19 0046    Unscheduled  Potassium  As Needed     Comments:  For Ventricular Arrhythmias      11/08/19 0046    Unscheduled  Magnesium  As Needed     Comments:  For Ventricular Arrhythmias      11/08/19 0046    Unscheduled  Troponin  As Needed     Comments:  For Chest Pain      11/08/19 0046    Unscheduled  Digoxin Level  As Needed     Comments:  For Atrial Arrhythmias      11/08/19 0046    Unscheduled  Blood Gas, Arterial  As Needed     Comments:  Per O2 PolicyNotify Physician      11/08/19 0046    Unscheduled  Up In Chair  As Needed      11/08/19 0214    Unscheduled  Up With Assistance  As Needed      11/08/19 0452    Unscheduled  Magnesium  As Needed       19 0552    Unscheduled  Potassium  As Needed      19 0552    Unscheduled  Magnesium  As Needed      11/15/19 0622    Unscheduled  Potassium  As Needed      11/15/19 0622    Unscheduled  Change Dressing to IV Site As Needed When Damp, Loose or Soiled  As Needed      11/15/19 1303    --  traMADol (ULTRAM) 50 MG tablet  Daily PRN      19 0844    --  SCANNED - TELEMETRY        19 0000          Operative/Procedure Notes (last 24 hours) (Notes from 11/15/19 142 through 19 142)     No notes of this type exist for this encounter.           Physician Progress Notes (last 24 hours) (Notes from 11/15/19 142 through 19)      Josie Bella APRN at 19 1040          Patient Identification:  Name:  Tahmina Martinez  Age:  74 y.o.  Sex:  female  :  1945  MRN:  [id number]  Visit Number:  [id number]  Primary Care Provider:  Noe Bower MD    Length of stay:  8    Subjective/Interval History/Consultants/Procedures     Chief complaint: chest pain    HPI:  Patient is 74 year old female who was admitted to [facility] on 19 for unstable angina, CADENCE with probable stage II CKD, and mild hyperkalemia.  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     Subjective  Pt resting quietly in bed. She states that she doesn't feel good today. She woke up last night and started chilling. She states she feels like she is getting a cold and having some head congestion. She is still having frequent diarrhea. She has not been able to eat very much due to the fear of getting nauseated. She denies dyspnea, chest pain, heart palpitations    ----------------------------------------------------------------------------------------------------------------------  Current  Hospital Meds:    aspirin 81 mg Oral Daily   atorvastatin 10 mg Oral Nightly   calcium carb-cholecalciferol 1 tablet Oral Daily   carvedilol 12.5 mg Oral BID   cefTRIAXone 1 g Intravenous Q24H   cetirizine 5 mg Oral Daily   cholecalciferol 50,000 Units Oral Weekly   exemestane 25 mg Oral Daily   heparin (porcine) 5,000 Units Subcutaneous Q12H   insulin aspart 0-7 Units Subcutaneous 4x Daily AC & at Bedtime   metroNIDAZOLE 500 mg Intravenous Q8H   pantoprazole 40 mg Oral Q AM   potassium chloride 40 mEq Oral Q4H   rOPINIRole 0.25 mg Oral Nightly   sodium chloride 10 mL Intravenous Q12H   sodium chloride 10 mL Intravenous Q12H   valACYclovir 1,000 mg Oral Daily       sodium chloride 75 mL/hr Last Rate: 125 mL/hr (11/15/19 1515)     ----------------------------------------------------------------------------------------------------------------------      Objective     Vital Signs:  Temp:  [97.6 °F (36.4 °C)-97.8 °F (36.6 °C)] 97.8 °F (36.6 °C)  Heart Rate:  [72-84] 84  Resp:  [16-18] 18  BP: (124-152)/(80-82) 140/80      11/14/19  0500 11/15/19  0500 11/16/19  0500   Weight: 68 kg (150 lb) 68.3 kg (150 lb 9.6 oz) 69.9 kg (154 lb 1.6 oz)     Body mass index is 27.3 kg/m².    Intake/Output Summary (Last 24 hours) at 11/16/2019 1040  Last data filed at 11/16/2019 0900  Gross per 24 hour   Intake 780 ml   Output 400 ml   Net 380 ml     I/O this shift:  In: 120 [P.O.:120]  Out: -   Diet Regular; GI Soft  ----------------------------------------------------------------------------------------------------------------------  Physical exam:    Physical Exam   Constitutional: She is oriented to person, place, and time. She appears well-developed and well-nourished. She appears ill.   HENT:   Head: Normocephalic and atraumatic.   Eyes: EOM are normal. Pupils are equal, round, and reactive to light.   Neck: Normal range of motion. Neck supple.   Cardiovascular: Normal rate, regular rhythm, normal heart sounds and intact distal  pulses. Exam reveals no gallop and no friction rub.   No murmur heard.  Pulmonary/Chest: Effort normal and breath sounds normal. No stridor. No respiratory distress. She has no wheezes. She has no rales. She exhibits no tenderness.   Abdominal: Soft. Bowel sounds are increased.   Musculoskeletal: Normal range of motion. She exhibits no edema, tenderness or deformity.   Neurological: She is alert and oriented to person, place, and time.   Skin: Skin is warm and dry. Capillary refill takes less than 2 seconds. No rash noted. No erythema. No pallor.   Psychiatric: She has a normal mood and affect. Her behavior is normal. Judgment and thought content normal.       ----------------------------------------------------------------------------------------------------------------------  Tele:  Sinus rhythm 66  ----------------------------------------------------------------------------------------------------------------------  Results from last 7 days   Lab Units 11/13/19  0704 11/13/19  0045 11/12/19  1855   TROPONIN T ng/mL <0.010 <0.010 <0.010     Results from last 7 days   Lab Units 11/16/19  0422 11/15/19  0509 11/14/19  0515   CRP mg/dL 4.18* 10.30* 19.46*   WBC 10*3/mm3 9.60 10.22 10.16   HEMOGLOBIN g/dL 9.3* 9.6* 10.5*   HEMATOCRIT % 28.6* 28.9* 30.9*   MCV fL 107.9* 108.2* 104.7*   MCHC g/dL 32.5 33.2 34.0   PLATELETS 10*3/mm3 212 201 205         Results from last 7 days   Lab Units 11/16/19  0422 11/15/19  0509 11/14/19  0515 11/13/19  0045   SODIUM mmol/L 140 137 133* 134*   POTASSIUM mmol/L 3.0* 3.9 3.4* 4.1   MAGNESIUM mg/dL 1.7 1.9 1.8 1.7   CHLORIDE mmol/L 117* 118* 105 100   CO2 mmol/L 11.6* 12.1* 15.4* 16.5*   BUN mg/dL 8 14 23 34*   CREATININE mg/dL 0.65 0.73 0.80 1.55*   EGFR IF NONAFRICN AM mL/min/1.73 89 78 70 33*   CALCIUM mg/dL 8.5* 8.9 8.2* 8.9   GLUCOSE mg/dL 99 95 116* 139*   ALBUMIN g/dL  --  2.75* 2.70* 3.69   BILIRUBIN mg/dL  --  <0.2* <0.2* 0.4   ALK PHOS U/L  --  66 72 95   AST (SGOT) U/L  --   31 40* 48*   ALT (SGPT) U/L  --  26 34* 39*   Estimated Creatinine Clearance: 56.1 mL/min (by C-G formula based on SCr of 0.65 mg/dL).  No results found for: AMMONIA      Blood Culture   Date Value Ref Range Status   11/13/2019 No growth at 2 days  Preliminary   11/13/2019 No growth at 2 days  Preliminary              ----------------------------------------------------------------------------------------------------------------------  Imaging Results (Last 24 Hours)     ** No results found for the last 24 hours. **        ----------------------------------------------------------------------------------------------------------------------      Assessment/Plan     Active Hospital Problems    Diagnosis POA   • **Chest pain in adult [R07.9] Yes   • History of splenectomy [Z90.81] Not Applicable   • Cholelithiasis [K80.20] Yes   • Acute kidney injury (CMS/HCC) [N17.9] Yes   • Hyperkalemia [E87.5] Yes   • Hyperglycemia [R73.9] Yes   • First degree AV block [I44.0] Yes   • Macrocytosis without anemia [D75.89] Yes   • Hypocarbia [E87.8] Yes   • Type II diabetes mellitus (CMS/HCC) [E11.9] Yes   • Dyslipidemia [E78.5] Yes     On atorvastatin.      • RBBB unchanged from 2015 [I45.10] Yes   • History of Non-STEMI (non-ST elevated myocardial infarction) with no coronary intervention needed in 2008, clinically stable.  [I21.4] Yes   • History of Acute myelocytic leukemia,status post chemotherapy and bone marrow transplant in 2005. [C92.00] Yes   • Essential hypertension [I10] Yes   • Breast cancer (right), status post radiation therapy in 08/2015 [C50.919] Yes         ASSESSMENT/PLAN:  · Sepsis, secondary to EAEC enteritis vs hospital acquired pneumonia: Resolving, Leukocytosis resolved, CRP is elevated but trending down, continue IV flagyl & IV rocephin, continue supportive treatment with IV fluids and advance diet as tolerated, continue IVF     · Chest pain, ruled out for an acute MI:  continue medical management with aspirin  and statin, continuous telemetry monitoring, patient denies chest pain currently     · Acute on probable stage II CKD: resolved, continue gentle IV fluids, avoid nephrotoxins, repeat BMP in AM, monitor output     · Cholelithiasis: Gallbladder ultrasound revealed cholelithiasis and no evidence of inflammation, continue to monitor for now, recommend general surgery consult outpatient if warranted     · Dizziness: orthostatics normal, bilateral carotid doppler U/S with no hemodynamically significant stenosis, head CT without any acute intracranial abnormalities, fall precautions, up with assistance, monitor for now,      · Debility/generalized weakness: Continue PT/OT, possible discharge to a rehab facility for prolonged physical therapy     · Borderline/hyperkalemia: K+ decreased, replace potassium per protocol, repeat BMP in AM     · Hypomagnesemia: Magnesium slightly low this AM, replace per protocol, repeat magnesium in AM     · Hyponatremia: Resolved, repeat BMP in AM     · Hypophosphatemia: Replace per protocol. Repeat phosphorus in AM.      · Non insulin dependent type II diabetes mellitus:  Continue low dose sliding scale insulin, acuchecks ACHS, hypoglycemia protocol in place     · History of splenectomy: Supportive     · History of AML S/P chemo and bone marrow transplant in past: Monitor      -----------  -DVT prophylaxis: Heparin SQ  -GI prophylaxis: Protonix  -Activity:  PT/OT, up with assistance  -Disposition: pending possible inpatient rehab placement for prolonged therapy due to debility and weakness  -Diet: Advance as tolereated      The patient is high risk due to the following diagnoses/reasons:  Sepsis, enteritis, HCAP, Cheat pain, weakness, multiple co-morbidities    JENNY Shields  11/16/19  10:40 AM    Electronically signed by Josie Bella APRN at 11/16/19 1102       Consult Notes (last 24 hours) (Notes from 11/15/19 1424 through 11/16/19 1424)     No notes of this type exist for this  encounter.        Physical Therapy Notes (last 24 hours) (Notes from 11/15/19 1424 through 19 1424)     No notes of this type exist for this encounter.           Occupational Therapy Notes (last 24 hours) (Notes from 11/15/19 1424 through 19 1424)      María Monique, OT at 11/15/19 1526          Acute Care - Occupational Therapy Treatment Note  KATTY Jose     Patient Name: Tahmina Martinez  : 1945  MRN: [id number]  Today's Date: 11/15/2019  Onset of Illness/Injury or Date of Surgery: 19     Referring Physician: Shayan    Admit Date: 2019       ICD-10-CM ICD-9-CM   1. Chest pain in adult R07.9 786.50   2. Renal insufficiency N28.9 593.9     Patient Active Problem List   Diagnosis   • History of Non-STEMI (non-ST elevated myocardial infarction) with no coronary intervention needed in , clinically stable.    • Dyspnea, class II-III.    • Essential hypertension   • History of Acute myelocytic leukemia,status post chemotherapy and bone marrow transplant in .   • Breast cancer (right), status post radiation therapy in 2015   • RBBB unchanged from 2015   • Dyslipidemia   • Bifascicular block (RBBB plus LP FB)   • Chest pain in adult   • History of splenectomy   • Cholelithiasis   • Acute kidney injury (CMS/HCC)   • Hyperkalemia   • Hyperglycemia   • First degree AV block   • Macrocytosis without anemia   • Hypocarbia   • Type II diabetes mellitus (CMS/HCC)     Past Medical History:   Diagnosis Date   • Dyslipidemia 2017    On atorvastatin.    • Essential hypertension 10/12/2016   • First degree AV block 2019   • H/O splenectomy    • History of breast cancer        • Hypertension    • Myelocytic leukemia (CMS/HCC)    • Non-STEMI (non-ST elevated myocardial infarction) (CMS/HCC)    • Restless leg    • Type II diabetes mellitus (CMS/HCC) 2019     Past Surgical History:   Procedure Laterality Date   • BONE MARROW TRANSPLANT     •  SECTION     • SPLENECTOMY      • TUBAL ABDOMINAL LIGATION         Therapy Treatment    Rehabilitation Treatment Summary     Row Name 11/15/19 1522 11/15/19 1311          Treatment Time/Intention    Discipline  occupational therapist  -LM  physical therapist  -CT     Document Type  therapy note (daily note)  -LM  therapy note (daily note)  -CT     Subjective Information  complains of;weakness;fatigue  -LM  complains of;weakness;fatigue  -CT     Mode of Treatment  occupational therapy  -LM  physical therapy  -CT     Care Plan Review  care plan/treatment goals reviewed;patient/other agree to care plan  -LM  --     Therapy Frequency (PT Clinical Impression)  --  3 times/wk 3-5 times/wk  -CT     Patient Effort  adequate  -LM  good  -CT     Comment  Light TA and ADL retraining.  Patient sat on EOB with supervision, CGA for BSC transfers, Yann/CGA with toileting.  Demonstrates increase in activity tolerance this date.  Continues to experience dizziness at times.    -LM  Pt tolerated treatment session well with rest breaks provided as needed.   -CT     Existing Precautions/Restrictions  fall  -LM  fall  -CT     Recorded by [LM] María Monique, OT 11/15/19 1524 [CT] Nakia Stone, PT 11/15/19 1323     Row Name 11/15/19 1311             Cognitive Assessment/Intervention- PT/OT    Orientation Status (Cognition)  oriented x 4  -CT      Follows Commands (Cognition)  WFL  -CT      Recorded by [CT] Nakia Stone, PT 11/15/19 1323      Row Name 11/15/19 1311             Safety Issues, Functional Mobility    Impairments Affecting Function (Mobility)  endurance/activity tolerance;strength  -CT      Recorded by [CT] Nakia Stone, PT 11/15/19 1323      Row Name 11/15/19 1311             Bed Mobility Assessment/Treatment    Bed Mobility Assessment/Treatment  bed mobility (all) activities  -CT      Coats Level (Bed Mobility)  minimum assist (75% patient effort)  -CT      Assistive Device (Bed Mobility)  bed rails  -CT      Recorded by [CT] Chase  Nakia, PT 11/15/19 1323      Row Name 11/15/19 1311             Transfer Assessment/Treatment    Transfer Assessment/Treatment  sit-stand transfer;stand-sit transfer  -CT      Recorded by [CT] Nakia Stone, PT 11/15/19 1323      Row Name 11/15/19 1311             Sit-Stand Transfer    Sit-Stand [place] (Transfers)  minimum assist (75% patient effort)  -CT      Assistive Device (Sit-Stand Transfers)  walker, front-wheeled  -CT2      Recorded by [CT] Nakia Stone, PT 11/15/19 1323  [CT2] Nakia Stone, PT 11/15/19 1355      Row Name 11/15/19 1311             Stand-Sit Transfer    Stand-Sit [place] (Transfers)  minimum assist (75% patient effort)  -CT      Assistive Device (Stand-Sit Transfers)  walker, front-wheeled  -CT2      Recorded by [CT] Nakia Stone, PT 11/15/19 1323  [CT2] Nakia Stone, PT 11/15/19 1355      Row Name 11/15/19 1311             Gait/Stairs Assessment/Training    [place]he Level (Gait)  minimum assist (75% patient effort)  -CT      Assistive Device (Gait)  walker, front-wheeled  -CT2      Distance in Feet (Gait)  40  -CT2      Pattern (Gait)  step-to  -CT      Deviations/Abnormal Patterns (Gait)  base of support, narrow;festinating/shuffling;gait speed decreased  -CT      Bilateral Gait Deviations  forward flexed posture;weight shift ability decreased  -CT      Recorded by [CT] Nakia Stone, PT 11/15/19 1323  [CT2] Nakia Stone, PT 11/15/19 1355      Row Name 11/15/19 1522 11/15/19 1311          Positioning and Restraints    Pre-Treatment Position  --  in bed  -CT     Post Treatment Position  bed  -LM  chair  -CT     In Bed  call light within reach;encouraged to call for assist  -LM  --     In Chair  --  sitting;call light within reach;encouraged to call for assist;notified nsg  -CT     Recorded by [LM] María Monique, OT 11/15/19 1524 [CT] Nakia Stone, PT 11/15/19 1355     Row Name 11/15/19 1311             Plan of Care Review    Plan of Care Reviewed With   patient  -CT      Recorded by [CT] Nakia Stone, PT 11/15/19 1355      Row Name 11/15/19 1311             Outcome Summary/Treatment Plan (PT)    Anticipated Equipment Needs at Discharge (PT)  -- tbd  -CT      Anticipated Discharge Disposition (PT)  inpatient rehabilitation facility  -CT      Recorded by [CT] Nakia Stone, PT 11/15/19 1323        User Key  (r) = Recorded By, (t) = Taken By, (c) = Cosigned By    Initials Name Effective Dates Discipline    CT Nakia Stone, PT 04/03/18 -  PT    María Allen, OT 03/14/16 -  OT             Occupational Therapy Education     Title: PT OT SLP Therapies (Done)     Topic: Occupational Therapy (Done)     Point: ADL training (Done)     Description: Instruct learner(s) on proper safety adaptation and remediation techniques during self care or transfers.   Instruct in proper use of assistive devices.    Learning Progress Summary           Patient Acceptance, E, VU by NINA at 11/14/2019 10:27 PM    Acceptance, E, VU by COSTA at 11/14/2019 12:11 PM    Acceptance, E,TB, VU by ADITI at 11/13/2019  9:41 PM    Acceptance, E, VU by COSTA at 11/13/2019  3:32 PM    Acceptance, E, VU by TYSON at 11/11/2019 10:50 PM    Acceptance, E, VU,NR by AS at 11/11/2019  9:09 AM    Acceptance, E,TB, VU by JANETH at 11/11/2019 12:19 AM    Acceptance, E, VU by BC at 11/8/2019  3:42 PM    Acceptance, E,D, VU,DU,NR by BILL at 11/8/2019  3:38 PM                   Point: Home exercise program (Done)     Description: Instruct learner(s) on appropriate technique for monitoring, assisting and/or progressing therapeutic exercises/activities.    Learning Progress Summary           Patient Acceptance, E, VU by EA at 11/14/2019 10:27 PM    Acceptance, E, VU by DG at 11/14/2019 12:11 PM    Acceptance, E,TB, VU by SM at 11/13/2019  9:41 PM    Acceptance, E, VU by DG at 11/13/2019  3:32 PM    Acceptance, E, VU by TYSON at 11/11/2019 10:50 PM    Acceptance, E, VU,NR by AS at 11/11/2019  9:09 AM    Acceptance, ANDREW VELASQUEZ, VU by KF  at 11/11/2019 12:19 AM    Acceptance, E, VU by BC at 11/8/2019  3:42 PM    Acceptance, E,D, VU,DU,NR by LM at 11/8/2019  3:38 PM                   Point: Precautions (Done)     Description: Instruct learner(s) on prescribed precautions during self-care and functional transfers.    Learning Progress Summary           Patient Acceptance, E, VU by EA at 11/14/2019 10:27 PM    Acceptance, E, VU by DG at 11/14/2019 12:11 PM    Acceptance, E,TB, VU by SM at 11/13/2019  9:41 PM    Acceptance, E, VU by DG at 11/13/2019  3:32 PM    Acceptance, E, VU by SM1 at 11/11/2019 10:50 PM    Acceptance, E, VU,NR by AS at 11/11/2019  9:09 AM    Acceptance, E,TB, VU by KF at 11/11/2019 12:19 AM    Acceptance, E, VU by BC at 11/8/2019  3:42 PM    Acceptance, E,D, VU,DU,NR by LM at 11/8/2019  3:38 PM                   Point: Body mechanics (Done)     Description: Instruct learner(s) on proper positioning and spine alignment during self-care, functional mobility activities and/or exercises.    Learning Progress Summary           Patient Acceptance, E, VU by NINA at 11/14/2019 10:27 PM    Acceptance, E, VU by DG at 11/14/2019 12:11 PM    Acceptance, E,TB, VU by SM at 11/13/2019  9:41 PM    Acceptance, E, VU by DG at 11/13/2019  3:32 PM    Acceptance, E, VU by SM1 at 11/11/2019 10:50 PM    Acceptance, E, VU,NR by AS at 11/11/2019  9:09 AM    Acceptance, E,TB, VU by KF at 11/11/2019 12:19 AM    Acceptance, E, VU by BC at 11/8/2019  3:42 PM    Acceptance, E,D, VU,DU,NR by  at 11/8/2019  3:38 PM                               User Key     Initials Effective Dates Name Provider Type Discipline     06/16/16 -  Joy Weiner, RN Registered Nurse Nurse    BC 03/14/16 -  Karly Carranza PT Physical Therapist PT    LM 03/14/16 -  María Monique OT Occupational Therapist OT    DG 09/18/19 -  Millie Reeves, RN Registered Nurse Nurse    SM 04/03/17 -  Dariela Barron, RN Registered Nurse Nurse     07/08/19 -  Elsie Hernandez  RN Registered Nurse Nurse    SM1 09/12/18 -  Jose Francisco Bocanegra, ANGEL Registered Nurse Nurse    AS 08/27/19 -  Jones Toussaint, RN Registered Nurse Nurse                OT Recommendation and Plan  Planned Therapy Interventions (OT Eval): activity tolerance training, BADL retraining, transfer/mobility retraining, ROM/therapeutic exercise, strengthening exercise  Therapy Frequency (OT Eval): (3-5 times week)  Plan of Care Review  Plan of Care Reviewed With: patient  Plan of Care Reviewed With: patient  Outcome Summary: ADL retraining, bed mobility, fxl mobility.  Continue POC.       Time Calculation:   Time Calculation- OT     Row Name 11/15/19 1525             Time Calculation- OT    Total Timed Code Minutes- OT  25 minute(s)  -        User Key  (r) = Recorded By, (t) = Taken By, (c) = Cosigned By    Initials Name Provider Type    LM María Monique OT Occupational Therapist        Therapy Charges for Today     Code Description Service Date Service Provider Modifiers Qty    [id number] HC OT SELF CARE/MGMT/TRAIN EA 15 MIN 11/15/2019 María Monique OT GO 2               María Monique OT  11/15/2019    Electronically signed by María Monique OT at 11/15/19 1526     María Monique OT at 11/15/19 1525          Problem: Patient Care Overview  Goal: Plan of Care Review  Outcome: Ongoing (interventions implemented as appropriate)   11/15/19 1524   Coping/Psychosocial   Plan of Care Reviewed With patient   Plan of Care Review   Progress improving   OTHER   Outcome Summary ADL retraining, bed mobility, fxl mobility. Continue POC.           Electronically signed by María Monique OT at 11/15/19 1525       Speech Language Pathology Notes (last 24 hours) (Notes from 11/15/19 1425 through 11/16/19 1425)     No notes of this type exist for this encounter.          Discharge Summary     No notes of this type exist for this encounter.

## 2019-11-16 NOTE — PROGRESS NOTES
Discharge Planning Assessment  [facility]     Patient Name: Tahmina Martinez  MRN: [id number]  Today's Date: 11/16/2019    Admit Date: 11/7/2019        Discharge Plan     Row Name 11/16/19 1425       Plan    Plan  SS notified on this date per Physician that pt would prefer placement at [facility] rather than [facility].  SS faxed pt information to [facility] for review.  SS will follow up am Monday.         Destination      Service Provider Request Status Selected Services Address Phone Number Fax Number    [facility] Pending - Request Sent N/A [address], [place] [address] [phone]            LLOYD Malin

## 2019-11-16 NOTE — PROGRESS NOTES
Patient Identification:  Name:  Tahmina Martinez  Age:  74 y.o.  Sex:  female  :  1945  MRN:  [id number]  Visit Number:  [id number]  Primary Care Provider:  Noe Bower MD    Length of stay:  8    Subjective/Interval History/Consultants/Procedures     Chief complaint: chest pain    HPI:  Patient is 74 year old female who was admitted to [facility] on 19 for unstable angina, CADENCE with probable stage II CKD, and mild hyperkalemia.  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     Subjective  Pt resting quietly in bed. She states that she doesn't feel good today. She woke up last night and started chilling. She states she feels like she is getting a cold and having some head congestion. She is still having frequent diarrhea. She has not been able to eat very much due to the fear of getting nauseated. She denies dyspnea, chest pain, heart palpitations    ----------------------------------------------------------------------------------------------------------------------  Current Hospital Meds:    aspirin 81 mg Oral Daily   atorvastatin 10 mg Oral Nightly   calcium carb-cholecalciferol 1 tablet Oral Daily   carvedilol 12.5 mg Oral BID   cefTRIAXone 1 g Intravenous Q24H   cetirizine 5 mg Oral Daily   cholecalciferol 50,000 Units Oral Weekly   exemestane 25 mg Oral Daily   heparin (porcine) 5,000 Units Subcutaneous Q12H   insulin aspart 0-7 Units Subcutaneous 4x Daily AC & at Bedtime   metroNIDAZOLE 500 mg Intravenous Q8H   pantoprazole 40 mg Oral Q AM   potassium chloride 40 mEq Oral Q4H   rOPINIRole 0.25 mg Oral Nightly   sodium chloride 10 mL Intravenous Q12H   sodium chloride 10 mL Intravenous Q12H   valACYclovir 1,000 mg Oral Daily       sodium chloride 75 mL/hr Last Rate: 125 mL/hr  (11/15/19 1515)     ----------------------------------------------------------------------------------------------------------------------      Objective     Vital Signs:  Temp:  [97.6 °F (36.4 °C)-97.8 °F (36.6 °C)] 97.8 °F (36.6 °C)  Heart Rate:  [72-84] 84  Resp:  [16-18] 18  BP: (124-152)/(80-82) 140/80      11/14/19  0500 11/15/19  0500 11/16/19  0500   Weight: 68 kg (150 lb) 68.3 kg (150 lb 9.6 oz) 69.9 kg (154 lb 1.6 oz)     Body mass index is 27.3 kg/m².    Intake/Output Summary (Last 24 hours) at 11/16/2019 1040  Last data filed at 11/16/2019 0900  Gross per 24 hour   Intake 780 ml   Output 400 ml   Net 380 ml     I/O this shift:  In: 120 [P.O.:120]  Out: -   Diet Regular; GI Soft  ----------------------------------------------------------------------------------------------------------------------  Physical exam:    Physical Exam   Constitutional: She is oriented to person, place, and time. She appears well-developed and well-nourished. She appears ill.   HENT:   Head: Normocephalic and atraumatic.   Eyes: EOM are normal. Pupils are equal, round, and reactive to light.   Neck: Normal range of motion. Neck supple.   Cardiovascular: Normal rate, regular rhythm, normal heart sounds and intact distal pulses. Exam reveals no gallop and no friction rub.   No murmur heard.  Pulmonary/Chest: Effort normal and breath sounds normal. No stridor. No respiratory distress. She has no wheezes. She has no rales. She exhibits no tenderness.   Abdominal: Soft. Bowel sounds are increased.   Musculoskeletal: Normal range of motion. She exhibits no edema, tenderness or deformity.   Neurological: She is alert and oriented to person, place, and time.   Skin: Skin is warm and dry. Capillary refill takes less than 2 seconds. No rash noted. No erythema. No pallor.   Psychiatric: She has a normal mood and affect. Her behavior is normal. Judgment and thought content normal.        ----------------------------------------------------------------------------------------------------------------------  Tele:  Sinus rhythm 66  ----------------------------------------------------------------------------------------------------------------------  Results from last 7 days   Lab Units 11/13/19 0704 11/13/19 0045 11/12/19  1855   TROPONIN T ng/mL <0.010 <0.010 <0.010     Results from last 7 days   Lab Units 11/16/19  0422 11/15/19  0509 11/14/19  0515   CRP mg/dL 4.18* 10.30* 19.46*   WBC 10*3/mm3 9.60 10.22 10.16   HEMOGLOBIN g/dL 9.3* 9.6* 10.5*   HEMATOCRIT % 28.6* 28.9* 30.9*   MCV fL 107.9* 108.2* 104.7*   MCHC g/dL 32.5 33.2 34.0   PLATELETS 10*3/mm3 212 201 205         Results from last 7 days   Lab Units 11/16/19  0422 11/15/19  0509 11/14/19  0515 11/13/19  0045   SODIUM mmol/L 140 137 133* 134*   POTASSIUM mmol/L 3.0* 3.9 3.4* 4.1   MAGNESIUM mg/dL 1.7 1.9 1.8 1.7   CHLORIDE mmol/L 117* 118* 105 100   CO2 mmol/L 11.6* 12.1* 15.4* 16.5*   BUN mg/dL 8 14 23 34*   CREATININE mg/dL 0.65 0.73 0.80 1.55*   EGFR IF NONAFRICN AM mL/min/1.73 89 78 70 33*   CALCIUM mg/dL 8.5* 8.9 8.2* 8.9   GLUCOSE mg/dL 99 95 116* 139*   ALBUMIN g/dL  --  2.75* 2.70* 3.69   BILIRUBIN mg/dL  --  <0.2* <0.2* 0.4   ALK PHOS U/L  --  66 72 95   AST (SGOT) U/L  --  31 40* 48*   ALT (SGPT) U/L  --  26 34* 39*   Estimated Creatinine Clearance: 56.1 mL/min (by C-G formula based on SCr of 0.65 mg/dL).  No results found for: AMMONIA      Blood Culture   Date Value Ref Range Status   11/13/2019 No growth at 2 days  Preliminary   11/13/2019 No growth at 2 days  Preliminary              ----------------------------------------------------------------------------------------------------------------------  Imaging Results (Last 24 Hours)     ** No results found for the last 24 hours. **         ----------------------------------------------------------------------------------------------------------------------      Assessment/Plan     Active Hospital Problems    Diagnosis POA   • **Chest pain in adult [R07.9] Yes   • History of splenectomy [Z90.81] Not Applicable   • Cholelithiasis [K80.20] Yes   • Acute kidney injury (CMS/HCC) [N17.9] Yes   • Hyperkalemia [E87.5] Yes   • Hyperglycemia [R73.9] Yes   • First degree AV block [I44.0] Yes   • Macrocytosis without anemia [D75.89] Yes   • Hypocarbia [E87.8] Yes   • Type II diabetes mellitus (CMS/HCC) [E11.9] Yes   • Dyslipidemia [E78.5] Yes     On atorvastatin.      • RBBB unchanged from 2015 [I45.10] Yes   • History of Non-STEMI (non-ST elevated myocardial infarction) with no coronary intervention needed in 2008, clinically stable.  [I21.4] Yes   • History of Acute myelocytic leukemia,status post chemotherapy and bone marrow transplant in 2005. [C92.00] Yes   • Essential hypertension [I10] Yes   • Breast cancer (right), status post radiation therapy in 08/2015 [C50.919] Yes         ASSESSMENT/PLAN:  · Sepsis, secondary to EAEC enteritis vs hospital acquired pneumonia: Resolving, Leukocytosis resolved, CRP is elevated but trending down, continue IV flagyl & IV rocephin, continue supportive treatment with IV fluids and advance diet as tolerated, continue IVF     · Chest pain, ruled out for an acute MI:  continue medical management with aspirin and statin, continuous telemetry monitoring, patient denies chest pain currently     · Acute on probable stage II CKD: resolved, continue gentle IV fluids, avoid nephrotoxins, repeat BMP in AM, monitor output     · Cholelithiasis: Gallbladder ultrasound revealed cholelithiasis and no evidence of inflammation, continue to monitor for now, recommend general surgery consult outpatient if warranted     · Dizziness: orthostatics normal, bilateral carotid doppler U/S with no hemodynamically significant stenosis, head CT  without any acute intracranial abnormalities, fall precautions, up with assistance, monitor for now,      · Debility/generalized weakness: Continue PT/OT, possible discharge to a rehab facility for prolonged physical therapy     · Borderline/hyperkalemia: K+ decreased, replace potassium per protocol, repeat BMP in AM     · Hypomagnesemia: Magnesium slightly low this AM, replace per protocol, repeat magnesium in AM     · Hyponatremia: Resolved, repeat BMP in AM     · Hypophosphatemia: Replace per protocol. Repeat phosphorus in AM.      · Non insulin dependent type II diabetes mellitus:  Continue low dose sliding scale insulin, acuchecks ACHS, hypoglycemia protocol in place     · History of splenectomy: Supportive     · History of AML S/P chemo and bone marrow transplant in past: Monitor      -----------  -DVT prophylaxis: Heparin SQ  -GI prophylaxis: Protonix  -Activity:  PT/OT, up with assistance  -Disposition: pending possible inpatient rehab placement for prolonged therapy due to debility and weakness  -Diet: Advance as tolereated      The patient is high risk due to the following diagnoses/reasons:  Sepsis, enteritis, HCAP, Cheat pain, weakness, multiple co-morbidities    Josie Bella, APRN  11/16/19  10:40 AM

## 2019-11-17 LAB
ALBUMIN SERPL-MCNC: 2.83 G/DL (ref 3.5–5.2)
ALBUMIN/GLOB SERPL: 0.9 G/DL
ALP SERPL-CCNC: 86 U/L (ref 39–117)
ALT SERPL W P-5'-P-CCNC: 27 U/L (ref 1–33)
ANION GAP SERPL CALCULATED.3IONS-SCNC: 10.7 MMOL/L (ref 5–15)
AST SERPL-CCNC: 41 U/L (ref 1–32)
BASOPHILS # BLD AUTO: 0.04 10*3/MM3 (ref 0–0.2)
BASOPHILS NFR BLD AUTO: 0.4 % (ref 0–1.5)
BILIRUB SERPL-MCNC: 0.3 MG/DL (ref 0.2–1.2)
BUN BLD-MCNC: 5 MG/DL (ref 8–23)
BUN/CREAT SERPL: 8.6 (ref 7–25)
BURR CELLS BLD QL SMEAR: NORMAL
CALCIUM SPEC-SCNC: 8.7 MG/DL (ref 8.6–10.5)
CHLORIDE SERPL-SCNC: 113 MMOL/L (ref 98–107)
CO2 SERPL-SCNC: 14.3 MMOL/L (ref 22–29)
CREAT BLD-MCNC: 0.58 MG/DL (ref 0.57–1)
CRP SERPL-MCNC: 2.38 MG/DL (ref 0–0.5)
DEPRECATED RDW RBC AUTO: 56.6 FL (ref 37–54)
EOSINOPHIL # BLD AUTO: 0.13 10*3/MM3 (ref 0–0.4)
EOSINOPHIL NFR BLD AUTO: 1.1 % (ref 0.3–6.2)
ERYTHROCYTE [DISTWIDTH] IN BLOOD BY AUTOMATED COUNT: 14.5 % (ref 12.3–15.4)
GFR SERPL CREATININE-BSD FRML MDRD: 101 ML/MIN/1.73
GLOBULIN UR ELPH-MCNC: 3.3 GM/DL
GLUCOSE BLD-MCNC: 76 MG/DL (ref 65–99)
GLUCOSE BLDC GLUCOMTR-MCNC: 111 MG/DL (ref 70–130)
GLUCOSE BLDC GLUCOMTR-MCNC: 123 MG/DL (ref 70–130)
GLUCOSE BLDC GLUCOMTR-MCNC: 126 MG/DL (ref 70–130)
GLUCOSE BLDC GLUCOMTR-MCNC: 68 MG/DL (ref 70–130)
GLUCOSE BLDC GLUCOMTR-MCNC: 94 MG/DL (ref 70–130)
HCT VFR BLD AUTO: 30.3 % (ref 34–46.6)
HGB BLD-MCNC: 10 G/DL (ref 12–15.9)
IMM GRANULOCYTES # BLD AUTO: 0.05 10*3/MM3 (ref 0–0.05)
IMM GRANULOCYTES NFR BLD AUTO: 0.4 % (ref 0–0.5)
LYMPHOCYTES # BLD AUTO: 2.95 10*3/MM3 (ref 0.7–3.1)
LYMPHOCYTES NFR BLD AUTO: 25.9 % (ref 19.6–45.3)
MACROCYTES BLD QL SMEAR: NORMAL
MAGNESIUM SERPL-MCNC: 1.8 MG/DL (ref 1.6–2.4)
MCH RBC QN AUTO: 34.8 PG (ref 26.6–33)
MCHC RBC AUTO-ENTMCNC: 33 G/DL (ref 31.5–35.7)
MCV RBC AUTO: 105.6 FL (ref 79–97)
MONOCYTES # BLD AUTO: 1.45 10*3/MM3 (ref 0.1–0.9)
MONOCYTES NFR BLD AUTO: 12.8 % (ref 5–12)
NEUTROPHILS # BLD AUTO: 6.75 10*3/MM3 (ref 1.7–7)
NEUTROPHILS NFR BLD AUTO: 59.4 % (ref 42.7–76)
NRBC BLD AUTO-RTO: 0 /100 WBC (ref 0–0.2)
PHOSPHATE SERPL-MCNC: 3.1 MG/DL (ref 2.5–4.5)
PLATELET # BLD AUTO: 201 10*3/MM3 (ref 140–450)
PMV BLD AUTO: 10.7 FL (ref 6–12)
POTASSIUM BLD-SCNC: 4 MMOL/L (ref 3.5–5.2)
PROT SERPL-MCNC: 6.1 G/DL (ref 6–8.5)
RBC # BLD AUTO: 2.87 10*6/MM3 (ref 3.77–5.28)
SMALL PLATELETS BLD QL SMEAR: ADEQUATE
SODIUM BLD-SCNC: 138 MMOL/L (ref 136–145)
WBC NRBC COR # BLD: 11.37 10*3/MM3 (ref 3.4–10.8)

## 2019-11-17 PROCEDURE — 94799 UNLISTED PULMONARY SVC/PX: CPT

## 2019-11-17 PROCEDURE — 85025 COMPLETE CBC W/AUTO DIFF WBC: CPT | Performed by: INTERNAL MEDICINE

## 2019-11-17 PROCEDURE — 25010000002 HEPARIN (PORCINE) PER 1000 UNITS: Performed by: INTERNAL MEDICINE

## 2019-11-17 PROCEDURE — 86140 C-REACTIVE PROTEIN: CPT | Performed by: INTERNAL MEDICINE

## 2019-11-17 PROCEDURE — 84100 ASSAY OF PHOSPHORUS: CPT | Performed by: INTERNAL MEDICINE

## 2019-11-17 PROCEDURE — 83735 ASSAY OF MAGNESIUM: CPT | Performed by: INTERNAL MEDICINE

## 2019-11-17 PROCEDURE — 82962 GLUCOSE BLOOD TEST: CPT

## 2019-11-17 PROCEDURE — 99232 SBSQ HOSP IP/OBS MODERATE 35: CPT | Performed by: NURSE PRACTITIONER

## 2019-11-17 PROCEDURE — 80053 COMPREHEN METABOLIC PANEL: CPT | Performed by: INTERNAL MEDICINE

## 2019-11-17 PROCEDURE — 25010000002 CEFTRIAXONE: Performed by: INTERNAL MEDICINE

## 2019-11-17 PROCEDURE — 85007 BL SMEAR W/DIFF WBC COUNT: CPT | Performed by: INTERNAL MEDICINE

## 2019-11-17 PROCEDURE — 25010000002 MAGNESIUM SULFATE 2 GM/50ML SOLUTION: Performed by: INTERNAL MEDICINE

## 2019-11-17 RX ORDER — MAGNESIUM SULFATE HEPTAHYDRATE 40 MG/ML
2 INJECTION, SOLUTION INTRAVENOUS ONCE
Status: COMPLETED | OUTPATIENT
Start: 2019-11-17 | End: 2019-11-17

## 2019-11-17 RX ADMIN — CARVEDILOL 12.5 MG: 6.25 TABLET, FILM COATED ORAL at 08:39

## 2019-11-17 RX ADMIN — HEPARIN SODIUM 5000 UNITS: 5000 INJECTION INTRAVENOUS; SUBCUTANEOUS at 20:37

## 2019-11-17 RX ADMIN — VALACYCLOVIR HYDROCHLORIDE 1000 MG: 500 TABLET, FILM COATED ORAL at 08:40

## 2019-11-17 RX ADMIN — CARVEDILOL 12.5 MG: 6.25 TABLET, FILM COATED ORAL at 20:37

## 2019-11-17 RX ADMIN — METRONIDAZOLE 500 MG: 500 INJECTION, SOLUTION INTRAVENOUS at 20:37

## 2019-11-17 RX ADMIN — ATORVASTATIN CALCIUM 10 MG: 10 TABLET, FILM COATED ORAL at 20:37

## 2019-11-17 RX ADMIN — MAGNESIUM SULFATE IN WATER 2 G: 40 INJECTION, SOLUTION INTRAVENOUS at 08:41

## 2019-11-17 RX ADMIN — HEPARIN SODIUM 5000 UNITS: 5000 INJECTION INTRAVENOUS; SUBCUTANEOUS at 08:40

## 2019-11-17 RX ADMIN — FLUTICASONE PROPIONATE 2 SPRAY: 50 SPRAY, METERED NASAL at 08:40

## 2019-11-17 RX ADMIN — METRONIDAZOLE 500 MG: 500 INJECTION, SOLUTION INTRAVENOUS at 11:21

## 2019-11-17 RX ADMIN — METRONIDAZOLE 500 MG: 500 INJECTION, SOLUTION INTRAVENOUS at 06:08

## 2019-11-17 RX ADMIN — CETIRIZINE HYDROCHLORIDE 5 MG: 10 TABLET, FILM COATED ORAL at 08:40

## 2019-11-17 RX ADMIN — Medication 1 TABLET: at 08:39

## 2019-11-17 RX ADMIN — SODIUM CHLORIDE, PRESERVATIVE FREE 10 ML: 5 INJECTION INTRAVENOUS at 08:40

## 2019-11-17 RX ADMIN — CEFTRIAXONE 1 G: 1 INJECTION, POWDER, FOR SOLUTION INTRAMUSCULAR; INTRAVENOUS at 20:37

## 2019-11-17 RX ADMIN — PANTOPRAZOLE SODIUM 40 MG: 40 TABLET, DELAYED RELEASE ORAL at 06:08

## 2019-11-17 RX ADMIN — EXEMESTANE 25 MG: 25 TABLET ORAL at 08:39

## 2019-11-17 RX ADMIN — ROPINIROLE HYDROCHLORIDE 0.25 MG: 0.25 TABLET, FILM COATED ORAL at 20:37

## 2019-11-17 RX ADMIN — ASPIRIN 81 MG: 81 TABLET, COATED ORAL at 08:40

## 2019-11-17 NOTE — PROGRESS NOTES
Patient Identification:  Name:  Tahmina Martinez  Age:  74 y.o.  Sex:  female  :  1945  MRN:  [id number]  Visit Number:  [id number]  Primary Care Provider:  Noe Bower MD    Length of stay:  9    Subjective/Interval History/Consultants/Procedures     Chief complaint: Chest pain    HPI:  Patient is 74 year old female who was [facility] for unstable angina, CADENCE with probable stage II CKD, and mild hyperkalemia.  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     Subjective:    Pt is resting quietly in bed. She states that she is feeling better today. She denies nausea. She hasn't been able to eat a whole lot due to being afraid of being sick. She states that every time she needs to urinate, she ends up having a bowel movement too. She describes them as as semi formed. She refused walking with physical therapy yesterday. She denies dyspnea, chest pain, heart palpitations.  ----------------------------------------------------------------------------------------------------------------------  Current Hospital Meds:    aspirin 81 mg Oral Daily   atorvastatin 10 mg Oral Nightly   calcium carb-cholecalciferol 1 tablet Oral Daily   carvedilol 12.5 mg Oral BID   cefTRIAXone 1 g Intravenous Q24H   cetirizine 5 mg Oral Daily   cholecalciferol 50,000 Units Oral Weekly   exemestane 25 mg Oral Daily   fluticasone 2 spray Each Nare Daily   heparin (porcine) 5,000 Units Subcutaneous Q12H   insulin aspart 0-7 Units Subcutaneous 4x Daily AC & at Bedtime   magnesium sulfate 2 g Intravenous Once   metroNIDAZOLE 500 mg Intravenous Q8H   pantoprazole 40 mg Oral Q AM   rOPINIRole 0.25 mg Oral Nightly   sodium chloride 10 mL Intravenous Q12H   sodium chloride 10 mL Intravenous Q12H    valACYclovir 1,000 mg Oral Daily       sodium chloride 75 mL/hr Last Rate: 75 mL/hr (11/16/19 1214)     ----------------------------------------------------------------------------------------------------------------------      Objective     Vital Signs:  Temp:  [97.8 °F (36.6 °C)-98.2 °F (36.8 °C)] 97.8 °F (36.6 °C)  Heart Rate:  [67-92] 71  Resp:  [18-20] 20  BP: (125-160)/(74-96) 125/96      11/15/19  0500 11/16/19  0500 11/17/19  0500   Weight: 68.3 kg (150 lb 9.6 oz) 69.9 kg (154 lb 1.6 oz) 71.5 kg (157 lb 9.6 oz)     Body mass index is 27.92 kg/m².    Intake/Output Summary (Last 24 hours) at 11/17/2019 0743  Last data filed at 11/17/2019 0608  Gross per 24 hour   Intake 1000 ml   Output 650 ml   Net 350 ml     No intake/output data recorded.  Diet Regular; GI Soft  ----------------------------------------------------------------------------------------------------------------------  Physical exam:    Physical Exam   Constitutional: She is oriented to person, place, and time. She appears well-developed. She has a sickly appearance.   HENT:   Head: Normocephalic and atraumatic.   Eyes: EOM are normal. Pupils are equal, round, and reactive to light.   Neck: Normal range of motion. Neck supple.   Cardiovascular: Normal rate, regular rhythm, normal heart sounds and intact distal pulses. Exam reveals no gallop and no friction rub.   No murmur heard.  Pulmonary/Chest: Effort normal and breath sounds normal. No stridor. No respiratory distress. She has no wheezes. She has no rales. She exhibits no tenderness.   Abdominal: Soft. Bowel sounds are normal. She exhibits no distension and no mass. There is no tenderness. There is no rebound and no guarding. No hernia.   Musculoskeletal: Normal range of motion. She exhibits no edema, tenderness or deformity.   Neurological: She is alert and oriented to person, place, and time. She displays normal reflexes. No cranial nerve deficit or sensory deficit. She exhibits normal  muscle tone. Coordination normal.   Skin: Skin is warm and dry. No rash noted. No erythema. No pallor.   Psychiatric: She has a normal mood and affect. Her behavior is normal. Judgment and thought content normal.       ----------------------------------------------------------------------------------------------------------------------  Tele:  Sinus rhythm 60's  ----------------------------------------------------------------------------------------------------------------------  Results from last 7 days   Lab Units 11/13/19  0704 11/13/19  0045 11/12/19  1855   TROPONIN T ng/mL <0.010 <0.010 <0.010     Results from last 7 days   Lab Units 11/17/19  0432 11/17/19 0431 11/16/19  0422 11/15/19  0509   CRP mg/dL  --  2.38* 4.18* 10.30*   WBC 10*3/mm3 11.37*  --  9.60 10.22   HEMOGLOBIN g/dL 10.0*  --  9.3* 9.6*   HEMATOCRIT % 30.3*  --  28.6* 28.9*   MCV fL 105.6*  --  107.9* 108.2*   MCHC g/dL 33.0  --  32.5 33.2   PLATELETS 10*3/mm3 201  --  212 201         Results from last 7 days   Lab Units 11/17/19 0627 11/17/19 0431 11/16/19 1945 11/16/19  0422 11/15/19  0509 11/14/19  0515   SODIUM mmol/L 138  --   --  140 137 133*   POTASSIUM mmol/L 4.0  --  4.0 3.0* 3.9 3.4*   MAGNESIUM mg/dL  --  1.8  --  1.7 1.9 1.8   CHLORIDE mmol/L 113*  --   --  117* 118* 105   CO2 mmol/L 14.3*  --   --  11.6* 12.1* 15.4*   BUN mg/dL 5*  --   --  8 14 23   CREATININE mg/dL 0.58  --   --  0.65 0.73 0.80   EGFR IF NONAFRICN AM mL/min/1.73 101  --   --  89 78 70   CALCIUM mg/dL 8.7  --   --  8.5* 8.9 8.2*   GLUCOSE mg/dL 76  --   --  99 95 116*   ALBUMIN g/dL 2.83*  --   --   --  2.75* 2.70*   BILIRUBIN mg/dL 0.3  --   --   --  <0.2* <0.2*   ALK PHOS U/L 86  --   --   --  66 72   AST (SGOT) U/L 41*  --   --   --  31 40*   ALT (SGPT) U/L 27  --   --   --  26 34*   Estimated Creatinine Clearance: 56.7 mL/min (by C-G formula based on SCr of 0.58 mg/dL).  No results found for: AMMONIA      Blood Culture   Date Value Ref Range Status    11/13/2019 No growth at 3 days  Preliminary   11/13/2019 No growth at 3 days  Preliminary              ----------------------------------------------------------------------------------------------------------------------  Imaging Results (Last 24 Hours)     ** No results found for the last 24 hours. **        ----------------------------------------------------------------------------------------------------------------------      Assessment/Plan     Active Hospital Problems    Diagnosis POA   • **Chest pain in adult [R07.9] Yes   • History of splenectomy [Z90.81] Not Applicable   • Cholelithiasis [K80.20] Yes   • Acute kidney injury (CMS/HCC) [N17.9] Yes   • Hyperkalemia [E87.5] Yes   • Hyperglycemia [R73.9] Yes   • First degree AV block [I44.0] Yes   • Macrocytosis without anemia [D75.89] Yes   • Hypocarbia [E87.8] Yes   • Type II diabetes mellitus (CMS/HCC) [E11.9] Yes   • Dyslipidemia [E78.5] Yes     On atorvastatin.      • RBBB unchanged from 2015 [I45.10] Yes   • History of Non-STEMI (non-ST elevated myocardial infarction) with no coronary intervention needed in 2008, clinically stable.  [I21.4] Yes   • History of Acute myelocytic leukemia,status post chemotherapy and bone marrow transplant in 2005. [C92.00] Yes   • Essential hypertension [I10] Yes   • Breast cancer (right), status post radiation therapy in 08/2015 [C50.919] Yes         ASSESSMENT/PLAN:  · Sepsis, secondary to EAEC enteritis vs hospital acquire pneumonia: Continuing to resolve, CRP is continuing to trending down, WBC is slightly elevated, continue IV flagyl & IV rocephin, Continue supportive treatment with IV fluids and advance diet as tolerated, repeat CRP, CBC, CMP in AM    · Chest pain that was ruled out for MI: continue medical management with aspirin and statin, continuous telemetry monitoring, patient denies chest pain currently    · Acute on probable stage II CKD: Resolved, continue gentle IV fluids, avoid nephrotoxins, repeat BMP in  AM, monitor output    · Cholelithiasis: gallbladder ultrasound revealed cholelithiasis and no evidence of inflammation, continue to monitor for now, recommend general surgery consult outpatient if warranted    · Essential hypertension: slightly fluctuating high, continue current blood pressure regimen for now, monitor per protocol    · Dizziness: fall precautions, up with assistance, monitor for now, patient denies dizziness    · Debility/generalized weakness: continue PT/OT, possible discharge to a rehab facility for prolonged physical therapy    · Hypomagnesemia: Magnesium slightly low again this AM, replace per protocol, repeat magnesium in AM     · Hypophosphatemia: Resolved. Repeat phosphorus in AM.      · Non insulin dependent type II diabetes mellitus:  continue low dose insulin sliding scale, acuchecks achs, hypoglycemic protocol, patient was hypoglycemic today at 68 this morning. Recheck was 93. Monitor.     · History of splenectomy: Supportive     · History of AML S/P chemo and bone marrow transplant in past: Monitor      -----------  -DVT prophylaxis: Heparin SQ  -GI prophylaxis: Protonix  -Activity: Up as tolerated  -Disposition: pending possible inpatient rehab placement for prolonged therapy due to debility and weakness  -Diet:  Advance as tolerated     The patient is high risk due to the following diagnoses/reasons:  Sepsis, enteritis, HCAP, chest pain, weakness, multiple co-morbidities    Josie Bella, JENNY  11/17/19  7:43 AM

## 2019-11-18 LAB
ALBUMIN SERPL-MCNC: 2.86 G/DL (ref 3.5–5.2)
ALBUMIN/GLOB SERPL: 0.9 G/DL
ALP SERPL-CCNC: 92 U/L (ref 39–117)
ALT SERPL W P-5'-P-CCNC: 39 U/L (ref 1–33)
ANION GAP SERPL CALCULATED.3IONS-SCNC: 13.1 MMOL/L (ref 5–15)
AST SERPL-CCNC: 57 U/L (ref 1–32)
BACTERIA SPEC AEROBE CULT: NORMAL
BACTERIA SPEC AEROBE CULT: NORMAL
BILIRUB SERPL-MCNC: 0.3 MG/DL (ref 0.2–1.2)
BUN BLD-MCNC: 4 MG/DL (ref 8–23)
BUN/CREAT SERPL: 7.1 (ref 7–25)
CALCIUM SPEC-SCNC: 8.4 MG/DL (ref 8.6–10.5)
CHLORIDE SERPL-SCNC: 108 MMOL/L (ref 98–107)
CO2 SERPL-SCNC: 16.9 MMOL/L (ref 22–29)
CREAT BLD-MCNC: 0.56 MG/DL (ref 0.57–1)
CRP SERPL-MCNC: 2.72 MG/DL (ref 0–0.5)
DEPRECATED RDW RBC AUTO: 53.5 FL (ref 37–54)
ERYTHROCYTE [DISTWIDTH] IN BLOOD BY AUTOMATED COUNT: 14.1 % (ref 12.3–15.4)
GFR SERPL CREATININE-BSD FRML MDRD: 105 ML/MIN/1.73
GLOBULIN UR ELPH-MCNC: 3 GM/DL
GLUCOSE BLD-MCNC: 97 MG/DL (ref 65–99)
GLUCOSE BLDC GLUCOMTR-MCNC: 134 MG/DL (ref 70–130)
GLUCOSE BLDC GLUCOMTR-MCNC: 230 MG/DL (ref 70–130)
GLUCOSE BLDC GLUCOMTR-MCNC: 69 MG/DL (ref 70–130)
GLUCOSE BLDC GLUCOMTR-MCNC: 95 MG/DL (ref 70–130)
HCT VFR BLD AUTO: 31.3 % (ref 34–46.6)
HGB BLD-MCNC: 10.5 G/DL (ref 12–15.9)
MAGNESIUM SERPL-MCNC: 1.7 MG/DL (ref 1.6–2.4)
MCH RBC QN AUTO: 34.8 PG (ref 26.6–33)
MCHC RBC AUTO-ENTMCNC: 33.5 G/DL (ref 31.5–35.7)
MCV RBC AUTO: 103.6 FL (ref 79–97)
PHOSPHATE SERPL-MCNC: 2.8 MG/DL (ref 2.5–4.5)
PLATELET # BLD AUTO: 223 10*3/MM3 (ref 140–450)
PMV BLD AUTO: 10.4 FL (ref 6–12)
POTASSIUM BLD-SCNC: 3 MMOL/L (ref 3.5–5.2)
PROT SERPL-MCNC: 5.9 G/DL (ref 6–8.5)
RBC # BLD AUTO: 3.02 10*6/MM3 (ref 3.77–5.28)
SODIUM BLD-SCNC: 138 MMOL/L (ref 136–145)
WBC NRBC COR # BLD: 9.96 10*3/MM3 (ref 3.4–10.8)

## 2019-11-18 PROCEDURE — 82962 GLUCOSE BLOOD TEST: CPT

## 2019-11-18 PROCEDURE — 83735 ASSAY OF MAGNESIUM: CPT | Performed by: NURSE PRACTITIONER

## 2019-11-18 PROCEDURE — 84100 ASSAY OF PHOSPHORUS: CPT | Performed by: NURSE PRACTITIONER

## 2019-11-18 PROCEDURE — 85027 COMPLETE CBC AUTOMATED: CPT | Performed by: NURSE PRACTITIONER

## 2019-11-18 PROCEDURE — 25010000003 POTASSIUM CHLORIDE 10 MEQ/100ML SOLUTION: Performed by: PHYSICIAN ASSISTANT

## 2019-11-18 PROCEDURE — 25010000002 MAGNESIUM SULFATE 2 GM/50ML SOLUTION: Performed by: NURSE PRACTITIONER

## 2019-11-18 PROCEDURE — 25010000002 HEPARIN (PORCINE) PER 1000 UNITS: Performed by: INTERNAL MEDICINE

## 2019-11-18 PROCEDURE — 63710000001 INSULIN ASPART PER 5 UNITS: Performed by: PHYSICIAN ASSISTANT

## 2019-11-18 PROCEDURE — 80053 COMPREHEN METABOLIC PANEL: CPT | Performed by: NURSE PRACTITIONER

## 2019-11-18 PROCEDURE — 86140 C-REACTIVE PROTEIN: CPT | Performed by: NURSE PRACTITIONER

## 2019-11-18 PROCEDURE — 25010000002 CEFTRIAXONE: Performed by: INTERNAL MEDICINE

## 2019-11-18 PROCEDURE — 25010000002 PROMETHAZINE PER 50 MG: Performed by: NURSE PRACTITIONER

## 2019-11-18 PROCEDURE — 99232 SBSQ HOSP IP/OBS MODERATE 35: CPT | Performed by: FAMILY MEDICINE

## 2019-11-18 RX ORDER — POTASSIUM CHLORIDE 7.45 MG/ML
10 INJECTION INTRAVENOUS
Status: DISPENSED | OUTPATIENT
Start: 2019-11-18 | End: 2019-11-18

## 2019-11-18 RX ORDER — MAGNESIUM SULFATE HEPTAHYDRATE 40 MG/ML
2 INJECTION, SOLUTION INTRAVENOUS ONCE
Status: COMPLETED | OUTPATIENT
Start: 2019-11-18 | End: 2019-11-18

## 2019-11-18 RX ADMIN — POTASSIUM CHLORIDE 10 MEQ: 10 INJECTION, SOLUTION INTRAVENOUS at 16:10

## 2019-11-18 RX ADMIN — EXEMESTANE 25 MG: 25 TABLET ORAL at 07:52

## 2019-11-18 RX ADMIN — FLUTICASONE PROPIONATE 2 SPRAY: 50 SPRAY, METERED NASAL at 07:53

## 2019-11-18 RX ADMIN — SODIUM CHLORIDE, PRESERVATIVE FREE 10 ML: 5 INJECTION INTRAVENOUS at 07:52

## 2019-11-18 RX ADMIN — METRONIDAZOLE 500 MG: 500 INJECTION, SOLUTION INTRAVENOUS at 20:34

## 2019-11-18 RX ADMIN — METRONIDAZOLE 500 MG: 500 INJECTION, SOLUTION INTRAVENOUS at 14:18

## 2019-11-18 RX ADMIN — PANTOPRAZOLE SODIUM 40 MG: 40 TABLET, DELAYED RELEASE ORAL at 05:12

## 2019-11-18 RX ADMIN — ASPIRIN 81 MG: 81 TABLET, COATED ORAL at 07:51

## 2019-11-18 RX ADMIN — POTASSIUM CHLORIDE 10 MEQ: 10 INJECTION, SOLUTION INTRAVENOUS at 11:31

## 2019-11-18 RX ADMIN — CEFTRIAXONE 1 G: 1 INJECTION, POWDER, FOR SOLUTION INTRAMUSCULAR; INTRAVENOUS at 20:34

## 2019-11-18 RX ADMIN — Medication 1 TABLET: at 07:52

## 2019-11-18 RX ADMIN — ATORVASTATIN CALCIUM 10 MG: 10 TABLET, FILM COATED ORAL at 20:33

## 2019-11-18 RX ADMIN — HEPARIN SODIUM 5000 UNITS: 5000 INJECTION INTRAVENOUS; SUBCUTANEOUS at 07:52

## 2019-11-18 RX ADMIN — PROMETHAZINE HYDROCHLORIDE 12.5 MG: 25 INJECTION INTRAMUSCULAR; INTRAVENOUS at 09:27

## 2019-11-18 RX ADMIN — POTASSIUM CHLORIDE 10 MEQ: 10 INJECTION, SOLUTION INTRAVENOUS at 13:08

## 2019-11-18 RX ADMIN — MAGNESIUM SULFATE IN WATER 2 G: 40 INJECTION, SOLUTION INTRAVENOUS at 09:19

## 2019-11-18 RX ADMIN — POTASSIUM CHLORIDE 10 MEQ: 10 INJECTION, SOLUTION INTRAVENOUS at 09:19

## 2019-11-18 RX ADMIN — METRONIDAZOLE 500 MG: 500 INJECTION, SOLUTION INTRAVENOUS at 05:12

## 2019-11-18 RX ADMIN — POTASSIUM CHLORIDE 10 MEQ: 10 INJECTION, SOLUTION INTRAVENOUS at 14:18

## 2019-11-18 RX ADMIN — CETIRIZINE HYDROCHLORIDE 5 MG: 10 TABLET, FILM COATED ORAL at 07:52

## 2019-11-18 RX ADMIN — INSULIN ASPART 3 UNITS: 100 INJECTION, SOLUTION INTRAVENOUS; SUBCUTANEOUS at 17:09

## 2019-11-18 RX ADMIN — VALACYCLOVIR HYDROCHLORIDE 1000 MG: 500 TABLET, FILM COATED ORAL at 07:51

## 2019-11-18 RX ADMIN — CARVEDILOL 12.5 MG: 6.25 TABLET, FILM COATED ORAL at 20:33

## 2019-11-18 RX ADMIN — CARVEDILOL 12.5 MG: 6.25 TABLET, FILM COATED ORAL at 07:51

## 2019-11-18 RX ADMIN — HEPARIN SODIUM 5000 UNITS: 5000 INJECTION INTRAVENOUS; SUBCUTANEOUS at 20:33

## 2019-11-18 RX ADMIN — SODIUM CHLORIDE 75 ML/HR: 9 INJECTION, SOLUTION INTRAVENOUS at 20:34

## 2019-11-18 NOTE — PROGRESS NOTES
[facility] HOSPITALIST PROGRESS NOTE     Patient Identification:  Name:  Tahmina Martinez  Age:  74 y.o.  Sex:  female  :  1945  MRN:  [id number]  Visit Number:  [id number]  ROOM: [address] [place] [address]     Primary Care Provider:  Noe Bower MD    Length of stay in inpatient status:  10    Subjective     Chief Compliant:    Chief Complaint   Patient presents with   • Abdominal Pain   • Chest Pain       History of Presenting Illness: 74-year-old female who was admitted with sepsis secondary to either enteritis versus pneumonia.  Patient currently on IV antibiotics and is likely improving from this but did have one episode of nausea and vomiting this morning.  Patient also was evaluated for chest pain and she has had no other issues with chest pains over the last couple days and this point would continue medical therapy.  Acute kidney injury is resolved during the admission.  Patient is also being treated for low magnesium low phosphate and low potassium.  She states other than the nausea mom she had this morning she is feeling some better.  No new complaints otherwise    Objective     Current Hospital Meds:  aspirin 81 mg Oral Daily   atorvastatin 10 mg Oral Nightly   calcium carb-cholecalciferol 1 tablet Oral Daily   carvedilol 12.5 mg Oral BID   cefTRIAXone 1 g Intravenous Q24H   cetirizine 5 mg Oral Daily   cholecalciferol 50,000 Units Oral Weekly   exemestane 25 mg Oral Daily   fluticasone 2 spray Each Nare Daily   heparin (porcine) 5,000 Units Subcutaneous Q12H   insulin aspart 0-7 Units Subcutaneous 4x Daily AC & at Bedtime   metroNIDAZOLE 500 mg Intravenous Q8H   pantoprazole 40 mg Oral Q AM   potassium chloride 10 mEq Intravenous Q1H   rOPINIRole 0.25 mg Oral Nightly   sodium chloride 10 mL Intravenous Q12H   sodium chloride 10 mL Intravenous Q12H   valACYclovir 1,000 mg Oral Daily     sodium chloride 75 mL/hr Last Rate: 75 mL/hr (19 1214)      ----------------------------------------------------------------------------------------------------------------------  Vital Signs:  Temp:  [98 °F (36.7 °C)-98.8 °F (37.1 °C)] 98.8 °F (37.1 °C)  Heart Rate:  [72-83] 74  Resp:  [20] 20  BP: (138-155)/(73-90) 155/90  SpO2:  [94 %-98 %] 97 %  on   ;   Device (Oxygen Therapy): room air  Body mass index is 27.92 kg/m².    Wt Readings from Last 3 Encounters:   11/17/19 71.5 kg (157 lb 9.6 oz)   11/04/19 65.3 kg (143 lb 15.4 oz)   10/29/19 71 kg (156 lb 9.6 oz)     Intake & Output (last 3 days)       11/15 0701 - 11/16 0700 11/16 0701 - 11/17 0700 11/17 0701 - 11/18 0700 11/18 0701 - 11/19 0700    P.O. 600 600 600 360    IV Piggyback 300 400      Total Intake(mL/kg) 900 (12.9) 1000 (14) 600 (8.4) 360 (5)    Urine (mL/kg/hr) 400 (0.2) 650 (0.4) 1550 (0.9) 500 (1.6)    Stool 0 0      Total Output  500    Net +500 +350 -950 -140            Urine Unmeasured Occurrence 4 x 6 x 3 x     Stool Unmeasured Occurrence 5 x 6 x          Diet Regular; GI Soft  ----------------------------------------------------------------------------------------------------------------------  Physical exam:  Constitutional: No acute distress  HEENT: Normocephalic atraumatic  Neck: Supple  Cardiovascular: Regular rate and rhythm  Pulmonary/Chest: Clear to auscultation  Abdominal: Positive bowel sounds soft.   Musculoskeletal: No arthropathy  Neurological: No focal deficits  Skin: No rashes  Peripheral vascular:  Genitourinary:  ----------------------------------------------------------------------------------------------------------------------    Last echocardiogram:  Results for orders placed during the hospital encounter of 11/02/19   Transthoracic Echo Complete With Contrast if Necessary Per Protocol    Narrative · Left ventricular wall thickness is consistent with borderline concentric   hypertrophy.  · Left ventricular systolic function is normal.  · Estimated EF appears to be in  the range of 66 - 70%.  · Left ventricular diastolic dysfunction.  · Normal cardiac chamber dimensions  · Mild aortic valve regurgitation is present.  · Mild mitral valve regurgitation is present  · Mild tricuspid valve regurgitation is present. No evidence of pulmonary   hypertension is present  · There is no evidence of pericardial effusion        ----------------------------------------------------------------------------------------------------------------------  Results from last 7 days   Lab Units 11/18/19 0554 11/17/19 0432 11/17/19 0431 11/16/19  0422   CRP mg/dL 2.72*  --  2.38* 4.18*   WBC 10*3/mm3 9.96 11.37*  --  9.60   HEMOGLOBIN g/dL 10.5* 10.0*  --  9.3*   HEMATOCRIT % 31.3* 30.3*  --  28.6*   MCV fL 103.6* 105.6*  --  107.9*   MCHC g/dL 33.5 33.0  --  32.5   PLATELETS 10*3/mm3 223 201  --  212         Results from last 7 days   Lab Units 11/18/19  0554 11/17/19  0627 11/17/19 0431 11/16/19 1945 11/16/19  1747 11/16/19  0422 11/15/19  0509   SODIUM mmol/L 138 138  --   --   --  140 137   POTASSIUM mmol/L 3.0* 4.0  --  4.0  --  3.0* 3.9   MAGNESIUM mg/dL 1.7  --  1.8  --   --  1.7 1.9   CHLORIDE mmol/L 108* 113*  --   --   --  117* 118*   CO2 mmol/L 16.9* 14.3*  --   --   --  11.6* 12.1*   BUN mg/dL 4* 5*  --   --   --  8 14   CREATININE mg/dL 0.56* 0.58  --   --   --  0.65 0.73   EGFR IF NONAFRICN AM mL/min/1.73 105 101  --   --   --  89 78   CALCIUM mg/dL 8.4* 8.7  --   --   --  8.5* 8.9   PHOSPHORUS mg/dL 2.8  --  3.1  --  2.8 2.3* 1.4*   GLUCOSE mg/dL 97 76  --   --   --  99 95   ALBUMIN g/dL 2.86* 2.83*  --   --   --   --  2.75*   BILIRUBIN mg/dL 0.3 0.3  --   --   --   --  <0.2*   ALK PHOS U/L 92 86  --   --   --   --  66   AST (SGOT) U/L 57* 41*  --   --   --   --  31   ALT (SGPT) U/L 39* 27  --   --   --   --  26   Estimated Creatinine Clearance: 56.7 mL/min (A) (by C-G formula based on SCr of 0.56 mg/dL (L)).  No results found for: AMMONIA  Results from last 7 days   Lab Units  11/13/19  0704 11/13/19  0045 11/12/19  1855   TROPONIN T ng/mL <0.010 <0.010 <0.010             Glucose   Date/Time Value Ref Range Status   11/18/2019 0624 95 70 - 130 mg/dL Final   11/17/2019 1959 126 70 - 130 mg/dL Final   11/17/2019 1612 111 70 - 130 mg/dL Final   11/17/2019 1020 123 70 - 130 mg/dL Final   11/17/2019 0836 94 70 - 130 mg/dL Final   11/17/2019 0736 68 (L) 70 - 130 mg/dL Final   11/16/2019 1942 130 70 - 130 mg/dL Final   11/16/2019 1612 116 70 - 130 mg/dL Final     Lab Results   Component Value Date    TSH 1.430 11/08/2019     No results found for: PREGTESTUR, PREGSERUM, HCG, HCGQUANT  Pain Management Panel     There is no flowsheet data to display.        Brief Urine Lab Results  (Last result in the past 365 days)      Color   Clarity   Blood   Leuk Est   Nitrite   Protein   CREAT   Urine HCG        11/13/19 1505 Yellow Clear Negative Negative Negative Trace             Blood Culture   Date Value Ref Range Status   11/13/2019 No growth at 4 days  Preliminary   11/13/2019 No growth at 4 days  Preliminary     Results from last 7 days   Lab Units 11/13/19  1505   NITRITE UA  Negative              Results from last 7 days   Lab Units 11/18/19  0554 11/17/19  0431 11/16/19  0422 11/15/19  0509 11/14/19  0515 11/13/19  0045   CRP mg/dL 2.72* 2.38* 4.18* 10.30* 19.46* 10.20*       I have personally looked at the labs and they are summarized above.  ----------------------------------------------------------------------------------------------------------------------  Detailed radiology reports for the last 24 hours:    Imaging Results (Last 24 Hours)     ** No results found for the last 24 hours. **        Final impressions for the last 30 days of radiology reports:    Ct Abdomen Pelvis Without Contrast    Result Date: 11/7/2019  Cholelithiasis without CT evidence of cholecystitis or biliary obstruction. No renal or bowel obstruction or other acute abnormality. Normal appendix. Signer Name: Fabio  MD Arnol  Signed: 11/7/2019 11:20 PM  Workstation Name: [email or web address]  [facility]    Xr Shoulder 2+ View Left    Result Date: 11/11/2019  No acute osseous or articular abnormalities.  This report was finalized on 11/11/2019 12:37 PM by Dr. Brendan Orellana MD.      Ct Head Without Contrast    Result Date: 11/7/2019  No acute intracranial pathology. Nothing is seen on this exam to specifically account for the patient's symptoms.  This report was finalized on 11/7/2019 8:00 PM by Dr. Sal Cheek MD.      Ct Head Without Contrast    Result Date: 11/2/2019  1. No acute intracranial abnormality. Stable senescent changes. 2. Mucosal thickening bilateral maxillary sinuses. 3. Partial fluid opacification of the bilateral mastoid air cells. Signer Name: Shabbir Bella MD  Signed: 11/2/2019 10:42 PM  Workstation Name: [id number]  [facility] Gallbladder    Result Date: 11/12/2019  1. Cholelithiasis. 2. No sonographic evidence of inflammation.  This report was finalized on 11/12/2019 7:35 PM by Dr. Brendan Orellana MD.      Xr Chest 1 View    Result Date: 11/13/2019  Minimal left lower lobe airspace disease. Otherwise stable chest.  This report was finalized on 11/13/2019 11:36 AM by Dr. Brendan Orellana MD.      Xr Chest 1 View    Result Date: 11/7/2019  No evidence of active or acute cardiopulmonary disease on today's chest radiograph.  This report was finalized on 11/7/2019 7:19 PM by Dr. Sal Cheek MD.      Us Carotid Bilateral    Result Date: 11/11/2019   No evidence of hemodynamically significant plaques or stenosis within the carotid system at this time.  This report was finalized on 11/11/2019 1:14 PM by Dr. Sal Cheek MD.      Xr Abdomen Kub    Result Date: 11/12/2019  Nonobstructive bowel gas pattern.  This report was finalized on 11/12/2019 11:37 AM by Dr. Brendan Orellana MD.      I have personally looked at the radiology images and read the final  radiology report.    Assessment & Plan    Sepsis--improved.  Recent enteritis.    Enteritis--patient no having no diarrhea this morning but did have nausea vomiting.  Continue gentle hydration for now    Pneumonia--continue Rocephin at this time.  Patient is improved    Acute kidney injury--much improved    Chest pain--resolved    Hypomagnesia anemia and hypokalemia--continue supplementation per protocol    diabetes mellitus-continue current treatment    History of splenectomy.    History of AML    Plan for transfer to nursing home discharge.    VTE Prophylaxis:   Mechanical Order History:     None      Pharmalogical Order History:     Ordered     Dose Route Frequency Stop    11/08/19 0046  heparin (porcine) 5000 UNIT/ML injection 5,000 Units      5,000 Units SC Every 12 Hours Scheduled --              Shaq Jones MD  [facility]  11/18/19  11:28 AM

## 2019-11-18 NOTE — PROGRESS NOTES
Discharge Planning Assessment  KATTY Garcia     Patient Name: Tahmina Martinez  MRN: [id number]  Today's Date: 11/18/2019    Admit Date: 11/7/2019      Discharge Plan     Row Name 11/18/19 1340       Plan    Plan  [facility] reviewing pt's information per Elisa.  SS will follow.         Destination      Service Provider Request Status Selected Services Address Phone Number Fax Number    [facility] Pending - Request Sent N/A [address] [place] [address] [phone]       Carly Elizabeth, CHENGW

## 2019-11-18 NOTE — SIGNIFICANT NOTE
11/18/19 1656   Rehab Treatment   Discipline physical therapist   Reason Treatment Not Performed patient/family declined treatment, not feeling well  (Pt was nauseas and requested therapist come back tomorrow.)

## 2019-11-19 LAB
027 TOXIN: NORMAL
ANION GAP SERPL CALCULATED.3IONS-SCNC: 11.8 MMOL/L (ref 5–15)
BASOPHILS # BLD AUTO: 0.04 10*3/MM3 (ref 0–0.2)
BASOPHILS NFR BLD AUTO: 0.4 % (ref 0–1.5)
BUN BLD-MCNC: 3 MG/DL (ref 8–23)
BUN/CREAT SERPL: 4.9 (ref 7–25)
C DIFF TOX GENS STL QL NAA+PROBE: NEGATIVE
CALCIUM SPEC-SCNC: 8.1 MG/DL (ref 8.6–10.5)
CHLORIDE SERPL-SCNC: 107 MMOL/L (ref 98–107)
CO2 SERPL-SCNC: 20.2 MMOL/L (ref 22–29)
CREAT BLD-MCNC: 0.61 MG/DL (ref 0.57–1)
DEPRECATED RDW RBC AUTO: 50.4 FL (ref 37–54)
EOSINOPHIL # BLD AUTO: 0.43 10*3/MM3 (ref 0–0.4)
EOSINOPHIL NFR BLD AUTO: 4.3 % (ref 0.3–6.2)
ERYTHROCYTE [DISTWIDTH] IN BLOOD BY AUTOMATED COUNT: 13.7 % (ref 12.3–15.4)
GFR SERPL CREATININE-BSD FRML MDRD: 95 ML/MIN/1.73
GLUCOSE BLD-MCNC: 107 MG/DL (ref 65–99)
GLUCOSE BLDC GLUCOMTR-MCNC: 107 MG/DL (ref 70–130)
GLUCOSE BLDC GLUCOMTR-MCNC: 129 MG/DL (ref 70–130)
GLUCOSE BLDC GLUCOMTR-MCNC: 143 MG/DL (ref 70–130)
GLUCOSE BLDC GLUCOMTR-MCNC: 99 MG/DL (ref 70–130)
HCT VFR BLD AUTO: 29.7 % (ref 34–46.6)
HGB BLD-MCNC: 10.6 G/DL (ref 12–15.9)
IMM GRANULOCYTES # BLD AUTO: 0.1 10*3/MM3 (ref 0–0.05)
IMM GRANULOCYTES NFR BLD AUTO: 1 % (ref 0–0.5)
LYMPHOCYTES # BLD AUTO: 2.34 10*3/MM3 (ref 0.7–3.1)
LYMPHOCYTES NFR BLD AUTO: 23.2 % (ref 19.6–45.3)
MAGNESIUM SERPL-MCNC: 1.8 MG/DL (ref 1.6–2.4)
MCH RBC QN AUTO: 35.9 PG (ref 26.6–33)
MCHC RBC AUTO-ENTMCNC: 35.7 G/DL (ref 31.5–35.7)
MCV RBC AUTO: 100.7 FL (ref 79–97)
MONOCYTES # BLD AUTO: 1.62 10*3/MM3 (ref 0.1–0.9)
MONOCYTES NFR BLD AUTO: 16.1 % (ref 5–12)
NEUTROPHILS # BLD AUTO: 5.56 10*3/MM3 (ref 1.7–7)
NEUTROPHILS NFR BLD AUTO: 55 % (ref 42.7–76)
NRBC BLD AUTO-RTO: 0.2 /100 WBC (ref 0–0.2)
PLATELET # BLD AUTO: 260 10*3/MM3 (ref 140–450)
PMV BLD AUTO: 10.5 FL (ref 6–12)
POTASSIUM BLD-SCNC: 2.9 MMOL/L (ref 3.5–5.2)
RBC # BLD AUTO: 2.95 10*6/MM3 (ref 3.77–5.28)
SODIUM BLD-SCNC: 139 MMOL/L (ref 136–145)
WBC NRBC COR # BLD: 10.09 10*3/MM3 (ref 3.4–10.8)

## 2019-11-19 PROCEDURE — 97116 GAIT TRAINING THERAPY: CPT

## 2019-11-19 PROCEDURE — 83735 ASSAY OF MAGNESIUM: CPT | Performed by: INTERNAL MEDICINE

## 2019-11-19 PROCEDURE — 94799 UNLISTED PULMONARY SVC/PX: CPT

## 2019-11-19 PROCEDURE — 25010000002 HEPARIN (PORCINE) PER 1000 UNITS: Performed by: INTERNAL MEDICINE

## 2019-11-19 PROCEDURE — 25010000002 MAGNESIUM SULFATE 2 GM/50ML SOLUTION: Performed by: FAMILY MEDICINE

## 2019-11-19 PROCEDURE — 25010000002 CEFTRIAXONE: Performed by: INTERNAL MEDICINE

## 2019-11-19 PROCEDURE — 99232 SBSQ HOSP IP/OBS MODERATE 35: CPT | Performed by: FAMILY MEDICINE

## 2019-11-19 PROCEDURE — 82962 GLUCOSE BLOOD TEST: CPT

## 2019-11-19 PROCEDURE — 80048 BASIC METABOLIC PNL TOTAL CA: CPT | Performed by: FAMILY MEDICINE

## 2019-11-19 PROCEDURE — 97530 THERAPEUTIC ACTIVITIES: CPT

## 2019-11-19 PROCEDURE — 87493 C DIFF AMPLIFIED PROBE: CPT | Performed by: FAMILY MEDICINE

## 2019-11-19 PROCEDURE — 25010000002 PROMETHAZINE PER 50 MG: Performed by: NURSE PRACTITIONER

## 2019-11-19 PROCEDURE — 85025 COMPLETE CBC W/AUTO DIFF WBC: CPT | Performed by: FAMILY MEDICINE

## 2019-11-19 RX ORDER — POTASSIUM CHLORIDE 20 MEQ/1
40 TABLET, EXTENDED RELEASE ORAL EVERY 4 HOURS
Status: COMPLETED | OUTPATIENT
Start: 2019-11-19 | End: 2019-11-19

## 2019-11-19 RX ORDER — MAGNESIUM SULFATE HEPTAHYDRATE 40 MG/ML
2 INJECTION, SOLUTION INTRAVENOUS ONCE
Status: COMPLETED | OUTPATIENT
Start: 2019-11-19 | End: 2019-11-19

## 2019-11-19 RX ADMIN — SODIUM CHLORIDE, PRESERVATIVE FREE 10 ML: 5 INJECTION INTRAVENOUS at 07:58

## 2019-11-19 RX ADMIN — ATORVASTATIN CALCIUM 10 MG: 10 TABLET, FILM COATED ORAL at 21:03

## 2019-11-19 RX ADMIN — ROPINIROLE HYDROCHLORIDE 0.25 MG: 0.25 TABLET, FILM COATED ORAL at 21:04

## 2019-11-19 RX ADMIN — Medication 1 TABLET: at 07:58

## 2019-11-19 RX ADMIN — SODIUM CHLORIDE 75 ML/HR: 9 INJECTION, SOLUTION INTRAVENOUS at 20:57

## 2019-11-19 RX ADMIN — METRONIDAZOLE 500 MG: 500 INJECTION, SOLUTION INTRAVENOUS at 05:22

## 2019-11-19 RX ADMIN — PANTOPRAZOLE SODIUM 40 MG: 40 TABLET, DELAYED RELEASE ORAL at 05:22

## 2019-11-19 RX ADMIN — CETIRIZINE HYDROCHLORIDE 5 MG: 10 TABLET, FILM COATED ORAL at 07:58

## 2019-11-19 RX ADMIN — POTASSIUM CHLORIDE 40 MEQ: 1500 TABLET, EXTENDED RELEASE ORAL at 17:23

## 2019-11-19 RX ADMIN — MAGNESIUM SULFATE IN WATER 2 G: 40 INJECTION, SOLUTION INTRAVENOUS at 06:28

## 2019-11-19 RX ADMIN — EXEMESTANE 25 MG: 25 TABLET ORAL at 07:56

## 2019-11-19 RX ADMIN — CEFTRIAXONE 1 G: 1 INJECTION, POWDER, FOR SOLUTION INTRAMUSCULAR; INTRAVENOUS at 20:58

## 2019-11-19 RX ADMIN — CARVEDILOL 12.5 MG: 6.25 TABLET, FILM COATED ORAL at 07:57

## 2019-11-19 RX ADMIN — HEPARIN SODIUM 5000 UNITS: 5000 INJECTION INTRAVENOUS; SUBCUTANEOUS at 07:56

## 2019-11-19 RX ADMIN — METRONIDAZOLE 500 MG: 500 INJECTION, SOLUTION INTRAVENOUS at 22:54

## 2019-11-19 RX ADMIN — VALACYCLOVIR HYDROCHLORIDE 1000 MG: 500 TABLET, FILM COATED ORAL at 07:57

## 2019-11-19 RX ADMIN — FLUTICASONE PROPIONATE 2 SPRAY: 50 SPRAY, METERED NASAL at 07:58

## 2019-11-19 RX ADMIN — CARVEDILOL 12.5 MG: 6.25 TABLET, FILM COATED ORAL at 21:03

## 2019-11-19 RX ADMIN — HEPARIN SODIUM 5000 UNITS: 5000 INJECTION INTRAVENOUS; SUBCUTANEOUS at 21:05

## 2019-11-19 RX ADMIN — SODIUM CHLORIDE, PRESERVATIVE FREE 10 ML: 5 INJECTION INTRAVENOUS at 21:08

## 2019-11-19 RX ADMIN — POTASSIUM CHLORIDE 40 MEQ: 1500 TABLET, EXTENDED RELEASE ORAL at 06:04

## 2019-11-19 RX ADMIN — ASPIRIN 81 MG: 81 TABLET, COATED ORAL at 07:57

## 2019-11-19 RX ADMIN — METRONIDAZOLE 500 MG: 500 INJECTION, SOLUTION INTRAVENOUS at 13:48

## 2019-11-19 RX ADMIN — POTASSIUM CHLORIDE 40 MEQ: 1500 TABLET, EXTENDED RELEASE ORAL at 07:57

## 2019-11-19 RX ADMIN — PROMETHAZINE HYDROCHLORIDE 12.5 MG: 25 INJECTION INTRAMUSCULAR; INTRAVENOUS at 07:56

## 2019-11-19 NOTE — THERAPY TREATMENT NOTE
Acute Care - Physical Therapy Treatment Note  KATTY Garcia     Patient Name: Tahmina Martinez  : 1945  MRN: [id number]  Today's Date: 2019  Onset of Illness/Injury or Date of Surgery: 19  Date of Referral to PT: 11/10/19  Referring Physician: Shayan    Admit Date: 2019    Visit Dx:    ICD-10-CM ICD-9-CM   1. Chest pain in adult R07.9 786.50   2. Renal insufficiency N28.9 593.9     Patient Active Problem List   Diagnosis   • History of Non-STEMI (non-ST elevated myocardial infarction) with no coronary intervention needed in , clinically stable.    • Dyspnea, class II-III.    • Essential hypertension   • History of Acute myelocytic leukemia,status post chemotherapy and bone marrow transplant in .   • Breast cancer (right), status post radiation therapy in 2015   • RBBB unchanged from    • Dyslipidemia   • Bifascicular block (RBBB plus LP FB)   • Chest pain in adult   • History of splenectomy   • Cholelithiasis   • Acute kidney injury (CMS/HCC)   • Hyperkalemia   • Hyperglycemia   • First degree AV block   • Macrocytosis without anemia   • Hypocarbia   • Type II diabetes mellitus (CMS/HCC)       Therapy Treatment    Rehabilitation Treatment Summary     Row Name 19 6944             Treatment Time/Intention    Discipline  physical therapist  -CT      Document Type  therapy note (daily note)  -CT      Subjective Information  complains of;weakness  -CT      Mode of Treatment  physical therapy  -CT      Therapy Frequency (PT Clinical Impression)  3 times/wk 3-5 times/wk  -CT      Patient Effort  good  -CT      Comment  Pt continues to tolerated therapy well with rest breaks provided as needed.   -CT      Existing Precautions/Restrictions  fall  -CT      Recorded by [CT] Nakia Stone, PT 19 1503      Row Name 19 4737             Cognitive Assessment/Intervention- PT/OT    Orientation Status (Cognition)  oriented x 4  -CT      Follows Commands (Cognition)  WFL  -CT       Recorded by [CT] Nakia Stone, PT 11/19/19 1503      Row Name 11/19/19 1454             Safety Issues, Functional Mobility    Impairments Affecting Function (Mobility)  endurance/activity tolerance;strength  -CT      Recorded by [CT] Nakia Stone, PT 11/19/19 1503      Row Name 11/19/19 1455             Bed Mobility Assessment/Treatment    Bed Mobility Assessment/Treatment  bed mobility (all) activities  -CT      [place] Level (Bed Mobility)  minimum assist (75% patient effort)  -CT      Assistive Device (Bed Mobility)  bed rails  -CT      Recorded by [CT] Nakia Stone, PT 11/19/19 1503      Row Name 11/19/19 1450             Transfer Assessment/Treatment    Transfer Assessment/Treatment  sit-stand transfer;stand-sit transfer  -CT      Recorded by [CT] Nakia Stone, PT 11/19/19 1503      Row Name 11/19/19 1450             Sit-Stand Transfer    Sit-Stand [place] (Transfers)  minimum assist (75% patient effort)  -CT      Assistive Device (Sit-Stand Transfers)  walker, front-wheeled  -CT      Recorded by [CT] Nakia Stone, PT 11/19/19 1503      Row Name 11/19/19 1450             Stand-Sit Transfer    Stand-Sit [place] (Transfers)  minimum assist (75% patient effort)  -CT      Assistive Device (Stand-Sit Transfers)  walker, front-wheeled  -CT      Recorded by [CT] Nakia Stone, PT 11/19/19 1503      Row Name 11/19/19 1453             Gait/Stairs Assessment/Training    [place] Level (Gait)  minimum assist (75% patient effort)  -CT      Assistive Device (Gait)  walker, front-wheeled  -CT      Distance in Feet (Gait)  60  -CT      Pattern (Gait)  step-to  -CT      Deviations/Abnormal Patterns (Gait)  base of support, narrow;festinating/shuffling;gait speed decreased  -CT      Bilateral Gait Deviations  forward flexed posture;weight shift ability decreased  -CT      Recorded by [CT] Nakia Stone, PT 11/19/19 1503      Row Name 11/19/19 2732             Positioning and Restraints     Pre-Treatment Position  in bed  -CT      Post Treatment Position  chair  -CT      In Chair  sitting;call light within reach;encouraged to call for assist  -CT      Recorded by [CT] Nakia Stone, PT 11/19/19 1503      Row Name 11/19/19 1459             Plan of Care Review    Plan of Care Reviewed With  patient  -CT      Recorded by [CT] Nakia Stone, PT 11/19/19 1503      Row Name 11/19/19 1459             Outcome Summary/Treatment Plan (PT)    Daily Summary of Progress (PT)  progress toward functional goals is good  -CT      Anticipated Equipment Needs at Discharge (PT)  -- tbd  -CT      Anticipated Discharge Disposition (PT)  inpatient rehabilitation facility  -CT      Recorded by [CT] Nakia Stone, PT 11/19/19 1502        User Key  (r) = Recorded By, (t) = Taken By, (c) = Cosigned By    Initials Name Effective Dates Discipline    CT Nakia Stone, PT 04/03/18 -  PT                   Physical Therapy Education     Title: PT OT SLP Therapies (Done)     Topic: Physical Therapy (Done)     Point: Mobility training (Done)     Learning Progress Summary           Patient Acceptance, E,TB, VU by CT at 11/19/2019  3:03 PM    Acceptance, E, VU by EA at 11/16/2019  9:03 PM    Acceptance, E, VU by EA at 11/15/2019 11:01 PM    Acceptance, E,TB, VU by CT at 11/15/2019  1:55 PM    Acceptance, E, VU by EA at 11/14/2019 10:27 PM    Acceptance, E,TB, VU by CT at 11/14/2019  2:54 PM                   Point: Home exercise program (Done)     Learning Progress Summary           Patient Acceptance, E,TB, VU by CT at 11/19/2019  3:03 PM    Acceptance, E, VU by EA at 11/16/2019  9:03 PM    Acceptance, E, VU by EA at 11/15/2019 11:01 PM    Acceptance, E,TB, VU by CT at 11/15/2019  1:55 PM    Acceptance, E, VU by EA at 11/14/2019 10:27 PM    Acceptance, E,TB, VU by CT at 11/14/2019  2:54 PM                   Point: Body mechanics (Done)     Learning Progress Summary           Patient Acceptance, E,TB, VU by CT at 11/19/2019  3:03  PM    Acceptance, E, VU by EA at 11/16/2019  9:03 PM    Acceptance, E, VU by EA at 11/15/2019 11:01 PM    Acceptance, E,TB, VU by CT at 11/15/2019  1:55 PM    Acceptance, E, VU by EA at 11/14/2019 10:27 PM    Acceptance, E,TB, VU by CT at 11/14/2019  2:54 PM                   Point: Precautions (Done)     Learning Progress Summary           Patient Acceptance, E,TB, VU by CT at 11/19/2019  3:03 PM    Acceptance, E, VU by EA at 11/16/2019  9:03 PM    Acceptance, E, VU by EA at 11/15/2019 11:01 PM    Acceptance, E,TB, VU by CT at 11/15/2019  1:55 PM    Acceptance, E, VU by EA at 11/14/2019 10:27 PM    Acceptance, E,TB, VU by CT at 11/14/2019  2:54 PM                               User Key     Initials Effective Dates Name Provider Type Discipline    EA 06/16/16 -  Joy Weiner, RN Registered Nurse Nurse    CT 04/03/18 -  Nakia Stone, PAULINA Physical Therapist PT                PT Recommendation and Plan  Anticipated Discharge Disposition (PT): inpatient rehabilitation facility  Planned Therapy Interventions (PT Eval): balance training, bed mobility training, gait training, home exercise program, manual therapy techniques, motor coordination training, neuromuscular re-education, patient/family education, postural re-education, ROM (range of motion), stair training, strengthening, transfer training  Therapy Frequency (PT Clinical Impression): 3 times/wk(3-5 times/wk)  Outcome Summary/Treatment Plan (PT)  Daily Summary of Progress (PT): progress toward functional goals is good  Anticipated Equipment Needs at Discharge (PT): (tbd)  Anticipated Discharge Disposition (PT): inpatient rehabilitation facility  Plan of Care Reviewed With: patient     Time Calculation:   PT Charges     Row Name 11/19/19 1505             Time Calculation    PT Received On  11/19/19  -CT      PT Goal Re-Cert Due Date  11/28/19  -CT         Time Calculation- PT    Total Timed Code Minutes- PT  32 minute(s)  -CT        User Key  (r) =  Recorded By, (t) = Taken By, (c) = Cosigned By    Initials Name Provider Type    CT Nakia Stone, PT Physical Therapist        Therapy Charges for Today     Code Description Service Date Service Provider Modifiers Qty    44753472608 HC GAIT TRAINING EA 15 MIN 11/19/2019 Nakia Stone, PT GP 1    09026741484 HC PT THERAPEUTIC ACT EA 15 MIN 11/19/2019 Nakia Stone, PT GP 1          PT G-Codes  Outcome Measure Options: AM-PAC 6 Clicks Daily Activity (OT)  AM-PAC 6 Clicks Score (OT): 21    Nakia Stone, PT  11/19/2019

## 2019-11-19 NOTE — PROGRESS NOTES
[facility] HOSPITALIST PROGRESS NOTE     Patient Identification:  Name:  Tahmina Martinez  Age:  74 y.o.  Sex:  female  :  1945  MRN:  [id number]  Visit Number:  [id number]  ROOM: [address] [place] [address]     Primary Care Provider:  Noe Bower MD    Length of stay in inpatient status:  11    Subjective     Chief Compliant:    Chief Complaint   Patient presents with   • Abdominal Pain   • Chest Pain       History of Presenting Illness: 74-year-old female who presented with acute gastroenteritis and pneumonia.  Patient continues to have some nausea vomiting and diarrhea.  Stool studies did show rotavirus and E. coli.  Will obtain stools for C. difficile.  Patient lab work appears to be improving but she is states she feels worse at this time.  Patient also does have low magnesium and potassium numbers.    Objective     Current Hospital Meds:  aspirin 81 mg Oral Daily   atorvastatin 10 mg Oral Nightly   calcium carb-cholecalciferol 1 tablet Oral Daily   carvedilol 12.5 mg Oral BID   cefTRIAXone 1 g Intravenous Q24H   cetirizine 5 mg Oral Daily   cholecalciferol 50,000 Units Oral Weekly   exemestane 25 mg Oral Daily   fluticasone 2 spray Each Nare Daily   heparin (porcine) 5,000 Units Subcutaneous Q12H   insulin aspart 0-7 Units Subcutaneous 4x Daily AC & at Bedtime   metroNIDAZOLE 500 mg Intravenous Q8H   pantoprazole 40 mg Oral Q AM   potassium chloride 40 mEq Oral Q4H   rOPINIRole 0.25 mg Oral Nightly   sodium chloride 10 mL Intravenous Q12H   sodium chloride 10 mL Intravenous Q12H   valACYclovir 1,000 mg Oral Daily     sodium chloride 75 mL/hr Last Rate: 75 mL/hr (19)     ----------------------------------------------------------------------------------------------------------------------  Vital Signs:  Temp:  [97.4 °F (36.3 °C)-98.4 °F (36.9 °C)] 98 °F (36.7 °C)  Heart Rate:  [70-82] 76  Resp:  [20] 20  BP: (128-174)/(58-91) 174/81  SpO2:  [95 %-98 %] 98 %  on   ;   Device (Oxygen Therapy):  room air  Body mass index is 27.39 kg/m².    Wt Readings from Last 3 Encounters:   11/19/19 70.1 kg (154 lb 9.6 oz)   11/04/19 65.3 kg (143 lb 15.4 oz)   10/29/19 71 kg (156 lb 9.6 oz)     Intake & Output (last 3 days)       11/16 0701 - 11/17 0700 11/17 0701 - 11/18 0700 11/18 0701 - 11/19 0700 11/19 0701 - 11/20 0700    P.O. 600 600 840 360    I.V. (mL/kg)   950 (13.6)     IV Piggyback 400       Total Intake(mL/kg) 1000 (14) 600 (8.4) 1790 (25.5) 360 (5.1)    Urine (mL/kg/hr) 650 (0.4) 1550 (0.9) 2600 (1.5) 500 (1.8)    Stool 0       Total Output 650 1550 2600 500    Net +350 -950 -810 -140            Urine Unmeasured Occurrence 6 x 3 x  200 x    Stool Unmeasured Occurrence 6 x           Diet Regular; GI Soft  ----------------------------------------------------------------------------------------------------------------------  Physical exam:  Constitutional: No acute distress  HEENT: Normocephalic atraumatic  Neck: Supple  Cardiovascular: Regular rate and rhythm  Pulmonary/Chest: Clear to auscultation  Abdominal: Positive bowel sounds soft.   Musculoskeletal: No arthropathy  Neurological: No focal deficits  Skin: No rash  Peripheral vascular:  Genitourinary:  ----------------------------------------------------------------------------------------------------------------------    Last echocardiogram:  Results for orders placed during the hospital encounter of 11/02/19   Transthoracic Echo Complete With Contrast if Necessary Per Protocol    Narrative · Left ventricular wall thickness is consistent with borderline concentric   hypertrophy.  · Left ventricular systolic function is normal.  · Estimated EF appears to be in the range of 66 - 70%.  · Left ventricular diastolic dysfunction.  · Normal cardiac chamber dimensions  · Mild aortic valve regurgitation is present.  · Mild mitral valve regurgitation is present  · Mild tricuspid valve regurgitation is present. No evidence of pulmonary   hypertension is present  ·  There is no evidence of pericardial effusion        ----------------------------------------------------------------------------------------------------------------------  Results from last 7 days   Lab Units 11/19/19  0431 11/18/19  0554 11/17/19  0432 11/17/19 0431 11/16/19  0422   CRP mg/dL  --  2.72*  --  2.38* 4.18*   WBC 10*3/mm3 10.09 9.96 11.37*  --  9.60   HEMOGLOBIN g/dL 10.6* 10.5* 10.0*  --  9.3*   HEMATOCRIT % 29.7* 31.3* 30.3*  --  28.6*   MCV fL 100.7* 103.6* 105.6*  --  107.9*   MCHC g/dL 35.7 33.5 33.0  --  32.5   PLATELETS 10*3/mm3 260 223 201  --  212         Results from last 7 days   Lab Units 11/19/19  0431 11/18/19  0554 11/17/19  0627 11/17/19  0431  11/16/19  1747  11/15/19  0509   SODIUM mmol/L 139 138 138  --   --   --    < > 137   POTASSIUM mmol/L 2.9* 3.0* 4.0  --    < >  --    < > 3.9   MAGNESIUM mg/dL 1.8 1.7  --  1.8  --   --    < > 1.9   CHLORIDE mmol/L 107 108* 113*  --   --   --    < > 118*   CO2 mmol/L 20.2* 16.9* 14.3*  --   --   --    < > 12.1*   BUN mg/dL 3* 4* 5*  --   --   --    < > 14   CREATININE mg/dL 0.61 0.56* 0.58  --   --   --    < > 0.73   EGFR IF NONAFRICN AM mL/min/1.73 95 105 101  --   --   --    < > 78   CALCIUM mg/dL 8.1* 8.4* 8.7  --   --   --    < > 8.9   PHOSPHORUS mg/dL  --  2.8  --  3.1  --  2.8   < > 1.4*   GLUCOSE mg/dL 107* 97 76  --   --   --    < > 95   ALBUMIN g/dL  --  2.86* 2.83*  --   --   --   --  2.75*   BILIRUBIN mg/dL  --  0.3 0.3  --   --   --   --  <0.2*   ALK PHOS U/L  --  92 86  --   --   --   --  66   AST (SGOT) U/L  --  57* 41*  --   --   --   --  31   ALT (SGPT) U/L  --  39* 27  --   --   --   --  26    < > = values in this interval not displayed.   Estimated Creatinine Clearance: 56.2 mL/min (by C-G formula based on SCr of 0.61 mg/dL).  No results found for: AMMONIA  Results from last 7 days   Lab Units 11/13/19  0704 11/13/19  0045 11/12/19  1855   TROPONIN T ng/mL <0.010 <0.010 <0.010             Glucose   Date/Time Value Ref Range  Status   11/19/2019 0958 129 70 - 130 mg/dL Final   11/19/2019 0618 99 70 - 130 mg/dL Final   11/18/2019 1916 69 (L) 70 - 130 mg/dL Final   11/18/2019 1649 230 (H) 70 - 130 mg/dL Final   11/18/2019 1003 134 (H) 70 - 130 mg/dL Final   11/18/2019 0624 95 70 - 130 mg/dL Final   11/17/2019 1959 126 70 - 130 mg/dL Final   11/17/2019 1612 111 70 - 130 mg/dL Final     Lab Results   Component Value Date    TSH 1.430 11/08/2019     No results found for: PREGTESTUR, PREGSERUM, HCG, HCGQUANT  Pain Management Panel     There is no flowsheet data to display.        Brief Urine Lab Results  (Last result in the past 365 days)      Color   Clarity   Blood   Leuk Est   Nitrite   Protein   CREAT   Urine HCG        11/13/19 1505 Yellow Clear Negative Negative Negative Trace             Blood Culture   Date Value Ref Range Status   11/13/2019 No growth at 5 days  Final   11/13/2019 No growth at 5 days  Final     Results from last 7 days   Lab Units 11/13/19  1505   NITRITE UA  Negative              Results from last 7 days   Lab Units 11/18/19  0554 11/17/19  0431 11/16/19  0422 11/15/19  0509 11/14/19  0515 11/13/19  0045   CRP mg/dL 2.72* 2.38* 4.18* 10.30* 19.46* 10.20*       I have personally looked at the labs and they are summarized above.  ----------------------------------------------------------------------------------------------------------------------  Detailed radiology reports for the last 24 hours:    Imaging Results (Last 24 Hours)     ** No results found for the last 24 hours. **        Final impressions for the last 30 days of radiology reports:    Ct Abdomen Pelvis Without Contrast    Result Date: 11/7/2019  Cholelithiasis without CT evidence of cholecystitis or biliary obstruction. No renal or bowel obstruction or other acute abnormality. Normal appendix. Signer Name: Fabio Ambrose MD  Signed: 11/7/2019 11:20 PM  Workstation Name: [email or web address]  [facility]    Xr Shoulder 2+ View  Left    Result Date: 11/11/2019  No acute osseous or articular abnormalities.  This report was finalized on 11/11/2019 12:37 PM by Dr. Brendan Orellana MD.      Ct Head Without Contrast    Result Date: 11/7/2019  No acute intracranial pathology. Nothing is seen on this exam to specifically account for the patient's symptoms.  This report was finalized on 11/7/2019 8:00 PM by Dr. Sal Cheek MD.      Ct Head Without Contrast    Result Date: 11/2/2019  1. No acute intracranial abnormality. Stable senescent changes. 2. Mucosal thickening bilateral maxillary sinuses. 3. Partial fluid opacification of the bilateral mastoid air cells. Signer Name: Shabbir Bella MD  Signed: 11/2/2019 10:42 PM  Workstation [facility]: [email or web address]  [facility] Gall[facility]adder    Result Date: 11/12/2019  1. Cholelithiasis. 2. No sonographic evidence of inflammation.  This report was finalized on 11/12/2019 7:35 PM by Dr. Brendan Orellana MD.      Xr Chest 1 View    Result Date: 11/13/2019  Minimal left lower lobe airspace disease. Otherwise stable chest.  This report was finalized on 11/13/2019 11:36 AM by Dr. Brendan Orellana MD.      Xr Chest 1 View    Result Date: 11/7/2019  No evidence of active or acute cardiopulmonary disease on today's chest radiograph.  This report was finalized on 11/7/2019 7:19 PM by Dr. Sal Cehek MD.      Us Carotid Bilateral    Result Date: 11/11/2019   No evidence of hemodynamically significant plaques or stenosis within the carotid system at this time.  This report was finalized on 11/11/2019 1:14 PM by Dr. Sal Cheek MD.      Xr Abdomen Kub    Result Date: 11/12/2019  Nonobstructive bowel gas pattern.  This report was finalized on 11/12/2019 11:37 AM by Dr. Brendan Orellana MD.      I have personally looked at the radiology images and read the final radiology report.    Assessment & Plan    Gastroenteritis--patient did go test positive for rotavirus.  Check stools at this time for C.  difficile.  In the meantime will place on IV Reglan she continues to have severe nausea.    Pneumonia--much improved    History of AML    History of splenectomy    Diabetes mellitus--continue current treatment    Electrolyte depletion--magnesium repletion and potassium supplementation per protocol    Acute kidney injury improved    VTE Prophylaxis:   Mechanical Order History:     None      Pharmalogical Order History:     Ordered     Dose Route Frequency Stop    11/08/19 0046  heparin (porcine) 5000 UNIT/ML injection 5,000 Units      5,000 Units SC Every 12 Hours Scheduled --            Shaq Jones MD  [facility]  11/19/19  11:04 AM

## 2019-11-19 NOTE — PROGRESS NOTES
Discharge Planning Assessment   Jose     Patient Name: Tahmina Martinez  MRN: [id number]  Today's Date: 11/19/2019    Admit Date: 11/7/2019        Discharge Plan     Row Name 11/19/19 1129       Plan    Plan  SS spoke with MD who states that pt will likely be ready for discharge in a couple of days. SS spoke with Elisa at the [facility] and informed her of this. Elisa states that she can not ensure bed avaliablility for pt, and to check back when pt is avaliable for discharge. SS will follow.     Provided post acute provider list?  Refused    Patient/Family in Agreement with Plan  yes        Destination      Service Provider Request Status Selected Services Address Phone Number Fax Number    [facility] Pending - Request Sent N/A [address] [place] [address] [phone]     Expected Discharge Date and Time     Expected Discharge Date Expected Discharge Time    Nov 10, 2019         LLOYD Vieira

## 2019-11-20 LAB
GLUCOSE BLDC GLUCOMTR-MCNC: 109 MG/DL (ref 70–130)
GLUCOSE BLDC GLUCOMTR-MCNC: 114 MG/DL (ref 70–130)
GLUCOSE BLDC GLUCOMTR-MCNC: 129 MG/DL (ref 70–130)
GLUCOSE BLDC GLUCOMTR-MCNC: 96 MG/DL (ref 70–130)
POTASSIUM BLD-SCNC: 3.1 MMOL/L (ref 3.5–5.2)

## 2019-11-20 PROCEDURE — 84132 ASSAY OF SERUM POTASSIUM: CPT | Performed by: FAMILY MEDICINE

## 2019-11-20 PROCEDURE — 25010000002 HYDRALAZINE PER 20 MG: Performed by: PHYSICIAN ASSISTANT

## 2019-11-20 PROCEDURE — 99231 SBSQ HOSP IP/OBS SF/LOW 25: CPT | Performed by: FAMILY MEDICINE

## 2019-11-20 PROCEDURE — 25010000002 HEPARIN (PORCINE) PER 1000 UNITS: Performed by: INTERNAL MEDICINE

## 2019-11-20 PROCEDURE — 25010000002 METOCLOPRAMIDE PER 10 MG: Performed by: FAMILY MEDICINE

## 2019-11-20 PROCEDURE — 82962 GLUCOSE BLOOD TEST: CPT

## 2019-11-20 RX ORDER — METOCLOPRAMIDE HYDROCHLORIDE 5 MG/ML
10 INJECTION INTRAMUSCULAR; INTRAVENOUS
Status: DISCONTINUED | OUTPATIENT
Start: 2019-11-20 | End: 2019-11-21 | Stop reason: HOSPADM

## 2019-11-20 RX ORDER — POTASSIUM CHLORIDE 20 MEQ/1
40 TABLET, EXTENDED RELEASE ORAL EVERY 4 HOURS
Status: DISCONTINUED | OUTPATIENT
Start: 2019-11-20 | End: 2019-11-20

## 2019-11-20 RX ORDER — POTASSIUM CHLORIDE 20 MEQ/1
40 TABLET, EXTENDED RELEASE ORAL EVERY 4 HOURS
Status: DISPENSED | OUTPATIENT
Start: 2019-11-20 | End: 2019-11-21

## 2019-11-20 RX ADMIN — HEPARIN SODIUM 5000 UNITS: 5000 INJECTION INTRAVENOUS; SUBCUTANEOUS at 07:47

## 2019-11-20 RX ADMIN — CETIRIZINE HYDROCHLORIDE 5 MG: 10 TABLET, FILM COATED ORAL at 07:46

## 2019-11-20 RX ADMIN — ROPINIROLE HYDROCHLORIDE 0.25 MG: 0.25 TABLET, FILM COATED ORAL at 20:32

## 2019-11-20 RX ADMIN — HYDRALAZINE HYDROCHLORIDE 10 MG: 20 INJECTION INTRAMUSCULAR; INTRAVENOUS at 06:49

## 2019-11-20 RX ADMIN — CARVEDILOL 12.5 MG: 6.25 TABLET, FILM COATED ORAL at 07:46

## 2019-11-20 RX ADMIN — METOCLOPRAMIDE 10 MG: 5 INJECTION, SOLUTION INTRAMUSCULAR; INTRAVENOUS at 12:20

## 2019-11-20 RX ADMIN — SODIUM CHLORIDE, PRESERVATIVE FREE 10 ML: 5 INJECTION INTRAVENOUS at 07:47

## 2019-11-20 RX ADMIN — METRONIDAZOLE 500 MG: 500 INJECTION, SOLUTION INTRAVENOUS at 05:23

## 2019-11-20 RX ADMIN — HYDRALAZINE HYDROCHLORIDE 10 MG: 20 INJECTION INTRAMUSCULAR; INTRAVENOUS at 15:25

## 2019-11-20 RX ADMIN — Medication 1 TABLET: at 07:46

## 2019-11-20 RX ADMIN — METRONIDAZOLE 500 MG: 500 INJECTION, SOLUTION INTRAVENOUS at 12:21

## 2019-11-20 RX ADMIN — HEPARIN SODIUM 5000 UNITS: 5000 INJECTION INTRAVENOUS; SUBCUTANEOUS at 20:32

## 2019-11-20 RX ADMIN — PANTOPRAZOLE SODIUM 40 MG: 40 TABLET, DELAYED RELEASE ORAL at 05:23

## 2019-11-20 RX ADMIN — VALACYCLOVIR HYDROCHLORIDE 1000 MG: 500 TABLET, FILM COATED ORAL at 07:45

## 2019-11-20 RX ADMIN — SODIUM CHLORIDE, PRESERVATIVE FREE 10 ML: 5 INJECTION INTRAVENOUS at 20:32

## 2019-11-20 RX ADMIN — FLUTICASONE PROPIONATE 2 SPRAY: 50 SPRAY, METERED NASAL at 07:45

## 2019-11-20 RX ADMIN — ASPIRIN 81 MG: 81 TABLET, COATED ORAL at 07:45

## 2019-11-20 RX ADMIN — POTASSIUM CHLORIDE 40 MEQ: 1500 TABLET, EXTENDED RELEASE ORAL at 18:07

## 2019-11-20 RX ADMIN — CARVEDILOL 12.5 MG: 6.25 TABLET, FILM COATED ORAL at 20:32

## 2019-11-20 RX ADMIN — EXEMESTANE 25 MG: 25 TABLET ORAL at 07:45

## 2019-11-20 RX ADMIN — METOCLOPRAMIDE 10 MG: 5 INJECTION, SOLUTION INTRAMUSCULAR; INTRAVENOUS at 16:55

## 2019-11-20 RX ADMIN — POTASSIUM CHLORIDE 40 MEQ: 1500 TABLET, EXTENDED RELEASE ORAL at 20:32

## 2019-11-20 RX ADMIN — ATORVASTATIN CALCIUM 10 MG: 10 TABLET, FILM COATED ORAL at 20:32

## 2019-11-20 RX ADMIN — METOCLOPRAMIDE 10 MG: 5 INJECTION, SOLUTION INTRAMUSCULAR; INTRAVENOUS at 20:32

## 2019-11-20 RX ADMIN — METRONIDAZOLE 500 MG: 500 INJECTION, SOLUTION INTRAVENOUS at 20:38

## 2019-11-20 NOTE — PROGRESS NOTES
Discharge Planning Assessment   Jose     Patient Name: Tahmina Martinez  MRN: [id number]  Today's Date: 11/20/2019    Admit Date: 11/7/2019        Discharge Plan     Row Name 11/20/19 1231       Plan    Plan  SS spoke with pt on this date.  Pt has decided to return home at discharge rather than placement at [facility].  Pt states no needs at this time.  SS will follow.             CHENG MalinW

## 2019-11-20 NOTE — PROGRESS NOTES
[facility] HOSPITALIST PROGRESS NOTE     Patient Identification:  Name:  Tahmina Martinez  Age:  74 y.o.  Sex:  female  :  1945  MRN:  [id number]  Visit Number:  [id number]  ROOM: [address] [place] [address]     Primary Care Provider:  Noe Bower MD    Length of stay in inpatient status:  12    Subjective     Chief Compliant:    Chief Complaint   Patient presents with   • Abdominal Pain   • Chest Pain       History of Presenting Illness:  75 yo female with recent gastroenteritis/pna.  Pt improved.  PT working with pt.  Pt decided to forego nh and go home.  Will go home tomorrow.    Objective     Current Hospital Meds:  aspirin 81 mg Oral Daily   atorvastatin 10 mg Oral Nightly   calcium carb-cholecalciferol 1 tablet Oral Daily   carvedilol 12.5 mg Oral BID   cetirizine 5 mg Oral Daily   cholecalciferol 50,000 Units Oral Weekly   exemestane 25 mg Oral Daily   fluticasone 2 spray Each Nare Daily   heparin (porcine) 5,000 Units Subcutaneous Q12H   insulin aspart 0-7 Units Subcutaneous 4x Daily AC & at Bedtime   metoclopramide 10 mg Intravenous 4x Daily AC & at Bedtime   metroNIDAZOLE 500 mg Intravenous Q8H   pantoprazole 40 mg Oral Q AM   rOPINIRole 0.25 mg Oral Nightly   sodium chloride 10 mL Intravenous Q12H   sodium chloride 10 mL Intravenous Q12H   valACYclovir 1,000 mg Oral Daily     sodium chloride 75 mL/hr Last Rate: 75 mL/hr (19)     ----------------------------------------------------------------------------------------------------------------------  Vital Signs:  Temp:  [97.4 °F (36.3 °C)-98.6 °F (37 °C)] 97.4 °F (36.3 °C)  Heart Rate:  [67-82] 74  Resp:  [20] 20  BP: (141-178)/(74-88) 172/74  SpO2:  [94 %-97 %] 95 %  on  Flow (L/min):  [2] 2;   Device (Oxygen Therapy): room air  Body mass index is 27.53 kg/m².    Wt Readings from Last 3 Encounters:   19 70.5 kg (155 lb 6.4 oz)   19 65.3 kg (143 lb 15.4 oz)   10/29/19 71 kg (156 lb 9.6 oz)     Intake & Output (last 3 days)        11/17 0701 - 11/18 0700 11/18 0701 - 11/19 0700 11/19 0701 - 11/20 0700 11/20 0701 - 11/21 0700    P.O.  240    I.V. (mL/kg)  950 (13.6) 900 (12.8)     IV Piggyback   200     Total Intake(mL/kg) 600 (8.4) 1790 (25.5) 2340 (33.2) 240 (3.4)    Urine (mL/kg/hr) 1550 (0.9) 2600 (1.5) 2800 (1.7)     Stool        Total Output 1550 2600 2800     Net -950 -810 -460 +240            Urine Unmeasured Occurrence 3 x  200 x         Diet Regular; GI Soft  ----------------------------------------------------------------------------------------------------------------------  Physical exam:  Constitutional:  nad  HEENT:nc/at  Neck:   supple  Cardiovascular: rrr   Pulmonary/Chest:  cta  Abdominal:  . postive bs soft nt on palp  Musculoskeletal:no arthropathy  Neurological:no focal defictis  Skin: no rash  Peripheral vascular:  Genitourinary:  ----------------------------------------------------------------------------------------------------------------------    Last echocardiogram:  Results for orders placed during the hospital encounter of 11/02/19   Transthoracic Echo Complete With Contrast if Necessary Per Protocol    Narrative · Left ventricular wall thickness is consistent with borderline concentric   hypertrophy.  · Left ventricular systolic function is normal.  · Estimated EF appears to be in the range of 66 - 70%.  · Left ventricular diastolic dysfunction.  · Normal cardiac chamber dimensions  · Mild aortic valve regurgitation is present.  · Mild mitral valve regurgitation is present  · Mild tricuspid valve regurgitation is present. No evidence of pulmonary   hypertension is present  · There is no evidence of pericardial effusion        ----------------------------------------------------------------------------------------------------------------------  Results from last 7 days   Lab Units 11/19/19  0431 11/18/19  0554 11/17/19  0432 11/17/19 0431 11/16/19  0422   CRP mg/dL  --  2.72*  --  2.38* 4.18*    WBC 10*3/mm3 10.09 9.96 11.37*  --  9.60   HEMOGLOBIN g/dL 10.6* 10.5* 10.0*  --  9.3*   HEMATOCRIT % 29.7* 31.3* 30.3*  --  28.6*   MCV fL 100.7* 103.6* 105.6*  --  107.9*   MCHC g/dL 35.7 33.5 33.0  --  32.5   PLATELETS 10*3/mm3 260 223 201  --  212         Results from last 7 days   Lab Units 11/19/19  0431 11/18/19  0554 11/17/19  0627 11/17/19  0431  11/16/19  1747  11/15/19  0509   SODIUM mmol/L 139 138 138  --   --   --    < > 137   POTASSIUM mmol/L 2.9* 3.0* 4.0  --    < >  --    < > 3.9   MAGNESIUM mg/dL 1.8 1.7  --  1.8  --   --    < > 1.9   CHLORIDE mmol/L 107 108* 113*  --   --   --    < > 118*   CO2 mmol/L 20.2* 16.9* 14.3*  --   --   --    < > 12.1*   BUN mg/dL 3* 4* 5*  --   --   --    < > 14   CREATININE mg/dL 0.61 0.56* 0.58  --   --   --    < > 0.73   EGFR IF NONAFRICN AM mL/min/1.73 95 105 101  --   --   --    < > 78   CALCIUM mg/dL 8.1* 8.4* 8.7  --   --   --    < > 8.9   PHOSPHORUS mg/dL  --  2.8  --  3.1  --  2.8   < > 1.4*   GLUCOSE mg/dL 107* 97 76  --   --   --    < > 95   ALBUMIN g/dL  --  2.86* 2.83*  --   --   --   --  2.75*   BILIRUBIN mg/dL  --  0.3 0.3  --   --   --   --  <0.2*   ALK PHOS U/L  --  92 86  --   --   --   --  66   AST (SGOT) U/L  --  57* 41*  --   --   --   --  31   ALT (SGPT) U/L  --  39* 27  --   --   --   --  26    < > = values in this interval not displayed.   Estimated Creatinine Clearance: 56.3 mL/min (by C-G formula based on SCr of 0.61 mg/dL).  No results found for: AMMONIA              Glucose   Date/Time Value Ref Range Status   11/20/2019 0730 96 70 - 130 mg/dL Final   11/19/2019 2035 107 70 - 130 mg/dL Final   11/19/2019 1600 143 (H) 70 - 130 mg/dL Final   11/19/2019 0958 129 70 - 130 mg/dL Final   11/19/2019 0618 99 70 - 130 mg/dL Final   11/18/2019 1916 69 (L) 70 - 130 mg/dL Final   11/18/2019 1649 230 (H) 70 - 130 mg/dL Final   11/18/2019 1003 134 (H) 70 - 130 mg/dL Final     Lab Results   Component Value Date    TSH 1.430 11/08/2019     No results found  for: PREGTESTUR, PREGSERUM, HCG, HCGQUANT  Pain Management Panel     There is no flowsheet data to display.        Brief Urine Lab Results  (Last result in the past 365 days)      Color   Clarity   Blood   Leuk Est   Nitrite   Protein   CREAT   Urine HCG        11/13/19 1505 Yellow Clear Negative Negative Negative Trace                Results from last 7 days   Lab Units 11/13/19  1505   NITRITE UA  Negative              Results from last 7 days   Lab Units 11/18/19  0554 11/17/19  0431 11/16/19  0422 11/15/19  0509 11/14/19  0515   CRP mg/dL 2.72* 2.38* 4.18* 10.30* 19.46*       I have personally looked at the labs and they are summarized above.  ----------------------------------------------------------------------------------------------------------------------  Detailed radiology reports for the last 24 hours:    Imaging Results (Last 24 Hours)     ** No results found for the last 24 hours. **        Final impressions for the last 30 days of radiology reports:    Ct Abdomen Pelvis Without Contrast    Result Date: 11/7/2019  Cholelithiasis without CT evidence of cholecystitis or biliary obstruction. No renal or bowel obstruction or other acute abnormality. Normal appendix. Signer Name: Fabio Ambrose MD  Signed: 11/7/2019 11:20 PM  Workstation Name: [email or web address]  [facility]    Xr Shoulder 2+ View Left    Result Date: 11/11/2019  No acute osseous or articular abnormalities.  This report was finalized on 11/11/2019 12:37 PM by Dr. Brendan Orellana MD.      Ct Head Without Contrast    Result Date: 11/7/2019  No acute intracranial pathology. Nothing is seen on this exam to specifically account for the patient's symptoms.  This report was finalized on 11/7/2019 8:00 PM by Dr. Sal Cheek MD.      Ct Head Without Contrast    Result Date: 11/2/2019  1. No acute intracranial abnormality. Stable senescent changes. 2. Mucosal thickening bilateral maxillary sinuses. 3. Partial fluid opacification  of the bilateral mastoid air cells. Signer Name: Shabbir Bella MD  Signed: 11/2/2019 10:42 PM  Workstation Name: [email or web address]  [facility] Gallbladder    Result Date: 11/12/2019  1. Cholelithiasis. 2. No sonographic evidence of inflammation.  This report was finalized on 11/12/2019 7:35 PM by Dr. Brendan Orellana MD.      Xr Chest 1 View    Result Date: 11/13/2019  Minimal left lower lobe airspace disease. Otherwise stable chest.  This report was finalized on 11/13/2019 11:36 AM by Dr. Brendan Orellana MD.      Xr Chest 1 View    Result Date: 11/7/2019  No evidence of active or acute cardiopulmonary disease on today's chest radiograph.  This report was finalized on 11/7/2019 7:19 PM by Dr. Sal Cheek MD.      Us Carotid Bilateral    Result Date: 11/11/2019   No evidence of hemodynamically significant plaques or stenosis within the carotid system at this time.  This report was finalized on 11/11/2019 1:14 PM by Dr. Sal Cheek MD.      Xr Abdomen Kub    Result Date: 11/12/2019  Nonobstructive bowel gas pattern.  This report was finalized on 11/12/2019 11:37 AM by Dr. Brendan Orellana MD.      I have personally looked at the radiology images and read the final radiology report.    Assessment & Plan    Gastroenteritis--rotavirus--improved    Debility--pt    pna resolved    daniel resolved    Hx aml    Hx splenectomy      VTE Prophylaxis:   Mechanical Order History:     None      Pharmalogical Order History:     Ordered     Dose Route Frequency Stop    11/08/19 0046  heparin (porcine) 5000 UNIT/ML injection 5,000 Units      5,000 Units SC Every 12 Hours Scheduled --              Shaq Jones MD  [facility]  11/20/19  11:32 AM

## 2019-11-21 VITALS
HEART RATE: 78 BPM | WEIGHT: 155.3 LBS | OXYGEN SATURATION: 97 % | BODY MASS INDEX: 27.52 KG/M2 | TEMPERATURE: 98.5 F | DIASTOLIC BLOOD PRESSURE: 80 MMHG | HEIGHT: 63 IN | SYSTOLIC BLOOD PRESSURE: 138 MMHG | RESPIRATION RATE: 18 BRPM

## 2019-11-21 LAB — GLUCOSE BLDC GLUCOMTR-MCNC: 109 MG/DL (ref 70–130)

## 2019-11-21 PROCEDURE — 99238 HOSP IP/OBS DSCHRG MGMT 30/<: CPT | Performed by: FAMILY MEDICINE

## 2019-11-21 PROCEDURE — 82962 GLUCOSE BLOOD TEST: CPT

## 2019-11-21 PROCEDURE — 25010000002 HEPARIN (PORCINE) PER 1000 UNITS: Performed by: INTERNAL MEDICINE

## 2019-11-21 RX ORDER — POTASSIUM CHLORIDE 20 MEQ/1
20 TABLET, EXTENDED RELEASE ORAL 2 TIMES DAILY
Qty: 10 TABLET | Refills: 0 | Status: SHIPPED | OUTPATIENT
Start: 2019-11-21 | End: 2019-11-26 | Stop reason: SDUPTHER

## 2019-11-21 RX ORDER — METOCLOPRAMIDE 10 MG/1
10 TABLET ORAL
Qty: 12 TABLET | Refills: 0 | Status: SHIPPED | OUTPATIENT
Start: 2019-11-21 | End: 2019-11-24

## 2019-11-21 RX ADMIN — Medication 1 TABLET: at 08:46

## 2019-11-21 RX ADMIN — METRONIDAZOLE 500 MG: 500 INJECTION, SOLUTION INTRAVENOUS at 06:05

## 2019-11-21 RX ADMIN — CARVEDILOL 12.5 MG: 6.25 TABLET, FILM COATED ORAL at 08:46

## 2019-11-21 RX ADMIN — VALACYCLOVIR HYDROCHLORIDE 1000 MG: 500 TABLET, FILM COATED ORAL at 08:46

## 2019-11-21 RX ADMIN — CETIRIZINE HYDROCHLORIDE 5 MG: 10 TABLET, FILM COATED ORAL at 08:46

## 2019-11-21 RX ADMIN — HEPARIN SODIUM 5000 UNITS: 5000 INJECTION INTRAVENOUS; SUBCUTANEOUS at 08:58

## 2019-11-21 RX ADMIN — SODIUM CHLORIDE, PRESERVATIVE FREE 10 ML: 5 INJECTION INTRAVENOUS at 08:50

## 2019-11-21 RX ADMIN — PANTOPRAZOLE SODIUM 40 MG: 40 TABLET, DELAYED RELEASE ORAL at 06:05

## 2019-11-21 RX ADMIN — ASPIRIN 81 MG: 81 TABLET, COATED ORAL at 08:46

## 2019-11-21 RX ADMIN — FLUTICASONE PROPIONATE 2 SPRAY: 50 SPRAY, METERED NASAL at 08:50

## 2019-11-21 RX ADMIN — EXEMESTANE 25 MG: 25 TABLET ORAL at 08:46

## 2019-11-21 NOTE — DISCHARGE SUMMARY
[facility] HOSPITALISTS DISCHARGE SUMMARY    Patient Identification:  Name:  Tahmina Martinez  Age:  74 y.o.  Sex:  female  :  1945  MRN:  [id number]  Visit Number:  [id number]    Date of Admission: 2019  Date of Discharge:  2019     PCP: Noe Bower MD    DISCHARGE DIAGNOSIS  Chest pain--rule out MI  Acute kidney injury  Mild hyperkalemia at the time of admission  Hypertension  Gastroenteritis likely secondary to rotavirus      CONSULTS   Dr. Soler    PROCEDURES PERFORMED  Stress test no evidence for ischemia    HOSPITAL COURSE  Patient is a 74 y.o. 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  Patient presented to the emergency department complaining of a severe headache with chest pain that radiated to her left upper extremity on the night prior to admission.  Patient states she had some tingling with this.  When she arrived her blood pressure 170/90.  She was also became nauseated and had a bout of emesis.  Patient also that she become unsteady on her feet at home.  She arrived in the ED patient was normotensive had a BNP of 127 potassium 5.3 creatinine 1.42 chest x-ray was negative CT of the abdomen pelvis showed some cholelithiasis no evidence of cholecystitis.  Troponins were negative x2 patient was started on aspirin morphine and placed on normal saline and admitted to telemetry for rule out MI protocol.  Patient ruled out for MI stress test was negative.  Patient continued to have some difficulty with nausea and vomiting did test positive for rotavirus.  Patient been treated IV fluids over the last several days did eventually add Reglan and patient is improved over the  last 2 days.  She is tolerating her meals over the last couple of days her renal function has normalized.  Patient feeling much better and is able to ambulate at this time.  She initially was considered rehab therapy at the nursing home but her ambulation improved and she felt that she could go home.  Will arrange for home health for home PT.      VITAL SIGNS:  Temp:  [98.1 °F (36.7 °C)-98.8 °F (37.1 °C)] 98.5 °F (36.9 °C)  Heart Rate:  [74-78] 78  Resp:  [18-20] 18  BP: (128-156)/(70-80) 138/80  SpO2:  [96 %-97 %] 97 %  on   ;   Device (Oxygen Therapy): room air    Body mass index is 27.52 kg/m².  Wt Readings from Last 3 Encounters:   11/21/19 70.4 kg (155 lb 4.8 oz)   11/04/19 65.3 kg (143 lb 15.4 oz)   10/29/19 71 kg (156 lb 9.6 oz)       PHYSICAL EXAM:  Constitutional: No acute distress  HEENT: Normocephalic atraumatic  Neck:   Supple  Cardiovascular: Regular rate and rhythm  Pulmonary/Chest: Clear to auscultation  Abdominal: Positive bowel sounds soft.   Musculoskeletal: No arthropathy  Neurological: No focal deficits  Skin: No rashes  Peripheral vascular:  Genitourinary::    DISCHARGE DISPOSITION   Stable    DISCHARGE MEDICATIONS:     Discharge Medications      New Medications      Instructions Start Date   metoclopramide 10 MG tablet  Commonly known as:  REGLAN   10 mg, Oral, 4 Times Daily Before Meals & Nightly      potassium chloride 20 MEQ CR tablet  Commonly known as:  K-DUR,KLOR-CON   20 mEq, Oral, 2 Times Daily         Changes to Medications      Instructions Start Date   hydrALAZINE 50 MG tablet  Commonly known as:  APRESOLINE  What changed:  when to take this   50 mg, Oral, Every 8 Hours Scheduled         Continue These Medications      Instructions Start Date   aspirin 81 MG EC tablet   81 mg, Oral, Daily      atorvastatin 10 MG tablet  Commonly known as:  LIPITOR   10 mg, Oral, Nightly      CALCIUM + D PO   600 mg, Oral, Every Morning      carvedilol 12.5 MG tablet  Commonly known as:  COREG   12.5  mg, Oral, 2 Times Daily      Co Q-10 200 MG capsule   200 mg, Oral, Daily      exemestane 25 MG chemo tablet  Commonly known as:  AROMASIN   25 mg, Oral, Daily      lisinopril 40 MG tablet  Commonly known as:  PRINIVIL,ZESTRIL   40 mg, Oral, Daily      Loratadine 10 MG capsule   1 capsule, Oral, Daily      metFORMIN 500 MG tablet  Commonly known as:  GLUCOPHAGE   500 mg, Oral, Every Morning      nitroglycerin 0.4 MG SL tablet  Commonly known as:  NITROSTAT   0.4 mg, Sublingual, Every 5 Minutes PRN, Take no more than 3 doses in 15 minutes.      pantoprazole 40 MG EC tablet  Commonly known as:  PROTONIX   40 mg, Oral, Daily      rOPINIRole 0.25 MG tablet  Commonly known as:  REQUIP   0.25 mg, Oral, Nightly, Take 1 hour before bedtime.      traMADol 50 MG tablet  Commonly known as:  ULTRAM   50 mg, Oral, Daily PRN      valACYclovir 500 MG tablet  Commonly known as:  VALTREX   1 g, Oral, Daily      vitamin D 1.25 MG (91395 UT) capsule capsule  Commonly known as:  ERGOCALCIFEROL   50,000 Units, Oral, Every 14 Days         Stop These Medications    furosemide 40 MG tablet  Commonly known as:  LASIX     triamterene-hydrochlorothiazide 37.5-25 MG per tablet  Commonly known as:  MAXZIDE-25             Your medication list      START taking these medications      Instructions Last Dose Given Next Dose Due   metoclopramide 10 MG tablet  Commonly known as:  REGLAN      Take 1 tablet by mouth 4 (Four) Times a Day Before Meals & at Bedtime for 3 days.       potassium chloride 20 MEQ CR tablet  Commonly known as:  K-DUR,KLOR-CON      Take 1 tablet by mouth 2 (Two) Times a Day for 5 days.          CHANGE how you take these medications      Instructions Last Dose Given Next Dose Due   hydrALAZINE 50 MG tablet  Commonly known as:  APRESOLINE  What changed:  when to take this      Take 1 tablet by mouth Every 8 (Eight) Hours.          CONTINUE taking these medications      Instructions Last Dose Given Next Dose Due   aspirin 81 MG EC  tablet      Take 81 mg by mouth Daily.       atorvastatin 10 MG tablet  Commonly known as:  LIPITOR      Take 1 tablet by mouth Every Night.       CALCIUM + D PO      Take 600 mg by mouth Every Morning.       carvedilol 12.5 MG tablet  Commonly known as:  COREG      Take 1 tablet by mouth 2 (Two) Times a Day.       Co Q-10 200 MG capsule      Take 200 mg by mouth Daily.       exemestane 25 MG chemo tablet  Commonly known as:  AROMASIN      Take 25 mg by mouth Daily.       lisinopril 40 MG tablet  Commonly known as:  PRINIVIL,ZESTRIL      Take 1 tablet by mouth Daily.       Loratadine 10 MG capsule      Take 1 capsule by mouth Daily.       metFORMIN 500 MG tablet  Commonly known as:  GLUCOPHAGE      Take 500 mg by mouth Every Morning.       nitroglycerin 0.4 MG SL tablet  Commonly known as:  NITROSTAT      Place 0.4 mg under the tongue Every 5 (Five) Minutes As Needed for chest pain. Take no more than 3 doses in 15 minutes.       pantoprazole 40 MG EC tablet  Commonly known as:  PROTONIX      Take 40 mg by mouth Daily.       rOPINIRole 0.25 MG tablet  Commonly known as:  REQUIP      Take 0.25 mg by mouth Every Night. Take 1 hour before bedtime.       traMADol 50 MG tablet  Commonly known as:  ULTRAM      Take 50 mg by mouth Daily As Needed for Moderate Pain .       valACYclovir 500 MG tablet  Commonly known as:  VALTREX      Take 1 g by mouth Daily.       vitamin D 1.25 MG (22239 UT) capsule capsule  Commonly known as:  ERGOCALCIFEROL      Take 50,000 Units by mouth Every 14 (Fourteen) Days.          STOP taking these medications    furosemide 40 MG tablet  Commonly known as:  LASIX        triamterene-hydrochlorothiazide 37.5-25 MG per tablet  Commonly known as:  MAXZIDE-25              Where to Get Your Medications      These medications were sent to [facility] [place] - [address] [place] [address] - [phone]  - [phone] [address] [place] [address]    Phone:  [phone]   · metoclopramide  10 MG tablet  · potassium chloride 20 MEQ CR tablet           Future Appointments   Date Time Provider Department Center   11/26/2019  1:15 PM Kyra Clark APRN [facility] None   3/31/2020  1:00 PM Kyra Clark APRN [facility] None     Additional Instructions for the Follow-ups that You Need to Schedule     Discharge Follow-up with PCP   As directed       Currently Documented PCP:    Noe Bower MD    PCP Phone Number:    [phone]     Follow Up Details:  in one week; followup bmp(hypokalemia)         Referral to Home Health   As directed      Face to Face Visit Date:  11/21/2019    Follow-up provider for Plan of Care?:  I treated the patient in an acute care facility and will not continue treatment after discharge.    Follow-up provider:  NOE BOWER [[id number]]    Reason/Clinical Findings:  weakness    Describe mobility limitations that make leaving home difficult:  unsteady gait    Nursing/Therapeutic Services Requested:  Physical Therapy    Frequency:  1 Week 1            Contact information for follow-up providers     Noe Bower MD .    Specialty:  Internal Medicine  Why:  in one week; followup bmp(hypokalemia)  Contact information:  [address] [facility] [address]  [phone]                   Contact information for after-discharge care     Home Medical Care     [facility] .    Service:  Home Health Services  Contact information:  [address] [facility] [address] [phone]                              TEST  RESULTS PENDING AT DISCHARGE       CODE STATUS  Code Status and Medical Interventions:   Ordered at: 11/08/19 0002     Code Status:    CPR     Medical Interventions (Level of Support Prior to Arrest):    Full       Shaq Jones MD  [facility] Hospitalist  11/21/19  5:05 PM

## 2019-11-21 NOTE — DISCHARGE PLACEMENT REQUEST
"Erica Jaramillo (74 y.o. Female)     Date of Birth Social Security Number Address Home Phone MRN    1945  [address] [facility] [address] [phone] [id number]    Anglican Marital Status          Evangelical        Admission Date Admission Type Admitting Provider Attending Provider Department, Room/Bed    11/7/19 Emergency Didi Ulloa DO Hays, Ray G, MD [address], 3304/1S    Discharge Date Discharge Disposition Discharge Destination         Home or Self Care              Attending Provider:  Shaq Jones MD    Allergies:  Latex, Penicillins, Zithromax [Azithromycin]    Isolation:  Contact   Infection:  Rotavirus (11/12/19)   Code Status:  CPR    Ht:  160 cm (62.99\")   Wt:  70.4 kg (155 lb 4.8 oz)    Admission Cmt:  None   Principal Problem:  Chest pain in adult [R07.9]                 Active Insurance as of 11/7/2019     Primary Coverage     Payor Plan Insurance Group Employer/Plan Group    MEDICARE MEDICARE A & B      Payor Plan Address Payor Plan Phone Number Payor Plan Fax Number Effective Dates    [address] [phone]  3/1/2007 - None Entered    Formerly [facility] [id number]       Subscriber Name Subscriber Birth Date Member ID       ERICA JARAMILLO 1945 [id number]           Secondary Coverage     Payor Plan Insurance Group Employer/Plan Group    [place]      Payor Plan Address Payor Plan Phone Number Payor Plan Fax Number Effective Dates    [address] [phone]  5/31/2017 - None Entered    [facility] [id number]       Subscriber Name Subscriber Birth Date Member ERICA TRIMBLE 1945 [id number]                 Emergency Contacts      (Rel.) Home Phone Work Phone Mobile Phone    Bala Jaramillo (Spouse) [phone] -- --    Kamari Cummins (Son) [phone] -- [phone]            Emergency Contact Information     Name Relation Home Work Mobile    Bala Jaramillo Spouse [phone]      Kamari Cummins Son " [phone]          Insurance Information                MEDICARE/MEDICARE A & B Phone: [phone]    Subscriber: Tahmina Martinez Subscriber#: [id number]    Group#:  Precert#:         [place] Phone: [phone]    Subscriber: Tahmina Martinez Subscriber#: [id number]    Group#:  Precert#:           Treatment Team     Provider Relationship Specialty Contact    Shaq Jones MD Attending, Physician of Record Hospitalist [phone]    Georgina Vidal, SU Respiratory Therapist -- [phone]    Serena Hyatt [facility] [place] --     Taz Helton, RN Registered Nurse --     Mike Toussaint RN Registered Nurse --     Laine Sim, RN Registered Nurse -- [phone]          Problem List           Codes Noted - Resolved       Hospital    * (Principal) Chest pain in adult ICD-10-CM: R07.9  ICD-9-CM: 786.50 11/8/2019 - Present    History of splenectomy (Chronic) ICD-10-CM: Z90.81  ICD-9-CM: V45.79 11/8/2019 - Present    Cholelithiasis ICD-10-CM: K80.20  ICD-9-CM: 574.20 11/8/2019 - Present    Acute kidney injury (CMS/HCC) ICD-10-CM: N17.9  ICD-9-CM: 584.9 11/8/2019 - Present    Hyperkalemia ICD-10-CM: E87.5  ICD-9-CM: 276.7 11/8/2019 - Present    Hyperglycemia ICD-10-CM: R73.9  ICD-9-CM: 790.29 11/8/2019 - Present    First degree AV block ICD-10-CM: I44.0  ICD-9-CM: 426.11 11/8/2019 - Present    Macrocytosis without anemia ICD-10-CM: D75.89  ICD-9-CM: 289.89 11/8/2019 - Present    Hypocarbia ICD-10-CM: E87.8  ICD-9-CM: 276.9 11/8/2019 - Present    Type II diabetes mellitus (CMS/HCC) (Chronic) ICD-10-CM: E11.9  ICD-9-CM: 250.00 11/8/2019 - Present    Dyslipidemia (Chronic) ICD-10-CM: E78.5  ICD-9-CM: 272.4 11/14/2017 - Present    RBBB unchanged from 2015 (Chronic) ICD-10-CM: I45.10  ICD-9-CM: 426.4 9/1/2017 - Present    History of Non-STEMI (non-ST elevated myocardial infarction) with no coronary intervention needed in 2008, clinically stable.  (Chronic)  "ICD-10-CM: I21.4  ICD-9-CM: 410.70 10/12/2016 - Present    Essential hypertension (Chronic) ICD-10-CM: I10  ICD-9-CM: 401.9 10/12/2016 - Present    History of Acute myelocytic leukemia,status post chemotherapy and bone marrow transplant in 2005. (Chronic) ICD-10-CM: C92.00  ICD-9-CM: 205.00 10/12/2016 - Present    Breast cancer (right), status post radiation therapy in 08/2015 (Chronic) ICD-10-CM: C50.919  ICD-9-CM: 174.9 10/12/2016 - Present       Non-Hospital    Bifascicular block (RBBB plus LP FB) ICD-10-CM: I45.2  ICD-9-CM: 426.53 3/29/2018 - Present    Dyspnea, class II-III.  ICD-10-CM: R06.00  ICD-9-CM: 786.09 10/12/2016 - Present             History & Physical      TammyStephenie PA-C at 11/08/19 0051     Attestation signed by Didi Ulloa DO at 11/08/19 0635    Patient seen and examined.  Patient was resting in bed.  Patient currently denies any chest pain and/or left upper extremity pain.  Patient reports that she was awakened with left upper extremity pain and chest pain on Thursday morning.  The patient states that the pain is described as an ache.  Patient states that she also felt very weak.  She reports that she took her medications and attempted to eat breakfast when she became very nauseous and had an episode of vomiting.  She also reports chills and diaphoresis.  She stated that she also felt dizzy when ambulating back to her bedroom and for a moment felt \"confused.\"  The patient told me that her blood pressure was 189/86 yesterday morning but stated that since her most recent discharge her systolic blood pressure will at times be as low as the 1 teens which for her causes dizziness.    Brief data: Patient's maximum blood pressure since this admission has been 182/76.  She has had 2- troponin T levels.  BUN 41 upon admission with a creatinine of 1.42.  Baseline appears to be around 0.73.  Potassium 5.3, sodium 131, BUN to creatinine ratio 28.9.  CBC largely unremarkable with the " exception of an MCV of 101.7.  EKG has been reviewed and reveals a normal sinus rhythm with a first-degree AV block and right bundle branch block.  QTc 469 MS.    Brief exam: Patient is pleasant, well-nourished and well-developed and resting in bed.  HEENT exam is unremarkable.  Sclerae anicteric and oropharynx is clear.  No JVD.  No carotid bruits.  Heart is regular rate and rhythm without obvious murmur/gallop/rub.  Patient does have some mild tenderness to palpation across the anterior chest wall left greater than right.  Lungs are clear to auscultation anteriorly.  Abdomen is soft and nontender, nondistended during my exam.  No hepatomegaly is palpated.  Bowel sounds are slightly hypoactive.  Lower extremities are with no significant edema.  Neurologically Patient Is Oriented x3.  Strength Is Equal Bilaterally.  No Focal Neurologic Deficits.    Assessment and Plan:  -Chest pain with mixed features in the setting of known MI, hypertension and dyslipidemia: Patient has been admitted to the telemetry unit.  She has ruled out for acute myocardial infarction.  Plan to schedule a Lexiscan today.  Patient had a recent echocardiogram that revealed some borderline concentric hypertrophy.  Systolic function was normal with an EF of 66 to 70%.  She had some left ventricular diastolic dysfunction with mild aortic, mitral and tricuspid valve regurgitation.  Recent lipid panel was acceptable.  Obtain hemoglobin A1c.  Consider cardiology evaluation.    -Acute kidney injury on top of probable stage II CKD: Patient's baseline creatinine appears to be around 0.7.  Patient is receiving very gentle IV fluid hydration.  Hold nephrotoxic medications and repeat chemistry panel.    -Mild hyperkalemia at 5.3: Gentle IV fluids as noted above.  Repeat chemistry panel.  Monitor on telemetry.    -Essential hypertension, currently with fair control: Await patient's home medication list. Place holding parameters given patient's complaint of  "dizziness when systolic blood pressure in the range of the low 100s to 1-teens.                       [facility]  HISTORY & PHYSICAL    Patient Identification:  Name:  Tahmina Martinez  Age:  74 y.o.  Sex:  female  :  1945  MRN:  [id number]   Visit Number:  [id number]  Primary Care Physician:  Noe Bower MD     Subjective     Chief complaint:   Chief Complaint   Patient presents with   • Abdominal Pain   • Chest Pain     History of presenting illness:   Patient is a 74 y.o. female with past medical history significant for history of NSTEMI with no coronary intervention (), type II diabetes mellitus (non-insulin dependent), history of acute myelocytic leukemia status post chemotherapy and bone marrow transplant (), essential hypertension, hyperlipidemia, first-degree AV block, right bundle branch block, history of splenectomy, and history of breast cancer as post radiation, that presented to the [facility] emergency department for evaluation of chest pain and abdominal pain. The patient states that she started  experiencing a severe headache with chest pain that radiates to her to left arm last night. She describes the chest pain as a \"tingling\" with \"knots\" that is located below her left breast that occurs when she is active and occasionally at rest. She states that once she began having the headache she went to check her blood pressure and take her blood pressure medications. Per the patient, her BP was 170/90 at that time. She states she tried to eat and suddenly felt nauseous with one episode of emesis with subsequent \"weakness\" and \"\"fogginess.\" She admits to having no energy, fatigue, chills, chest tightness, occasional choking, shortness of breath, chest pain described as \"tingling/knots,\" intermittent leg edema, abdominal distention, abdominal pain with greasy foods, nausea and vomiting x 1, vertigo when going from sitting to standing, " "headaches, light headedness, and weakness. The patient states that she has become more unsteady on her feet and has fallen at least 3 times within the past year but denies any recent falls, syncope, or trauma. She uses a cane at home to ambulate. She denies fever, diaphoresis, congestion, sore throat, cough, wheezing, palpitations, constipation, diarrhea, dysuria, wounds, numbness/tingling, syncope, or confusion. The patient has a history of NSTEMI in the past with no coronary intervention and a significant family history with her mother and father having heart disease and hypertension and her two brother's and sister having hypertension as well.The patient states she had \"at least 13 medications\" that she takes regularly and states that she takes them as directed and does not get them confused.     Furthermore, the patient was recently admitted to [facility] on 11/2/2019 with a chief complaint of headache and uncontrolled hypertension.  Patient was found to have hypertensive urgency with blood pressure 231/100 on arrival to the emergency department.  Upon evaluation a new bifascicular block was found but patient denied any episodes of syncope or concerning symptoms that warranted a pacemaker. Patient had been seen by Dr. Soler in the clinic who transitioned her HCTZ/triamterene.  Her home regimen was restarted and hydralazine was increased from 25 mg 3 times daily to 50 mg 3 times daily on this admission and at that time her blood pressure was reasonably controlled.  Patient's severe headache had resolved with blood pressure control and she was educated to continue her current regimen.  Patient was to follow-up with PCP and Dr. Soler in 1 week and outpatient evaluation into secondary causes of her hypertension including a renal artery duplex was suggested. The patient states she has an appointment with Dr. Soler on 11/14/19 and her PCP, Dr. Bower, on 11/12/19.     Upon arrival to the ED, vitals were " "temperature 97.9 °F, pulse 106, respirations 20, /76, SPO2 95% on room air.  Troponin negative x2.  proBNP 127.2.  CMP with glucose 133, sodium 131, potassium 5.3, CO2 20.6, chloride 95, anion gap 15.4, creatinine 1.42, BUN 41, BUN/creatinine ratio 28.9, calcium 10.8, GFR 36, alkaline phosphatase 150, total protein 8.8.  CBC with .7, MCH 35.2.  Urinalysis shows trace protein, otherwise unremarkable.  Chest x-ray shows no evidence of acute or active cardiopulmonary disease.  Noncontrast CT of abdomen/pelvis shows cholelithiasis without CT evidence of cholecystitis or biliary obstruction.  CT of head without contrast shows no acute intracranial pathology.  EKG shows sinus rhythm with first-degree AV block, right bundle branch block, 75 bpm, QTc 469 MS.  Patient received p.o. aspirin 324 mg, IV morphine 2 mg x 2, IV Zofran 4 mg x 1, normal saline thousand millimeter bolus, and emergency department.    Patient has been admitted to the telemetry floor for further evaluation and treatment.  ---------------------------------------------------------------------------------------------------------------------   Review of Systems   Constitutional: Positive for activity change (no energy ), appetite change (unable to eat greasy foods ), chills and fatigue. Negative for diaphoresis and fever.   HENT: Negative for congestion and sore throat.    Eyes: Negative for photophobia and visual disturbance.   Respiratory: Positive for choking (occasionally on foods ), chest tightness and shortness of breath. Negative for cough and wheezing.    Cardiovascular: Positive for chest pain (described as a \"tingling/knotting\" feeling) and leg swelling (intermittent). Negative for palpitations.   Gastrointestinal: Positive for abdominal distention, abdominal pain (after eating greasy foods ), nausea and vomiting (x1 episode yesterday ). Negative for blood in stool, constipation and diarrhea.   Endocrine: Negative for polydipsia and " polyphagia.   Genitourinary: Negative for dysuria and frequency.   Musculoskeletal: Negative for arthralgias.   Skin: Negative for wound.   Allergic/Immunologic: Negative for environmental allergies.   Neurological: Positive for dizziness (when going from sitting to standing ), weakness, light-headedness and headaches. Negative for syncope and numbness.   Hematological: Does not bruise/bleed easily.   Psychiatric/Behavioral: Negative for confusion.      ---------------------------------------------------------------------------------------------------------------------   Past Medical History:   Diagnosis Date   • Breast cancer (CMS/[facility])    • Dyspnea    • H/O splenectomy    • Hypertension    • Myelocytic leukemia (CMS/[facility])    • Non-STEMI (non-ST elevated myocardial infarction) (CMS/[facility])      Past Surgical History:   Procedure Laterality Date   • BONE MARROW TRANSPLANT     •  SECTION     • SPLENECTOMY     • TUBAL ABDOMINAL LIGATION       Family History   Problem Relation Age of Onset   • Heart disease Mother    • Heart disease Father      Social History     Socioeconomic History   • Marital status:      Spouse name: Not on file   • Number of children: Not on file   • Years of education: Not on file   • Highest education level: Not on file   Tobacco Use   • Smoking status: Never Smoker   • Smokeless tobacco: Never Used   Substance and Sexual Activity   • Alcohol use: No   • Drug use: No   • Sexual activity: Defer     ---------------------------------------------------------------------------------------------------------------------   Allergies:  Latex; Penicillins; and Zithromax [azithromycin]  ---------------------------------------------------------------------------------------------------------------------   Medications below are reported home medications pulling from within the system; at this time, these medications have not been reconciled unless otherwise specified and are in the verification  process for further verifcation as current home medications.    Prior to Admission Medications     Prescriptions Last Dose Informant Patient Reported? Taking?    aspirin 81 MG EC tablet  Pharmacy Yes No    Take 81 mg by mouth Daily.    atorvastatin (LIPITOR) 10 MG tablet  Pharmacy No No    Take 1 tablet by mouth Every Night.    Calcium Citrate-Vitamin D (CALCIUM + D PO)  Pharmacy Yes No    Take 600 mg by mouth.    carvedilol (COREG) 12.5 MG tablet  Pharmacy No No    Take 1 tablet by mouth 2 (Two) Times a Day.    Coenzyme Q10 (CO Q-10) 200 MG capsule  Pharmacy Yes No    Take 200 mg by mouth Daily.    exemestane (AROMASIN) 25 MG chemo tablet  Pharmacy Yes No    Take 25 mg by mouth Daily.    furosemide (LASIX) 40 MG tablet  Pharmacy Yes No    Take 40 mg by mouth Daily.    hydrALAZINE (APRESOLINE) 50 MG tablet   No No    Take 1 tablet by mouth Every 8 (Eight) Hours.    lisinopril (PRINIVIL,ZESTRIL) 40 MG tablet  Pharmacy No No    Take 1 tablet by mouth Daily.    Loratadine 10 MG capsule  Pharmacy Yes No    Take  by mouth.    metFORMIN (GLUCOPHAGE) 500 MG tablet  Pharmacy Yes No    Take 500 mg by mouth Every Morning.    nitroglycerin (NITROSTAT) 0.4 MG SL tablet  Pharmacy Yes No    Place 0.4 mg under the tongue Every 5 (Five) Minutes As Needed for chest pain. Take no more than 3 doses in 15 minutes.    pantoprazole (PROTONIX) 40 MG EC tablet  Pharmacy Yes No    Take 40 mg by mouth Daily.    rOPINIRole (REQUIP) 0.25 MG tablet  Pharmacy Yes No    Take 0.25 mg by mouth Every Night. Take 1 hour before bedtime.    triamterene-hydrochlorothiazide (MAXZIDE-25) 37.5-25 MG per tablet  Pharmacy No No    Take 1 tablet by mouth Daily.    valACYclovir (VALTREX) 500 MG tablet  Pharmacy Yes No    Take 1 g by mouth Daily.    vitamin D (ERGOCALCIFEROL) 98633 UNITS capsule capsule  Pharmacy Yes No    Take 50,000 Units by mouth Every 14 (Fourteen) Days.         ---------------------------------------------------------------------------------------------------------------------    Objective     Hospital Scheduled Meds:    heparin (porcine) 5,000 Units Subcutaneous Q12H   pantoprazole 40 mg Oral Q AM   sodium chloride 10 mL Intravenous Q12H       sodium chloride 50 mL/hr Last Rate: 50 mL/hr (11/08/19 0136)       Current listed hospital scheduled medications may not yet reflect those currently placed in orders that are signed and held, awaiting patient's arrival to floor/unit.    ---------------------------------------------------------------------------------------------------------------------   Vital Signs:  Temp:  [97.9 °F (36.6 °C)-98.3 °F (36.8 °C)] 97.9 °F (36.6 °C)  Heart Rate:  [] 93  Resp:  [16-20] 18  BP: (106-182)/(62-90) 131/62  Mean Arterial Pressure (Non-Invasive) for the past 24 hrs (Last 3 readings):   Noninvasive MAP (mmHg)   11/08/19 0049 110   11/08/19 0002 116   11/07/19 2347 117     SpO2 Percentage    11/08/19 0002 11/08/19 0049 11/08/19 0330   SpO2: 99% 95% 96%     SpO2:  [95 %-100 %] 96 %  on   ;        Body mass index is 25.95 kg/m².  Wt Readings from Last 3 Encounters:   11/08/19 66.5 kg (146 lb 8 oz)   11/04/19 65.3 kg (143 lb 15.4 oz)   10/29/19 71 kg (156 lb 9.6 oz)     ---------------------------------------------------------------------------------------------------------------------   Physical Exam:  Physical Exam   Constitutional: She is oriented to person, place, and time. She appears well-developed and well-nourished. She is cooperative.  Non-toxic appearance. She does not have a sickly appearance. She does not appear ill. No distress.   Upon evaluation patient lying in bed in no acute distress    HENT:   Head: Normocephalic and atraumatic.   Nose: Nose normal.   Mouth/Throat: Mucous membranes are normal. Mucous membranes are not pale, not dry and not cyanotic.   Eyes: Conjunctivae and lids are normal. Right eye exhibits no  discharge. Left eye exhibits no discharge. Right conjunctiva is not injected. Left conjunctiva is not injected.   Cardiovascular: Regular rhythm, normal heart sounds, intact distal pulses and normal pulses. Exam reveals no decreased pulses.   Pulses:       Dorsalis pedis pulses are 2+ on the right side, and 2+ on the left side.        Posterior tibial pulses are 2+ on the right side, and 2+ on the left side.   Pulmonary/Chest: Effort normal and breath sounds normal. No stridor. No tachypnea and no bradypnea. No respiratory distress. She has no decreased breath sounds. She has no wheezes. She has no rhonchi. She has no rales.   Abdominal: Soft. Normal appearance and bowel sounds are normal. She exhibits no ascites and no mass. There is no tenderness. There is no rigidity, no guarding and negative Sebastian's sign.   Musculoskeletal:        Right lower leg: She exhibits no edema.        Left lower leg: She exhibits no edema.     Vascular Status -  Her right foot exhibits no edema. Her left foot exhibits no edema.  Skin Integrity  -  Her right foot skin is intact.Her left foot skin is intact..  Neurological: She is alert and oriented to person, place, and time. She has normal strength. She is not disoriented. No sensory deficit.   No focal neurological deficits. Patient alert and oriented to time and place, answers questions appropriately. Strength equal bilaterally in upper and lower extremities. Sensation intact bilaterally in upper and lower extremities. Able to move arms and legs equal bilaterally in both upper and lower extremities.    Skin: No abrasion, no lesion and no rash noted.   Psychiatric: She has a normal mood and affect. Her behavior is normal. Thought content normal. Her mood appears not anxious. Her speech is not delayed and not slurred. Cognition and memory are normal. Cognition and memory are not impaired. She does not exhibit a depressed mood.      ---------------------------------------------------------------------------------------------------------------------  EKG:    Pending cardiology read.  Per my evaluation, EKG shows sinus rhythm with first-degree AV block, right bundle branch block, heart rate 75 bpm, QTc 469 MS.    Telemetry:    Normal sinus rhythm.  Heart rate 92 bpm, SPO2 99% on room air.    I have personally reviewed the EKG/Telemetry strip  ---------------------------------------------------------------------------------------------------------------------   Results from last 7 days   Lab Units 11/08/19 0111 11/07/19 2032 11/07/19  1832   TROPONIN T ng/mL <0.010 <0.010 <0.010     Results from last 7 days   Lab Units 11/07/19 1832 11/02/19  2249   PROBNP pg/mL 127.2 795.4     Results from last 7 days   Lab Units 11/03/19  0531   CHOLESTEROL mg/dL 142   TRIGLYCERIDES mg/dL 104   HDL CHOL mg/dL 57   LDL CHOL mg/dL 64       Results from last 7 days   Lab Units 11/08/19 0111 11/07/19 1832 11/03/19  0531 11/02/19  2249   CRP mg/dL  --   --   --  0.33   WBC 10*3/mm3 10.10 8.24 8.03 7.56   HEMOGLOBIN g/dL 12.9 14.2 12.8 12.7   HEMATOCRIT % 38.6 41.0 38.1 37.8   MCV fL 104.6* 101.7* 105.5* 104.4*   MCHC g/dL 33.4 34.6 33.6 33.6   PLATELETS 10*3/mm3 359 395 394 378   INR   --   --   --  0.97     Results from last 7 days   Lab Units 11/08/19  0111 11/07/19  1832 11/03/19  0531   SODIUM mmol/L 134* 131* 139   POTASSIUM mmol/L 4.9 5.3* 3.9   CHLORIDE mmol/L 102 95* 103   CO2 mmol/L 20.5* 20.6* 23.1   BUN mg/dL 37* 41* 10   CREATININE mg/dL 1.23* 1.42* 0.73   EGFR IF NONAFRICN AM mL/min/1.73 42* 36* 78   CALCIUM mg/dL 9.8 10.8* 9.7   GLUCOSE mg/dL 99 133* 112*   ALBUMIN g/dL 3.91 4.79 4.00   BILIRUBIN mg/dL 0.5 0.7 0.3   ALK PHOS U/L 125* 150* 122*   AST (SGOT) U/L 23 30 26   ALT (SGPT) U/L 21 26 20   Estimated Creatinine Clearance: 35.6 mL/min (A) (by C-G formula based on SCr of 1.23 mg/dL (H)).  No results found for: AMMONIA    Hemoglobin A1C    Date/Time Value Ref Range Status   11/08/2019 0111 6.10 (H) 4.80 - 5.60 % Final     Lab Results   Component Value Date    HGBA1C 6.10 (H) 11/08/2019     Lab Results   Component Value Date    TSH 1.430 11/08/2019       Pain Management Panel     There is no flowsheet data to display.        I have personally reviewed the above laboratory results.   ---------------------------------------------------------------------------------------------------------------------  Imaging Results (Last 7 Days)     Procedure Component Value Units Date/Time    CT Abdomen Pelvis Without Contrast [[id number]] Collected:  11/07/19 2320     Updated:  11/07/19 2322    Narrative:       CT Abdomen Pelvis WO    INDICATION:   Nausea and vomiting    TECHNIQUE:   CT of the abdomen and pelvis without contrast. Coronal and sagittal reconstructions were obtained.  Radiation dose reduction techniques included automated exposure control or exposure modulation based on body size. Radiation audit for number of CT and  nuclear cardiology exams performed in the last year: 3.      COMPARISON:   None available.    FINDINGS:    Visualized lung bases are unremarkable.    Abdomen: Unenhanced images of the liver are normal. There is cholelithiasis, but no CT evidence of cholecystitis or biliary obstruction. The spleen is surgically absent. The pancreas appears normal. The adrenal glands are normal. There are areas of left  renal cortical scarring but the right kidney is normal. The aorta is normal in caliber and there is no evidence of bowel obstruction.    Pelvis:  The appendix is normal. There is no pelvic mass or inflammatory change or abnormal fluid collection. There is no hernia or bowel obstruction.    There are spinal degenerative and postoperative changes, but there is no acute bony abnormality.      Impression:       Cholelithiasis without CT evidence of cholecystitis or biliary obstruction.    No renal or bowel obstruction or other acute abnormality.  Normal appendix.      Signer Name: Fabio Ambrose MD   Signed: 11/7/2019 11:20 PM   Workstation Name: [email or web address]    [facility]    CT Head Without Contrast [[id number]] Collected:  11/07/19 2000     Updated:  11/07/19 2004    Narrative:       CT HEAD WO CONTRAST-     CLINICAL INDICATION: Decreased alertness        COMPARISON: 11/02/2019      TECHNIQUE: Axial images of the brain were obtained with out intravenous  contrast.  Reformatted images were created in the sagittal and coronal  planes.     DOSE:     Radiation dose reduction techniques were utilized per ALARA protocol.  Automated exposure control was initiated through either or [employer] or  [employer] software packages by  protocol.           FINDINGS:   Today's study shows no mass, hemorrhage, or midline shift.   The ventricles, cisterns, and sulci are unremarkable. There is no  hydrocephalus.   There is no evidence of acute ischemia.  I do not see epidural or subdural hematoma.  The gray-white differentiation is appropriate.   The bone window setting images show no destructive calvarial lesion or  acute calvarial fracture.   The posterior fossa is unremarkable.          Impression:       No acute intracranial pathology. Nothing is seen on this exam to  specifically account for the patient's symptoms.     This report was finalized on 11/7/2019 8:00 PM by Dr. Sal Cheek MD.       XR Chest 1 View [[id number]] Collected:  11/07/19 1919     Updated:  11/07/19 1922    Narrative:       XR CHEST 1 VW-     CLINICAL INDICATION: Chest pain protocol        COMPARISON: 11/02/2019      TECHNIQUE: Single frontal view of the chest.     FINDINGS:     There is no focal alveolar infiltrate or effusion.  The cardiac silhouette is normal. The pulmonary vasculature is  unremarkable.  There is no evidence of an acute osseous abnormality.   There are no suspicious-appearing parenchymal soft tissue nodules.          Impression:       No  evidence of active or acute cardiopulmonary disease on today's chest  radiograph.     This report was finalized on 11/7/2019 7:19 PM by Dr. Sal Cheek MD.           I have personally reviewed the above radiology results.     Last Echocardiogram:  Results for orders placed during the hospital encounter of 11/02/19   Transthoracic Echo Complete With Contrast if Necessary Per Protocol    Narrative · Left ventricular wall thickness is consistent with borderline concentric   hypertrophy.  · Left ventricular systolic function is normal.  · Estimated EF appears to be in the range of 66 - 70%.  · Left ventricular diastolic dysfunction.  · Normal cardiac chamber dimensions  · Mild aortic valve regurgitation is present.  · Mild mitral valve regurgitation is present  · Mild tricuspid valve regurgitation is present. 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      • RBBB unchanged from 2015 [I45.10] Yes   • History of Non-STEMI (non-ST elevated myocardial infarction) with no coronary intervention needed in 2008, clinically stable.  [I21.4] Yes   • History of Acute  myelocytic leukemia,status post chemotherapy and bone marrow transplant in 2005. [C92.00] Yes   • Essential hypertension [I10] Yes   • Breast cancer (right), status post radiation therapy in 08/2015 [C50.919] Yes     · SIRS criteria present upon admission: patient meets SIRS criteria with pulse 106 and HR 20. Likely due to patient's elevated blood pressure.  PRN hydralazine for SBP greater than 160.  Continue home medications once reconciled per pharmacy. Will adjust as necessary.  Continue to monitor blood pressure closely.    · Unstable angina with known history of NSTEMI in the past without intervention: Patient is currently chest pain-free.  EKG shows normal sinus rhythm.  Troponin negative x2.  Continue to trend troponin.  Patient stress test in 2017 was normal with no significant echo cardiographic evidence for myocardial ischemia. Continue daily aspirin and statin.  Monitor closely on telemetry.  Repeat a.m. EKG.    · Acute kidney injury: Gentle IV fluids. Avoid nephrotoxic agents as much as possible. Hold home lasix. Repeat AM CMP.     · Hyponatremia: likely due to dehydration in setting of hypochloremia. Will give gentle IV fluids. Repeat AM CMP. Monitor I's and O's. Monitor closely.     · Acute hyperkalemia, mild: only slightly elevated. Will hold off on treatment for now. Monitor closely and repeat AM CMP.     · Acute hypercalcemia, mild: only slightly elevated. Will repeat AM CMP. Monitor closely.     · Type II diabetes mellitus (non-insulin dependent): Hemoglobin A1c ordered. Hold home oral hypoglycemics to prevent hypoglycemia. Will add SSI for now. Accu-cheks qAC and qhs. Titrate insulin therapy as necessary.     · Hypocarbia: Likely secondary to patient's increased respiratory rate.  Repeat a.m. CMP.  Monitor closely.    · Macrocytosis without anemia present upon admission: No active signs of bleeding. H&H stable. Vitamin b12, folate and vitamin D levels ordered, replace as necessary. Monitor H&H  closely. Repeat AM CBC.     · Right bundle branch block with first degree AV block: patient in normal sinus rhythm and currently chest pain free.  Bifascicular block was seen on past admission on 11/2/2019, not present on this admission.  Patient denies any recent syncopal episodes or other concerning symptoms.  Monitor closely on telemetry.      · Cholelithiasis without evidence of cholecystitis or biliary obstruction, present upon admission: incidental finding on CT of abdomen. Patient is not complaining of any abdominal pain at this time. No evidence of an acute abdomen present, Sebastian's sign negative. Monitor closely. Recommend outpatient follow up with PCP.     · Essential hypertension, poorly controlled: PRN hydralazine for SBP greater than 160. Continue home medications once reconciled per pharmacy with holding parameters. Obtain orthostatics in setting of vertigo when going from a sitting to standing position.  Continue to monitor blood pressure closely. Will adjust medication as necessary. Recommend patient keep appointment with Dr. Soler on 11/14/19 and with PCP on 11/12/19.     · Hyperlipidemia: continue patients home statin.  Lipid panel on 11/3/2019 was unremarkable, controlled. Recommend continued outpatient follow up with PCP.     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 continue home medication once reconciled per pharmacy. Repeat AM CBC.     · History of splenectomy: patient has not received flu shot this year, requesting one while admitted. States she has tolerated flu shot before with no adverse side effects.       · F/E/N: IV fluids.  Replace electrolytes as necessary per protocol.  N.p.o.    ---------------------------------------------------  DVT Prophylaxis: Subcutaneous heparin   GI Prophylaxis: Protonix   Activity: Up with assistance     The patient is considered to be a high risk patient due to:  Sirs criteria present upon admission, chest pain with typical features with known history of an STEMI in the past, acute kidney injury.    INPATIENT status due to the need for care which can only be reasonably provided in an hospital setting such as aggressive/expedited ancillary services and/or consultation services, the necessity for IV medications, close physician monitoring and/or the possible need for procedures.  In such, I feel patient’s risk for adverse outcomes and need for care warrant INPATIENT evaluation and predict the patient’s care encounter to likely last beyond 2 midnights.      Code Status: FULL CODE     I have discussed the patient's assessment and plan with the patient and attending physician.       Stephenie Munoz PA-C  Hospitalist Service -- [facility]       11/08/19  4:40 AM    Attending Physician: Didi Ulloa DO       Electronically signed by Didi Ulloa DO at 11/08/19 0635       Vital Signs (last day)     Date/Time   Temp   Temp src   Pulse   Resp   BP   Patient Position   SpO2    11/21/19 0700   98.5 (36.9)   Oral   78   18   138/80   Sitting   97    11/21/19 0300   98.1 (36.7)   Axillary   74   20   156/70   Lying   96    11/20/19 1819   98.8 (37.1)   Oral   76   20   128/80   Lying   97    11/20/19 1650   --   --   74   --   132/57   Sitting   --    11/20/19 1356   98.2 (36.8)   Oral   86   20   186/82  (Abnormal)    Sitting   99    11/20/19 1014   97.4 (36.3)   Axillary   74   20   172/74   Sitting   95    11/20/19 0745   --   --   76   --   154/78   --   --    11/20/19 0629   98.3 (36.8)   Oral   67   20   178/76   Lying   96    11/20/19 0259   98.6 (37)   Axillary   77   20   160/86   Lying   95              Lines, Drains & Airways    Active LDAs     Name:   Placement date:   Placement time:   Site:   Days:    Midline Catheter - Single Lumen 11/15/19 Right Basilic   11/15/19    1200     5                Hospital Medications (active)       Dose  Frequency Start End    acetaminophen (TYLENOL) tablet 650 mg 650 mg Every 6 Hours PRN 11/8/2019     Sig - Route: Take 2 tablets by mouth Every 6 (Six) Hours As Needed for Mild Pain . - Oral    Cosign for Ordering: Accepted by Didi Ulloa DO on 11/8/2019  4:01 AM    aspirin EC tablet 81 mg 81 mg Daily 11/8/2019     Sig - Route: Take 1 tablet by mouth Daily. - Oral    Cosign for Ordering: Accepted by Didi Ulloa DO on 11/8/2019  6:39 AM    atorvastatin (LIPITOR) tablet 10 mg 10 mg Nightly 11/8/2019     Sig - Route: Take 1 tablet by mouth Every Night. - Oral    Cosign for Ordering: Accepted by Didi Ulloa DO on 11/8/2019  6:39 AM    calcium carb-cholecalciferol 600-800 MG-UNIT tablet 1 tablet 1 tablet Daily 11/8/2019     Sig - Route: Take 1 tablet by mouth Daily. - Oral    Cosign for Ordering: Accepted by Didi Ulloa DO on 11/8/2019  6:39 AM    carvedilol (COREG) tablet 12.5 mg 12.5 mg 2 Times Daily 11/8/2019     Sig - Route: Take 2 tablets by mouth 2 (Two) Times a Day. - Oral    Cosign for Ordering: Accepted by Didi Ulloa DO on 11/8/2019  6:39 AM    cetirizine (zyrTEC) tablet 5 mg 5 mg Daily 11/8/2019     Sig - Route: Take 0.5 tablets by mouth Daily. - Oral    Cosign for Ordering: Accepted by Diid Ulloa DO on 11/8/2019  6:39 AM    cholecalciferol (VITAMIN D3) capsule 50,000 Units 50,000 Units Weekly 11/8/2019     Sig - Route: Take 1 capsule by mouth 1 (One) Time Per Week. - Oral    Cosign for Ordering: Accepted by Didi Ulloa DO on 11/8/2019  6:39 AM    dextrose (D50W) 25 g/ 50mL Intravenous Solution 25 g 25 g Every 15 Minutes PRN 11/8/2019     Sig - Route: Infuse 50 mL into a venous catheter Every 15 (Fifteen) Minutes As Needed for Low Blood Sugar (Blood Sugar Less Than 70). - Intravenous    Cosign for Ordering: Accepted by Didi Ulloa DO on 11/8/2019  6:39 AM    dextrose (GLUTOSE) oral gel 15 g 15 g Every 15 Minutes PRN 11/8/2019      Sig - Route: Take 15 application by mouth Every 15 (Fifteen) Minutes As Needed for Low Blood Sugar (Blood sugar less than 70). - Oral    Cosign for Ordering: Accepted by Didi Ulloa DO on 11/8/2019  6:39 AM    exemestane (AROMASIN) chemo tablet 25 mg 25 mg Daily 11/8/2019     Sig - Route: Take 1 tablet by mouth Daily. - Oral    Cosign for Ordering: Accepted by Didi Ulloa DO on 11/8/2019  6:39 AM    fluticasone (FLONASE) 50 MCG/ACT nasal spray 2 spray 2 spray Daily 11/16/2019     Sig - Route: 2 sprays by Each Nare route Daily. - Each Nare    glucagon (human recombinant) (GLUCAGEN DIAGNOSTIC) injection 1 mg 1 mg Every 15 Minutes PRN 11/8/2019     Sig - Route: Inject 1 mg under the skin into the appropriate area as directed Every 15 (Fifteen) Minutes As Needed for Low Blood Sugar (Blood Glucose Less Than 70). - Subcutaneous    Cosign for Ordering: Accepted by Didi Ulloa DO on 11/8/2019  6:39 AM    heparin (porcine) 5000 UNIT/ML injection 5,000 Units 5,000 Units Every 12 Hours Scheduled 11/8/2019     Sig - Route: Inject 1 mL under the skin into the appropriate area as directed Every 12 (Twelve) Hours. - Subcutaneous    hydrALAZINE (APRESOLINE) injection 10 mg 10 mg Every 6 Hours PRN 11/8/2019     Sig - Route: Infuse 0.5 mL into a venous catheter Every 6 (Six) Hours As Needed for High Blood Pressure. - Intravenous    Cosign for Ordering: Accepted by Didi Ulloa DO on 11/8/2019  4:01 AM    insulin aspart (novoLOG) injection 0-7 Units 0-7 Units 4 Times Daily Before Meals & Nightly 11/8/2019     Sig - Route: Inject 0-7 Units under the skin into the appropriate area as directed 4 (Four) Times a Day Before Meals & at Bedtime. - Subcutaneous    Cosign for Ordering: Accepted by Didi Ulloa DO on 11/8/2019  6:39 AM    Magnesium Sulfate 2 gram infusion- Mg 1.6 - 1.9 mg/dL 2 g As Needed 11/13/2019     Sig - Route: Infuse 50 mL into a venous catheter As Needed (Mg 1.6 - 1.9  "mg/dL). - Intravenous    Linked Group 1:  \"Or\" Linked Group Details        magnesium sulfate 3 gram infusion (1gm x 3) - Mg 1.1 - 1.5 mg/dL 1 g As Needed 11/13/2019     Sig - Route: Infuse 100 mL into a venous catheter As Needed (Mg 1.1 - 1.5 mg/dL). - Intravenous    Linked Group 1:  \"Or\" Linked Group Details        magnesium sulfate 4 gram infusion - Mg less than or equal to 1mg/dL 4 g As Needed 11/13/2019     Sig - Route: Infuse 100 mL into a venous catheter As Needed (Mg less than or equal to 1mg/dL). - Intravenous    Linked Group 1:  \"Or\" Linked Group Details        metoclopramide (REGLAN) injection 10 mg 10 mg 4 Times Daily Before Meals & Nightly 11/20/2019     Sig - Route: Infuse 2 mL into a venous catheter 4 (Four) Times a Day Before Meals & at Bedtime. - Intravenous    metroNIDAZOLE (FLAGYL) 500 mg/100mL IVPB 500 mg Every 8 Hours 11/20/2019 11/27/2019    Sig - Route: Infuse 100 mL into a venous catheter Every 8 (Eight) Hours. - Intravenous    nitroglycerin (NITROSTAT) SL tablet 0.4 mg 0.4 mg Every 5 Minutes PRN 11/8/2019     Sig - Route: Place 1 tablet under the tongue Every 5 (Five) Minutes As Needed for Chest Pain (Only if SBP Greater Than 100). - Sublingual    pantoprazole (PROTONIX) EC tablet 40 mg 40 mg Every Early Morning 11/8/2019     Sig - Route: Take 1 tablet by mouth Every Morning. - Oral    Cosign for Ordering: Accepted by Didi Ulloa DO on 11/8/2019  4:01 AM    potassium & sodium phosphates (PHOS-NAK) 280-160-250 MG packet - for Phosphorus 1.25 - 2.5 mg/dL 2 packet Every 6 Hours PRN 11/15/2019     Sig - Route: Take 2 packets by mouth Every 6 (Six) Hours As Needed (Phosphorus 1.25 - 2.5 mg/dL). - Oral    Linked Group 2:  \"Or\" Linked Group Details        potassium & sodium phosphates (PHOS-NAK) 280-160-250 MG packet - for Phosphorus less than 1.25 mg/dL 2 packet Every 6 Hours PRN 11/15/2019     Sig - Route: Take 2 packets by mouth Every 6 (Six) Hours As Needed (Phosphorus less than 1.25 " "mg/dL). - Oral    Linked Group 2:  \"Or\" Linked Group Details        potassium chloride (K-DUR,KLOR-CON) CR tablet 40 mEq 40 mEq Every 4 Hours 11/20/2019 11/21/2019    Sig - Route: Take 2 tablets by mouth Every 4 (Four) Hours. - Oral    potassium chloride (KLOR-CON) packet 40 mEq 40 mEq As Needed 11/14/2019     Sig - Route: Take 40 mEq by mouth As Needed (potassium replacement, see admin instructions). - Oral    Cosign for Ordering: Accepted by David Matute DO on 11/14/2019 12:43 PM    Linked Group 3:  \"Or\" Linked Group Details        potassium chloride (MICRO-K) CR capsule 40 mEq 40 mEq As Needed 11/14/2019     Sig - Route: Take 4 capsules by mouth As Needed (Potassium Replacement.  See Admin Instructions). - Oral    Cosign for Ordering: Accepted by David Matute DO on 11/14/2019 12:43 PM    Linked Group 3:  \"Or\" Linked Group Details        potassium chloride 10 mEq in 100 mL IVPB 10 mEq Every 1 Hour PRN 11/14/2019     Sig - Route: Infuse 100 mL into a venous catheter Every 1 (One) Hour As Needed (Potassium Replacement - See Admin Instructions). - Intravenous    Cosign for Ordering: Accepted by David Matute DO on 11/14/2019 12:43 PM    Linked Group 3:  \"Or\" Linked Group Details        rOPINIRole (REQUIP) tablet 0.25 mg 0.25 mg Nightly 11/8/2019     Sig - Route: Take 1 tablet by mouth Every Night. - Oral    Cosign for Ordering: Accepted by Didi Ulloa DO on 11/8/2019  6:39 AM    sodium chloride 0.9 % flush 10 mL 10 mL As Needed 11/7/2019     Sig - Route: Infuse 10 mL into a venous catheter As Needed for Line Care. - Intravenous    Cosign for Ordering: Accepted by Suresh Thomas MD on 11/7/2019  6:24 PM    sodium chloride 0.9 % flush 10 mL 10 mL Every 12 Hours Scheduled 11/8/2019     Sig - Route: Infuse 10 mL into a venous catheter Every 12 (Twelve) Hours. - Intravenous    sodium chloride 0.9 % flush 10 mL 10 mL As Needed 11/8/2019     Sig - Route: Infuse 10 mL into a " venous catheter As Needed for Line Care. - Intravenous    sodium chloride 0.9 % flush 10 mL 10 mL Every 12 Hours Scheduled 11/15/2019     Sig - Route: Infuse 10 mL into a venous catheter Every 12 (Twelve) Hours. - Intravenous    sodium chloride 0.9 % flush 10 mL 10 mL As Needed 11/15/2019     Sig - Route: Infuse 10 mL into a venous catheter As Needed for Line Care (After Medication Administration). - Intravenous    sodium chloride 0.9 % infusion 75 mL/hr Continuous 11/12/2019     Sig - Route: Infuse 75 mL/hr into a venous catheter Continuous. - Intravenous    traMADol (ULTRAM) tablet 50 mg 50 mg Daily PRN 11/9/2019     Sig - Route: Take 1 tablet by mouth Daily As Needed for Moderate Pain . - Oral    valACYclovir (VALTREX) tablet 1,000 mg 1,000 mg Daily 11/8/2019 11/7/2020    Sig - Route: Take 2 tablets by mouth Daily. - Oral    Cosign for Ordering: Accepted by Didi Ulloa DO on 11/8/2019  6:39 AM    metroNIDAZOLE (FLAGYL) 500 mg/100mL IVPB (Discontinued) 500 mg Every 8 Hours 11/13/2019 11/20/2019    Sig - Route: Infuse 100 mL into a venous catheter Every 8 (Eight) Hours. - Intravenous    potassium chloride (K-DUR,KLOR-CON) CR tablet 40 mEq (Discontinued) 40 mEq Every 4 Hours 11/20/2019 11/20/2019    Sig - Route: Take 2 tablets by mouth Every 4 (Four) Hours. - Oral    Reason for Discontinue: *Re-Entry    promethazine (PHENERGAN) 12.5 mg in sodium chloride 0.9 % 50 mL (Discontinued) 12.5 mg Every 6 Hours PRN 11/13/2019 11/20/2019    Sig - Route: Infuse 12.5 mg into a venous catheter Every 6 (Six) Hours As Needed for Nausea or Vomiting. - Intravenous            Lab Results (last 24 hours)     Procedure Component Value Units Date/Time    POC Glucose Once [[id number]]  (Normal) Collected:  11/21/19 0737    Specimen:  Blood Updated:  11/21/19 0804     Glucose 109 mg/dL     POC Glucose Once [[id number]]  (Normal) Collected:  11/20/19 1925    Specimen:  Blood Updated:  11/20/19 1935     Glucose 109 mg/dL     POC  Glucose Once [[id number]]  (Normal) Collected:  11/20/19 1612    Specimen:  Blood Updated:  11/20/19 1618     Glucose 114 mg/dL     Potassium [[id number]]  (Abnormal) Collected:  11/20/19 1042    Specimen:  Blood Updated:  11/20/19 1213     Potassium 3.1 mmol/L     POC Glucose Once [[id number]]  (Normal) Collected:  11/20/19 1132    Specimen:  Blood Updated:  11/20/19 1206     Glucose 129 mg/dL         Orders (last 24 hrs)     Start     Ordered    11/21/19 1334  Auto Discontinue in 48 Hours if not Collected  ONCE CDIFF      11/19/19 1336    11/21/19 0833  Discontinue IV  Once      11/21/19 0835    11/21/19 0831  Discontinue IV  Once      11/21/19 0831    11/21/19 0827  Discharge patient  Once      11/21/19 0831    11/21/19 0824  Potassium  STAT,   Status:  Canceled      11/21/19 0824    11/21/19 0805  POC Glucose Once  Once      11/21/19 0737    11/21/19 0000  potassium chloride (K-DUR,KLOR-CON) 20 MEQ CR tablet  2 Times Daily      11/21/19 0831    11/21/19 0000  metoclopramide (REGLAN) 10 MG tablet  4 Times Daily Before Meals & Nightly      11/21/19 0831    11/21/19 0000  Discharge Follow-up with PCP      11/21/19 0831    11/21/19 0000  Referral to Home Health      11/21/19 0835    11/20/19 1936  POC Glucose Once  Once      11/20/19 1925    11/20/19 1800  potassium chloride (K-DUR,KLOR-CON) CR tablet 40 mEq  Every 4 Hours      11/20/19 1630    11/20/19 1619  POC Glucose Once  Once      11/20/19 1612    11/20/19 1400  metroNIDAZOLE (FLAGYL) 500 mg/100mL IVPB  Every 8 Hours      11/20/19 1132    11/20/19 1330  potassium chloride (K-DUR,KLOR-CON) CR tablet 40 mEq  Every 4 Hours,   Status:  Discontinued      11/20/19 1244    11/20/19 1230  metoclopramide (REGLAN) injection 10 mg  4 Times Daily Before Meals & Nightly      11/20/19 1132    11/20/19 1207  POC Glucose Once  Once      11/20/19 1132    11/16/19 1300  fluticasone (FLONASE) 50 MCG/ACT nasal spray 2 spray  Daily      11/16/19 1104    11/15/19 1400  sodium chloride  0.9 % flush 10 mL  Every 12 Hours Scheduled      11/15/19 1303    11/15/19 1302  sodium chloride 0.9 % flush 10 mL  As Needed      11/15/19 1303    11/15/19 0622  potassium & sodium phosphates (PHOS-NAK) 280-160-250 MG packet - for Phosphorus less than 1.25 mg/dL  Every 6 Hours PRN      11/15/19 0622    11/15/19 0622  potassium & sodium phosphates (PHOS-NAK) 280-160-250 MG packet - for Phosphorus 1.25 - 2.5 mg/dL  Every 6 Hours PRN      11/15/19 0622    11/14/19 1200  Dietary Nutrition Supplements Boost Breeze (Ensure Clear); mixed berry  Daily With Breakfast, Lunch & Dinner     Comments:  Berry flavor, thank you!    11/14/19 1149    11/14/19 0913  potassium chloride (MICRO-K) CR capsule 40 mEq  As Needed      11/14/19 0913    11/14/19 0913  potassium chloride (KLOR-CON) packet 40 mEq  As Needed      11/14/19 0913    11/14/19 0913  potassium chloride 10 mEq in 100 mL IVPB  Every 1 Hour PRN      11/14/19 0913    11/13/19 2240  promethazine (PHENERGAN) 12.5 mg in sodium chloride 0.9 % 50 mL  Every 6 Hours PRN,   Status:  Discontinued      11/13/19 2240    11/13/19 2200  metroNIDAZOLE (FLAGYL) 500 mg/100mL IVPB  Every 8 Hours,   Status:  Discontinued      11/13/19 1825    11/13/19 0554  magnesium sulfate 4 gram infusion - Mg less than or equal to 1mg/dL  As Needed      11/13/19 0554    11/13/19 0554  magnesium sulfate 3 gram infusion (1gm x 3) - Mg 1.1 - 1.5 mg/dL  As Needed      11/13/19 0554    11/13/19 0554  Magnesium Sulfate 2 gram infusion- Mg 1.6 - 1.9 mg/dL  As Needed      11/13/19 0554    11/12/19 1030  sodium chloride 0.9 % infusion  Continuous      11/12/19 0931    11/09/19 1052  traMADol (ULTRAM) tablet 50 mg  Daily PRN      11/09/19 1052    11/08/19 2100  rOPINIRole (REQUIP) tablet 0.25 mg  Nightly      11/08/19 0634    11/08/19 2100  atorvastatin (LIPITOR) tablet 10 mg  Nightly      11/08/19 0634    11/08/19 0900  cetirizine (zyrTEC) tablet 5 mg  Daily      11/08/19 0634    11/08/19 0900  valACYclovir  (VALTREX) tablet 1,000 mg  Daily      11/08/19 0634    11/08/19 0900  aspirin EC tablet 81 mg  Daily      11/08/19 0634    11/08/19 0900  cholecalciferol (VITAMIN D3) capsule 50,000 Units  Weekly      11/08/19 0634    11/08/19 0900  exemestane (AROMASIN) chemo tablet 25 mg  Daily      11/08/19 0634    11/08/19 0900  calcium carb-cholecalciferol 600-800 MG-UNIT tablet 1 tablet  Daily      11/08/19 0634    11/08/19 0900  carvedilol (COREG) tablet 12.5 mg  2 Times Daily      11/08/19 0634 11/08/19 0730  insulin aspart (novoLOG) injection 0-7 Units  4 Times Daily Before Meals & Nightly      11/08/19 0454 11/08/19 0700  POC Glucose 4x Daily AC & at Bedtime  4 Times Daily Before Meals & at Bedtime      11/08/19 0454    11/08/19 0600  pantoprazole (PROTONIX) EC tablet 40 mg  Every Early Morning      11/08/19 0214    11/08/19 0453  dextrose (GLUTOSE) oral gel 15 g  Every 15 Minutes PRN      11/08/19 0454    11/08/19 0453  dextrose (D50W) 25 g/ 50mL Intravenous Solution 25 g  Every 15 Minutes PRN      11/08/19 0454    11/08/19 0453  glucagon (human recombinant) (GLUCAGEN DIAGNOSTIC) injection 1 mg  Every 15 Minutes PRN      11/08/19 0454    11/08/19 0214  acetaminophen (TYLENOL) tablet 650 mg  Every 6 Hours PRN      11/08/19 0215    11/08/19 0214  hydrALAZINE (APRESOLINE) injection 10 mg  Every 6 Hours PRN      11/08/19 0214    11/08/19 0145  sodium chloride 0.9 % flush 10 mL  Every 12 Hours Scheduled      11/08/19 0046    11/08/19 0145  heparin (porcine) 5000 UNIT/ML injection 5,000 Units  Every 12 Hours Scheduled      11/08/19 0046    11/08/19 0046  sodium chloride 0.9 % flush 10 mL  As Needed      11/08/19 0046    11/08/19 0046  nitroglycerin (NITROSTAT) SL tablet 0.4 mg  Every 5 Minutes PRN      11/08/19 0046    11/07/19 1819  sodium chloride 0.9 % flush 10 mL  As Needed      11/07/19 1819    Unscheduled  ECG 12 Lead  As Needed      11/07/19 1819    Unscheduled  Telemetry - Pulse Oximetry  Continuous PRN      Comments:  If Patient Develops Unresponsiveness, Acute Dyspnea, Cyanosis or Suspected Hypoxemia Start Continuous Pulse Ox Monitoring, Apply Oxygen & Notify Provider    11/08/19 0046    Unscheduled  Oxygen Therapy- Nasal Cannula; Titrate for SPO2: 90% - 95%  Continuous PRN     Comments:  If Patient Develops Unresponsiveness, Acute Dyspnea, Cyanosis or Suspected Hypoxemia Start Continuous Pulse Ox Monitoring, Apply Oxygen & Notify Provider    11/08/19 0046    Unscheduled  ECG 12 Lead  As Needed     Comments:  Nurse to Release if Patient Expericences Acute Chest Pain or Dysrhythmias    11/08/19 0046    Unscheduled  Potassium  As Needed     Comments:  For Ventricular Arrhythmias      11/08/19 0046    Unscheduled  Magnesium  As Needed     Comments:  For Ventricular Arrhythmias      11/08/19 0046    Unscheduled  Troponin  As Needed     Comments:  For Chest Pain      11/08/19 0046    Unscheduled  Digoxin Level  As Needed     Comments:  For Atrial Arrhythmias      11/08/19 0046    Unscheduled  Blood Gas, Arterial  As Needed     Comments:  Per O2 PolicyNotify Physician      11/08/19 0046    Unscheduled  Up In Chair  As Needed      11/08/19 0214    Unscheduled  Up With Assistance  As Needed      11/08/19 0452    Unscheduled  Magnesium  As Needed      11/13/19 0552    Unscheduled  Potassium  As Needed      11/13/19 0552    Unscheduled  Magnesium  As Needed      11/15/19 0622    Unscheduled  Change Dressing to IV Site As Needed When Damp, Loose or Soiled  As Needed      11/15/19 1303    --  traMADol (ULTRAM) 50 MG tablet  Daily PRN      11/09/19 0844    --  SCANNED - TELEMETRY        11/07/19 0000    --  SCANNED - TELEMETRY        11/07/19 0000    --  SCANNED - TELEMETRY        11/07/19 0000    --  SCANNED - TELEMETRY        11/07/19 0000    --  SCANNED - TELEMETRY        11/07/19 0000    --  SCANNED - TELEMETRY        11/07/19 0000    --  SCANNED - TELEMETRY        11/07/19 0000    --  SCANNED - TELEMETRY        11/07/19  0000    --  SCANNED - TELEMETRY        19 0000    --  SCANNED - TELEMETRY        19 0000    Pending  Influenza Vac Subunit Quad (FLUCELVAX) injection 0.5 mL  During Hospitalization      Pending          Operative/Procedure Notes (last 24 hours) (Notes from 19 through 19)     No notes of this type exist for this encounter.           Physician Progress Notes (last 24 hours) (Notes from 19 through 19)      Shaq Jones MD at 19 1132              [facility] HOSPITALIST PROGRESS NOTE     Patient Identification:  Name:  Tahmina Martinez  Age:  74 y.o.  Sex:  female  :  1945  MRN:  [id number]  Visit Number:  [id number]  ROOM: [address] [place] [address]     Primary Care Provider:  Noe Bower MD    Length of stay in inpatient status:  12    Subjective     Chief Compliant:    Chief Complaint   Patient presents with   • Abdominal Pain   • Chest Pain       History of Presenting Illness:  75 yo female with recent gastroenteritis/pna.  Pt improved.  PT working with pt.  Pt decided to forego nh and go home.  Will go home tomorrow.    Objective     Current Hospital Meds:  aspirin 81 mg Oral Daily   atorvastatin 10 mg Oral Nightly   calcium carb-cholecalciferol 1 tablet Oral Daily   carvedilol 12.5 mg Oral BID   cetirizine 5 mg Oral Daily   cholecalciferol 50,000 Units Oral Weekly   exemestane 25 mg Oral Daily   fluticasone 2 spray Each Nare Daily   heparin (porcine) 5,000 Units Subcutaneous Q12H   insulin aspart 0-7 Units Subcutaneous 4x Daily AC & at Bedtime   metoclopramide 10 mg Intravenous 4x Daily AC & at Bedtime   metroNIDAZOLE 500 mg Intravenous Q8H   pantoprazole 40 mg Oral Q AM   rOPINIRole 0.25 mg Oral Nightly   sodium chloride 10 mL Intravenous Q12H   sodium chloride 10 mL Intravenous Q12H   valACYclovir 1,000 mg Oral Daily     sodium chloride 75 mL/hr Last Rate: 75 mL/hr (19)      ----------------------------------------------------------------------------------------------------------------------  Vital Signs:  Temp:  [97.4 °F (36.3 °C)-98.6 °F (37 °C)] 97.4 °F (36.3 °C)  Heart Rate:  [67-82] 74  Resp:  [20] 20  BP: (141-178)/(74-88) 172/74  SpO2:  [94 %-97 %] 95 %  on  Flow (L/min):  [2] 2;   Device (Oxygen Therapy): room air  Body mass index is 27.53 kg/m².    Wt Readings from Last 3 Encounters:   11/20/19 70.5 kg (155 lb 6.4 oz)   11/04/19 65.3 kg (143 lb 15.4 oz)   10/29/19 71 kg (156 lb 9.6 oz)     Intake & Output (last 3 days)       11/17 0701 - 11/18 0700 11/18 0701 - 11/19 0700 11/19 0701 - 11/20 0700 11/20 0701 - 11/21 0700    P.O.  240    I.V. (mL/kg)  950 (13.6) 900 (12.8)     IV Piggyback   200     Total Intake(mL/kg) 600 (8.4) 1790 (25.5) 2340 (33.2) 240 (3.4)    Urine (mL/kg/hr) 1550 (0.9) 2600 (1.5) 2800 (1.7)     Stool        Total Output 1550 2600 2800     Net -950 -810 -460 +240            Urine Unmeasured Occurrence 3 x  200 x         Diet Regular; GI Soft  ----------------------------------------------------------------------------------------------------------------------  Physical exam:  Constitutional:  nad  HEENT:nc/at  Neck:   supple  Cardiovascular: rrr   Pulmonary/Chest:  cta  Abdominal:  . postive bs soft nt on palp  Musculoskeletal:no arthropathy  Neurological:no focal defictis  Skin: no rash  Peripheral vascular:  Genitourinary:  ----------------------------------------------------------------------------------------------------------------------    Last echocardiogram:  Results for orders placed during the hospital encounter of 11/02/19   Transthoracic Echo Complete With Contrast if Necessary Per Protocol    Narrative · Left ventricular wall thickness is consistent with borderline concentric   hypertrophy.  · Left ventricular systolic function is normal.  · Estimated EF appears to be in the range of 66 - 70%.  · Left ventricular diastolic  dysfunction.  · Normal cardiac chamber dimensions  · Mild aortic valve regurgitation is present.  · Mild mitral valve regurgitation is present  · Mild tricuspid valve regurgitation is present. No evidence of pulmonary   hypertension is present  · There is no evidence of pericardial effusion        ----------------------------------------------------------------------------------------------------------------------  Results from last 7 days   Lab Units 11/19/19 0431 11/18/19 0554 11/17/19 0432 11/17/19 0431 11/16/19  0422   CRP mg/dL  --  2.72*  --  2.38* 4.18*   WBC 10*3/mm3 10.09 9.96 11.37*  --  9.60   HEMOGLOBIN g/dL 10.6* 10.5* 10.0*  --  9.3*   HEMATOCRIT % 29.7* 31.3* 30.3*  --  28.6*   MCV fL 100.7* 103.6* 105.6*  --  107.9*   MCHC g/dL 35.7 33.5 33.0  --  32.5   PLATELETS 10*3/mm3 260 223 201  --  212         Results from last 7 days   Lab Units 11/19/19 0431 11/18/19  0554 11/17/19  0627 11/17/19 0431 11/16/19  1747  11/15/19  0509   SODIUM mmol/L 139 138 138  --   --   --    < > 137   POTASSIUM mmol/L 2.9* 3.0* 4.0  --    < >  --    < > 3.9   MAGNESIUM mg/dL 1.8 1.7  --  1.8  --   --    < > 1.9   CHLORIDE mmol/L 107 108* 113*  --   --   --    < > 118*   CO2 mmol/L 20.2* 16.9* 14.3*  --   --   --    < > 12.1*   BUN mg/dL 3* 4* 5*  --   --   --    < > 14   CREATININE mg/dL 0.61 0.56* 0.58  --   --   --    < > 0.73   EGFR IF NONAFRICN AM mL/min/1.73 95 105 101  --   --   --    < > 78   CALCIUM mg/dL 8.1* 8.4* 8.7  --   --   --    < > 8.9   PHOSPHORUS mg/dL  --  2.8  --  3.1  --  2.8   < > 1.4*   GLUCOSE mg/dL 107* 97 76  --   --   --    < > 95   ALBUMIN g/dL  --  2.86* 2.83*  --   --   --   --  2.75*   BILIRUBIN mg/dL  --  0.3 0.3  --   --   --   --  <0.2*   ALK PHOS U/L  --  92 86  --   --   --   --  66   AST (SGOT) U/L  --  57* 41*  --   --   --   --  31   ALT (SGPT) U/L  --  39* 27  --   --   --   --  26    < > = values in this interval not displayed.   Estimated Creatinine Clearance: 56.3 mL/min  (by C-G formula based on SCr of 0.61 mg/dL).  No results found for: AMMONIA              Glucose   Date/Time Value Ref Range Status   11/20/2019 0730 96 70 - 130 mg/dL Final   11/19/2019 2035 107 70 - 130 mg/dL Final   11/19/2019 1600 143 (H) 70 - 130 mg/dL Final   11/19/2019 0958 129 70 - 130 mg/dL Final   11/19/2019 0618 99 70 - 130 mg/dL Final   11/18/2019 1916 69 (L) 70 - 130 mg/dL Final   11/18/2019 1649 230 (H) 70 - 130 mg/dL Final   11/18/2019 1003 134 (H) 70 - 130 mg/dL Final     Lab Results   Component Value Date    TSH 1.430 11/08/2019     No results found for: PREGTESTUR, PREGSERUM, HCG, HCGQUANT  Pain Management Panel     There is no flowsheet data to display.        Brief Urine Lab Results  (Last result in the past 365 days)      Color   Clarity   Blood   Leuk Est   Nitrite   Protein   CREAT   Urine HCG        11/13/19 1505 Yellow Clear Negative Negative Negative Trace                Results from last 7 days   Lab Units 11/13/19  1505   NITRITE UA  Negative              Results from last 7 days   Lab Units 11/18/19  0554 11/17/19  0431 11/16/19  0422 11/15/19  0509 11/14/19  0515   CRP mg/dL 2.72* 2.38* 4.18* 10.30* 19.46*       I have personally looked at the labs and they are summarized above.  ----------------------------------------------------------------------------------------------------------------------  Detailed radiology reports for the last 24 hours:    Imaging Results (Last 24 Hours)     ** No results found for the last 24 hours. **        Final impressions for the last 30 days of radiology reports:    Ct Abdomen Pelvis Without Contrast    Result Date: 11/7/2019  Cholelithiasis without CT evidence of cholecystitis or biliary obstruction. No renal or bowel obstruction or other acute abnormality. Normal appendix. Signer Name: Fabio Ambrose MD  Signed: 11/7/2019 11:20 PM  Workstation Name: [email or web address]  [facility]    Xr Shoulder 2+ View Left    Result Date:  11/11/2019  No acute osseous or articular abnormalities.  This report was finalized on 11/11/2019 12:37 PM by Dr. Brendan Orellana MD.      Ct Head Without Contrast    Result Date: 11/7/2019  No acute intracranial pathology. Nothing is seen on this exam to specifically account for the patient's symptoms.  This report was finalized on 11/7/2019 8:00 PM by Dr. Sal Cheek MD.      Ct Head Without Contrast    Result Date: 11/2/2019  1. No acute intracranial abnormality. Stable senescent changes. 2. Mucosal thickening bilateral maxillary sinuses. 3. Partial fluid opacification of the bilateral mastoid air cells. Signer Name: Shabbir Bella MD  Signed: 11/2/2019 10:42 PM  Workstation Name: [email or web address]  [facility] Gallbladder    Result Date: 11/12/2019  1. Cholelithiasis. 2. No sonographic evidence of inflammation.  This report was finalized on 11/12/2019 7:35 PM by Dr. Brendan Orellana MD.      Xr Chest 1 View    Result Date: 11/13/2019  Minimal left lower lobe airspace disease. Otherwise stable chest.  This report was finalized on 11/13/2019 11:36 AM by Dr. Brendan Orellana MD.      Xr Chest 1 View    Result Date: 11/7/2019  No evidence of active or acute cardiopulmonary disease on today's chest radiograph.  This report was finalized on 11/7/2019 7:19 PM by Dr. Sal Cheek MD.      Us Carotid Bilateral    Result Date: 11/11/2019   No evidence of hemodynamically significant plaques or stenosis within the carotid system at this time.  This report was finalized on 11/11/2019 1:14 PM by Dr. Sal Cheek MD.      Xr Abdomen Kub    Result Date: 11/12/2019  Nonobstructive bowel gas pattern.  This report was finalized on 11/12/2019 11:37 AM by Dr. Brendan Orellana MD.      I have personally looked at the radiology images and read the final radiology report.    Assessment & Plan     Gastroenteritis--rotavirus--improved    Debility--pt    pna resolved    daniel resolved    Hx aml    Hx  splenectomy      VTE Prophylaxis:   Mechanical Order History:     None      Pharmalogical Order History:     Ordered     Dose Route Frequency Stop    11/08/19 0046  heparin (porcine) 5000 UNIT/ML injection 5,000 Units      5,000 Units SC Every 12 Hours Scheduled --              Ines Monet MD  [facility] Hospitalist  11/20/19  11:32 AM    Electronically signed by Ines Monet MD at 11/20/19 1135       Consult Notes (last 24 hours) (Notes from 11/20/19 0934 through 11/21/19 0934)     No notes of this type exist for this encounter.        Physical Therapy Notes (last 24 hours) (Notes from 11/20/19 0934 through 11/21/19 0934)     No notes of this type exist for this encounter.        Occupational Therapy Notes (last 24 hours) (Notes from 11/20/19 0934 through 11/21/19 0934)     No notes of this type exist for this encounter.        Speech Language Pathology Notes (last 24 hours) (Notes from 11/20/19 0934 through 11/21/19 0934)     No notes of this type exist for this encounter.          Discharge Summary     No notes of this type exist for this encounter.        Discharge Order (From admission, onward)    Start     Ordered    11/21/19 0827  Discharge patient  Once     Expected Discharge Date:  11/21/19    Expected Discharge Time:  Morning    Discharge Disposition:  Home or Self Care    Physician of Record for Attribution - Please select from Treatment Team:  INES MONET [[id number]]    Review needed by CMO to determine Physician of Record:  No    Please choose which facility the patient is currently admitted if they are being discharged to another facility or unit.:   [facility]    Mode:  Family       Question Answer Comment   Physician of Record for Attribution - Please select from Treatment Team INES MONET    Review needed by CMO to determine Physician of Record No    Please choose which facility the patient is currently admitted if they are being discharged to another facility or unit.  [facility]    Mode: Family         11/21/19 0831

## 2019-11-21 NOTE — NURSING NOTE
Pt refused to have potassium lab kamilla. She was educated on the risk of having low potassium. Pt was encouraged and stop and get potassium that was called to her pharmacy and take as soon as she gets home. Pt states she understands the risks.

## 2019-11-21 NOTE — PROGRESS NOTES
Discharge Planning Assessment  KATTY Garcia     Patient Name: Tahmina Martinez  MRN: [id number]  Today's Date: 11/21/2019    Admit Date: 11/7/2019      Discharge Plan     Row Name 11/21/19 1604       Plan    Final Discharge Disposition Code  06 - home with home health care    Final Note  Pt discharged home on this date with Physician ordered home health.  Pt requested [facility].  SS made referral to [facility] per Ann.  Pt aware and agreeable.          CHENG MalinW

## 2019-11-21 NOTE — DISCHARGE PLACEMENT REQUEST
"Erica Jaramillo (74 y.o. Female)     Date of Birth Social Security Number Address Home Phone MRN    1945  [address] [facility] [address] [phone] [id number]    Anglican Marital Status          Roman Catholic        Admission Date Admission Type Admitting Provider Attending Provider Department, Room/Bed    11/7/19 Emergency Didi Ulloa, [facility] 3 SOUTH, 3304/1S    Discharge Date Discharge Disposition Discharge Destination        11/21/2019 Home or Self Care              Attending Provider:  (none)   Allergies:  Latex, Penicillins, Zithromax [Azithromycin]    Isolation:  Contact   Infection:  Rotavirus (11/12/19)   Code Status:  Prior    Ht:  160 cm (62.99\")   Wt:  70.4 kg (155 lb 4.8 oz)    Admission Cmt:  None   Principal Problem:  Chest pain in adult [R07.9]                 Active Insurance as of 11/7/2019     Primary Coverage     Payor Plan Insurance Group Employer/Plan Group    MEDICARE MEDICARE A & B      Payor Plan Address Payor Plan Phone Number Payor Plan Fax Number Effective Dates    [address] [phone]  3/1/2007 - None Entered    [facility] [id number]       Subscriber Name Subscriber Birth Date Member ID       ERICA JARAMILLO 1945 [id number]           Secondary Coverage     Payor Plan Insurance Group Employer/Plan Group    [place]      Payor Plan Address Payor Plan Phone Number Payor Plan Fax Number Effective Dates    [address] [phone]  5/31/2017 - None Entered    [place] [address]       Subscriber Name Subscriber Birth Date Member ERICA TRIMBLE 1945 [id number]                 Emergency Contacts      (Rel.) Home Phone Work Phone Mobile Phone    Bala Jaramillo (Spouse) [phone] -- --    Kamari Cummins (Son) [phone] -- [phone]               Physical Therapy Notes (all)      Karly Carranza, PT at 11/08/19 1543  Version 1 of 1         Acute Care - Physical Therapy Initial " Evaluation  KATTY Garcia     Patient Name: Tahmina Martinez  : 1945  MRN: [id number]  Today's Date: 2019   Onset of Illness/Injury or Date of Surgery: 19  Date of Referral to PT: 19  Referring Physician: Dr. Cheek      Admit Date: 2019    Visit Dx:     ICD-10-CM ICD-9-CM   1. Chest pain in adult R07.9 786.50   2. Renal insufficiency N28.9 593.9     Patient Active Problem List   Diagnosis   • History of Non-STEMI (non-ST elevated myocardial infarction) with no coronary intervention needed in , clinically stable.    • Dyspnea, class II-III.    • Essential hypertension   • History of Acute myelocytic leukemia,status post chemotherapy and bone marrow transplant in .   • Breast cancer (right), status post radiation therapy in 2015   • RBBB unchanged from    • Dyslipidemia   • Bifascicular block (RBBB plus LP FB)   • Chest pain in adult   • History of splenectomy   • Cholelithiasis   • Acute kidney injury (CMS/HCC)   • Hyperkalemia   • SIRS (systemic inflammatory response syndrome) (CMS/HCC)   • Hyperglycemia   • First degree AV block   • Macrocytosis without anemia   • Hypocarbia   • Type II diabetes mellitus (CMS/HCC)     Past Medical History:   Diagnosis Date   • Dyslipidemia 2017    On atorvastatin.    • Essential hypertension 10/12/2016   • First degree AV block 2019   • H/O splenectomy    • History of breast cancer        • Hypertension    • Myelocytic leukemia (CMS/HCC)    • Non-STEMI (non-ST elevated myocardial infarction) (CMS/HCC)    • Restless leg    • Type II diabetes mellitus (CMS/HCC) 2019     Past Surgical History:   Procedure Laterality Date   • BONE MARROW TRANSPLANT     •  SECTION     • SPLENECTOMY     • TUBAL ABDOMINAL LIGATION          PT ASSESSMENT (last 12 hours)      Physical Therapy Evaluation     Row Name 19 1534          PT Evaluation Time/Intention    Subjective Information  complains of;weakness  -BC     Document Type   evaluation  -BC     Mode of Treatment  individual therapy;physical therapy  -BC     Patient Effort  good  -BC     Row Name 11/08/19 1534          General Information    Patient Profile Reviewed?  yes  -BC     Onset of Illness/Injury or Date of Surgery  11/07/19  -BC     Referring Physician  Dr. Cheek  -BC     Patient Observations  alert;cooperative;agree to therapy  -BC     Prior Level of Function  independent:;all household mobility;community mobility  -BC     Equipment Currently Used at Home  cane, quad  -BC     Pertinent History of Current Functional Problem  admitted with chest pain  -BC     Existing Precautions/Restrictions  fall  -BC     Risks Reviewed  patient:;LOB;nausea/vomiting;dizziness;increased discomfort;change in vital signs;increased drainage;lines disloged  -BC     Benefits Reviewed  patient:;improve function;increase independence;increase strength;increase balance;decrease pain;decrease risk of DVT;improve skin integrity;increase knowledge  -BC     Row Name 11/08/19 1534          Relationship/Environment    Lives With  spouse  -BC     Row Name 11/08/19 1534          Resource/Environmental Concerns    Current Living Arrangements  home/apartment/condo  -BC     Row Name 11/08/19 1534          Cognitive Assessment/Intervention- PT/OT    Orientation Status (Cognition)  oriented x 4  -BC     Follows Commands (Cognition)  WFL  -BC     Row Name 11/08/19 1534          Mobility Assessment/Treatment    Extremity Weight-bearing Status  left lower extremity;right lower extremity  -BC     Left Lower Extremity (Weight-bearing Status)  weight-bearing as tolerated (WBAT)  -BC     Right Lower Extremity (Weight-bearing Status)  weight-bearing as tolerated (WBAT)  -BC     Row Name 11/08/19 1534          Bed Mobility Assessment/Treatment    Bed Mobility Assessment/Treatment  bed mobility (all) activities  -BC     [place] Level (Bed Mobility)  minimum assist (75% patient effort)  -BC     Assistive Device (Bed  Mobility)  bed rails  -BC     Row Name 11/08/19 1534          Transfer Assessment/Treatment    Transfer Assessment/Treatment  sit-stand transfer;stand-sit transfer  -BC     Maintains Weight-bearing Status (Transfers)  able to maintain  -BC     Sit-Stand [place] (Transfers)  minimum assist (75% patient effort)  -BC     Stand-Sit [place] (Transfers)  minimum assist (75% patient effort)  -BC     Row Name 11/08/19 1534          Sit-Stand Transfer    Assistive Device (Sit-Stand Transfers)  walker, front-wheeled  -BC     Row Name 11/08/19 1534          Stand-Sit Transfer    Assistive Device (Stand-Sit Transfers)  walker, front-wheeled  -BC     Row Name 11/08/19 1534          Gait/Stairs Assessment/Training    Gait/Stairs Assessment/Training  gait/ambulation independence  -BC     [place] Level (Gait)  minimum assist (75% patient effort)  -BC     Assistive Device (Gait)  walker, front-wheeled  -BC     Distance in Feet (Gait)  60  -BC     Row Name 11/08/19 1534          General ROM    GENERAL ROM COMMENTS  WFL BLE  -BC     Row Name 11/08/19 1534          MMT (Manual Muscle Testing)    General MMT Comments  BLE  4/5  -BC     Row Name 11/08/19 1534          Coping    Observed Emotional State  accepting  -BC     Verbalized Emotional State  acceptance  -BC     Row Name 11/08/19 1534          Plan of Care Review    Plan of Care Reviewed With  patient  -BC     Row Name 11/08/19 1534          Physical Therapy Clinical Impression    Date of Referral to PT  11/07/19  -BC     PT Diagnosis (PT Clinical Impression)  weakness  -BC     Functional Level at Time of Evaluation (PT Clinical Impression)  good  -BC     Criteria for Skilled Interventions Met (PT Clinical Impression)  no;no problems identified which require skilled intervention  -BC     Row Name 11/08/19 1534          Positioning and Restraints    Pre-Treatment Position  in bed  -BC     Post Treatment Position  bed  -BC     In Bed  notified nsg;call light within  reach;encouraged to call for assist  -BC       User Key  (r) = Recorded By, (t) = Taken By, (c) = Cosigned By    Initials Name Provider Type    BC Karly Carranza, PT Physical Therapist          PT Recommendation and Plan     Plan of Care Reviewed With: patient  Outcome Measures     Row Name 11/08/19 1500             How much help from another is currently needed...    Putting on and taking off regular lower body clothing?  3  -LM      Bathing (including washing, rinsing, and drying)  3  -LM      Toileting (which includes using toilet bed pan or urinal)  3  -LM      Putting on and taking off regular upper body clothing  4  -LM      Taking care of personal grooming (such as brushing teeth)  4  -LM      Eating meals  4  -LM      AM-PAC 6 Clicks Score (OT)  21  -LM         Functional Assessment    Outcome Measure Options  AM-PAC 6 Clicks Daily Activity (OT)  -        User Key  (r) = Recorded By, (t) = Taken By, (c) = Cosigned By    Initials Name Provider Type     María Monique, OT Occupational Therapist         Time Calculation:   PT Charges     Row Name 11/08/19 1543             Time Calculation    PT Received On  11/08/19  -BC         Time Calculation- PT    Total Timed Code Minutes- PT  60 minute(s)  -BC        User Key  (r) = Recorded By, (t) = Taken By, (c) = Cosigned By    Initials Name Provider Type    BC Karly Carranza, PT Physical Therapist        Therapy Charges for Today     Code Description Service Date Service Provider Modifiers Qty    29860674750 HC PT EVAL MOD COMPLEXITY 3 11/8/2019 Karly Carranza, PT GP 1          PT G-Codes  Outcome Measure Options: AM-PAC 6 Clicks Daily Activity (OT)  AM-PAC 6 Clicks Score (OT): 21      Karly Carranza, PT  11/8/2019          Electronically signed by Karly Carranza, PT at 11/08/19 1544     Nakia Stone PT at 11/13/19 1430  Version 1 of 1 11/13/19 1429   Rehab Time/Intention   Evaluation Not Performed patient/family decline, not feeling  well  (Pt declined eval d/t not feeling well. Will check back next date. )   Rehab Treatment   Discipline physical therapist       Electronically signed by Nakia Stone, PT at 19 5086     Nakia Stone, PT at 19 8053  Version 1 of 1         Acute Care - Physical Therapy Initial Evaluation  KATTY Garcia     Patient Name: Tahmina Martinez  : 1945  MRN: [id number]  Today's Date: 2019   Onset of Illness/Injury or Date of Surgery: 19  Date of Referral to PT: 11/10/19  Referring Physician: Shayan      Admit Date: 2019    Visit Dx:     ICD-10-CM ICD-9-CM   1. Chest pain in adult R07.9 786.50   2. Renal insufficiency N28.9 593.9     Patient Active Problem List   Diagnosis   • History of Non-STEMI (non-ST elevated myocardial infarction) with no coronary intervention needed in , clinically stable.    • Dyspnea, class II-III.    • Essential hypertension   • History of Acute myelocytic leukemia,status post chemotherapy and bone marrow transplant in .   • Breast cancer (right), status post radiation therapy in 2015   • RBBB unchanged from 2015   • Dyslipidemia   • Bifascicular block (RBBB plus LP FB)   • Chest pain in adult   • History of splenectomy   • Cholelithiasis   • Acute kidney injury (CMS/HCC)   • Hyperkalemia   • Hyperglycemia   • First degree AV block   • Macrocytosis without anemia   • Hypocarbia   • Type II diabetes mellitus (CMS/HCC)     Past Medical History:   Diagnosis Date   • Dyslipidemia 2017    On atorvastatin.    • Essential hypertension 10/12/2016   • First degree AV block 2019   • H/O splenectomy    • History of breast cancer        • Hypertension    • Myelocytic leukemia (CMS/HCC)    • Non-STEMI (non-ST elevated myocardial infarction) (CMS/HCC)    • Restless leg    • Type II diabetes mellitus (CMS/HCC) 2019     Past Surgical History:   Procedure Laterality Date   • BONE MARROW TRANSPLANT     •  SECTION     • SPLENECTOMY     •  TUBAL ABDOMINAL LIGATION          PT ASSESSMENT (last 12 hours)      Physical Therapy Evaluation     Row Name 11/14/19 1436          PT Evaluation Time/Intention    Subjective Information  complains of;weakness;fatigue;nausea/vomiting  -CT     Document Type  evaluation  -CT     Mode of Treatment  physical therapy  -CT     Patient Effort  good  -CT     Symptoms Noted During/After Treatment  fatigue  -CT     Comment  Pt evaluated again today d/t reported decline. Pt found to be Min A with all functional mobility and was able to ambulate 20 ft. Pt would benefit from skilled PT services to increase overall functional mobility and return to independence.   -CT     Row Name 11/14/19 1436          General Information    Patient Profile Reviewed?  yes  -CT     Onset of Illness/Injury or Date of Surgery  11/07/19  -CT     Referring Physician  Shayan  -CT     Patient Observations  alert;cooperative;agree to therapy  -CT     Prior Level of Function  independent:;all household mobility;community mobility  -CT     Equipment Currently Used at Home  cane, quad  -CT     Existing Precautions/Restrictions  fall  -CT     Equipment Issued to Patient  gait belt  -CT     Risks Reviewed  patient:;LOB;nausea/vomiting;dizziness;increased discomfort;change in vital signs;increased drainage;lines disloged  -CT     Benefits Reviewed  patient:;improve function;increase independence;increase strength;increase balance;decrease pain;decrease risk of DVT;improve skin integrity;increase knowledge  -CT     Barriers to Rehab  medically complex  -CT     Row Name 11/14/19 1436          Relationship/Environment    Lives With  spouse  -CT     Row Name 11/14/19 1436          Resource/Environmental Concerns    Current Living Arrangements  home/apartment/condo  -CT     Row Name 11/14/19 1436          Living Environment    Home Accessibility  stairs to enter home;stairs within home  -CT     Row Name 11/14/19 1436          Stairs Within Home, Primary     Stairs, Within Home, Primary  14  -CT     Number of Stairs, Within Home, Primary  other (see comments) 14  -CT     Row Name 11/14/19 1436          Cognitive Assessment/Intervention- PT/OT    Orientation Status (Cognition)  oriented x 4  -CT     Follows Commands (Cognition)  WFL  -CT     Row Name 11/14/19 1436          Safety Issues, Functional Mobility    Impairments Affecting Function (Mobility)  endurance/activity tolerance;strength  -CT     Row Name 11/14/19 1436          Bed Mobility Assessment/Treatment    Bed Mobility Assessment/Treatment  bed mobility (all) activities  -CT     [place] Level (Bed Mobility)  minimum assist (75% patient effort)  -CT     Assistive Device (Bed Mobility)  bed rails  -CT     Row Name 11/14/19 1436          Transfer Assessment/Treatment    Transfer Assessment/Treatment  sit-stand transfer;stand-sit transfer  -CT     Sit-Stand [place] (Transfers)  minimum assist (75% patient effort)  -CT     Stand-Sit [place] (Transfers)  minimum assist (75% patient effort)  -CT     Row Name 11/14/19 1436          Sit-Stand Transfer    Assistive Device (Sit-Stand Transfers)  cane, quad  -CT     Row Name 11/14/19 1436          Stand-Sit Transfer    Assistive Device (Stand-Sit Transfers)  cane, quad  -CT     Row Name 11/14/19 1436          Gait/Stairs Assessment/Training    [place] Level (Gait)  minimum assist (75% patient effort)  -CT     Assistive Device (Gait)  cane, quad  -CT     Distance in Feet (Gait)  20  -CT     Pattern (Gait)  step-to  -CT     Deviations/Abnormal Patterns (Gait)  base of support, narrow;festinating/shuffling;gait speed decreased  -CT     Bilateral Gait Deviations  forward flexed posture;weight shift ability decreased  -CT     Row Name 11/14/19 1436          General ROM    GENERAL ROM COMMENTS  BLE grossly WFL  -CT     Row Name 11/14/19 1436          MMT (Manual Muscle Testing)    General MMT Comments  BLE grossly 4-/5  -CT     Row Name 11/14/19 1436           Pain Assessment    Additional Documentation  -- nausea and vomiting reported but no pain  -CT     Row Name 11/14/19 1436          Plan of Care Review    Plan of Care Reviewed With  patient  -CT     Row Name 11/14/19 1436          Physical Therapy Clinical Impression    Date of Referral to PT  11/10/19  -CT     PT Diagnosis (PT Clinical Impression)  impaired functional mobility  -CT     Prognosis (PT Clinical Impression)  good  -CT     Functional Level at Time of Evaluation (PT Clinical Impression)  Min A   -CT     Patient/Family Goals Statement (PT Clinical Impression)  Pt goals are to get better and go home  -CT     Criteria for Skilled Interventions Met (PT Clinical Impression)  yes;treatment indicated  -CT     Pathology/Pathophysiology Noted (Describe Specifically for Each System)  musculoskeletal;neuromuscular  -CT     Impairments Found (describe specific impairments)  aerobic capacity/endurance;gait, locomotion, and balance  -CT     Functional Limitations in Following Categories (Describe Specific Limitations)  self-care;home management  -CT     Rehab Potential (PT Clinical Summary)  good, to achieve stated therapy goals  -CT     Predicted Duration of Therapy (PT)  length of stay  -CT     Care Plan Review (PT)  evaluation/treatment results reviewed;care plan/treatment goals reviewed;risks/benefits reviewed;current/potential barriers reviewed;patient/other agree to care plan  -CT     Row Name 11/14/19 1436          Physical Therapy Goals    Bed Mobility Goal Selection (PT)  bed mobility, PT goal 1  -CT     Transfer Goal Selection (PT)  transfer, PT goal 1  -CT     Gait Training Goal Selection (PT)  gait training, PT goal 1  -CT     Row Name 11/14/19 1436          Bed Mobility Goal 1 (PT)    Activity/Assistive Device (Bed Mobility Goal 1, PT)  bed mobility activities, all  -CT     [place] Level/Cues Needed (Bed Mobility Goal 1, PT)  contact guard assist  -CT     Time Frame (Bed Mobility Goal 1, PT)  by  discharge  -CT     Row Name 11/14/19 1436          Transfer Goal 1 (PT)    Activity/Assistive Device (Transfer Goal 1, PT)  sit-to-stand/stand-to-sit;bed-to-chair/chair-to-bed  -CT     [place] Level/Cues Needed (Transfer Goal 1, PT)  contact guard assist  -CT     Time Frame (Transfer Goal 1, PT)  by discharge  -CT     Row Name 11/14/19 1436          Gait Training Goal 1 (PT)    Activity/Assistive Device (Gait Training Goal 1, PT)  gait (walking locomotion);assistive device use  -CT     [place] Level (Gait Training Goal 1, PT)  contact guard assist  -CT     Time Frame (Gait Training Goal 1, PT)  by discharge  -CT     Row Name 11/14/19 1436          Positioning and Restraints    Pre-Treatment Position  in bed  -CT     Post Treatment Position  chair  -CT     In Chair  sitting;call light within reach;encouraged to call for assist;notified nsg  -CT       User Key  (r) = Recorded By, (t) = Taken By, (c) = Cosigned By    Initials Name Provider Type    CT Nakia Stone PT Physical Therapist        Physical Therapy Education     Title: PT OT SLP Therapies (Done)     Topic: Physical Therapy (Done)     Point: Mobility training (Done)     Learning Progress Summary           Patient Acceptance, E,TB, VU by CT at 11/14/2019  2:54 PM                   Point: Home exercise program (Done)     Learning Progress Summary           Patient Acceptance, E,TB, VU by CT at 11/14/2019  2:54 PM                   Point: Body mechanics (Done)     Learning Progress Summary           Patient Acceptance, E,TB, VU by CT at 11/14/2019  2:54 PM                   Point: Precautions (Done)     Learning Progress Summary           Patient Acceptance, E,TB, VU by CT at 11/14/2019  2:54 PM                               User Key     Initials Effective Dates Name Provider Type Discipline    CT 04/03/18 -  Nakia Stone PT Physical Therapist PT              PT Recommendation and Plan  Anticipated Discharge Disposition (PT): inpatient  rehabilitation facility  Planned Therapy Interventions (PT Eval): balance training, bed mobility training, gait training, home exercise program, manual therapy techniques, motor coordination training, neuromuscular re-education, patient/family education, postural re-education, ROM (range of motion), stair training, strengthening, transfer training  Therapy Frequency (PT Clinical Impression): 3 times/wk(3-5 times/wk)  Outcome Summary/Treatment Plan (PT)  Anticipated Equipment Needs at Discharge (PT): (tbd)  Anticipated Discharge Disposition (PT): inpatient rehabilitation facility  Plan of Care Reviewed With: patient     Time Calculation:   PT Charges     Row Name 19 1454             Time Calculation    PT Received On  19  -CT      PT Goal Re-Cert Due Date  19  -CT        User Key  (r) = Recorded By, (t) = Taken By, (c) = Cosigned By    Initials Name Provider Type    CT Nakia Stone, PT Physical Therapist        Therapy Charges for Today     Code Description Service Date Service Provider Modifiers Qty    61214242082 HC PT EVAL LOW COMPLEXITY 4 2019 Nakia Stone, PT GP 1          PT G-Codes  Outcome Measure Options: AM-PAC 6 Clicks Daily Activity (OT)  AM-PAC 6 Clicks Score (OT): 21      Nakia Stone PT  2019          Electronically signed by Nakia Stone, PT at 19 6167     Nakia Stone PT at 11/15/19 1356  Version 1 of 1         Acute Care - Physical Therapy Treatment Note  KATTY Garcia     Patient Name: Tahmina Martinez  : 1945  MRN: [id number]  Today's Date: 11/15/2019  Onset of Illness/Injury or Date of Surgery: 19  Date of Referral to PT: 11/10/19  Referring Physician: Shayan    Admit Date: 2019    Visit Dx:    ICD-10-CM ICD-9-CM   1. Chest pain in adult R07.9 786.50   2. Renal insufficiency N28.9 593.9     Patient Active Problem List   Diagnosis   • History of Non-STEMI (non-ST elevated myocardial infarction) with no coronary intervention needed  in 2008, clinically stable.    • Dyspnea, class II-III.    • Essential hypertension   • History of Acute myelocytic leukemia,status post chemotherapy and bone marrow transplant in 2005.   • Breast cancer (right), status post radiation therapy in 08/2015   • RBBB unchanged from 2015   • Dyslipidemia   • Bifascicular block (RBBB plus LP FB)   • Chest pain in adult   • History of splenectomy   • Cholelithiasis   • Acute kidney injury (CMS/HCC)   • Hyperkalemia   • Hyperglycemia   • First degree AV block   • Macrocytosis without anemia   • Hypocarbia   • Type II diabetes mellitus (CMS/HCC)       Therapy Treatment    Rehabilitation Treatment Summary     Row Name 11/15/19 1311             Treatment Time/Intention    Discipline  physical therapist  -CT      Document Type  therapy note (daily note)  -CT      Subjective Information  complains of;weakness;fatigue  -CT      Mode of Treatment  physical therapy  -CT      Therapy Frequency (PT Clinical Impression)  3 times/wk 3-5 times/wk  -CT      Patient Effort  good  -CT      Comment  Pt tolerated treatment session well with rest breaks provided as needed.   -CT      Existing Precautions/Restrictions  fall  -CT      Recorded by [CT] Nakia Stone, PT 11/15/19 1323      Row Name 11/15/19 1311             Cognitive Assessment/Intervention- PT/OT    Orientation Status (Cognition)  oriented x 4  -CT      Follows Commands (Cognition)  WFL  -CT      Recorded by [CT] Nakia Stone, PT 11/15/19 1323      Row Name 11/15/19 1311             Safety Issues, Functional Mobility    Impairments Affecting Function (Mobility)  endurance/activity tolerance;strength  -CT      Recorded by [CT] Nakia Stone, PT 11/15/19 1323      Row Name 11/15/19 1311             Bed Mobility Assessment/Treatment    Bed Mobility Assessment/Treatment  bed mobility (all) activities  -CT      [place] Level (Bed Mobility)  minimum assist (75% patient effort)  -CT      Assistive Device (Bed Mobility)  bed  rails  -CT      Recorded by [CT] Nakia tSone, PT 11/15/19 1323      Row Name 11/15/19 1311             Transfer Assessment/Treatment    Transfer Assessment/Treatment  sit-stand transfer;stand-sit transfer  -CT      Recorded by [CT] Nakia Stone, PT 11/15/19 1323      Row Name 11/15/19 1311             Sit-Stand Transfer    Sit-Stand [place] (Transfers)  minimum assist (75% patient effort)  -CT      Assistive Device (Sit-Stand Transfers)  walker, front-wheeled  -CT2      Recorded by [CT] Nakia Stone, PT 11/15/19 1323  [CT2] Nakia Stone, PT 11/15/19 1355      Row Name 11/15/19 1311             Stand-Sit Transfer    Stand-Sit [place] (Transfers)  minimum assist (75% patient effort)  -CT      Assistive Device (Stand-Sit Transfers)  walker, front-wheeled  -CT2      Recorded by [CT] Nakia Stone, PT 11/15/19 1323  [CT2] Nakia Stone, PT 11/15/19 1355      Row Name 11/15/19 1311             Gait/Stairs Assessment/Training    [place] Level (Gait)  minimum assist (75% patient effort)  -CT      Assistive Device (Gait)  walker, front-wheeled  -CT2      Distance in Feet (Gait)  40  -CT2      Pattern (Gait)  step-to  -CT      Deviations/Abnormal Patterns (Gait)  base of support, narrow;festinating/shuffling;gait speed decreased  -CT      Bilateral Gait Deviations  forward flexed posture;weight shift ability decreased  -CT      Recorded by [CT] Nakia Stone, PT 11/15/19 1323  [CT2] Nakia Stone, PT 11/15/19 1355      Row Name 11/15/19 1311             Positioning and Restraints    Pre-Treatment Position  in bed  -CT      Post Treatment Position  chair  -CT      In Chair  sitting;call light within reach;encouraged to call for assist;notified nsg  -CT      Recorded by [CT] Nakia Stone, PT 11/15/19 1355      Row Name 11/15/19 1311             Plan of Care Review    Plan of Care Reviewed With  patient  -CT      Recorded by [CT] Nakia Stone, PT 11/15/19 1355      Row Name 11/15/19 1311              Outcome Summary/Treatment Plan (PT)    Anticipated Equipment Needs at Discharge (PT)  -- tbd  -CT      Anticipated Discharge Disposition (PT)  inpatient rehabilitation facility  -CT      Recorded by [CT] Nakia Stone, PT 11/15/19 1323        User Key  (r) = Recorded By, (t) = Taken By, (c) = Cosigned By    Initials Name Effective Dates Discipline    CT Nakia Stone, PT 04/03/18 -  PT                   Physical Therapy Education     Title: PT OT SLP Therapies (Done)     Topic: Physical Therapy (Done)     Point: Mobility training (Done)     Learning Progress Summary           Patient Acceptance, E,TB, VU by CT at 11/15/2019  1:55 PM    Acceptance, E, VU by EA at 11/14/2019 10:27 PM    Acceptance, E,TB, VU by CT at 11/14/2019  2:54 PM                   Point: Home exercise program (Done)     Learning Progress Summary           Patient Acceptance, E,TB, VU by CT at 11/15/2019  1:55 PM    Acceptance, E, VU by EA at 11/14/2019 10:27 PM    Acceptance, E,TB, VU by CT at 11/14/2019  2:54 PM                   Point: Body mechanics (Done)     Learning Progress Summary           Patient Acceptance, E,TB, VU by CT at 11/15/2019  1:55 PM    Acceptance, E, VU by EA at 11/14/2019 10:27 PM    Acceptance, E,TB, VU by CT at 11/14/2019  2:54 PM                   Point: Precautions (Done)     Learning Progress Summary           Patient Acceptance, E,TB, VU by CT at 11/15/2019  1:55 PM    Acceptance, E, VU by EA at 11/14/2019 10:27 PM    Acceptance, E,TB, VU by CT at 11/14/2019  2:54 PM                               User Key     Initials Effective Dates Name Provider Type Discipline    EA 06/16/16 -  Joy Weiner, RN Registered Nurse Nurse    CT 04/03/18 -  Nakia Stone, PT Physical Therapist PT                PT Recommendation and Plan  Anticipated Discharge Disposition (PT): inpatient rehabilitation facility  Planned Therapy Interventions (PT Eval): balance training, bed mobility training, gait training,  home exercise program, manual therapy techniques, motor coordination training, neuromuscular re-education, patient/family education, postural re-education, ROM (range of motion), stair training, strengthening, transfer training  Therapy Frequency (PT Clinical Impression): 3 times/wk(3-5 times/wk)  Outcome Summary/Treatment Plan (PT)  Anticipated Equipment Needs at Discharge (PT): (tbd)  Anticipated Discharge Disposition (PT): inpatient rehabilitation facility  Plan of Care Reviewed With: patient     Time Calculation:   PT Charges     Row Name 11/15/19 1355             Time Calculation    PT Received On  11/15/19  -CT      PT Goal Re-Cert Due Date  11/28/19  -CT         Time Calculation- PT    Total Timed Code Minutes- PT  27 minute(s)  -CT        User Key  (r) = Recorded By, (t) = Taken By, (c) = Cosigned By    Initials Name Provider Type    CT Naika Stone, PT Physical Therapist        Therapy Charges for Today     Code Description Service Date Service Provider Modifiers Qty    44783192687 HC PT EVAL LOW COMPLEXITY 4 11/14/2019 Nakia Stone, PT GP 1    78978393853 HC GAIT TRAINING EA 15 MIN 11/15/2019 Nakia Stone, PT GP 1    78648090752 HC PT THERAPEUTIC ACT EA 15 MIN 11/15/2019 Nakia Stone, PT GP 1          PT G-Codes  Outcome Measure Options: AM-PAC 6 Clicks Daily Activity (OT)  AM-PAC 6 Clicks Score (OT): 21    Nakia Stone PT  11/15/2019         Electronically signed by Nakia Stone, PT at 11/15/19 1356     Lianna Byrne, PT at 11/18/19 1657  Version 1 of 1 11/18/19 1656   Rehab Treatment   Discipline physical therapist   Reason Treatment Not Performed patient/family declined treatment, not feeling well  (Pt was nauseas and requested therapist come back tomorrow.)       Electronically signed by Lianna Byrne, PT at 11/18/19 7220     Nakia Stone, PT at 11/19/19 1506  Version 1 of 1         Acute Care - Physical Therapy Treatment Note  KATTY Garcia     Patient Name: Tahmina  Michelle  : 1945  MRN: [id number]  Today's Date: 2019  Onset of Illness/Injury or Date of Surgery: 19  Date of Referral to PT: 11/10/19  Referring Physician: Shayan    Admit Date: 2019    Visit Dx:    ICD-10-CM ICD-9-CM   1. Chest pain in adult R07.9 786.50   2. Renal insufficiency N28.9 593.9     Patient Active Problem List   Diagnosis   • History of Non-STEMI (non-ST elevated myocardial infarction) with no coronary intervention needed in , clinically stable.    • Dyspnea, class II-III.    • Essential hypertension   • History of Acute myelocytic leukemia,status post chemotherapy and bone marrow transplant in .   • Breast cancer (right), status post radiation therapy in 2015   • RBBB unchanged from    • Dyslipidemia   • Bifascicular block (RBBB plus LP FB)   • Chest pain in adult   • History of splenectomy   • Cholelithiasis   • Acute kidney injury (CMS/HCC)   • Hyperkalemia   • Hyperglycemia   • First degree AV block   • Macrocytosis without anemia   • Hypocarbia   • Type II diabetes mellitus (CMS/HCC)       Therapy Treatment    Rehabilitation Treatment Summary     Row Name 19 1459             Treatment Time/Intention    Discipline  physical therapist  -CT      Document Type  therapy note (daily note)  -CT      Subjective Information  complains of;weakness  -CT      Mode of Treatment  physical therapy  -CT      Therapy Frequency (PT Clinical Impression)  3 times/wk 3-5 times/wk  -CT      Patient Effort  good  -CT      Comment  Pt continues to tolerated therapy well with rest breaks provided as needed.   -CT      Existing Precautions/Restrictions  fall  -CT      Recorded by [CT] Nakia Stone, PT 19 2567      Row Name 19 3886             Cognitive Assessment/Intervention- PT/OT    Orientation Status (Cognition)  oriented x 4  -CT      Follows Commands (Cognition)  WFL  -CT      Recorded by [CT] Nakia Stone, PT 19 4104      Row Name 19 3982              Safety Issues, Functional Mobility    Impairments Affecting Function (Mobility)  endurance/activity tolerance;strength  -CT      Recorded by [CT] Nakia Stone, PT 11/19/19 1503      Row Name 11/19/19 1459             Bed Mobility Assessment/Treatment    Bed Mobility Assessment/Treatment  bed mobility (all) activities  -CT      [place] Level (Bed Mobility)  minimum assist (75% patient effort)  -CT      Assistive Device (Bed Mobility)  bed rails  -CT      Recorded by [CT] Nakia Stone, PT 11/19/19 1503      Row Name 11/19/19 1458             Transfer Assessment/Treatment    Transfer Assessment/Treatment  sit-stand transfer;stand-sit transfer  -CT      Recorded by [CT] Nakia Stone, PT 11/19/19 1503      Row Name 11/19/19 145             Sit-Stand Transfer    Sit-Stand [place] (Transfers)  minimum assist (75% patient effort)  -CT      Assistive Device (Sit-Stand Transfers)  walker, front-wheeled  -CT      Recorded by [CT] Nakia Stone, PT 11/19/19 1503      Row Name 11/19/19 2965             Stand-Sit Transfer    Stand-Sit [place] (Transfers)  minimum assist (75% patient effort)  -CT      Assistive Device (Stand-Sit Transfers)  walker, front-wheeled  -CT      Recorded by [CT] Nakia Stone, PT 11/19/19 1503      Row Name 11/19/19 1450             Gait/Stairs Assessment/Training    [place] Level (Gait)  minimum assist (75% patient effort)  -CT      Assistive Device (Gait)  walker, front-wheeled  -CT      Distance in Feet (Gait)  60  -CT      Pattern (Gait)  step-to  -CT      Deviations/Abnormal Patterns (Gait)  base of support, narrow;festinating/shuffling;gait speed decreased  -CT      Bilateral Gait Deviations  forward flexed posture;weight shift ability decreased  -CT      Recorded by [CT] Nakia Stone, PT 11/19/19 1503      Row Name 11/19/19 1458             Positioning and Restraints    Pre-Treatment Position  in bed  -CT      Post Treatment Position  chair  -CT       In Chair  sitting;call light within reach;encouraged to call for assist  -CT      Recorded by [CT] Nakia Stone, PT 11/19/19 1503      Row Name 11/19/19 1459             Plan of Care Review    Plan of Care Reviewed With  patient  -CT      Recorded by [CT] Nakia Stone, PT 11/19/19 1503      Row Name 11/19/19 1459             Outcome Summary/Treatment Plan (PT)    Daily Summary of Progress (PT)  progress toward functional goals is good  -CT      Anticipated Equipment Needs at Discharge (PT)  -- tbd  -CT      Anticipated Discharge Disposition (PT)  inpatient rehabilitation facility  -CT      Recorded by [CT] Nakia Stone, PT 11/19/19 1503        User Key  (r) = Recorded By, (t) = Taken By, (c) = Cosigned By    Initials Name Effective Dates Discipline    CT Nakia Stone, PT 04/03/18 -  PT                   Physical Therapy Education     Title: PT OT SLP Therapies (Done)     Topic: Physical Therapy (Done)     Point: Mobility training (Done)     Learning Progress Summary           Patient Acceptance, E,TB, VU by CT at 11/19/2019  3:03 PM    Acceptance, E, VU by EA at 11/16/2019  9:03 PM    Acceptance, E, VU by EA at 11/15/2019 11:01 PM    Acceptance, E,TB, VU by CT at 11/15/2019  1:55 PM    Acceptance, E, VU by EA at 11/14/2019 10:27 PM    Acceptance, E,TB, VU by CT at 11/14/2019  2:54 PM                   Point: Home exercise program (Done)     Learning Progress Summary           Patient Acceptance, E,TB, VU by CT at 11/19/2019  3:03 PM    Acceptance, E, VU by EA at 11/16/2019  9:03 PM    Acceptance, E, VU by EA at 11/15/2019 11:01 PM    Acceptance, E,TB, VU by CT at 11/15/2019  1:55 PM    Acceptance, E, VU by EA at 11/14/2019 10:27 PM    Acceptance, E,TB, VU by CT at 11/14/2019  2:54 PM                   Point: Body mechanics (Done)     Learning Progress Summary           Patient Acceptance, E,TB, VU by CT at 11/19/2019  3:03 PM    Acceptance, E, VU by EA at 11/16/2019  9:03 PM    Acceptance, E, VU by EA at  11/15/2019 11:01 PM    Acceptance, E,TB, VU by CT at 11/15/2019  1:55 PM    Acceptance, E, VU by EA at 11/14/2019 10:27 PM    Acceptance, E,TB, VU by CT at 11/14/2019  2:54 PM                   Point: Precautions (Done)     Learning Progress Summary           Patient Acceptance, E,TB, VU by CT at 11/19/2019  3:03 PM    Acceptance, E, VU by EA at 11/16/2019  9:03 PM    Acceptance, E, VU by EA at 11/15/2019 11:01 PM    Acceptance, E,TB, VU by CT at 11/15/2019  1:55 PM    Acceptance, E, VU by EA at 11/14/2019 10:27 PM    Acceptance, E,TB, VU by CT at 11/14/2019  2:54 PM                               User Key     Initials Effective Dates Name Provider Type Discipline    EA 06/16/16 -  Joy Weiner, RN Registered Nurse Nurse    CT 04/03/18 -  Nakia Stone, PT Physical Therapist PT                PT Recommendation and Plan  Anticipated Discharge Disposition (PT): inpatient rehabilitation facility  Planned Therapy Interventions (PT Eval): balance training, bed mobility training, gait training, home exercise program, manual therapy techniques, motor coordination training, neuromuscular re-education, patient/family education, postural re-education, ROM (range of motion), stair training, strengthening, transfer training  Therapy Frequency (PT Clinical Impression): 3 times/wk(3-5 times/wk)  Outcome Summary/Treatment Plan (PT)  Daily Summary of Progress (PT): progress toward functional goals is good  Anticipated Equipment Needs at Discharge (PT): (tbd)  Anticipated Discharge Disposition (PT): inpatient rehabilitation facility  Plan of Care Reviewed With: patient     Time Calculation:   PT Charges     Row Name 11/19/19 1505             Time Calculation    PT Received On  11/19/19  -CT      PT Goal Re-Cert Due Date  11/28/19  -CT         Time Calculation- PT    Total Timed Code Minutes- PT  32 minute(s)  -CT        User Key  (r) = Recorded By, (t) = Taken By, (c) = Cosigned By    Initials Name Provider Type    CT  Nakia Stone, PT Physical Therapist        Therapy Charges for Today     Code Description Service Date Service Provider Modifiers Qty    90783665221 HC GAIT TRAINING EA 15 MIN 2019 Nakia Stone, PT GP 1    [id number]  PT THERAPEUTIC ACT EA 15 MIN 2019 Nakia Stone, PT GP 1          PT G-Codes  Outcome Measure Options: AM-PAC 6 Clicks Daily Activity (OT)  AM-PAC 6 Clicks Score (OT): 21    Nakia Stone PT  2019         Electronically signed by Nakia Stone, PT at 19 1508          Occupational Therapy Notes (all)      María Monique, OT at 11/15/19 1526          Acute Care - Occupational Therapy Treatment Note  KATTY Garcia     Patient Name: Tahmina Martinez  : 1945  MRN: [id number]  Today's Date: 11/15/2019  Onset of Illness/Injury or Date of Surgery: 19     Referring Physician: Shayan    Admit Date: 2019       ICD-10-CM ICD-9-CM   1. Chest pain in adult R07.9 786.50   2. Renal insufficiency N28.9 593.9     Patient Active Problem List   Diagnosis   • History of Non-STEMI (non-ST elevated myocardial infarction) with no coronary intervention needed in , clinically stable.    • Dyspnea, class II-III.    • Essential hypertension   • History of Acute myelocytic leukemia,status post chemotherapy and bone marrow transplant in .   • Breast cancer (right), status post radiation therapy in 2015   • RBBB unchanged from 2015   • Dyslipidemia   • Bifascicular block (RBBB plus LP FB)   • Chest pain in adult   • History of splenectomy   • Cholelithiasis   • Acute kidney injury (CMS/HCC)   • Hyperkalemia   • Hyperglycemia   • First degree AV block   • Macrocytosis without anemia   • Hypocarbia   • Type II diabetes mellitus (CMS/HCC)     Past Medical History:   Diagnosis Date   • Dyslipidemia 2017    On atorvastatin.    • Essential hypertension 10/12/2016   • First degree AV block 2019   • H/O splenectomy    • History of breast cancer        •  Hypertension    • Myelocytic leukemia (CMS/HCC)    • Non-STEMI (non-ST elevated myocardial infarction) (CMS/HCC)    • Restless leg    • Type II diabetes mellitus (CMS/HCC) 2019     Past Surgical History:   Procedure Laterality Date   • BONE MARROW TRANSPLANT     •  SECTION     • SPLENECTOMY     • TUBAL ABDOMINAL LIGATION         Therapy Treatment    Rehabilitation Treatment Summary     Row Name 11/15/19 1522 11/15/19 1311          Treatment Time/Intention    Discipline  occupational therapist  -LM  physical therapist  -CT     Document Type  therapy note (daily note)  -LM  therapy note (daily note)  -CT     Subjective Information  complains of;weakness;fatigue  -LM  complains of;weakness;fatigue  -CT     Mode of Treatment  occupational therapy  -LM  physical therapy  -CT     Care Plan Review  care plan/treatment goals reviewed;patient/other agree to care plan  -LM  --     Therapy Frequency (PT Clinical Impression)  --  3 times/wk 3-5 times/wk  -CT     Patient Effort  adequate  -LM  good  -CT     Comment  Light TA and ADL retraining.  Patient sat on EOB with supervision, CGA for BSC transfers, Yann/CGA with toileting.  Demonstrates increase in activity tolerance this date.  Continues to experience dizziness at times.    -LM  Pt tolerated treatment session well with rest breaks provided as needed.   -CT     Existing Precautions/Restrictions  fall  -LM  fall  -CT     Recorded by [LM] María Monique, OT 11/15/19 1524 [CT] Nakia Stone, PT 11/15/19 1323     Row Name 11/15/19 1311             Cognitive Assessment/Intervention- PT/OT    Orientation Status (Cognition)  oriented x 4  -CT      Follows Commands (Cognition)  WFL  -CT      Recorded by [CT] Nakia Stone, PT 11/15/19 1323      Row Name 11/15/19 1311             Safety Issues, Functional Mobility    Impairments Affecting Function (Mobility)  endurance/activity tolerance;strength  -CT      Recorded by [CT] Nakia Stone, PT 11/15/19 1323      Row  Name 11/15/19 1311             Bed Mobility Assessment/Treatment    Bed Mobility Assessment/Treatment  bed mobility (all) activities  -CT      [place] Level (Bed Mobility)  minimum assist (75% patient effort)  -CT      Assistive Device (Bed Mobility)  bed rails  -CT      Recorded by [CT] Nakia Stone, PT 11/15/19 1323      Row Name 11/15/19 1311             Transfer Assessment/Treatment    Transfer Assessment/Treatment  sit-stand transfer;stand-sit transfer  -CT      Recorded by [CT] Nakia Stone, PT 11/15/19 1323      Row Name 11/15/19 1311             Sit-Stand Transfer    Sit-Stand [place] (Transfers)  minimum assist (75% patient effort)  -CT      Assistive Device (Sit-Stand Transfers)  walker, front-wheeled  -CT2      Recorded by [CT] Nakia Stone, PT 11/15/19 1323  [CT2] Nakia Stone, PT 11/15/19 1355      Row Name 11/15/19 1311             Stand-Sit Transfer    Stand-Sit [place] (Transfers)  minimum assist (75% patient effort)  -CT      Assistive Device (Stand-Sit Transfers)  walker, front-wheeled  -CT2      Recorded by [CT] Nakia Stone, PT 11/15/19 1323  [CT2] Nakia Stone, PT 11/15/19 1355      Row Name 11/15/19 1311             Gait/Stairs Assessment/Training    [place] Level (Gait)  minimum assist (75% patient effort)  -CT      Assistive Device (Gait)  walker, front-wheeled  -CT2      Distance in Feet (Gait)  40  -CT2      Pattern (Gait)  step-to  -CT      Deviations/Abnormal Patterns (Gait)  base of support, narrow;festinating/shuffling;gait speed decreased  -CT      Bilateral Gait Deviations  forward flexed posture;weight shift ability decreased  -CT      Recorded by [CT] Nakia Stone, PT 11/15/19 1323  [CT2] Nakia Stone, PT 11/15/19 1355      Row Name 11/15/19 1522 11/15/19 1311          Positioning and Restraints    Pre-Treatment Position  --  in bed  -CT     Post Treatment Position  bed  -LM  chair  -CT     In Bed  call light within reach;encouraged to  call for assist  -LM  --     In Chair  --  sitting;call light within reach;encouraged to call for assist;notified nsg  -CT     Recorded by [LM] María Monique, OT 11/15/19 1524 [CT] Nakia Stone, PT 11/15/19 1355     Row Name 11/15/19 1311             Plan of Care Review    Plan of Care Reviewed With  patient  -CT      Recorded by [CT] ChaseNakia, PT 11/15/19 1355      Row Name 11/15/19 1311             Outcome Summary/Treatment Plan (PT)    Anticipated Equipment Needs at Discharge (PT)  -- tbd  -CT      Anticipated Discharge Disposition (PT)  inpatient rehabilitation facility  -CT      Recorded by [CT] ChaseNakia, PT 11/15/19 1323        User Key  (r) = Recorded By, (t) = Taken By, (c) = Cosigned By    Initials Name Effective Dates Discipline    CT Nakia Stone, PT 04/03/18 -  PT    LM María Monique OT 03/14/16 -  OT             Occupational Therapy Education     Title: PT OT SLP Therapies (Done)     Topic: Occupational Therapy (Done)     Point: ADL training (Done)     Description: Instruct learner(s) on proper safety adaptation and remediation techniques during self care or transfers.   Instruct in proper use of assistive devices.    Learning Progress Summary           Patient Acceptance, E, VU by NINA at 11/14/2019 10:27 PM    Acceptance, E, VU by COSTA at 11/14/2019 12:11 PM    Acceptance, E,TB, VU by SM at 11/13/2019  9:41 PM    Acceptance, E, VU by COSTA at 11/13/2019  3:32 PM    Acceptance, E, VU by SM1 at 11/11/2019 10:50 PM    Acceptance, E, VU,NR by AS at 11/11/2019  9:09 AM    Acceptance, E,TB, VU by KF at 11/11/2019 12:19 AM    Acceptance, E, VU by BC at 11/8/2019  3:42 PM    Acceptance, E,D, VU,DU,NR by LM at 11/8/2019  3:38 PM                   Point: Home exercise program (Done)     Description: Instruct learner(s) on appropriate technique for monitoring, assisting and/or progressing therapeutic exercises/activities.    Learning Progress Summary           Patient Acceptance, E, VU by NINA at  11/14/2019 10:27 PM    Acceptance, E, VU by DG at 11/14/2019 12:11 PM    Acceptance, E,TB, VU by SM at 11/13/2019  9:41 PM    Acceptance, E, VU by DG at 11/13/2019  3:32 PM    Acceptance, E, VU by SM1 at 11/11/2019 10:50 PM    Acceptance, E, VU,NR by AS at 11/11/2019  9:09 AM    Acceptance, E,TB, VU by KF at 11/11/2019 12:19 AM    Acceptance, E, VU by BC at 11/8/2019  3:42 PM    Acceptance, E,D, VU,DU,NR by LM at 11/8/2019  3:38 PM                   Point: Precautions (Done)     Description: Instruct learner(s) on prescribed precautions during self-care and functional transfers.    Learning Progress Summary           Patient Acceptance, E, VU by EA at 11/14/2019 10:27 PM    Acceptance, E, VU by DG at 11/14/2019 12:11 PM    Acceptance, E,TB, VU by SM at 11/13/2019  9:41 PM    Acceptance, E, VU by DG at 11/13/2019  3:32 PM    Acceptance, E, VU by SM1 at 11/11/2019 10:50 PM    Acceptance, E, VU,NR by AS at 11/11/2019  9:09 AM    Acceptance, E,TB, VU by KF at 11/11/2019 12:19 AM    Acceptance, E, VU by BC at 11/8/2019  3:42 PM    Acceptance, E,D, VU,DU,NR by LM at 11/8/2019  3:38 PM                   Point: Body mechanics (Done)     Description: Instruct learner(s) on proper positioning and spine alignment during self-care, functional mobility activities and/or exercises.    Learning Progress Summary           Patient Acceptance, E, VU by EA at 11/14/2019 10:27 PM    Acceptance, E, VU by DG at 11/14/2019 12:11 PM    Acceptance, E,TB, VU by SM at 11/13/2019  9:41 PM    Acceptance, E, VU by DG at 11/13/2019  3:32 PM    Acceptance, E, VU by SM1 at 11/11/2019 10:50 PM    Acceptance, E, VU,NR by AS at 11/11/2019  9:09 AM    Acceptance, E,TB, VU by KF at 11/11/2019 12:19 AM    Acceptance, RON VU by BC at 11/8/2019  3:42 PM    Acceptance, TIN VELASQUEZ, JEN,DU,NR by LM at 11/8/2019  3:38 PM                               User Key     Initials Effective Dates Name Provider Type Discipline     06/16/16 -  Joy Weiner RN  Registered Nurse Nurse    BC 03/14/16 -  Karly Carranza PT Physical Therapist PT    LM 03/14/16 -  María Monique OT Occupational Therapist OT    DG 09/18/19 -  Millie Reeves, RN Registered Nurse Nurse     04/03/17 -  Dariela Barron RN Registered Nurse Nurse    KF 07/08/19 -  Elsie Hernandez RN Registered Nurse Nurse    SM1 09/12/18 -  Jose Francisco Bocanegra RN Registered Nurse Nurse    AS 08/27/19 -  Jones Toussaint RN Registered Nurse Nurse                OT Recommendation and Plan  Planned Therapy Interventions (OT Eval): activity tolerance training, BADL retraining, transfer/mobility retraining, ROM/therapeutic exercise, strengthening exercise  Therapy Frequency (OT Eval): (3-5 times week)  Plan of Care Review  Plan of Care Reviewed With: patient  Plan of Care Reviewed With: patient  Outcome Summary: ADL retraining, bed mobility, fxl mobility.  Continue POC.       Time Calculation:   Time Calculation- OT     Row Name 11/15/19 1525             Time Calculation- OT    Total Timed Code Minutes- OT  25 minute(s)  -        User Key  (r) = Recorded By, (t) = Taken By, (c) = Cosigned By    Initials Name Provider Type     María Monique OT Occupational Therapist        Therapy Charges for Today     Code Description Service Date Service Provider Modifiers Qty    78995778166  OT SELF CARE/MGMT/TRAIN EA 15 MIN 11/15/2019 María Monique OT GO 2               María Monique OT  11/15/2019    Electronically signed by María Monique OT at 11/15/19 1526     María Monique OT at 11/15/19 1525          Problem: Patient Care Overview  Goal: Plan of Care Review  Outcome: Ongoing (interventions implemented as appropriate)   11/15/19 1524   Coping/Psychosocial   Plan of Care Reviewed With patient   Plan of Care Review   Progress improving   OTHER   Outcome Summary ADL retraining, bed mobility, fxl mobility. Continue POC.           Electronically signed by María Monique OT at 11/15/19 1525     María Monique  R, OT at 19 1542          Acute Care - Occupational Therapy Initial Evaluation   [facility]     Patient Name: Tahmina Martinez  : 1945  MRN: [id number]  Today's Date: 2019  Onset of Illness/Injury or Date of Surgery: 19     Referring Physician: Dr. Cheek    Admit Date: 2019       ICD-10-CM ICD-9-CM   1. Chest pain in adult R07.9 786.50   2. Renal insufficiency N28.9 593.9     Patient Active Problem List   Diagnosis   • History of Non-STEMI (non-ST elevated myocardial infarction) with no coronary intervention needed in , clinically stable.    • Dyspnea, class II-III.    • Essential hypertension   • History of Acute myelocytic leukemia,status post chemotherapy and bone marrow transplant in .   • Breast cancer (right), status post radiation therapy in 2015   • RBBB unchanged from    • Dyslipidemia   • Bifascicular block (RBBB plus LP FB)   • Chest pain in adult   • History of splenectomy   • Cholelithiasis   • Acute kidney injury (CMS/HCC)   • Hyperkalemia   • SIRS (systemic inflammatory response syndrome) (CMS/HCC)   • Hyperglycemia   • First degree AV block   • Macrocytosis without anemia   • Hypocarbia   • Type II diabetes mellitus (CMS/HCC)     Past Medical History:   Diagnosis Date   • Dyslipidemia 2017    On atorvastatin.    • Essential hypertension 10/12/2016   • First degree AV block 2019   • H/O splenectomy    • History of breast cancer        • Hypertension    • Myelocytic leukemia (CMS/HCC)    • Non-STEMI (non-ST elevated myocardial infarction) (CMS/HCC)    • Restless leg    • Type II diabetes mellitus (CMS/HCC) 2019     Past Surgical History:   Procedure Laterality Date   • BONE MARROW TRANSPLANT     •  SECTION     • SPLENECTOMY     • TUBAL ABDOMINAL LIGATION            OT ASSESSMENT FLOWSHEET (last 12 hours)      Occupational Therapy Evaluation     Row Name 19 1528                   OT Evaluation Time/Intention    Subjective  Information  complains of;weakness;fatigue  -LM        Document Type  evaluation  -LM        Mode of Treatment  occupational therapy  -LM        Patient Effort  good  -LM           General Information    Patient Profile Reviewed?  yes  -LM        Onset of Illness/Injury or Date of Surgery  11/07/19  -LM        Patient Observations  alert;cooperative;agree to therapy  -LM        Prior Level of Function  independent:;ADL's;all household mobility  -LM        Equipment Currently Used at Home  cane, quad;walker, rolling  -LM        Pertinent History of Current Functional Problem  Admitted with chest pain.  PMH:  HTN, nstemi, dm, myelocytic leukemia, bone marrow transplant, htn, av block, r bbb, breast ca, splenectomy  -LM        Existing Precautions/Restrictions  fall  -LM        Benefits Reviewed  patient:;improve function;increase independence;increase strength;increase balance  -LM           Relationship/Environment    Lives With  spouse  -LM           Resource/Environmental Concerns    Current Living Arrangements  home/apartment/condo  -LM           Cognitive Assessment/Intervention- PT/OT    Orientation Status (Cognition)  oriented x 4  -LM        Follows Commands (Cognition)  WFL  -LM           ADL Assessment/Intervention    BADL Assessment/Intervention  bathing;upper body dressing;lower body dressing;feeding;grooming;toileting  -LM           Bathing Assessment/Intervention    Bathing [place] Level  supervision;contact guard assist  -LM           Upper Body Dressing Assessment/Training    Upper Body Dressing [place] Level  set up  -LM           Lower Body Dressing Assessment/Training    Lower Body Dressing [place] Level  contact guard assist  -LM           Grooming Assessment/Training    [place] Level (Grooming)  set up  -LM           Self-Feeding Assessment/Training    [place] Level (Feeding)  independent  -LM           Toileting Assessment/Training    [place] Level (Toileting)   contact guard assist  -LM           General ROM    GENERAL ROM COMMENTS  wfl bue  -LM           MMT (Manual Muscle Testing)    General MMT Comments  wfl bue, 3+/5  -LM           Positioning and Restraints    Post Treatment Position  bed  -LM        In Bed  encouraged to call for assist;call light within reach  -LM           Plan of Care Review    Plan of Care Reviewed With  patient  -LM           Clinical Impression (OT)    OT Diagnosis  debility  -LM        Patient/Family Goals Statement (OT Eval)  return to plof  -LM        Criteria for Skilled Therapeutic Interventions Met (OT Eval)  yes  -LM        Rehab Potential (OT Eval)  good, to achieve stated therapy goals  -LM        Therapy Frequency (OT Eval)  -- 3-5 times week  -LM        Care Plan Review (OT)  evaluation/treatment results reviewed;patient/other agree to care plan;care plan/treatment goals reviewed  -LM           Planned OT Interventions    Planned Therapy Interventions (OT Eval)  activity tolerance training;BADL retraining;transfer/mobility retraining;ROM/therapeutic exercise;strengthening exercise  -LM           OT Goals    Transfer Goal Selection (OT)  transfer, OT goal 1  -LM        Bathing Goal Selection (OT)  --  -LM        Dressing Goal Selection (OT)  dressing, OT goal 1  -LM           Transfer Goal 1 (OT)    Activity/Assistive Device (Transfer Goal 1, OT)  commode, 3-in-1;transfers, all  -LM        [place] Level/Cues Needed (Transfer Goal 1, OT)  conditional independence  -LM        Time Frame (Transfer Goal 1, OT)  1 week  -LM           Bathing Goal 1 (OT)    Activity/Assistive Device (Bathing Goal 1, OT)  bathing skills, all  -LM        [place] Level/Cues Needed (Bathing Goal 1, OT)  conditional independence  -LM        Time Frame (Bathing Goal 1, OT)  1 week  -LM          User Key  (r) = Recorded By, (t) = Taken By, (c) = Cosigned By    Initials Name Effective Dates    LM María Monique, OT 03/14/16 -          Occupational Therapy  Education     Title: PT OT SLP Therapies (Done)     Topic: Occupational Therapy (Done)     Point: ADL training (Done)     Description: Instruct learner(s) on proper safety adaptation and remediation techniques during self care or transfers.   Instruct in proper use of assistive devices.    Learning Progress Summary           Patient Acceptance, E,D, VU,DU,NR by  at 11/8/2019  3:38 PM                   Point: Home exercise program (Done)     Description: Instruct learner(s) on appropriate technique for monitoring, assisting and/or progressing therapeutic exercises/activities.    Learning Progress Summary           Patient Acceptance, E,D, VU,DU,NR by  at 11/8/2019  3:38 PM                   Point: Precautions (Done)     Description: Instruct learner(s) on prescribed precautions during self-care and functional transfers.    Learning Progress Summary           Patient Acceptance, E,D, VU,DU,NR by  at 11/8/2019  3:38 PM                   Point: Body mechanics (Done)     Description: Instruct learner(s) on proper positioning and spine alignment during self-care, functional mobility activities and/or exercises.    Learning Progress Summary           Patient Acceptance, E,D, VU,DU,NR by  at 11/8/2019  3:38 PM                               User Key     Initials Effective Dates Name Provider Type Discipline     03/14/16 -  María Monique, OT Occupational Therapist OT                  OT Recommendation and Plan  Planned Therapy Interventions (OT Eval): activity tolerance training, BADL retraining, transfer/mobility retraining, ROM/therapeutic exercise, strengthening exercise  Therapy Frequency (OT Eval): (3-5 times week)  Plan of Care Review  Plan of Care Reviewed With: patient  Plan of Care Reviewed With: patient  Outcome Summary: OT evaluation completed.  Patient agreeable to POC.    Outcome Measures     Row Name 11/08/19 1500             How much help from another is currently needed...    Putting on and taking  off regular lower body clothing?  3  -LM      Bathing (including washing, rinsing, and drying)  3  -LM      Toileting (which includes using toilet bed pan or urinal)  3  -LM      Putting on and taking off regular upper body clothing  4  -LM      Taking care of personal grooming (such as brushing teeth)  4  -LM      Eating meals  4  -LM      AM-PAC 6 Clicks Score (OT)  21  -LM         Functional Assessment    Outcome Measure Options  AM-PAC 6 Clicks Daily Activity (OT)  -LM        User Key  (r) = Recorded By, (t) = Taken By, (c) = Cosigned By    Initials Name Provider Type    LM María Monique OT Occupational Therapist          Time Calculation:   Time Calculation- OT     Row Name 11/08/19 1542             Time Calculation- OT    Total Timed Code Minutes- OT  60 minute(s)  -LM        User Key  (r) = Recorded By, (t) = Taken By, (c) = Cosigned By    Initials Name Provider Type    LM María Monique OT Occupational Therapist        Therapy Charges for Today     Code Description Service Date Service Provider Modifiers Qty    [id number]  OT EVAL LOW COMPLEXITY 4 11/8/2019 María Monique OT GO 1               María Monique OT  11/8/2019    Electronically signed by María Monique OT at 11/08/19 1542     María Monique OT at 11/08/19 1541          Problem: Patient Care Overview  Goal: Plan of Care Review  Outcome: Ongoing (interventions implemented as appropriate)   11/08/19 1541   Coping/Psychosocial   Plan of Care Reviewed With patient   Plan of Care Review   Progress no change   OTHER   Outcome Summary OT evaluation completed. Patient agreeable to POC.           Electronically signed by María Monique OT at 11/08/19 1541       Speech Language Pathology Notes (last 24 hours) (Notes from 11/20/19 1602 through 11/21/19 1602)     No notes of this type exist for this encounter.

## 2019-11-21 NOTE — PLAN OF CARE
Problem: Patient Care Overview  Goal: Discharge Needs Assessment  Outcome: Outcome(s) achieved Date Met: 11/21/19    Goal: Interprofessional Rounds/Family Conf  Outcome: Outcome(s) achieved Date Met: 11/21/19      Problem: Fall Risk (Adult)  Goal: Identify Related Risk Factors and Signs and Symptoms  Outcome: Outcome(s) achieved Date Met: 11/21/19    Goal: Absence of Fall  Outcome: Outcome(s) achieved Date Met: 11/21/19      Problem: Pain, Acute (Adult)  Goal: Identify Related Risk Factors and Signs and Symptoms  Outcome: Outcome(s) achieved Date Met: 11/21/19    Goal: Acceptable Pain Control/Comfort Level  Outcome: Outcome(s) achieved Date Met: 11/21/19    Goal: Identify Related Risk Factors and Signs and Symptoms  Outcome: Outcome(s) achieved Date Met: 11/21/19    Goal: Acceptable Pain Control/Comfort Level  Outcome: Outcome(s) achieved Date Met: 11/21/19

## 2019-11-21 NOTE — NURSING NOTE
Pt was educated before she went to sleep on the need to continue taking potassium pills throughout the night. Stated she didn't want to take any, and was reluctant to take the two prior to going to sleep. Stated she would take the two before going to sleep but didn't want to take anymore. Pt educated, still wished to not take anymore tablets. Pt in no sign of acute distress. Will continue to monitor.

## 2019-11-22 ENCOUNTER — READMISSION MANAGEMENT (OUTPATIENT)
Dept: CALL CENTER | Facility: HOSPITAL | Age: 74
End: 2019-11-22

## 2019-11-22 NOTE — OUTREACH NOTE
Prep Survey      Responses   Facility patient discharged from?  [facility]   Is patient eligible?  Yes   Discharge diagnosis  Chest pain, r/o MI, CADENCE, mild hyperkalemia, HTN, gastroenteritis d/t rotavirus   Does the patient have one of the following disease processes/diagnoses(primary or secondary)?  Other   Does the patient have Home health ordered?  Yes   What is the Home health agency?   [facility]   Is there a DME ordered?  No   Comments regarding appointments  See AVS   Prep survey completed?  Yes          Judith Estrella RN

## 2019-11-25 ENCOUNTER — READMISSION MANAGEMENT (OUTPATIENT)
Dept: CALL CENTER | Facility: HOSPITAL | Age: 74
End: 2019-11-25

## 2019-11-25 NOTE — OUTREACH NOTE
Medical Week 1 Survey      Responses   Facility patient discharged from?  [facility]   Does the patient have one of the following disease processes/diagnoses(primary or secondary)?  Other   Is there a successful TCM telephone encounter documented?  No   Week 1 attempt successful?  Yes   Call start time  0923   Call end time  0928   Discharge diagnosis  Chest pain, r/o MI, CADENCE, mild hyperkalemia, HTN, gastroenteritis d/t rotavirus   Is patient permission given to speak with other caregiver?  No   Meds reviewed with patient/caregiver?  Yes   Is the patient having any side effects they believe may be caused by any medication additions or changes?  No   Does the patient have all medications ordered at discharge?  Yes   Is the patient taking all medications as directed (includes completed medication regime)?  Yes   Does the patient have a primary care provider?   Yes   Does the patient have an appointment with their PCP within 7 days of discharge?  Greater than 7 days   Comments regarding PCP  Dr Bower For Dec 4 At 10 Am   What is preventing the patient from scheduling follow up appointments within 7 days of discharge?  Earlier appointment not available   Nursing Interventions  Verified appointment date/time/provider   Has the patient kept scheduled appointments due by today?  N/A   Comments  Follow Up with JENNY Hope, Nov 26, 2019 1:15 PM   What is the Home health agency?   [facility]   Has home health visited the patient within 72 hours of discharge?  Yes   Home health comments  HH PT.    Psychosocial issues?  No   Did the patient receive a copy of their discharge instructions?  Yes   Nursing interventions  Reviewed instructions with patient   What is the patient's perception of their health status since discharge?  Improving   Is the patient/caregiver able to teach back signs and symptoms related to disease process for when to call PCP?  Yes   Is the patient/caregiver able to teach back signs and  symptoms related to disease process for when to call 911?  Yes   Is the patient/caregiver able to teach back the hierarchy of who to call/visit for symptoms/problems? PCP, Specialist, Home health nurse, Urgent Care, ED, 911  Yes   Week 1 call completed?  Yes          Judith Hale RN

## 2019-11-26 ENCOUNTER — OFFICE VISIT (OUTPATIENT)
Dept: CARDIOLOGY | Facility: CLINIC | Age: 74
End: 2019-11-26

## 2019-11-26 VITALS
DIASTOLIC BLOOD PRESSURE: 70 MMHG | HEART RATE: 65 BPM | BODY MASS INDEX: 26.61 KG/M2 | HEIGHT: 63 IN | WEIGHT: 150.2 LBS | SYSTOLIC BLOOD PRESSURE: 129 MMHG

## 2019-11-26 DIAGNOSIS — R53.83 FATIGUE, UNSPECIFIED TYPE: ICD-10-CM

## 2019-11-26 DIAGNOSIS — I10 ESSENTIAL HYPERTENSION: Chronic | ICD-10-CM

## 2019-11-26 DIAGNOSIS — E78.5 DYSLIPIDEMIA: Chronic | ICD-10-CM

## 2019-11-26 DIAGNOSIS — I21.4 NON-STEMI (NON-ST ELEVATED MYOCARDIAL INFARCTION) (HCC): Primary | Chronic | ICD-10-CM

## 2019-11-26 PROCEDURE — 99213 OFFICE O/P EST LOW 20 MIN: CPT | Performed by: PHYSICIAN ASSISTANT

## 2019-11-26 RX ORDER — POTASSIUM CHLORIDE 20 MEQ/1
20 TABLET, EXTENDED RELEASE ORAL 2 TIMES DAILY
Qty: 10 TABLET | Refills: 0 | OUTPATIENT
Start: 2019-11-26 | End: 2019-11-27

## 2019-11-26 NOTE — PROGRESS NOTES
Noe Bower MD  Tahmina Martinez  1945  11/26/2019    Patient Active Problem List   Diagnosis   • History of Non-STEMI (non-ST elevated myocardial infarction) with no coronary intervention needed in 2008, clinically stable.    • Dyspnea, class II-III.    • Essential hypertension   • History of Acute myelocytic leukemia,status post chemotherapy and bone marrow transplant in 2005.   • Breast cancer (right), status post radiation therapy in 08/2015   • RBBB unchanged from 2015   • Dyslipidemia   • Bifascicular block (RBBB plus LP FB)   • Chest pain in adult   • History of splenectomy   • Cholelithiasis   • Acute kidney injury (CMS/HCC)   • Hyperkalemia   • Hyperglycemia   • First degree AV block   • Macrocytosis without anemia   • Hypocarbia   • Type II diabetes mellitus (CMS/HCC)       Dear Noe Bower MD:    Subjective     History of Present Illness:    Chief Complaint   Patient presents with   • Chest Pain     [facility] follow up- pt reports tiredness/weak since leaving hospital   • Med Management     verbal       Tahmina Martinez is a pleasant 74 y.o. female with a past medical history significant for  non-ST elevation microinfarction and nonobstructive coronary artery disease, she comes in today for hospital follow-up.    Patient was recently hospitalized when she came into the emergency department for headache and chest pains.  She did rule out for acute myocardial infarction and had a stress test performed that showed no signs of ischemia.  She was found to have elevated blood pressure she was found to have the rotavirus as she was suffering from significant amounts of nausea and vomiting and had to be treated with IV fluids.  Speaking to her today she does still report significant weakness and fatigue similar to when she was in the hospital.  She does deny any chest pains or worsening shortness of breath.  Medicine list we have I am not sure is accurate she has been on some type of diuretic long-term  but is no longer on this and may have been discontinued in the hospital but she is unsure however her potassium has continued to drop making me suspect she is still taking a diuretic.        Stress test on 11/8/2019  Interpretation Summary   · A pharmacological stress test was performed using regadenoson with low-level exercise.  · Findings consistent with an indeterminate ECG stress test.  · Myocardial perfusion imaging indicates a normal myocardial perfusion study with no evidence of ischemia.  · Normal LV cavity size. Normal LV wall motion noted.  · Left ventricular ejection fraction is hyperdynamic (Calculated EF > 70%).  · Impressions are consistent with a low risk study.             Allergies   Allergen Reactions   • Latex    • Penicillins    • Zithromax [Azithromycin]    :      Current Outpatient Medications:   •  aspirin 81 MG EC tablet, Take 81 mg by mouth Daily., Disp: , Rfl:   •  atorvastatin (LIPITOR) 10 MG tablet, Take 1 tablet by mouth Every Night., Disp: 90 tablet, Rfl: 2  •  Calcium Citrate-Vitamin D (CALCIUM + D PO), Take 600 mg by mouth Every Morning., Disp: , Rfl:   •  carvedilol (COREG) 12.5 MG tablet, Take 1 tablet by mouth 2 (Two) Times a Day., Disp: 180 tablet, Rfl: 2  •  Coenzyme Q10 (CO Q-10) 200 MG capsule, Take 200 mg by mouth Daily., Disp: , Rfl:   •  exemestane (AROMASIN) 25 MG chemo tablet, Take 25 mg by mouth Daily., Disp: , Rfl:   •  hydrALAZINE (APRESOLINE) 50 MG tablet, Take 1 tablet by mouth Every 8 (Eight) Hours. (Patient taking differently: Take 50 mg by mouth 2 (Two) Times a Day.), Disp: 90 tablet, Rfl: 0  •  lisinopril (PRINIVIL,ZESTRIL) 40 MG tablet, Take 1 tablet by mouth Daily., Disp: 90 tablet, Rfl: 2  •  Loratadine 10 MG capsule, Take 1 capsule by mouth Daily., Disp: , Rfl:   •  metFORMIN (GLUCOPHAGE) 500 MG tablet, Take 500 mg by mouth Every Morning., Disp: , Rfl:   •  nitroglycerin (NITROSTAT) 0.4 MG SL tablet, Place 0.4 mg under the tongue Every 5 (Five) Minutes As  "Needed for chest pain. Take no more than 3 doses in 15 minutes., Disp: , Rfl:   •  pantoprazole (PROTONIX) 40 MG EC tablet, Take 40 mg by mouth Daily., Disp: , Rfl:   •  potassium chloride (K-DUR,KLOR-CON) 20 MEQ CR tablet, Take 1 tablet by mouth 2 (Two) Times a Day for 5 days., Disp: 10 tablet, Rfl: 0  •  rOPINIRole (REQUIP) 0.25 MG tablet, Take 0.25 mg by mouth Every Night. Take 1 hour before bedtime., Disp: , Rfl:   •  traMADol (ULTRAM) 50 MG tablet, Take 50 mg by mouth Daily As Needed for Moderate Pain ., Disp: , Rfl:   •  valACYclovir (VALTREX) 500 MG tablet, Take 1 g by mouth Daily., Disp: , Rfl:   •  vitamin D (ERGOCALCIFEROL) 55464 UNITS capsule capsule, Take 50,000 Units by mouth Every 14 (Fourteen) Days., Disp: , Rfl:     The following portions of the patient's history were reviewed and updated as appropriate: allergies, current medications, past family history, past medical history, past social history, past surgical history and problem list.    Social History     Tobacco Use   • Smoking status: Former Smoker     Types: Cigarettes   • Smokeless tobacco: Never Used   • Tobacco comment: Patient states she smoked 3 cigarettes a day for 1 year as a teenager    Substance Use Topics   • Alcohol use: No   • Drug use: No       Review of Systems   Constitution: Positive for weakness and malaise/fatigue.   Cardiovascular: Negative for chest pain, dyspnea on exertion and irregular heartbeat.   Respiratory: Negative for cough and shortness of breath.    Hematologic/Lymphatic: Negative for bleeding problem. Does not bruise/bleed easily.   Gastrointestinal: Negative for nausea and vomiting.       Objective   Vitals:    11/26/19 1314   BP: 129/70   BP Location: Right arm   Patient Position: Sitting   Cuff Size: Adult   Pulse: 65   Weight: 68.1 kg (150 lb 3.2 oz)   Height: 160 cm (62.99\")     Body mass index is 26.62 kg/m².    Physical Exam   Constitutional: She is oriented to person, place, and time. She appears " well-developed and well-nourished. No distress.   HENT:   Head: Normocephalic and atraumatic.   Cardiovascular: Normal rate, regular rhythm and normal heart sounds.   Pulmonary/Chest: Effort normal and breath sounds normal. No respiratory distress.   Musculoskeletal: She exhibits no edema.   Neurological: She is alert and oriented to person, place, and time.   Skin: She is not diaphoretic.       Lab Results   Component Value Date     11/19/2019    K 3.1 (L) 11/20/2019     11/19/2019    CO2 20.2 (L) 11/19/2019    BUN 3 (L) 11/19/2019    CREATININE 0.61 11/19/2019    GLUCOSE 107 (H) 11/19/2019    CALCIUM 8.1 (L) 11/19/2019    AST 57 (H) 11/18/2019    ALT 39 (H) 11/18/2019    ALKPHOS 92 11/18/2019    LABIL2 1.4 (L) 12/20/2014     No results found for: CKTOTAL  Lab Results   Component Value Date    WBC 10.09 11/19/2019    HGB 10.6 (L) 11/19/2019    HCT 29.7 (L) 11/19/2019     11/19/2019     Lab Results   Component Value Date    INR 0.97 11/02/2019    INR 0.90 07/20/2015    INR <0.90 12/20/2014     Lab Results   Component Value Date    MG 1.8 11/19/2019     Lab Results   Component Value Date    TSH 1.430 11/08/2019    TRIG 104 11/03/2019    HDL 57 11/03/2019    LDL 64 11/03/2019      No results found for: BNP    During this visit the following were done:  Labs Reviewed [x]    Labs Ordered []    Radiology Reports Reviewed [x]    Radiology Ordered []    PCP Records Reviewed []    Referring Provider Records Reviewed []    ER Records Reviewed []    Hospital Records Reviewed []    History Obtained From Family []    Radiology Images Reviewed []    Other Reviewed []    Records Requested []       Procedures    Assessment/Plan    Diagnosis Plan   1. History of Non-STEMI (non-ST elevated myocardial infarction) with no coronary intervention needed in 2008, clinically stable.      2. Essential hypertension  Basic Metabolic Panel   3. Dyslipidemia     4. Fatigue, unspecified type  TSH             Recommendations:  1. I will investigate patient's weakness further by rechecking potassium levels as well as a TSH.  Her blood pressure is within acceptable range today at 129/70 this may be a hair too low given her age,  I would decrease her carvedilol to 6.25 however I am concerned that she may be, unknowingly, taking a diuretic that she has been prescribed in the past that is causing her persistently low potassium.  I did ask for her to call her office when she gets home and read off all the medication she is taking if she is not on a diuretic will decrease carvedilol to 6.25 otherwise I will stop diuretic she is taking.  I also strongly encouraged her to schedule an appointment with her primary care provider.       Return in about 3 months (around 2/26/2020).    As always, I appreciate very much the opportunity to participate in the cardiovascular care of your patients.      With Best Regards,    Marcellus Araya PA-C

## 2019-11-26 NOTE — PROGRESS NOTES
Noe Bower MD  Tahmina Martinez  1945  11/26/2019    Patient Active Problem List   Diagnosis   • History of Non-STEMI (non-ST elevated myocardial infarction) with no coronary intervention needed in 2008, clinically stable.    • Dyspnea, class II-III.    • Essential hypertension   • History of Acute myelocytic leukemia,status post chemotherapy and bone marrow transplant in 2005.   • Breast cancer (right), status post radiation therapy in 08/2015   • RBBB unchanged from 2015   • Dyslipidemia   • Bifascicular block (RBBB plus LP FB)   • Chest pain in adult   • History of splenectomy   • Cholelithiasis   • Acute kidney injury (CMS/HCC)   • Hyperkalemia   • Hyperglycemia   • First degree AV block   • Macrocytosis without anemia   • Hypocarbia   • Type II diabetes mellitus (CMS/HCC)       Dear Noe Bower MD:    Subjective     Chief Complaint   Patient presents with   • Chest Pain     [facility] follow up   • Med Management     verbal           History of Present Illness:    Tahmina Martinez is a 74 y.o. female presents today          Allergies   Allergen Reactions   • Latex    • Penicillins    • Zithromax [Azithromycin]    :      Current Outpatient Medications:   •  aspirin 81 MG EC tablet, Take 81 mg by mouth Daily., Disp: , Rfl:   •  atorvastatin (LIPITOR) 10 MG tablet, Take 1 tablet by mouth Every Night., Disp: 90 tablet, Rfl: 2  •  Calcium Citrate-Vitamin D (CALCIUM + D PO), Take 600 mg by mouth Every Morning., Disp: , Rfl:   •  carvedilol (COREG) 12.5 MG tablet, Take 1 tablet by mouth 2 (Two) Times a Day., Disp: 180 tablet, Rfl: 2  •  Coenzyme Q10 (CO Q-10) 200 MG capsule, Take 200 mg by mouth Daily., Disp: , Rfl:   •  exemestane (AROMASIN) 25 MG chemo tablet, Take 25 mg by mouth Daily., Disp: , Rfl:   •  hydrALAZINE (APRESOLINE) 50 MG tablet, Take 1 tablet by mouth Every 8 (Eight) Hours. (Patient taking differently: Take 50 mg by mouth 2 (Two) Times a Day.), Disp: 90 tablet, Rfl: 0  •  lisinopril  "(PRINIVIL,ZESTRIL) 40 MG tablet, Take 1 tablet by mouth Daily., Disp: 90 tablet, Rfl: 2  •  Loratadine 10 MG capsule, Take 1 capsule by mouth Daily., Disp: , Rfl:   •  metFORMIN (GLUCOPHAGE) 500 MG tablet, Take 500 mg by mouth Every Morning., Disp: , Rfl:   •  nitroglycerin (NITROSTAT) 0.4 MG SL tablet, Place 0.4 mg under the tongue Every 5 (Five) Minutes As Needed for chest pain. Take no more than 3 doses in 15 minutes., Disp: , Rfl:   •  pantoprazole (PROTONIX) 40 MG EC tablet, Take 40 mg by mouth Daily., Disp: , Rfl:   •  potassium chloride (K-DUR,KLOR-CON) 20 MEQ CR tablet, Take 1 tablet by mouth 2 (Two) Times a Day for 5 days., Disp: 10 tablet, Rfl: 0  •  rOPINIRole (REQUIP) 0.25 MG tablet, Take 0.25 mg by mouth Every Night. Take 1 hour before bedtime., Disp: , Rfl:   •  traMADol (ULTRAM) 50 MG tablet, Take 50 mg by mouth Daily As Needed for Moderate Pain ., Disp: , Rfl:   •  valACYclovir (VALTREX) 500 MG tablet, Take 1 g by mouth Daily., Disp: , Rfl:   •  vitamin D (ERGOCALCIFEROL) 96248 UNITS capsule capsule, Take 50,000 Units by mouth Every 14 (Fourteen) Days., Disp: , Rfl:       The following portions of the patient's history were reviewed and updated as appropriate: allergies, current medications, past family history, past medical history, past social history, past surgical history and problem list.    Social History     Tobacco Use   • Smoking status: Former Smoker     Types: Cigarettes   • Smokeless tobacco: Never Used   • Tobacco comment: Patient states she smoked 3 cigarettes a day for 1 year as a teenager    Substance Use Topics   • Alcohol use: No   • Drug use: No       ROS    Objective   Vitals:    11/26/19 1314   Weight: 68.1 kg (150 lb 3.2 oz)   Height: 160 cm (62.99\")     Body mass index is 26.62 kg/m².        Physical Exam    Lab Results   Component Value Date     11/19/2019    K 3.1 (L) 11/20/2019     11/19/2019    CO2 20.2 (L) 11/19/2019    BUN 3 (L) 11/19/2019    CREATININE 0.61 " 11/19/2019    GLUCOSE 107 (H) 11/19/2019    CALCIUM 8.1 (L) 11/19/2019    AST 57 (H) 11/18/2019    ALT 39 (H) 11/18/2019    ALKPHOS 92 11/18/2019    LABIL2 1.4 (L) 12/20/2014     No results found for: CKTOTAL  Lab Results   Component Value Date    WBC 10.09 11/19/2019    HGB 10.6 (L) 11/19/2019    HCT 29.7 (L) 11/19/2019     11/19/2019     Lab Results   Component Value Date    INR 0.97 11/02/2019    INR 0.90 07/20/2015    INR <0.90 12/20/2014     Lab Results   Component Value Date    MG 1.8 11/19/2019     Lab Results   Component Value Date    TSH 1.430 11/08/2019    TRIG 104 11/03/2019    HDL 57 11/03/2019    LDL 64 11/03/2019      No results found for: BNP        Procedures      Assessment/Plan   No diagnosis found.             Recommendations:              Patient's Body mass index is 26.62 kg/m². BMI is {BMI range:73327}.         No Follow-up on file.    As always, I appreciate very much the opportunity to participate in the cardiovascular care of your patients.      With Best Regards,    JENNY Hope

## 2019-11-27 ENCOUNTER — HOSPITAL ENCOUNTER (EMERGENCY)
Facility: HOSPITAL | Age: 74
Discharge: HOME | End: 2019-11-27
Attending: EMERGENCY MEDICINE | Admitting: EMERGENCY MEDICINE

## 2019-11-27 ENCOUNTER — APPOINTMENT (OUTPATIENT)
Dept: GENERAL RADIOLOGY | Facility: HOSPITAL | Age: 74
End: 2019-11-27

## 2019-11-27 ENCOUNTER — TELEPHONE (OUTPATIENT)
Dept: CARDIOLOGY | Facility: CLINIC | Age: 74
End: 2019-11-27

## 2019-11-27 VITALS
HEIGHT: 63 IN | HEART RATE: 64 BPM | BODY MASS INDEX: 25.69 KG/M2 | WEIGHT: 145 LBS | TEMPERATURE: 98 F | DIASTOLIC BLOOD PRESSURE: 79 MMHG | SYSTOLIC BLOOD PRESSURE: 136 MMHG | OXYGEN SATURATION: 99 % | RESPIRATION RATE: 16 BRPM

## 2019-11-27 DIAGNOSIS — E87.5 HYPERKALEMIA: Primary | ICD-10-CM

## 2019-11-27 LAB
ALBUMIN SERPL-MCNC: 3.97 G/DL (ref 3.5–5.2)
ALBUMIN/GLOB SERPL: 1.1 G/DL
ALP SERPL-CCNC: 112 U/L (ref 39–117)
ALT SERPL W P-5'-P-CCNC: 32 U/L (ref 1–33)
ANION GAP SERPL CALCULATED.3IONS-SCNC: 10.1 MMOL/L (ref 5–15)
APTT PPP: 20.8 SECONDS (ref 23.8–36.1)
AST SERPL-CCNC: 32 U/L (ref 1–32)
BASOPHILS # BLD AUTO: 0.06 10*3/MM3 (ref 0–0.2)
BASOPHILS NFR BLD AUTO: 1 % (ref 0–1.5)
BILIRUB SERPL-MCNC: 0.4 MG/DL (ref 0.2–1.2)
BILIRUB UR QL STRIP: NEGATIVE
BUN BLD-MCNC: 16 MG/DL (ref 8–23)
BUN/CREAT SERPL: 14.7 (ref 7–25)
CALCIUM SPEC-SCNC: 9.9 MG/DL (ref 8.6–10.5)
CHLORIDE SERPL-SCNC: 99 MMOL/L (ref 98–107)
CLARITY UR: CLEAR
CO2 SERPL-SCNC: 24.9 MMOL/L (ref 22–29)
COLOR UR: YELLOW
CREAT BLD-MCNC: 1.09 MG/DL (ref 0.57–1)
DEPRECATED RDW RBC AUTO: 54.4 FL (ref 37–54)
EOSINOPHIL # BLD AUTO: 0.12 10*3/MM3 (ref 0–0.4)
EOSINOPHIL NFR BLD AUTO: 2 % (ref 0.3–6.2)
ERYTHROCYTE [DISTWIDTH] IN BLOOD BY AUTOMATED COUNT: 14.6 % (ref 12.3–15.4)
GFR SERPL CREATININE-BSD FRML MDRD: 49 ML/MIN/1.73
GLOBULIN UR ELPH-MCNC: 3.7 GM/DL
GLUCOSE BLD-MCNC: 88 MG/DL (ref 65–99)
GLUCOSE UR STRIP-MCNC: NEGATIVE MG/DL
HCT VFR BLD AUTO: 34.5 % (ref 34–46.6)
HGB BLD-MCNC: 11.9 G/DL (ref 12–15.9)
HGB UR QL STRIP.AUTO: NEGATIVE
IMM GRANULOCYTES # BLD AUTO: 0 10*3/MM3 (ref 0–0.05)
IMM GRANULOCYTES NFR BLD AUTO: 0 % (ref 0–0.5)
INR PPP: 0.91 (ref 0.9–1.1)
KETONES UR QL STRIP: NEGATIVE
LEUKOCYTE ESTERASE UR QL STRIP.AUTO: NEGATIVE
LYMPHOCYTES # BLD AUTO: 2.58 10*3/MM3 (ref 0.7–3.1)
LYMPHOCYTES NFR BLD AUTO: 42.2 % (ref 19.6–45.3)
MAGNESIUM SERPL-MCNC: 2.2 MG/DL (ref 1.6–2.4)
MCH RBC QN AUTO: 35 PG (ref 26.6–33)
MCHC RBC AUTO-ENTMCNC: 34.5 G/DL (ref 31.5–35.7)
MCV RBC AUTO: 101.5 FL (ref 79–97)
MONOCYTES # BLD AUTO: 1.01 10*3/MM3 (ref 0.1–0.9)
MONOCYTES NFR BLD AUTO: 16.5 % (ref 5–12)
NEUTROPHILS # BLD AUTO: 2.34 10*3/MM3 (ref 1.7–7)
NEUTROPHILS NFR BLD AUTO: 38.3 % (ref 42.7–76)
NITRITE UR QL STRIP: NEGATIVE
NRBC BLD AUTO-RTO: 0 /100 WBC (ref 0–0.2)
NT-PROBNP SERPL-MCNC: 230.4 PG/ML (ref 5–1800)
PH UR STRIP.AUTO: 7.5 [PH] (ref 5–8)
PHOSPHATE SERPL-MCNC: 4.4 MG/DL (ref 2.5–4.5)
PLATELET # BLD AUTO: 494 10*3/MM3 (ref 140–450)
PMV BLD AUTO: 9.9 FL (ref 6–12)
POTASSIUM BLD-SCNC: 4.7 MMOL/L (ref 3.5–5.2)
POTASSIUM BLD-SCNC: 6.2 MMOL/L (ref 3.5–5.2)
PROT SERPL-MCNC: 7.7 G/DL (ref 6–8.5)
PROT UR QL STRIP: NEGATIVE
PROTHROMBIN TIME: 12.7 SECONDS (ref 11–15.4)
RBC # BLD AUTO: 3.4 10*6/MM3 (ref 3.77–5.28)
SODIUM BLD-SCNC: 134 MMOL/L (ref 136–145)
SP GR UR STRIP: 1.01 (ref 1–1.03)
T4 FREE SERPL-MCNC: 1.4 NG/DL (ref 0.93–1.7)
TROPONIN T SERPL-MCNC: <0.01 NG/ML (ref 0–0.03)
TSH SERPL DL<=0.05 MIU/L-ACNC: 1.4 UIU/ML (ref 0.27–4.2)
UROBILINOGEN UR QL STRIP: NORMAL
WBC NRBC COR # BLD: 6.11 10*3/MM3 (ref 3.4–10.8)

## 2019-11-27 PROCEDURE — 93005 ELECTROCARDIOGRAM TRACING: CPT | Performed by: EMERGENCY MEDICINE

## 2019-11-27 PROCEDURE — 83880 ASSAY OF NATRIURETIC PEPTIDE: CPT | Performed by: EMERGENCY MEDICINE

## 2019-11-27 PROCEDURE — 63710000001 INSULIN REGULAR HUMAN PER 5 UNITS: Performed by: EMERGENCY MEDICINE

## 2019-11-27 PROCEDURE — 84484 ASSAY OF TROPONIN QUANT: CPT | Performed by: EMERGENCY MEDICINE

## 2019-11-27 PROCEDURE — 85730 THROMBOPLASTIN TIME PARTIAL: CPT | Performed by: EMERGENCY MEDICINE

## 2019-11-27 PROCEDURE — 71045 X-RAY EXAM CHEST 1 VIEW: CPT | Performed by: RADIOLOGY

## 2019-11-27 PROCEDURE — 71045 X-RAY EXAM CHEST 1 VIEW: CPT

## 2019-11-27 PROCEDURE — 99284 EMERGENCY DEPT VISIT MOD MDM: CPT

## 2019-11-27 PROCEDURE — 84439 ASSAY OF FREE THYROXINE: CPT | Performed by: EMERGENCY MEDICINE

## 2019-11-27 PROCEDURE — 96374 THER/PROPH/DIAG INJ IV PUSH: CPT

## 2019-11-27 PROCEDURE — 85610 PROTHROMBIN TIME: CPT | Performed by: EMERGENCY MEDICINE

## 2019-11-27 PROCEDURE — 83735 ASSAY OF MAGNESIUM: CPT | Performed by: EMERGENCY MEDICINE

## 2019-11-27 PROCEDURE — 93010 ELECTROCARDIOGRAM REPORT: CPT | Performed by: INTERNAL MEDICINE

## 2019-11-27 PROCEDURE — 84443 ASSAY THYROID STIM HORMONE: CPT | Performed by: EMERGENCY MEDICINE

## 2019-11-27 PROCEDURE — 81003 URINALYSIS AUTO W/O SCOPE: CPT | Performed by: EMERGENCY MEDICINE

## 2019-11-27 PROCEDURE — 84132 ASSAY OF SERUM POTASSIUM: CPT | Performed by: EMERGENCY MEDICINE

## 2019-11-27 PROCEDURE — 84100 ASSAY OF PHOSPHORUS: CPT | Performed by: EMERGENCY MEDICINE

## 2019-11-27 PROCEDURE — 80053 COMPREHEN METABOLIC PANEL: CPT | Performed by: EMERGENCY MEDICINE

## 2019-11-27 PROCEDURE — 85025 COMPLETE CBC W/AUTO DIFF WBC: CPT | Performed by: EMERGENCY MEDICINE

## 2019-11-27 RX ORDER — SODIUM POLYSTYRENE SULFONATE 4.1 MEQ/G
30 POWDER, FOR SUSPENSION ORAL; RECTAL ONCE
Status: COMPLETED | OUTPATIENT
Start: 2019-11-27 | End: 2019-11-27

## 2019-11-27 RX ORDER — SODIUM CHLORIDE 0.9 % (FLUSH) 0.9 %
10 SYRINGE (ML) INJECTION AS NEEDED
Status: DISCONTINUED | OUTPATIENT
Start: 2019-11-27 | End: 2019-11-27 | Stop reason: HOSPADM

## 2019-11-27 RX ORDER — DEXTROSE MONOHYDRATE 25 G/50ML
25 INJECTION, SOLUTION INTRAVENOUS ONCE
Status: COMPLETED | OUTPATIENT
Start: 2019-11-27 | End: 2019-11-27

## 2019-11-27 RX ORDER — HYDRALAZINE HYDROCHLORIDE 50 MG/1
75 TABLET, FILM COATED ORAL EVERY 8 HOURS SCHEDULED
Qty: 90 TABLET | Refills: 0 | Status: ON HOLD | OUTPATIENT
Start: 2019-11-27 | End: 2020-01-05

## 2019-11-27 RX ADMIN — HUMAN INSULIN 10 UNITS: 100 INJECTION, SOLUTION SUBCUTANEOUS at 16:10

## 2019-11-27 RX ADMIN — DEXTROSE 50 % IN WATER (D50W) INTRAVENOUS SYRINGE 25 G: at 16:11

## 2019-11-27 RX ADMIN — SODIUM POLYSTYRENE SULFONATE 30 G: 1 POWDER ORAL; RECTAL at 16:09

## 2019-11-28 ENCOUNTER — READMISSION MANAGEMENT (OUTPATIENT)
Dept: CALL CENTER | Facility: HOSPITAL | Age: 74
End: 2019-11-28

## 2019-11-28 ENCOUNTER — APPOINTMENT (OUTPATIENT)
Dept: GENERAL RADIOLOGY | Facility: HOSPITAL | Age: 74
End: 2019-11-28

## 2019-11-28 ENCOUNTER — APPOINTMENT (OUTPATIENT)
Dept: CT IMAGING | Facility: HOSPITAL | Age: 74
End: 2019-11-28

## 2019-11-28 ENCOUNTER — HOSPITAL ENCOUNTER (INPATIENT)
Facility: HOSPITAL | Age: 74
LOS: 5 days | Discharge: NURSING, REHAB OR LONG-TERM CARE | End: 2019-12-03
Attending: EMERGENCY MEDICINE | Admitting: INTERNAL MEDICINE

## 2019-11-28 DIAGNOSIS — E87.5 HYPERKALEMIA: ICD-10-CM

## 2019-11-28 DIAGNOSIS — R55 SYNCOPE, UNSPECIFIED SYNCOPE TYPE: Primary | ICD-10-CM

## 2019-11-28 PROBLEM — E11.9 TYPE II DIABETES MELLITUS (HCC): Chronic | Status: RESOLVED | Noted: 2019-11-08 | Resolved: 2019-11-28

## 2019-11-28 PROBLEM — E87.20 LACTIC ACIDOSIS: Status: ACTIVE | Noted: 2019-11-28

## 2019-11-28 PROBLEM — N17.9 ACUTE KIDNEY INJURY (HCC): Status: RESOLVED | Noted: 2019-11-08 | Resolved: 2019-11-28

## 2019-11-28 PROBLEM — Z90.81 HISTORY OF SPLENECTOMY: Chronic | Status: RESOLVED | Noted: 2019-11-08 | Resolved: 2019-11-28

## 2019-11-28 PROBLEM — I45.10 RBBB: Chronic | Status: RESOLVED | Noted: 2017-09-01 | Resolved: 2019-11-28

## 2019-11-28 PROBLEM — J18.9 RIGHT UPPER LOBE PNEUMONIA: Status: ACTIVE | Noted: 2019-11-28

## 2019-11-28 PROBLEM — N17.9 ACUTE KIDNEY INJURY (HCC): Status: ACTIVE | Noted: 2019-11-28

## 2019-11-28 LAB
ALBUMIN SERPL-MCNC: 3.92 G/DL (ref 3.5–5.2)
ALBUMIN/GLOB SERPL: 1 G/DL
ALP SERPL-CCNC: 115 U/L (ref 39–117)
ALT SERPL W P-5'-P-CCNC: 29 U/L (ref 1–33)
ANION GAP SERPL CALCULATED.3IONS-SCNC: 14.1 MMOL/L (ref 5–15)
AST SERPL-CCNC: 32 U/L (ref 1–32)
BASOPHILS # BLD AUTO: 0.1 10*3/MM3 (ref 0–0.2)
BASOPHILS NFR BLD AUTO: 1.4 % (ref 0–1.5)
BILIRUB SERPL-MCNC: 0.5 MG/DL (ref 0.2–1.2)
BILIRUB UR QL STRIP: NEGATIVE
BUN BLD-MCNC: 21 MG/DL (ref 8–23)
BUN/CREAT SERPL: 14.8 (ref 7–25)
CALCIUM SPEC-SCNC: 10.1 MG/DL (ref 8.6–10.5)
CHLORIDE SERPL-SCNC: 97 MMOL/L (ref 98–107)
CLARITY UR: CLEAR
CO2 SERPL-SCNC: 19.9 MMOL/L (ref 22–29)
COLOR UR: YELLOW
CREAT BLD-MCNC: 1.42 MG/DL (ref 0.57–1)
CRP SERPL-MCNC: 0.14 MG/DL (ref 0–0.5)
D DIMER PPP FEU-MCNC: 3.33 MCGFEU/ML (ref 0–0.5)
D-LACTATE SERPL-SCNC: 1.1 MMOL/L (ref 0.5–2)
D-LACTATE SERPL-SCNC: 3.3 MMOL/L (ref 0.5–2)
DEPRECATED RDW RBC AUTO: 58.5 FL (ref 37–54)
EOSINOPHIL # BLD AUTO: 0.18 10*3/MM3 (ref 0–0.4)
EOSINOPHIL NFR BLD AUTO: 2.5 % (ref 0.3–6.2)
ERYTHROCYTE [DISTWIDTH] IN BLOOD BY AUTOMATED COUNT: 15 % (ref 12.3–15.4)
FLUAV AG NPH QL: NEGATIVE
FLUBV AG NPH QL IA: NEGATIVE
GFR SERPL CREATININE-BSD FRML MDRD: 36 ML/MIN/1.73
GLOBULIN UR ELPH-MCNC: 3.8 GM/DL
GLUCOSE BLD-MCNC: 188 MG/DL (ref 65–99)
GLUCOSE BLDC GLUCOMTR-MCNC: 118 MG/DL (ref 70–130)
GLUCOSE BLDC GLUCOMTR-MCNC: 121 MG/DL (ref 70–130)
GLUCOSE UR STRIP-MCNC: NEGATIVE MG/DL
HCT VFR BLD AUTO: 39.2 % (ref 34–46.6)
HGB BLD-MCNC: 13 G/DL (ref 12–15.9)
HGB UR QL STRIP.AUTO: NEGATIVE
HOLD SPECIMEN: NORMAL
IMM GRANULOCYTES # BLD AUTO: 0.02 10*3/MM3 (ref 0–0.05)
IMM GRANULOCYTES NFR BLD AUTO: 0.3 % (ref 0–0.5)
KETONES UR QL STRIP: NEGATIVE
LEUKOCYTE ESTERASE UR QL STRIP.AUTO: NEGATIVE
LIPASE SERPL-CCNC: 53 U/L (ref 13–60)
LYMPHOCYTES # BLD AUTO: 2.4 10*3/MM3 (ref 0.7–3.1)
LYMPHOCYTES NFR BLD AUTO: 33.8 % (ref 19.6–45.3)
MACROCYTES BLD QL SMEAR: NORMAL
MAGNESIUM SERPL-MCNC: 2.4 MG/DL (ref 1.6–2.4)
MCH RBC QN AUTO: 35.1 PG (ref 26.6–33)
MCHC RBC AUTO-ENTMCNC: 33.2 G/DL (ref 31.5–35.7)
MCV RBC AUTO: 105.9 FL (ref 79–97)
MONOCYTES # BLD AUTO: 0.83 10*3/MM3 (ref 0.1–0.9)
MONOCYTES NFR BLD AUTO: 11.7 % (ref 5–12)
NEUTROPHILS # BLD AUTO: 3.58 10*3/MM3 (ref 1.7–7)
NEUTROPHILS NFR BLD AUTO: 50.3 % (ref 42.7–76)
NITRITE UR QL STRIP: NEGATIVE
NRBC BLD AUTO-RTO: 0 /100 WBC (ref 0–0.2)
NT-PROBNP SERPL-MCNC: 164.5 PG/ML (ref 5–1800)
PH UR STRIP.AUTO: 7 [PH] (ref 5–8)
PLATELET # BLD AUTO: 446 10*3/MM3 (ref 140–450)
PMV BLD AUTO: 9.8 FL (ref 6–12)
POTASSIUM BLD-SCNC: 4.9 MMOL/L (ref 3.5–5.2)
POTASSIUM BLD-SCNC: 6.1 MMOL/L (ref 3.5–5.2)
PROT SERPL-MCNC: 7.7 G/DL (ref 6–8.5)
PROT UR QL STRIP: NEGATIVE
RBC # BLD AUTO: 3.7 10*6/MM3 (ref 3.77–5.28)
SCHISTOCYTES BLD QL SMEAR: NORMAL
SMALL PLATELETS BLD QL SMEAR: NORMAL
SODIUM BLD-SCNC: 131 MMOL/L (ref 136–145)
SP GR UR STRIP: 1.01 (ref 1–1.03)
T4 FREE SERPL-MCNC: 1.35 NG/DL (ref 0.93–1.7)
TROPONIN T SERPL-MCNC: <0.01 NG/ML (ref 0–0.03)
TSH SERPL DL<=0.05 MIU/L-ACNC: 1.92 UIU/ML (ref 0.27–4.2)
UROBILINOGEN UR QL STRIP: NORMAL
WBC NRBC COR # BLD: 7.11 10*3/MM3 (ref 3.4–10.8)
WHOLE BLOOD HOLD SPECIMEN: NORMAL
WHOLE BLOOD HOLD SPECIMEN: NORMAL

## 2019-11-28 PROCEDURE — 82962 GLUCOSE BLOOD TEST: CPT

## 2019-11-28 PROCEDURE — 73070 X-RAY EXAM OF ELBOW: CPT

## 2019-11-28 PROCEDURE — 87804 INFLUENZA ASSAY W/OPTIC: CPT | Performed by: EMERGENCY MEDICINE

## 2019-11-28 PROCEDURE — 70450 CT HEAD/BRAIN W/O DYE: CPT

## 2019-11-28 PROCEDURE — 84443 ASSAY THYROID STIM HORMONE: CPT | Performed by: EMERGENCY MEDICINE

## 2019-11-28 PROCEDURE — 85007 BL SMEAR W/DIFF WBC COUNT: CPT | Performed by: EMERGENCY MEDICINE

## 2019-11-28 PROCEDURE — 94640 AIRWAY INHALATION TREATMENT: CPT

## 2019-11-28 PROCEDURE — 86140 C-REACTIVE PROTEIN: CPT | Performed by: EMERGENCY MEDICINE

## 2019-11-28 PROCEDURE — 83605 ASSAY OF LACTIC ACID: CPT | Performed by: EMERGENCY MEDICINE

## 2019-11-28 PROCEDURE — 94799 UNLISTED PULMONARY SVC/PX: CPT

## 2019-11-28 PROCEDURE — 71275 CT ANGIOGRAPHY CHEST: CPT

## 2019-11-28 PROCEDURE — 83690 ASSAY OF LIPASE: CPT | Performed by: EMERGENCY MEDICINE

## 2019-11-28 PROCEDURE — 87040 BLOOD CULTURE FOR BACTERIA: CPT | Performed by: INTERNAL MEDICINE

## 2019-11-28 PROCEDURE — 0 IOVERSOL 74 % SOLUTION: Performed by: EMERGENCY MEDICINE

## 2019-11-28 PROCEDURE — 85025 COMPLETE CBC W/AUTO DIFF WBC: CPT | Performed by: EMERGENCY MEDICINE

## 2019-11-28 PROCEDURE — 25010000002 HEPARIN (PORCINE) PER 1000 UNITS: Performed by: PHYSICIAN ASSISTANT

## 2019-11-28 PROCEDURE — 25010000002 INFLUENZA VAC SUBUNIT QUAD 0.5 ML SUSPENSION PREFILLED SYRINGE: Performed by: INTERNAL MEDICINE

## 2019-11-28 PROCEDURE — 84484 ASSAY OF TROPONIN QUANT: CPT | Performed by: EMERGENCY MEDICINE

## 2019-11-28 PROCEDURE — 93010 ELECTROCARDIOGRAM REPORT: CPT | Performed by: INTERNAL MEDICINE

## 2019-11-28 PROCEDURE — 81003 URINALYSIS AUTO W/O SCOPE: CPT | Performed by: EMERGENCY MEDICINE

## 2019-11-28 PROCEDURE — 93005 ELECTROCARDIOGRAM TRACING: CPT | Performed by: EMERGENCY MEDICINE

## 2019-11-28 PROCEDURE — 84439 ASSAY OF FREE THYROXINE: CPT | Performed by: EMERGENCY MEDICINE

## 2019-11-28 PROCEDURE — 0099U HC BIOFIRE FILMARRAY RESP PANEL 1: CPT | Performed by: PHYSICIAN ASSISTANT

## 2019-11-28 PROCEDURE — 83735 ASSAY OF MAGNESIUM: CPT | Performed by: EMERGENCY MEDICINE

## 2019-11-28 PROCEDURE — 80053 COMPREHEN METABOLIC PANEL: CPT | Performed by: EMERGENCY MEDICINE

## 2019-11-28 PROCEDURE — 71045 X-RAY EXAM CHEST 1 VIEW: CPT

## 2019-11-28 PROCEDURE — G0008 ADMIN INFLUENZA VIRUS VAC: HCPCS | Performed by: INTERNAL MEDICINE

## 2019-11-28 PROCEDURE — 83880 ASSAY OF NATRIURETIC PEPTIDE: CPT | Performed by: EMERGENCY MEDICINE

## 2019-11-28 PROCEDURE — 84132 ASSAY OF SERUM POTASSIUM: CPT | Performed by: PHYSICIAN ASSISTANT

## 2019-11-28 PROCEDURE — 25010000002 VANCOMYCIN 5 G RECONSTITUTED SOLUTION 5,000 MG VIAL: Performed by: EMERGENCY MEDICINE

## 2019-11-28 PROCEDURE — 85379 FIBRIN DEGRADATION QUANT: CPT | Performed by: EMERGENCY MEDICINE

## 2019-11-28 PROCEDURE — 90674 CCIIV4 VAC NO PRSV 0.5 ML IM: CPT | Performed by: INTERNAL MEDICINE

## 2019-11-28 PROCEDURE — P9612 CATHETERIZE FOR URINE SPEC: HCPCS

## 2019-11-28 PROCEDURE — 99285 EMERGENCY DEPT VISIT HI MDM: CPT

## 2019-11-28 PROCEDURE — 99223 1ST HOSP IP/OBS HIGH 75: CPT | Performed by: PHYSICIAN ASSISTANT

## 2019-11-28 RX ORDER — ASPIRIN 81 MG/1
81 TABLET ORAL DAILY
Status: DISCONTINUED | OUTPATIENT
Start: 2019-11-29 | End: 2019-12-03 | Stop reason: HOSPADM

## 2019-11-28 RX ORDER — DEXTROSE MONOHYDRATE 25 G/50ML
25 INJECTION, SOLUTION INTRAVENOUS
Status: DISCONTINUED | OUTPATIENT
Start: 2019-11-28 | End: 2019-12-03 | Stop reason: HOSPADM

## 2019-11-28 RX ORDER — NITROGLYCERIN 0.4 MG/1
0.4 TABLET SUBLINGUAL
Status: DISCONTINUED | OUTPATIENT
Start: 2019-11-28 | End: 2019-12-03 | Stop reason: HOSPADM

## 2019-11-28 RX ORDER — ROPINIROLE 0.25 MG/1
0.25 TABLET, FILM COATED ORAL NIGHTLY
Status: DISCONTINUED | OUTPATIENT
Start: 2019-11-28 | End: 2019-12-03 | Stop reason: HOSPADM

## 2019-11-28 RX ORDER — VALACYCLOVIR HYDROCHLORIDE 500 MG/1
1000 TABLET, FILM COATED ORAL DAILY
Status: DISCONTINUED | OUTPATIENT
Start: 2019-11-29 | End: 2019-12-03 | Stop reason: HOSPADM

## 2019-11-28 RX ORDER — ATORVASTATIN CALCIUM 10 MG/1
10 TABLET, FILM COATED ORAL NIGHTLY
Status: DISCONTINUED | OUTPATIENT
Start: 2019-11-28 | End: 2019-12-03 | Stop reason: HOSPADM

## 2019-11-28 RX ORDER — NITROGLYCERIN 0.4 MG/1
0.4 TABLET SUBLINGUAL
Status: DISCONTINUED | OUTPATIENT
Start: 2019-11-28 | End: 2019-11-28 | Stop reason: SDUPTHER

## 2019-11-28 RX ORDER — EXEMESTANE 25 MG/1
25 TABLET ORAL DAILY
Status: CANCELLED | OUTPATIENT
Start: 2019-11-28

## 2019-11-28 RX ORDER — PANTOPRAZOLE SODIUM 40 MG/1
40 TABLET, DELAYED RELEASE ORAL DAILY
Status: DISCONTINUED | OUTPATIENT
Start: 2019-11-28 | End: 2019-11-28

## 2019-11-28 RX ORDER — SODIUM POLYSTYRENE SULFONATE 4.1 MEQ/G
30 POWDER, FOR SUSPENSION ORAL; RECTAL ONCE
Status: COMPLETED | OUTPATIENT
Start: 2019-11-28 | End: 2019-11-28

## 2019-11-28 RX ORDER — IPRATROPIUM BROMIDE AND ALBUTEROL SULFATE 2.5; .5 MG/3ML; MG/3ML
3 SOLUTION RESPIRATORY (INHALATION)
Status: DISCONTINUED | OUTPATIENT
Start: 2019-11-28 | End: 2019-12-03 | Stop reason: HOSPADM

## 2019-11-28 RX ORDER — SODIUM CHLORIDE 0.9 % (FLUSH) 0.9 %
10 SYRINGE (ML) INJECTION AS NEEDED
Status: DISCONTINUED | OUTPATIENT
Start: 2019-11-28 | End: 2019-12-03 | Stop reason: HOSPADM

## 2019-11-28 RX ORDER — CALCIUM CARBONATE 500(1250)
500 TABLET ORAL DAILY
Status: DISCONTINUED | OUTPATIENT
Start: 2019-11-28 | End: 2019-12-03 | Stop reason: HOSPADM

## 2019-11-28 RX ORDER — HEPARIN SODIUM 5000 [USP'U]/ML
5000 INJECTION, SOLUTION INTRAVENOUS; SUBCUTANEOUS EVERY 12 HOURS SCHEDULED
Status: DISCONTINUED | OUTPATIENT
Start: 2019-11-28 | End: 2019-12-03 | Stop reason: HOSPADM

## 2019-11-28 RX ORDER — SODIUM CHLORIDE 9 MG/ML
100 INJECTION, SOLUTION INTRAVENOUS CONTINUOUS
Status: DISCONTINUED | OUTPATIENT
Start: 2019-11-28 | End: 2019-11-30

## 2019-11-28 RX ORDER — PANTOPRAZOLE SODIUM 40 MG/1
40 TABLET, DELAYED RELEASE ORAL DAILY
Status: DISCONTINUED | OUTPATIENT
Start: 2019-11-29 | End: 2019-12-03 | Stop reason: HOSPADM

## 2019-11-28 RX ORDER — LACTULOSE 10 G/15ML
20 SOLUTION ORAL ONCE
Status: COMPLETED | OUTPATIENT
Start: 2019-11-28 | End: 2019-11-28

## 2019-11-28 RX ORDER — CARVEDILOL 6.25 MG/1
12.5 TABLET ORAL 2 TIMES DAILY
Status: DISCONTINUED | OUTPATIENT
Start: 2019-11-28 | End: 2019-12-01

## 2019-11-28 RX ORDER — HYDROCODONE BITARTRATE AND ACETAMINOPHEN 5; 325 MG/1; MG/1
1 TABLET ORAL ONCE
Status: COMPLETED | OUTPATIENT
Start: 2019-11-28 | End: 2019-11-28

## 2019-11-28 RX ORDER — OSELTAMIVIR PHOSPHATE 6 MG/ML
30 FOR SUSPENSION ORAL ONCE
Status: DISCONTINUED | OUTPATIENT
Start: 2019-11-28 | End: 2019-11-28

## 2019-11-28 RX ORDER — CETIRIZINE HYDROCHLORIDE 10 MG/1
5 TABLET ORAL DAILY
Status: DISCONTINUED | OUTPATIENT
Start: 2019-11-29 | End: 2019-12-03 | Stop reason: HOSPADM

## 2019-11-28 RX ORDER — SODIUM CHLORIDE 0.9 % (FLUSH) 0.9 %
10 SYRINGE (ML) INJECTION EVERY 12 HOURS SCHEDULED
Status: DISCONTINUED | OUTPATIENT
Start: 2019-11-28 | End: 2019-12-03 | Stop reason: HOSPADM

## 2019-11-28 RX ORDER — IPRATROPIUM BROMIDE AND ALBUTEROL SULFATE 2.5; .5 MG/3ML; MG/3ML
3 SOLUTION RESPIRATORY (INHALATION)
Status: DISCONTINUED | OUTPATIENT
Start: 2019-11-28 | End: 2019-11-28 | Stop reason: SDUPTHER

## 2019-11-28 RX ORDER — NICOTINE POLACRILEX 4 MG
15 LOZENGE BUCCAL
Status: DISCONTINUED | OUTPATIENT
Start: 2019-11-28 | End: 2019-12-03 | Stop reason: HOSPADM

## 2019-11-28 RX ADMIN — AZTREONAM 2 G: 2 INJECTION, POWDER, FOR SOLUTION INTRAMUSCULAR; INTRAVENOUS at 15:25

## 2019-11-28 RX ADMIN — INFLUENZA A VIRUS A/SINGAPORE/GP1908/2015 IVR-180 (H1N1) ANTIGEN (MDCK CELL DERIVED, PROPIOLACTONE INACTIVATED), INFLUENZA A VIRUS A/NORTH CAROLINA/04/2016 (H3N2) HEMAGGLUTININ ANTIGEN (MDCK CELL DERIVED, PROPIOLACTONE INACTIVATED), INFLUENZA B VIRUS B/IOWA/06/2017 HEMAGGLUTININ ANTIGEN (MDCK CELL DERIVED, PROPIOLACTONE INACTIVATED), INFLUENZA B VIRUS B/SINGAPORE/INFTT-16-0610/2016 HEMAGGLUTININ ANTIGEN (MDCK CELL DERIVED, PROPIOLACTONE INACTIVATED) 0.5 ML: 15; 15; 15; 15 INJECTION, SUSPENSION INTRAMUSCULAR at 21:11

## 2019-11-28 RX ADMIN — SODIUM CHLORIDE 1000 ML: 9 INJECTION, SOLUTION INTRAVENOUS at 13:21

## 2019-11-28 RX ADMIN — HYDROCODONE BITARTRATE AND ACETAMINOPHEN 1 TABLET: 5; 325 TABLET ORAL at 15:04

## 2019-11-28 RX ADMIN — HEPARIN SODIUM 5000 UNITS: 5000 INJECTION INTRAVENOUS; SUBCUTANEOUS at 21:12

## 2019-11-28 RX ADMIN — LACTULOSE 20 G: 20 SOLUTION ORAL at 13:50

## 2019-11-28 RX ADMIN — SODIUM CHLORIDE 125 ML/HR: 900 INJECTION INTRAVENOUS at 11:59

## 2019-11-28 RX ADMIN — SODIUM CHLORIDE, PRESERVATIVE FREE 10 ML: 5 INJECTION INTRAVENOUS at 21:06

## 2019-11-28 RX ADMIN — Medication 500 MG: at 17:29

## 2019-11-28 RX ADMIN — VANCOMYCIN HYDROCHLORIDE 1250 MG: 5 INJECTION, POWDER, LYOPHILIZED, FOR SOLUTION INTRAVENOUS at 17:02

## 2019-11-28 RX ADMIN — SODIUM POLYSTYRENE SULFONATE 30 G: 1 POWDER ORAL; RECTAL at 13:05

## 2019-11-28 RX ADMIN — CARVEDILOL 12.5 MG: 6.25 TABLET, FILM COATED ORAL at 21:06

## 2019-11-28 RX ADMIN — METRONIDAZOLE 500 MG: 500 INJECTION, SOLUTION INTRAVENOUS at 18:36

## 2019-11-28 RX ADMIN — ATORVASTATIN CALCIUM 10 MG: 10 TABLET, FILM COATED ORAL at 21:05

## 2019-11-28 RX ADMIN — SODIUM CHLORIDE 500 ML: 9 INJECTION, SOLUTION INTRAVENOUS at 11:59

## 2019-11-28 RX ADMIN — IOVERSOL 60 ML: 741 INJECTION INTRA-ARTERIAL; INTRAVENOUS at 13:55

## 2019-11-28 RX ADMIN — IPRATROPIUM BROMIDE AND ALBUTEROL SULFATE 3 ML: .5; 3 SOLUTION RESPIRATORY (INHALATION) at 18:07

## 2019-11-28 RX ADMIN — ROPINIROLE HYDROCHLORIDE 0.25 MG: 0.25 TABLET, FILM COATED ORAL at 21:06

## 2019-11-28 RX ADMIN — OFLOXACIN 50000 UNITS: 300 TABLET, COATED ORAL at 17:29

## 2019-11-28 NOTE — OUTREACH NOTE
Medical Week 2 Survey      Responses   Facility patient discharged from?  [facility]   Does the patient have one of the following disease processes/diagnoses(primary or secondary)?  Other   Week 2 attempt successful?  No   Revoke  Readmitted          Mirela Mccollum RN

## 2019-11-29 ENCOUNTER — APPOINTMENT (OUTPATIENT)
Dept: ULTRASOUND IMAGING | Facility: HOSPITAL | Age: 74
End: 2019-11-29

## 2019-11-29 ENCOUNTER — APPOINTMENT (OUTPATIENT)
Dept: GENERAL RADIOLOGY | Facility: HOSPITAL | Age: 74
End: 2019-11-29

## 2019-11-29 LAB
ANION GAP SERPL CALCULATED.3IONS-SCNC: 11.6 MMOL/L (ref 5–15)
ANISOCYTOSIS BLD QL: NORMAL
B PERT DNA SPEC QL NAA+PROBE: NOT DETECTED
BASOPHILS # BLD AUTO: 0.09 10*3/MM3 (ref 0–0.2)
BASOPHILS NFR BLD AUTO: 1.4 % (ref 0–1.5)
BUN BLD-MCNC: 18 MG/DL (ref 8–23)
BUN/CREAT SERPL: 19.8 (ref 7–25)
C PNEUM DNA NPH QL NAA+NON-PROBE: NOT DETECTED
CALCIUM SPEC-SCNC: 9 MG/DL (ref 8.6–10.5)
CHLORIDE SERPL-SCNC: 104 MMOL/L (ref 98–107)
CO2 SERPL-SCNC: 21.4 MMOL/L (ref 22–29)
CREAT BLD-MCNC: 0.91 MG/DL (ref 0.57–1)
CRP SERPL-MCNC: 0.11 MG/DL (ref 0–0.5)
D-LACTATE SERPL-SCNC: 0.9 MMOL/L (ref 0.5–2)
DEPRECATED RDW RBC AUTO: 58.3 FL (ref 37–54)
EOSINOPHIL # BLD AUTO: 0.24 10*3/MM3 (ref 0–0.4)
EOSINOPHIL NFR BLD AUTO: 3.8 % (ref 0.3–6.2)
ERYTHROCYTE [DISTWIDTH] IN BLOOD BY AUTOMATED COUNT: 15 % (ref 12.3–15.4)
FLUAV H1 2009 PAND RNA NPH QL NAA+PROBE: NOT DETECTED
FLUAV H1 HA GENE NPH QL NAA+PROBE: NOT DETECTED
FLUAV H3 RNA NPH QL NAA+PROBE: NOT DETECTED
FLUAV SUBTYP SPEC NAA+PROBE: NOT DETECTED
FLUBV RNA ISLT QL NAA+PROBE: NOT DETECTED
GFR SERPL CREATININE-BSD FRML MDRD: 60 ML/MIN/1.73
GLUCOSE BLD-MCNC: 106 MG/DL (ref 65–99)
GLUCOSE BLDC GLUCOMTR-MCNC: 171 MG/DL (ref 70–130)
GLUCOSE BLDC GLUCOMTR-MCNC: 182 MG/DL (ref 70–130)
GLUCOSE BLDC GLUCOMTR-MCNC: 220 MG/DL (ref 70–130)
GLUCOSE BLDC GLUCOMTR-MCNC: 99 MG/DL (ref 70–130)
HADV DNA SPEC NAA+PROBE: NOT DETECTED
HCOV 229E RNA SPEC QL NAA+PROBE: NOT DETECTED
HCOV HKU1 RNA SPEC QL NAA+PROBE: NOT DETECTED
HCOV NL63 RNA SPEC QL NAA+PROBE: NOT DETECTED
HCOV OC43 RNA SPEC QL NAA+PROBE: NOT DETECTED
HCT VFR BLD AUTO: 32.6 % (ref 34–46.6)
HGB BLD-MCNC: 10.9 G/DL (ref 12–15.9)
HMPV RNA NPH QL NAA+NON-PROBE: NOT DETECTED
HPIV1 RNA SPEC QL NAA+PROBE: NOT DETECTED
HPIV2 RNA SPEC QL NAA+PROBE: NOT DETECTED
HPIV3 RNA NPH QL NAA+PROBE: NOT DETECTED
HPIV4 P GENE NPH QL NAA+PROBE: NOT DETECTED
IMM GRANULOCYTES # BLD AUTO: 0.01 10*3/MM3 (ref 0–0.05)
IMM GRANULOCYTES NFR BLD AUTO: 0.2 % (ref 0–0.5)
L PNEUMO1 AG UR QL IA: NEGATIVE
LYMPHOCYTES # BLD AUTO: 2.44 10*3/MM3 (ref 0.7–3.1)
LYMPHOCYTES NFR BLD AUTO: 38.6 % (ref 19.6–45.3)
M PNEUMO IGG SER IA-ACNC: NOT DETECTED
MACROCYTES BLD QL SMEAR: NORMAL
MCH RBC QN AUTO: 35.2 PG (ref 26.6–33)
MCHC RBC AUTO-ENTMCNC: 33.4 G/DL (ref 31.5–35.7)
MCV RBC AUTO: 105.2 FL (ref 79–97)
MONOCYTES # BLD AUTO: 1.08 10*3/MM3 (ref 0.1–0.9)
MONOCYTES NFR BLD AUTO: 17.1 % (ref 5–12)
NEUTROPHILS # BLD AUTO: 2.46 10*3/MM3 (ref 1.7–7)
NEUTROPHILS NFR BLD AUTO: 38.9 % (ref 42.7–76)
NRBC BLD AUTO-RTO: 0 /100 WBC (ref 0–0.2)
PLAT MORPH BLD: NORMAL
PLATELET # BLD AUTO: 409 10*3/MM3 (ref 140–450)
PMV BLD AUTO: 9.6 FL (ref 6–12)
POTASSIUM BLD-SCNC: 4.3 MMOL/L (ref 3.5–5.2)
RBC # BLD AUTO: 3.1 10*6/MM3 (ref 3.77–5.28)
RHINOVIRUS RNA SPEC NAA+PROBE: NOT DETECTED
RSV RNA NPH QL NAA+NON-PROBE: NOT DETECTED
SODIUM BLD-SCNC: 137 MMOL/L (ref 136–145)
WBC NRBC COR # BLD: 6.32 10*3/MM3 (ref 3.4–10.8)

## 2019-11-29 PROCEDURE — 92610 EVALUATE SWALLOWING FUNCTION: CPT

## 2019-11-29 PROCEDURE — 97162 PT EVAL MOD COMPLEX 30 MIN: CPT

## 2019-11-29 PROCEDURE — 85007 BL SMEAR W/DIFF WBC COUNT: CPT | Performed by: PHYSICIAN ASSISTANT

## 2019-11-29 PROCEDURE — 83605 ASSAY OF LACTIC ACID: CPT | Performed by: INTERNAL MEDICINE

## 2019-11-29 PROCEDURE — 97166 OT EVAL MOD COMPLEX 45 MIN: CPT

## 2019-11-29 PROCEDURE — 99232 SBSQ HOSP IP/OBS MODERATE 35: CPT | Performed by: PHYSICIAN ASSISTANT

## 2019-11-29 PROCEDURE — 93970 EXTREMITY STUDY: CPT

## 2019-11-29 PROCEDURE — 80048 BASIC METABOLIC PNL TOTAL CA: CPT | Performed by: PHYSICIAN ASSISTANT

## 2019-11-29 PROCEDURE — 94799 UNLISTED PULMONARY SVC/PX: CPT

## 2019-11-29 PROCEDURE — 92611 MOTION FLUOROSCOPY/SWALLOW: CPT

## 2019-11-29 PROCEDURE — 63710000001 INSULIN ASPART PER 5 UNITS: Performed by: PHYSICIAN ASSISTANT

## 2019-11-29 PROCEDURE — 25010000002 VANCOMYCIN 5 G RECONSTITUTED SOLUTION 5,000 MG VIAL: Performed by: INTERNAL MEDICINE

## 2019-11-29 PROCEDURE — 87899 AGENT NOS ASSAY W/OPTIC: CPT | Performed by: PHYSICIAN ASSISTANT

## 2019-11-29 PROCEDURE — 74230 X-RAY XM SWLNG FUNCJ C+: CPT

## 2019-11-29 PROCEDURE — 82962 GLUCOSE BLOOD TEST: CPT

## 2019-11-29 PROCEDURE — 74230 X-RAY XM SWLNG FUNCJ C+: CPT | Performed by: RADIOLOGY

## 2019-11-29 PROCEDURE — 85025 COMPLETE CBC W/AUTO DIFF WBC: CPT | Performed by: PHYSICIAN ASSISTANT

## 2019-11-29 PROCEDURE — 93970 EXTREMITY STUDY: CPT | Performed by: RADIOLOGY

## 2019-11-29 PROCEDURE — 25010000002 HEPARIN (PORCINE) PER 1000 UNITS: Performed by: PHYSICIAN ASSISTANT

## 2019-11-29 PROCEDURE — 86140 C-REACTIVE PROTEIN: CPT | Performed by: INTERNAL MEDICINE

## 2019-11-29 RX ORDER — EXEMESTANE 25 MG/1
25 TABLET ORAL DAILY
Status: DISCONTINUED | OUTPATIENT
Start: 2019-11-29 | End: 2019-12-03 | Stop reason: HOSPADM

## 2019-11-29 RX ADMIN — PANTOPRAZOLE SODIUM 40 MG: 40 TABLET, DELAYED RELEASE ORAL at 08:23

## 2019-11-29 RX ADMIN — METRONIDAZOLE 500 MG: 500 INJECTION, SOLUTION INTRAVENOUS at 17:54

## 2019-11-29 RX ADMIN — SODIUM CHLORIDE 100 ML/HR: 900 INJECTION INTRAVENOUS at 20:37

## 2019-11-29 RX ADMIN — ROPINIROLE HYDROCHLORIDE 0.25 MG: 0.25 TABLET, FILM COATED ORAL at 20:35

## 2019-11-29 RX ADMIN — AZTREONAM 2 G: 2 INJECTION, POWDER, FOR SOLUTION INTRAMUSCULAR; INTRAVENOUS at 08:24

## 2019-11-29 RX ADMIN — ATORVASTATIN CALCIUM 10 MG: 10 TABLET, FILM COATED ORAL at 20:35

## 2019-11-29 RX ADMIN — SODIUM CHLORIDE 100 ML/HR: 900 INJECTION INTRAVENOUS at 10:11

## 2019-11-29 RX ADMIN — AZTREONAM 2 G: 2 INJECTION, POWDER, FOR SOLUTION INTRAMUSCULAR; INTRAVENOUS at 00:38

## 2019-11-29 RX ADMIN — METRONIDAZOLE 500 MG: 500 INJECTION, SOLUTION INTRAVENOUS at 10:11

## 2019-11-29 RX ADMIN — ASPIRIN 81 MG: 81 TABLET, COATED ORAL at 08:23

## 2019-11-29 RX ADMIN — CETIRIZINE HYDROCHLORIDE 5 MG: 10 TABLET, FILM COATED ORAL at 08:23

## 2019-11-29 RX ADMIN — METRONIDAZOLE 500 MG: 500 INJECTION, SOLUTION INTRAVENOUS at 02:35

## 2019-11-29 RX ADMIN — HEPARIN SODIUM 5000 UNITS: 5000 INJECTION INTRAVENOUS; SUBCUTANEOUS at 08:24

## 2019-11-29 RX ADMIN — HEPARIN SODIUM 5000 UNITS: 5000 INJECTION INTRAVENOUS; SUBCUTANEOUS at 20:35

## 2019-11-29 RX ADMIN — VALACYCLOVIR HYDROCHLORIDE 1000 MG: 500 TABLET, FILM COATED ORAL at 08:23

## 2019-11-29 RX ADMIN — SODIUM CHLORIDE 125 ML/HR: 900 INJECTION INTRAVENOUS at 02:38

## 2019-11-29 RX ADMIN — CARVEDILOL 12.5 MG: 6.25 TABLET, FILM COATED ORAL at 20:35

## 2019-11-29 RX ADMIN — SODIUM CHLORIDE, PRESERVATIVE FREE 10 ML: 5 INJECTION INTRAVENOUS at 20:36

## 2019-11-29 RX ADMIN — AZTREONAM 2 G: 2 INJECTION, POWDER, FOR SOLUTION INTRAMUSCULAR; INTRAVENOUS at 17:54

## 2019-11-29 RX ADMIN — INSULIN ASPART 3 UNITS: 100 INJECTION, SOLUTION INTRAVENOUS; SUBCUTANEOUS at 20:35

## 2019-11-29 RX ADMIN — CARVEDILOL 12.5 MG: 6.25 TABLET, FILM COATED ORAL at 08:23

## 2019-11-29 RX ADMIN — EXEMESTANE 25 MG: 25 TABLET ORAL at 14:01

## 2019-11-29 RX ADMIN — Medication 500 MG: at 08:23

## 2019-11-29 RX ADMIN — VANCOMYCIN HYDROCHLORIDE 750 MG: 5 INJECTION, POWDER, LYOPHILIZED, FOR SOLUTION INTRAVENOUS at 12:48

## 2019-11-30 LAB
ANION GAP SERPL CALCULATED.3IONS-SCNC: 7.1 MMOL/L (ref 5–15)
BASOPHILS # BLD AUTO: 0.09 10*3/MM3 (ref 0–0.2)
BASOPHILS NFR BLD AUTO: 1.4 % (ref 0–1.5)
BUN BLD-MCNC: 16 MG/DL (ref 8–23)
BUN/CREAT SERPL: 20.8 (ref 7–25)
CALCIUM SPEC-SCNC: 8.6 MG/DL (ref 8.6–10.5)
CHLORIDE SERPL-SCNC: 108 MMOL/L (ref 98–107)
CO2 SERPL-SCNC: 21.9 MMOL/L (ref 22–29)
CREAT BLD-MCNC: 0.77 MG/DL (ref 0.57–1)
DEPRECATED RDW RBC AUTO: 55.8 FL (ref 37–54)
EOSINOPHIL # BLD AUTO: 0.38 10*3/MM3 (ref 0–0.4)
EOSINOPHIL NFR BLD AUTO: 6 % (ref 0.3–6.2)
ERYTHROCYTE [DISTWIDTH] IN BLOOD BY AUTOMATED COUNT: 14.6 % (ref 12.3–15.4)
GFR SERPL CREATININE-BSD FRML MDRD: 73 ML/MIN/1.73
GLUCOSE BLD-MCNC: 152 MG/DL (ref 65–99)
GLUCOSE BLDC GLUCOMTR-MCNC: 101 MG/DL (ref 70–130)
GLUCOSE BLDC GLUCOMTR-MCNC: 167 MG/DL (ref 70–130)
GLUCOSE BLDC GLUCOMTR-MCNC: 169 MG/DL (ref 70–130)
GLUCOSE BLDC GLUCOMTR-MCNC: 93 MG/DL (ref 70–130)
HCT VFR BLD AUTO: 27.4 % (ref 34–46.6)
HGB BLD-MCNC: 9.3 G/DL (ref 12–15.9)
IMM GRANULOCYTES # BLD AUTO: 0.01 10*3/MM3 (ref 0–0.05)
IMM GRANULOCYTES NFR BLD AUTO: 0.2 % (ref 0–0.5)
LYMPHOCYTES # BLD AUTO: 2.89 10*3/MM3 (ref 0.7–3.1)
LYMPHOCYTES NFR BLD AUTO: 45.3 % (ref 19.6–45.3)
MCH RBC QN AUTO: 35.2 PG (ref 26.6–33)
MCHC RBC AUTO-ENTMCNC: 33.9 G/DL (ref 31.5–35.7)
MCV RBC AUTO: 103.8 FL (ref 79–97)
MONOCYTES # BLD AUTO: 1.24 10*3/MM3 (ref 0.1–0.9)
MONOCYTES NFR BLD AUTO: 19.4 % (ref 5–12)
NEUTROPHILS # BLD AUTO: 1.77 10*3/MM3 (ref 1.7–7)
NEUTROPHILS NFR BLD AUTO: 27.7 % (ref 42.7–76)
NRBC BLD AUTO-RTO: 0 /100 WBC (ref 0–0.2)
PLATELET # BLD AUTO: 351 10*3/MM3 (ref 140–450)
PMV BLD AUTO: 9.6 FL (ref 6–12)
POTASSIUM BLD-SCNC: 3.9 MMOL/L (ref 3.5–5.2)
RBC # BLD AUTO: 2.64 10*6/MM3 (ref 3.77–5.28)
SODIUM BLD-SCNC: 137 MMOL/L (ref 136–145)
WBC NRBC COR # BLD: 6.38 10*3/MM3 (ref 3.4–10.8)

## 2019-11-30 PROCEDURE — 82962 GLUCOSE BLOOD TEST: CPT

## 2019-11-30 PROCEDURE — 85025 COMPLETE CBC W/AUTO DIFF WBC: CPT | Performed by: PHYSICIAN ASSISTANT

## 2019-11-30 PROCEDURE — 99232 SBSQ HOSP IP/OBS MODERATE 35: CPT | Performed by: PHYSICIAN ASSISTANT

## 2019-11-30 PROCEDURE — 25010000002 VANCOMYCIN 5 G RECONSTITUTED SOLUTION 5,000 MG VIAL: Performed by: INTERNAL MEDICINE

## 2019-11-30 PROCEDURE — 25010000002 HEPARIN (PORCINE) PER 1000 UNITS: Performed by: PHYSICIAN ASSISTANT

## 2019-11-30 PROCEDURE — 94799 UNLISTED PULMONARY SVC/PX: CPT

## 2019-11-30 PROCEDURE — 80048 BASIC METABOLIC PNL TOTAL CA: CPT | Performed by: PHYSICIAN ASSISTANT

## 2019-11-30 RX ORDER — ACETAMINOPHEN 325 MG/1
650 TABLET ORAL EVERY 6 HOURS PRN
Status: DISCONTINUED | OUTPATIENT
Start: 2019-11-30 | End: 2019-12-03 | Stop reason: HOSPADM

## 2019-11-30 RX ORDER — BENZONATATE 100 MG/1
100 CAPSULE ORAL 3 TIMES DAILY PRN
Status: DISCONTINUED | OUTPATIENT
Start: 2019-11-30 | End: 2019-12-03 | Stop reason: HOSPADM

## 2019-11-30 RX ADMIN — METRONIDAZOLE 500 MG: 500 INJECTION, SOLUTION INTRAVENOUS at 11:44

## 2019-11-30 RX ADMIN — SODIUM CHLORIDE, PRESERVATIVE FREE 10 ML: 5 INJECTION INTRAVENOUS at 20:46

## 2019-11-30 RX ADMIN — AZTREONAM 2 G: 2 INJECTION, POWDER, FOR SOLUTION INTRAMUSCULAR; INTRAVENOUS at 08:36

## 2019-11-30 RX ADMIN — ASPIRIN 81 MG: 81 TABLET, COATED ORAL at 08:36

## 2019-11-30 RX ADMIN — VANCOMYCIN HYDROCHLORIDE 750 MG: 5 INJECTION, POWDER, LYOPHILIZED, FOR SOLUTION INTRAVENOUS at 04:10

## 2019-11-30 RX ADMIN — SODIUM CHLORIDE, PRESERVATIVE FREE 10 ML: 5 INJECTION INTRAVENOUS at 08:39

## 2019-11-30 RX ADMIN — METRONIDAZOLE 500 MG: 500 INJECTION, SOLUTION INTRAVENOUS at 16:02

## 2019-11-30 RX ADMIN — CARVEDILOL 12.5 MG: 6.25 TABLET, FILM COATED ORAL at 08:36

## 2019-11-30 RX ADMIN — CETIRIZINE HYDROCHLORIDE 5 MG: 10 TABLET, FILM COATED ORAL at 08:36

## 2019-11-30 RX ADMIN — Medication 500 MG: at 08:36

## 2019-11-30 RX ADMIN — ROPINIROLE HYDROCHLORIDE 0.25 MG: 0.25 TABLET, FILM COATED ORAL at 20:45

## 2019-11-30 RX ADMIN — ATORVASTATIN CALCIUM 10 MG: 10 TABLET, FILM COATED ORAL at 20:45

## 2019-11-30 RX ADMIN — AZTREONAM 2 G: 2 INJECTION, POWDER, FOR SOLUTION INTRAMUSCULAR; INTRAVENOUS at 17:37

## 2019-11-30 RX ADMIN — HEPARIN SODIUM 5000 UNITS: 5000 INJECTION INTRAVENOUS; SUBCUTANEOUS at 08:36

## 2019-11-30 RX ADMIN — CARVEDILOL 12.5 MG: 6.25 TABLET, FILM COATED ORAL at 20:45

## 2019-11-30 RX ADMIN — AZTREONAM 2 G: 2 INJECTION, POWDER, FOR SOLUTION INTRAMUSCULAR; INTRAVENOUS at 00:49

## 2019-11-30 RX ADMIN — HEPARIN SODIUM 5000 UNITS: 5000 INJECTION INTRAVENOUS; SUBCUTANEOUS at 20:45

## 2019-11-30 RX ADMIN — VALACYCLOVIR HYDROCHLORIDE 1000 MG: 500 TABLET, FILM COATED ORAL at 08:36

## 2019-11-30 RX ADMIN — BENZONATATE 100 MG: 100 CAPSULE ORAL at 20:45

## 2019-11-30 RX ADMIN — METRONIDAZOLE 500 MG: 500 INJECTION, SOLUTION INTRAVENOUS at 02:01

## 2019-11-30 RX ADMIN — PANTOPRAZOLE SODIUM 40 MG: 40 TABLET, DELAYED RELEASE ORAL at 08:36

## 2019-11-30 RX ADMIN — EXEMESTANE 25 MG: 25 TABLET ORAL at 08:39

## 2019-12-01 LAB
ANION GAP SERPL CALCULATED.3IONS-SCNC: 10.5 MMOL/L (ref 5–15)
BASOPHILS # BLD MANUAL: 0.07 10*3/MM3 (ref 0–0.2)
BASOPHILS NFR BLD AUTO: 1 % (ref 0–1.5)
BUN BLD-MCNC: 15 MG/DL (ref 8–23)
BUN/CREAT SERPL: 19 (ref 7–25)
CALCIUM SPEC-SCNC: 8.9 MG/DL (ref 8.6–10.5)
CHLORIDE SERPL-SCNC: 108 MMOL/L (ref 98–107)
CO2 SERPL-SCNC: 21.5 MMOL/L (ref 22–29)
CREAT BLD-MCNC: 0.79 MG/DL (ref 0.57–1)
DEPRECATED RDW RBC AUTO: 57.5 FL (ref 37–54)
EOSINOPHIL # BLD MANUAL: 0.5 10*3/MM3 (ref 0–0.4)
EOSINOPHIL NFR BLD MANUAL: 7 % (ref 0.3–6.2)
ERYTHROCYTE [DISTWIDTH] IN BLOOD BY AUTOMATED COUNT: 14.8 % (ref 12.3–15.4)
GFR SERPL CREATININE-BSD FRML MDRD: 71 ML/MIN/1.73
GLUCOSE BLD-MCNC: 170 MG/DL (ref 65–99)
GLUCOSE BLDC GLUCOMTR-MCNC: 109 MG/DL (ref 70–130)
GLUCOSE BLDC GLUCOMTR-MCNC: 112 MG/DL (ref 70–130)
GLUCOSE BLDC GLUCOMTR-MCNC: 216 MG/DL (ref 70–130)
GLUCOSE BLDC GLUCOMTR-MCNC: 231 MG/DL (ref 70–130)
HCT VFR BLD AUTO: 27.9 % (ref 34–46.6)
HGB BLD-MCNC: 9.3 G/DL (ref 12–15.9)
LYMPHOCYTES # BLD MANUAL: 3.39 10*3/MM3 (ref 0.7–3.1)
LYMPHOCYTES NFR BLD MANUAL: 18 % (ref 5–12)
LYMPHOCYTES NFR BLD MANUAL: 47 % (ref 19.6–45.3)
MACROCYTES BLD QL SMEAR: ABNORMAL
MCH RBC QN AUTO: 35.4 PG (ref 26.6–33)
MCHC RBC AUTO-ENTMCNC: 33.3 G/DL (ref 31.5–35.7)
MCV RBC AUTO: 106.1 FL (ref 79–97)
MONOCYTES # BLD AUTO: 1.3 10*3/MM3 (ref 0.1–0.9)
NEUTROPHILS # BLD AUTO: 1.95 10*3/MM3 (ref 1.7–7)
NEUTROPHILS NFR BLD MANUAL: 27 % (ref 42.7–76)
PLAT MORPH BLD: NORMAL
PLATELET # BLD AUTO: 330 10*3/MM3 (ref 140–450)
PMV BLD AUTO: 9.6 FL (ref 6–12)
POTASSIUM BLD-SCNC: 4 MMOL/L (ref 3.5–5.2)
RBC # BLD AUTO: 2.63 10*6/MM3 (ref 3.77–5.28)
SCAN SLIDE: NORMAL
SODIUM BLD-SCNC: 140 MMOL/L (ref 136–145)
WBC NRBC COR # BLD: 7.21 10*3/MM3 (ref 3.4–10.8)

## 2019-12-01 PROCEDURE — 94799 UNLISTED PULMONARY SVC/PX: CPT

## 2019-12-01 PROCEDURE — 25010000002 HEPARIN (PORCINE) PER 1000 UNITS: Performed by: PHYSICIAN ASSISTANT

## 2019-12-01 PROCEDURE — 80048 BASIC METABOLIC PNL TOTAL CA: CPT | Performed by: PHYSICIAN ASSISTANT

## 2019-12-01 PROCEDURE — 85025 COMPLETE CBC W/AUTO DIFF WBC: CPT | Performed by: PHYSICIAN ASSISTANT

## 2019-12-01 PROCEDURE — 82962 GLUCOSE BLOOD TEST: CPT

## 2019-12-01 PROCEDURE — 99232 SBSQ HOSP IP/OBS MODERATE 35: CPT | Performed by: PHYSICIAN ASSISTANT

## 2019-12-01 PROCEDURE — 85007 BL SMEAR W/DIFF WBC COUNT: CPT | Performed by: PHYSICIAN ASSISTANT

## 2019-12-01 RX ORDER — DOXYCYCLINE 100 MG/1
100 CAPSULE ORAL EVERY 12 HOURS SCHEDULED
Status: DISCONTINUED | OUTPATIENT
Start: 2019-12-01 | End: 2019-12-03 | Stop reason: HOSPADM

## 2019-12-01 RX ORDER — CARVEDILOL 25 MG/1
25 TABLET ORAL 2 TIMES DAILY
Status: DISCONTINUED | OUTPATIENT
Start: 2019-12-01 | End: 2019-12-03 | Stop reason: HOSPADM

## 2019-12-01 RX ADMIN — HEPARIN SODIUM 5000 UNITS: 5000 INJECTION INTRAVENOUS; SUBCUTANEOUS at 08:32

## 2019-12-01 RX ADMIN — METRONIDAZOLE 500 MG: 500 INJECTION, SOLUTION INTRAVENOUS at 01:41

## 2019-12-01 RX ADMIN — CARVEDILOL 25 MG: 25 TABLET, FILM COATED ORAL at 20:48

## 2019-12-01 RX ADMIN — Medication 500 MG: at 08:32

## 2019-12-01 RX ADMIN — ATORVASTATIN CALCIUM 10 MG: 10 TABLET, FILM COATED ORAL at 20:48

## 2019-12-01 RX ADMIN — DOXYCYCLINE 100 MG: 100 CAPSULE ORAL at 20:48

## 2019-12-01 RX ADMIN — DOXYCYCLINE 100 MG: 100 CAPSULE ORAL at 08:32

## 2019-12-01 RX ADMIN — SODIUM CHLORIDE, PRESERVATIVE FREE 10 ML: 5 INJECTION INTRAVENOUS at 08:33

## 2019-12-01 RX ADMIN — VALACYCLOVIR HYDROCHLORIDE 1000 MG: 500 TABLET, FILM COATED ORAL at 08:32

## 2019-12-01 RX ADMIN — CARVEDILOL 25 MG: 25 TABLET, FILM COATED ORAL at 09:58

## 2019-12-01 RX ADMIN — ASPIRIN 81 MG: 81 TABLET, COATED ORAL at 08:32

## 2019-12-01 RX ADMIN — EXEMESTANE 25 MG: 25 TABLET ORAL at 08:32

## 2019-12-01 RX ADMIN — ROPINIROLE HYDROCHLORIDE 0.25 MG: 0.25 TABLET, FILM COATED ORAL at 20:48

## 2019-12-01 RX ADMIN — CETIRIZINE HYDROCHLORIDE 5 MG: 10 TABLET, FILM COATED ORAL at 08:32

## 2019-12-01 RX ADMIN — PANTOPRAZOLE SODIUM 40 MG: 40 TABLET, DELAYED RELEASE ORAL at 08:32

## 2019-12-01 RX ADMIN — HEPARIN SODIUM 5000 UNITS: 5000 INJECTION INTRAVENOUS; SUBCUTANEOUS at 20:48

## 2019-12-01 RX ADMIN — SODIUM CHLORIDE, PRESERVATIVE FREE 10 ML: 5 INJECTION INTRAVENOUS at 20:49

## 2019-12-01 RX ADMIN — AZTREONAM 2 G: 2 INJECTION, POWDER, FOR SOLUTION INTRAMUSCULAR; INTRAVENOUS at 01:40

## 2019-12-02 PROBLEM — R73.9 HYPERGLYCEMIA: Status: RESOLVED | Noted: 2019-11-08 | Resolved: 2019-12-02

## 2019-12-02 PROBLEM — R07.9 CHEST PAIN IN ADULT: Status: RESOLVED | Noted: 2019-11-08 | Resolved: 2019-12-02

## 2019-12-02 PROBLEM — R55 SYNCOPE: Status: ACTIVE | Noted: 2019-12-02

## 2019-12-02 PROBLEM — E87.8 HYPOCARBIA: Status: RESOLVED | Noted: 2019-11-08 | Resolved: 2019-12-02

## 2019-12-02 PROBLEM — Z87.891 FORMER SMOKER: Chronic | Status: ACTIVE | Noted: 2019-12-02

## 2019-12-02 PROBLEM — D53.9 MACROCYTIC ANEMIA: Status: ACTIVE | Noted: 2019-12-02

## 2019-12-02 LAB
ANION GAP SERPL CALCULATED.3IONS-SCNC: 12 MMOL/L (ref 5–15)
BASOPHILS # BLD AUTO: 0.08 10*3/MM3 (ref 0–0.2)
BASOPHILS NFR BLD AUTO: 0.9 % (ref 0–1.5)
BUN BLD-MCNC: 14 MG/DL (ref 8–23)
BUN/CREAT SERPL: 20 (ref 7–25)
CALCIUM SPEC-SCNC: 9.2 MG/DL (ref 8.6–10.5)
CHLORIDE SERPL-SCNC: 106 MMOL/L (ref 98–107)
CO2 SERPL-SCNC: 21 MMOL/L (ref 22–29)
CREAT BLD-MCNC: 0.7 MG/DL (ref 0.57–1)
DEPRECATED RDW RBC AUTO: 55.8 FL (ref 37–54)
EOSINOPHIL # BLD AUTO: 0.45 10*3/MM3 (ref 0–0.4)
EOSINOPHIL NFR BLD AUTO: 4.9 % (ref 0.3–6.2)
ERYTHROCYTE [DISTWIDTH] IN BLOOD BY AUTOMATED COUNT: 14.6 % (ref 12.3–15.4)
GFR SERPL CREATININE-BSD FRML MDRD: 81 ML/MIN/1.73
GLUCOSE BLD-MCNC: 104 MG/DL (ref 65–99)
GLUCOSE BLDC GLUCOMTR-MCNC: 121 MG/DL (ref 70–130)
GLUCOSE BLDC GLUCOMTR-MCNC: 145 MG/DL (ref 70–130)
GLUCOSE BLDC GLUCOMTR-MCNC: 93 MG/DL (ref 70–130)
GLUCOSE BLDC GLUCOMTR-MCNC: 97 MG/DL (ref 70–130)
HCT VFR BLD AUTO: 29.1 % (ref 34–46.6)
HGB BLD-MCNC: 9.8 G/DL (ref 12–15.9)
IMM GRANULOCYTES # BLD AUTO: 0.02 10*3/MM3 (ref 0–0.05)
IMM GRANULOCYTES NFR BLD AUTO: 0.2 % (ref 0–0.5)
IRON 24H UR-MRATE: 86 MCG/DL (ref 37–145)
IRON SATN MFR SERPL: 35 % (ref 20–50)
LYMPHOCYTES # BLD AUTO: 2.43 10*3/MM3 (ref 0.7–3.1)
LYMPHOCYTES NFR BLD AUTO: 26.2 % (ref 19.6–45.3)
MCH RBC QN AUTO: 34.9 PG (ref 26.6–33)
MCHC RBC AUTO-ENTMCNC: 33.7 G/DL (ref 31.5–35.7)
MCV RBC AUTO: 103.6 FL (ref 79–97)
MONOCYTES # BLD AUTO: 1.49 10*3/MM3 (ref 0.1–0.9)
MONOCYTES NFR BLD AUTO: 16.1 % (ref 5–12)
NEUTROPHILS # BLD AUTO: 4.79 10*3/MM3 (ref 1.7–7)
NEUTROPHILS NFR BLD AUTO: 51.7 % (ref 42.7–76)
NRBC BLD AUTO-RTO: 0 /100 WBC (ref 0–0.2)
PLATELET # BLD AUTO: 333 10*3/MM3 (ref 140–450)
PMV BLD AUTO: 9.6 FL (ref 6–12)
POTASSIUM BLD-SCNC: 3.5 MMOL/L (ref 3.5–5.2)
RBC # BLD AUTO: 2.81 10*6/MM3 (ref 3.77–5.28)
SODIUM BLD-SCNC: 139 MMOL/L (ref 136–145)
TIBC SERPL-MCNC: 243 MCG/DL (ref 298–536)
TRANSFERRIN SERPL-MCNC: 163 MG/DL (ref 200–360)
WBC NRBC COR # BLD: 9.26 10*3/MM3 (ref 3.4–10.8)

## 2019-12-02 PROCEDURE — 99233 SBSQ HOSP IP/OBS HIGH 50: CPT | Performed by: PHYSICIAN ASSISTANT

## 2019-12-02 PROCEDURE — 97116 GAIT TRAINING THERAPY: CPT

## 2019-12-02 PROCEDURE — 84466 ASSAY OF TRANSFERRIN: CPT | Performed by: PHYSICIAN ASSISTANT

## 2019-12-02 PROCEDURE — 83540 ASSAY OF IRON: CPT | Performed by: PHYSICIAN ASSISTANT

## 2019-12-02 PROCEDURE — 94799 UNLISTED PULMONARY SVC/PX: CPT

## 2019-12-02 PROCEDURE — 25010000002 HEPARIN (PORCINE) PER 1000 UNITS: Performed by: PHYSICIAN ASSISTANT

## 2019-12-02 PROCEDURE — 97530 THERAPEUTIC ACTIVITIES: CPT

## 2019-12-02 PROCEDURE — 82962 GLUCOSE BLOOD TEST: CPT

## 2019-12-02 PROCEDURE — 85025 COMPLETE CBC W/AUTO DIFF WBC: CPT | Performed by: PHYSICIAN ASSISTANT

## 2019-12-02 PROCEDURE — 80048 BASIC METABOLIC PNL TOTAL CA: CPT | Performed by: PHYSICIAN ASSISTANT

## 2019-12-02 RX ORDER — HYDRALAZINE HYDROCHLORIDE 50 MG/1
50 TABLET, FILM COATED ORAL EVERY 8 HOURS SCHEDULED
Status: DISCONTINUED | OUTPATIENT
Start: 2019-12-02 | End: 2019-12-03 | Stop reason: HOSPADM

## 2019-12-02 RX ORDER — ONDANSETRON 2 MG/ML
4 INJECTION INTRAMUSCULAR; INTRAVENOUS ONCE
Status: DISCONTINUED | OUTPATIENT
Start: 2019-12-02 | End: 2019-12-03 | Stop reason: HOSPADM

## 2019-12-02 RX ADMIN — ASPIRIN 81 MG: 81 TABLET, COATED ORAL at 08:58

## 2019-12-02 RX ADMIN — Medication 500 MG: at 08:58

## 2019-12-02 RX ADMIN — VALACYCLOVIR HYDROCHLORIDE 1000 MG: 500 TABLET, FILM COATED ORAL at 08:58

## 2019-12-02 RX ADMIN — ATORVASTATIN CALCIUM 10 MG: 10 TABLET, FILM COATED ORAL at 22:02

## 2019-12-02 RX ADMIN — ROPINIROLE HYDROCHLORIDE 0.25 MG: 0.25 TABLET, FILM COATED ORAL at 22:02

## 2019-12-02 RX ADMIN — SODIUM CHLORIDE, PRESERVATIVE FREE 10 ML: 5 INJECTION INTRAVENOUS at 22:02

## 2019-12-02 RX ADMIN — DOXYCYCLINE 100 MG: 100 CAPSULE ORAL at 08:57

## 2019-12-02 RX ADMIN — HEPARIN SODIUM 5000 UNITS: 5000 INJECTION INTRAVENOUS; SUBCUTANEOUS at 08:58

## 2019-12-02 RX ADMIN — HYDRALAZINE HYDROCHLORIDE 50 MG: 50 TABLET ORAL at 22:02

## 2019-12-02 RX ADMIN — HYDRALAZINE HYDROCHLORIDE 50 MG: 50 TABLET ORAL at 16:13

## 2019-12-02 RX ADMIN — EXEMESTANE 25 MG: 25 TABLET ORAL at 08:57

## 2019-12-02 RX ADMIN — CARVEDILOL 25 MG: 25 TABLET, FILM COATED ORAL at 22:02

## 2019-12-02 RX ADMIN — PANTOPRAZOLE SODIUM 40 MG: 40 TABLET, DELAYED RELEASE ORAL at 08:58

## 2019-12-02 RX ADMIN — CETIRIZINE HYDROCHLORIDE 5 MG: 10 TABLET, FILM COATED ORAL at 08:58

## 2019-12-02 RX ADMIN — SODIUM CHLORIDE, PRESERVATIVE FREE 10 ML: 5 INJECTION INTRAVENOUS at 08:57

## 2019-12-02 RX ADMIN — DOXYCYCLINE 100 MG: 100 CAPSULE ORAL at 22:02

## 2019-12-02 RX ADMIN — CARVEDILOL 25 MG: 25 TABLET, FILM COATED ORAL at 08:57

## 2019-12-03 ENCOUNTER — PATIENT OUTREACH (OUTPATIENT)
Dept: CASE MANAGEMENT | Facility: OTHER | Age: 74
End: 2019-12-03

## 2019-12-03 VITALS
DIASTOLIC BLOOD PRESSURE: 75 MMHG | RESPIRATION RATE: 18 BRPM | TEMPERATURE: 98.3 F | SYSTOLIC BLOOD PRESSURE: 159 MMHG | WEIGHT: 153 LBS | OXYGEN SATURATION: 96 % | HEIGHT: 63 IN | HEART RATE: 83 BPM | BODY MASS INDEX: 27.11 KG/M2

## 2019-12-03 LAB
ANION GAP SERPL CALCULATED.3IONS-SCNC: 7.5 MMOL/L (ref 5–15)
BACTERIA SPEC AEROBE CULT: NORMAL
BACTERIA SPEC AEROBE CULT: NORMAL
BASOPHILS # BLD MANUAL: 0.14 10*3/MM3 (ref 0–0.2)
BASOPHILS NFR BLD AUTO: 2 % (ref 0–1.5)
BUN BLD-MCNC: 18 MG/DL (ref 8–23)
BUN/CREAT SERPL: 26.1 (ref 7–25)
CALCIUM SPEC-SCNC: 9.1 MG/DL (ref 8.6–10.5)
CHLORIDE SERPL-SCNC: 108 MMOL/L (ref 98–107)
CO2 SERPL-SCNC: 20.5 MMOL/L (ref 22–29)
CREAT BLD-MCNC: 0.69 MG/DL (ref 0.57–1)
DEPRECATED RDW RBC AUTO: 57.6 FL (ref 37–54)
EOSINOPHIL # BLD MANUAL: 0.35 10*3/MM3 (ref 0–0.4)
EOSINOPHIL NFR BLD MANUAL: 5 % (ref 0.3–6.2)
ERYTHROCYTE [DISTWIDTH] IN BLOOD BY AUTOMATED COUNT: 14.8 % (ref 12.3–15.4)
GFR SERPL CREATININE-BSD FRML MDRD: 83 ML/MIN/1.73
GLUCOSE BLD-MCNC: 110 MG/DL (ref 65–99)
GLUCOSE BLDC GLUCOMTR-MCNC: 102 MG/DL (ref 70–130)
GLUCOSE BLDC GLUCOMTR-MCNC: 186 MG/DL (ref 70–130)
HCT VFR BLD AUTO: 29.2 % (ref 34–46.6)
HGB BLD-MCNC: 9.8 G/DL (ref 12–15.9)
LYMPHOCYTES # BLD MANUAL: 1.81 10*3/MM3 (ref 0.7–3.1)
LYMPHOCYTES NFR BLD MANUAL: 12 % (ref 5–12)
LYMPHOCYTES NFR BLD MANUAL: 26 % (ref 19.6–45.3)
MACROCYTES BLD QL SMEAR: ABNORMAL
MCH RBC QN AUTO: 35.3 PG (ref 26.6–33)
MCHC RBC AUTO-ENTMCNC: 33.6 G/DL (ref 31.5–35.7)
MCV RBC AUTO: 105 FL (ref 79–97)
MONOCYTES # BLD AUTO: 0.84 10*3/MM3 (ref 0.1–0.9)
NEUTROPHILS # BLD AUTO: 3.83 10*3/MM3 (ref 1.7–7)
NEUTROPHILS NFR BLD MANUAL: 55 % (ref 42.7–76)
PLAT MORPH BLD: NORMAL
PLATELET # BLD AUTO: 330 10*3/MM3 (ref 140–450)
PMV BLD AUTO: 9.6 FL (ref 6–12)
POTASSIUM BLD-SCNC: 3.9 MMOL/L (ref 3.5–5.2)
RBC # BLD AUTO: 2.78 10*6/MM3 (ref 3.77–5.28)
SCAN SLIDE: NORMAL
SODIUM BLD-SCNC: 136 MMOL/L (ref 136–145)
WBC NRBC COR # BLD: 6.96 10*3/MM3 (ref 3.4–10.8)

## 2019-12-03 PROCEDURE — 99239 HOSP IP/OBS DSCHRG MGMT >30: CPT | Performed by: PHYSICIAN ASSISTANT

## 2019-12-03 PROCEDURE — 80048 BASIC METABOLIC PNL TOTAL CA: CPT | Performed by: PHYSICIAN ASSISTANT

## 2019-12-03 PROCEDURE — 82962 GLUCOSE BLOOD TEST: CPT

## 2019-12-03 PROCEDURE — 85025 COMPLETE CBC W/AUTO DIFF WBC: CPT | Performed by: PHYSICIAN ASSISTANT

## 2019-12-03 PROCEDURE — 85007 BL SMEAR W/DIFF WBC COUNT: CPT | Performed by: PHYSICIAN ASSISTANT

## 2019-12-03 PROCEDURE — 94799 UNLISTED PULMONARY SVC/PX: CPT

## 2019-12-03 PROCEDURE — 25010000002 HEPARIN (PORCINE) PER 1000 UNITS: Performed by: PHYSICIAN ASSISTANT

## 2019-12-03 RX ORDER — IPRATROPIUM BROMIDE AND ALBUTEROL SULFATE 2.5; .5 MG/3ML; MG/3ML
3 SOLUTION RESPIRATORY (INHALATION)
Qty: 360 ML | Status: ON HOLD
Start: 2019-12-03 | End: 2020-01-05

## 2019-12-03 RX ORDER — DOXYCYCLINE 100 MG/1
100 CAPSULE ORAL EVERY 12 HOURS SCHEDULED
Qty: 10 CAPSULE | Refills: 0
Start: 2019-12-03 | End: 2019-12-08

## 2019-12-03 RX ORDER — CARVEDILOL 25 MG/1
25 TABLET ORAL 2 TIMES DAILY
Status: ON HOLD
Start: 2019-12-03 | End: 2020-01-05

## 2019-12-03 RX ADMIN — EXEMESTANE 25 MG: 25 TABLET ORAL at 08:08

## 2019-12-03 RX ADMIN — CETIRIZINE HYDROCHLORIDE 5 MG: 10 TABLET, FILM COATED ORAL at 08:09

## 2019-12-03 RX ADMIN — DOXYCYCLINE 100 MG: 100 CAPSULE ORAL at 08:08

## 2019-12-03 RX ADMIN — VALACYCLOVIR HYDROCHLORIDE 1000 MG: 500 TABLET, FILM COATED ORAL at 08:08

## 2019-12-03 RX ADMIN — HEPARIN SODIUM 5000 UNITS: 5000 INJECTION INTRAVENOUS; SUBCUTANEOUS at 08:09

## 2019-12-03 RX ADMIN — HYDRALAZINE HYDROCHLORIDE 50 MG: 50 TABLET ORAL at 05:25

## 2019-12-03 RX ADMIN — ASPIRIN 81 MG: 81 TABLET, COATED ORAL at 08:08

## 2019-12-03 RX ADMIN — PANTOPRAZOLE SODIUM 40 MG: 40 TABLET, DELAYED RELEASE ORAL at 08:08

## 2019-12-03 RX ADMIN — CARVEDILOL 25 MG: 25 TABLET, FILM COATED ORAL at 08:08

## 2019-12-03 RX ADMIN — Medication 500 MG: at 08:08

## 2019-12-05 ENCOUNTER — PATIENT OUTREACH (OUTPATIENT)
Dept: CASE MANAGEMENT | Facility: OTHER | Age: 74
End: 2019-12-05

## 2019-12-05 NOTE — OUTREACH NOTE
SNF Follow-up Note      Responses   Acute Facility Discharged From  [facility]   Acute Discharge Date  12/03/19   Name of the Skilled Nursing Facility?  [facility]   Purpose of SNF Admission  SN, OT, PT   Estimated length of stay for the patient?  Per alisa Suarez is doing very well and will likely be able to dc home within a short time   Who is the insurance provider or payor of patient stay?  Medicare   Progression of Patient?  Spoke with alisa Suarez skilled under Medicare          Erin Brandt RN  Ambulatory     12/5/2019, 2:42 PM

## 2019-12-09 ENCOUNTER — LAB REQUISITION (OUTPATIENT)
Dept: LAB | Facility: HOSPITAL | Age: 74
End: 2019-12-09

## 2019-12-09 DIAGNOSIS — D64.9 ANEMIA, UNSPECIFIED: ICD-10-CM

## 2019-12-09 DIAGNOSIS — I10 ESSENTIAL (PRIMARY) HYPERTENSION: ICD-10-CM

## 2019-12-09 LAB
ALBUMIN SERPL-MCNC: 4.12 G/DL (ref 3.5–5.2)
ALBUMIN/GLOB SERPL: 1.2 G/DL
ALP SERPL-CCNC: 115 U/L (ref 39–117)
ALT SERPL W P-5'-P-CCNC: 23 U/L (ref 1–33)
ANION GAP SERPL CALCULATED.3IONS-SCNC: 15.3 MMOL/L (ref 5–15)
AST SERPL-CCNC: 25 U/L (ref 1–32)
BILIRUB SERPL-MCNC: 0.3 MG/DL (ref 0.2–1.2)
BUN BLD-MCNC: 15 MG/DL (ref 8–23)
BUN/CREAT SERPL: 12.6 (ref 7–25)
CALCIUM SPEC-SCNC: 9.7 MG/DL (ref 8.6–10.5)
CHLORIDE SERPL-SCNC: 102 MMOL/L (ref 98–107)
CO2 SERPL-SCNC: 25.7 MMOL/L (ref 22–29)
CREAT BLD-MCNC: 1.19 MG/DL (ref 0.57–1)
CRP SERPL-MCNC: 0.2 MG/DL (ref 0–0.5)
D DIMER PPP FEU-MCNC: 2.9 MCGFEU/ML (ref 0–0.5)
FERRITIN SERPL-MCNC: 92.21 NG/ML (ref 13–150)
GFR SERPL CREATININE-BSD FRML MDRD: 44 ML/MIN/1.73
GLOBULIN UR ELPH-MCNC: 3.5 GM/DL
GLUCOSE BLD-MCNC: 134 MG/DL (ref 65–99)
IRON 24H UR-MRATE: 73 MCG/DL (ref 37–145)
IRON SATN MFR SERPL: 23 % (ref 20–50)
NT-PROBNP SERPL-MCNC: 401 PG/ML (ref 5–1800)
POTASSIUM BLD-SCNC: 3.5 MMOL/L (ref 3.5–5.2)
PROT SERPL-MCNC: 7.6 G/DL (ref 6–8.5)
SODIUM BLD-SCNC: 143 MMOL/L (ref 136–145)
TIBC SERPL-MCNC: 320 MCG/DL (ref 298–536)
TRANSFERRIN SERPL-MCNC: 215 MG/DL (ref 200–360)
TSH SERPL DL<=0.05 MIU/L-ACNC: 1.62 UIU/ML (ref 0.27–4.2)

## 2019-12-09 PROCEDURE — 84466 ASSAY OF TRANSFERRIN: CPT | Performed by: INTERNAL MEDICINE

## 2019-12-09 PROCEDURE — 86300 IMMUNOASSAY TUMOR CA 15-3: CPT | Performed by: INTERNAL MEDICINE

## 2019-12-09 PROCEDURE — 82728 ASSAY OF FERRITIN: CPT | Performed by: INTERNAL MEDICINE

## 2019-12-09 PROCEDURE — 83880 ASSAY OF NATRIURETIC PEPTIDE: CPT | Performed by: INTERNAL MEDICINE

## 2019-12-09 PROCEDURE — 80053 COMPREHEN METABOLIC PANEL: CPT | Performed by: INTERNAL MEDICINE

## 2019-12-09 PROCEDURE — 85379 FIBRIN DEGRADATION QUANT: CPT | Performed by: INTERNAL MEDICINE

## 2019-12-09 PROCEDURE — 84443 ASSAY THYROID STIM HORMONE: CPT | Performed by: INTERNAL MEDICINE

## 2019-12-09 PROCEDURE — 86140 C-REACTIVE PROTEIN: CPT | Performed by: INTERNAL MEDICINE

## 2019-12-09 PROCEDURE — 82746 ASSAY OF FOLIC ACID SERUM: CPT | Performed by: INTERNAL MEDICINE

## 2019-12-09 PROCEDURE — 83540 ASSAY OF IRON: CPT | Performed by: INTERNAL MEDICINE

## 2019-12-09 PROCEDURE — 82607 VITAMIN B-12: CPT | Performed by: INTERNAL MEDICINE

## 2019-12-10 ENCOUNTER — HOSPITAL ENCOUNTER (OUTPATIENT)
Dept: CT IMAGING | Facility: HOSPITAL | Age: 74
Discharge: HOME | End: 2019-12-10

## 2019-12-10 ENCOUNTER — HOSPITAL ENCOUNTER (OUTPATIENT)
Dept: NUCLEAR MEDICINE | Facility: HOSPITAL | Age: 74
Discharge: HOME | End: 2019-12-10

## 2019-12-10 ENCOUNTER — TRANSCRIBE ORDERS (OUTPATIENT)
Dept: ADMINISTRATIVE | Facility: HOSPITAL | Age: 74
End: 2019-12-10

## 2019-12-10 ENCOUNTER — LAB REQUISITION (OUTPATIENT)
Dept: LAB | Facility: HOSPITAL | Age: 74
End: 2019-12-10

## 2019-12-10 ENCOUNTER — TRANSCRIBE ORDERS (OUTPATIENT)
Dept: OTHER | Facility: OTHER | Age: 74
End: 2019-12-10

## 2019-12-10 ENCOUNTER — HOSPITAL ENCOUNTER (OUTPATIENT)
Dept: CARDIOLOGY | Facility: HOSPITAL | Age: 74
Discharge: HOME | End: 2019-12-10

## 2019-12-10 DIAGNOSIS — R30.0 DYSURIA: ICD-10-CM

## 2019-12-10 DIAGNOSIS — M79.89 LEG SWELLING: ICD-10-CM

## 2019-12-10 DIAGNOSIS — R79.89 POSITIVE D DIMER: Primary | ICD-10-CM

## 2019-12-10 DIAGNOSIS — R06.02 SOB (SHORTNESS OF BREATH): Primary | ICD-10-CM

## 2019-12-10 DIAGNOSIS — R79.89 POSITIVE D DIMER: ICD-10-CM

## 2019-12-10 DIAGNOSIS — R60.9 EDEMA, UNSPECIFIED TYPE: ICD-10-CM

## 2019-12-10 DIAGNOSIS — I10 ESSENTIAL (PRIMARY) HYPERTENSION: ICD-10-CM

## 2019-12-10 DIAGNOSIS — R06.02 SOB (SHORTNESS OF BREATH): ICD-10-CM

## 2019-12-10 LAB
ALBUMIN SERPL-MCNC: 3.36 G/DL (ref 3.5–5.2)
ALBUMIN/GLOB SERPL: 1.2 G/DL
ALP SERPL-CCNC: 94 U/L (ref 39–117)
ALT SERPL W P-5'-P-CCNC: 16 U/L (ref 1–33)
ANION GAP SERPL CALCULATED.3IONS-SCNC: 15 MMOL/L (ref 5–15)
AST SERPL-CCNC: 18 U/L (ref 1–32)
BACTERIA UR QL AUTO: ABNORMAL /HPF
BASOPHILS # BLD AUTO: 0.06 10*3/MM3 (ref 0–0.2)
BASOPHILS NFR BLD AUTO: 0.6 % (ref 0–1.5)
BILIRUB SERPL-MCNC: 0.3 MG/DL (ref 0.2–1.2)
BILIRUB UR QL STRIP: NEGATIVE
BUN BLD-MCNC: 25 MG/DL (ref 8–23)
BUN/CREAT SERPL: 16.9 (ref 7–25)
CALCIUM SPEC-SCNC: 9.1 MG/DL (ref 8.6–10.5)
CANCER AG15-3 SERPL-ACNC: 16.3 U/ML
CANCER AG27-29 SERPL-ACNC: 26 U/ML (ref 0–38.6)
CHLORIDE SERPL-SCNC: 99 MMOL/L (ref 98–107)
CLARITY UR: ABNORMAL
CO2 SERPL-SCNC: 23 MMOL/L (ref 22–29)
COLOR UR: ABNORMAL
CREAT BLD-MCNC: 1.48 MG/DL (ref 0.57–1)
DEPRECATED RDW RBC AUTO: 55 FL (ref 37–54)
EOSINOPHIL # BLD AUTO: 0.34 10*3/MM3 (ref 0–0.4)
EOSINOPHIL NFR BLD AUTO: 3.5 % (ref 0.3–6.2)
ERYTHROCYTE [DISTWIDTH] IN BLOOD BY AUTOMATED COUNT: 14.6 % (ref 12.3–15.4)
FOLATE SERPL-MCNC: 14 NG/ML (ref 4.78–24.2)
GFR SERPL CREATININE-BSD FRML MDRD: 34 ML/MIN/1.73
GLOBULIN UR ELPH-MCNC: 2.8 GM/DL
GLUCOSE BLD-MCNC: 100 MG/DL (ref 65–99)
GLUCOSE UR STRIP-MCNC: NEGATIVE MG/DL
HBA1C MFR BLD: 6.2 % (ref 4.8–5.6)
HCT VFR BLD AUTO: 28.9 % (ref 34–46.6)
HGB BLD-MCNC: 10 G/DL (ref 12–15.9)
HGB UR QL STRIP.AUTO: NEGATIVE
HYALINE CASTS UR QL AUTO: ABNORMAL /LPF
IMM GRANULOCYTES # BLD AUTO: 0.01 10*3/MM3 (ref 0–0.05)
IMM GRANULOCYTES NFR BLD AUTO: 0.1 % (ref 0–0.5)
KETONES UR QL STRIP: ABNORMAL
LEUKOCYTE ESTERASE UR QL STRIP.AUTO: ABNORMAL
LYMPHOCYTES # BLD AUTO: 3.33 10*3/MM3 (ref 0.7–3.1)
LYMPHOCYTES NFR BLD AUTO: 34.2 % (ref 19.6–45.3)
MCH RBC QN AUTO: 35.3 PG (ref 26.6–33)
MCHC RBC AUTO-ENTMCNC: 34.6 G/DL (ref 31.5–35.7)
MCV RBC AUTO: 102.1 FL (ref 79–97)
MONOCYTES # BLD AUTO: 1.44 10*3/MM3 (ref 0.1–0.9)
MONOCYTES NFR BLD AUTO: 14.8 % (ref 5–12)
NEUTROPHILS # BLD AUTO: 4.57 10*3/MM3 (ref 1.7–7)
NEUTROPHILS NFR BLD AUTO: 46.8 % (ref 42.7–76)
NITRITE UR QL STRIP: NEGATIVE
NRBC BLD AUTO-RTO: 0 /100 WBC (ref 0–0.2)
PH UR STRIP.AUTO: <=5 [PH] (ref 5–8)
PLATELET # BLD AUTO: 300 10*3/MM3 (ref 140–450)
PMV BLD AUTO: 10.7 FL (ref 6–12)
POTASSIUM BLD-SCNC: 3.2 MMOL/L (ref 3.5–5.2)
PROT SERPL-MCNC: 6.2 G/DL (ref 6–8.5)
PROT UR QL STRIP: ABNORMAL
RBC # BLD AUTO: 2.83 10*6/MM3 (ref 3.77–5.28)
RBC # UR: ABNORMAL /HPF
REF LAB TEST METHOD: ABNORMAL
SODIUM BLD-SCNC: 137 MMOL/L (ref 136–145)
SP GR UR STRIP: 1.02 (ref 1–1.03)
SQUAMOUS #/AREA URNS HPF: ABNORMAL /HPF
UROBILINOGEN UR QL STRIP: ABNORMAL
VIT B12 BLD-MCNC: 355 PG/ML (ref 211–946)
WBC NRBC COR # BLD: 9.75 10*3/MM3 (ref 3.4–10.8)
WBC UR QL AUTO: ABNORMAL /HPF

## 2019-12-10 PROCEDURE — 93970 EXTREMITY STUDY: CPT

## 2019-12-10 PROCEDURE — 83036 HEMOGLOBIN GLYCOSYLATED A1C: CPT | Performed by: INTERNAL MEDICINE

## 2019-12-10 PROCEDURE — 87086 URINE CULTURE/COLONY COUNT: CPT | Performed by: INTERNAL MEDICINE

## 2019-12-10 PROCEDURE — 0 TECHNETIUM TC 99M PENTETATE INHALER: Performed by: NURSE PRACTITIONER

## 2019-12-10 PROCEDURE — 80053 COMPREHEN METABOLIC PANEL: CPT | Performed by: INTERNAL MEDICINE

## 2019-12-10 PROCEDURE — 93970 EXTREMITY STUDY: CPT | Performed by: RADIOLOGY

## 2019-12-10 PROCEDURE — A9567 TECHNETIUM TC-99M AEROSOL: HCPCS | Performed by: NURSE PRACTITIONER

## 2019-12-10 PROCEDURE — 78582 LUNG VENTILAT&PERFUS IMAGING: CPT | Performed by: RADIOLOGY

## 2019-12-10 PROCEDURE — 0 TECHNETIUM ALBUMIN AGGREGATED: Performed by: NURSE PRACTITIONER

## 2019-12-10 PROCEDURE — A9540 TC99M MAA: HCPCS | Performed by: NURSE PRACTITIONER

## 2019-12-10 PROCEDURE — 78582 LUNG VENTILAT&PERFUS IMAGING: CPT

## 2019-12-10 PROCEDURE — 85025 COMPLETE CBC W/AUTO DIFF WBC: CPT | Performed by: INTERNAL MEDICINE

## 2019-12-10 PROCEDURE — 81001 URINALYSIS AUTO W/SCOPE: CPT | Performed by: INTERNAL MEDICINE

## 2019-12-10 RX ADMIN — Medication 1 DOSE: at 15:10

## 2019-12-10 RX ADMIN — Medication 1 DOSE: at 14:54

## 2019-12-11 LAB — BACTERIA SPEC AEROBE CULT: NO GROWTH

## 2019-12-12 ENCOUNTER — PATIENT OUTREACH (OUTPATIENT)
Dept: CASE MANAGEMENT | Facility: OTHER | Age: 74
End: 2019-12-12

## 2019-12-12 NOTE — OUTREACH NOTE
SNF Follow-up Note      Responses   Acute Facility Discharged From  [facility]   Acute Discharge Date  12/03/19   Name of the Skilled Nursing Facility?  [facility]   Purpose of SNF Admission  SN, OT, PT   Estimated length of stay for the patient?  Per Daniela pt is doing very well and will likely be able to dc home within a short time   Who is the insurance provider or payor of patient stay?  Medicare   Progression of Patient?  Patient is still receiving skilled services under Medicare          Erin Brandt RN  Ambulatory     12/12/2019, 11:22 AM

## 2019-12-16 ENCOUNTER — LAB REQUISITION (OUTPATIENT)
Dept: LAB | Facility: HOSPITAL | Age: 74
End: 2019-12-16

## 2019-12-16 DIAGNOSIS — R60.9 EDEMA, UNSPECIFIED: ICD-10-CM

## 2019-12-16 LAB
ANION GAP SERPL CALCULATED.3IONS-SCNC: 12.7 MMOL/L (ref 5–15)
BUN BLD-MCNC: 12 MG/DL (ref 8–23)
BUN/CREAT SERPL: 14.1 (ref 7–25)
CALCIUM SPEC-SCNC: 9.2 MG/DL (ref 8.6–10.5)
CHLORIDE SERPL-SCNC: 104 MMOL/L (ref 98–107)
CO2 SERPL-SCNC: 21.3 MMOL/L (ref 22–29)
CREAT BLD-MCNC: 0.85 MG/DL (ref 0.57–1)
GFR SERPL CREATININE-BSD FRML MDRD: 65 ML/MIN/1.73
GLUCOSE BLD-MCNC: 122 MG/DL (ref 65–99)
NT-PROBNP SERPL-MCNC: 1990 PG/ML (ref 5–1800)
POTASSIUM BLD-SCNC: 3.8 MMOL/L (ref 3.5–5.2)
SODIUM BLD-SCNC: 138 MMOL/L (ref 136–145)

## 2019-12-16 PROCEDURE — 83880 ASSAY OF NATRIURETIC PEPTIDE: CPT | Performed by: INTERNAL MEDICINE

## 2019-12-16 PROCEDURE — 80048 BASIC METABOLIC PNL TOTAL CA: CPT | Performed by: INTERNAL MEDICINE

## 2020-01-01 ENCOUNTER — TRANSCRIBE ORDERS (OUTPATIENT)
Dept: ADMINISTRATIVE | Facility: HOSPITAL | Age: 75
End: 2020-01-01

## 2020-01-01 ENCOUNTER — OFFICE VISIT (OUTPATIENT)
Dept: CARDIOLOGY | Facility: CLINIC | Age: 75
End: 2020-01-01

## 2020-01-01 ENCOUNTER — TREATMENT (OUTPATIENT)
Dept: CARDIOLOGY | Facility: CLINIC | Age: 75
End: 2020-01-01

## 2020-01-01 ENCOUNTER — LAB (OUTPATIENT)
Dept: LAB | Facility: HOSPITAL | Age: 75
End: 2020-01-01

## 2020-01-01 ENCOUNTER — TELEPHONE (OUTPATIENT)
Dept: CARDIOLOGY | Facility: CLINIC | Age: 75
End: 2020-01-01

## 2020-01-01 ENCOUNTER — HOSPITAL ENCOUNTER (OUTPATIENT)
Dept: CARDIOLOGY | Facility: HOSPITAL | Age: 75
Discharge: HOME | End: 2020-10-06
Admitting: PHYSICIAN ASSISTANT

## 2020-01-01 VITALS
TEMPERATURE: 99.7 F | HEART RATE: 58 BPM | BODY MASS INDEX: 27.18 KG/M2 | SYSTOLIC BLOOD PRESSURE: 189 MMHG | HEIGHT: 63 IN | WEIGHT: 153.4 LBS | DIASTOLIC BLOOD PRESSURE: 76 MMHG | OXYGEN SATURATION: 99 %

## 2020-01-01 VITALS
WEIGHT: 154.4 LBS | OXYGEN SATURATION: 98 % | RESPIRATION RATE: 14 BRPM | BODY MASS INDEX: 27.36 KG/M2 | HEART RATE: 60 BPM | SYSTOLIC BLOOD PRESSURE: 139 MMHG | TEMPERATURE: 98.6 F | HEIGHT: 63 IN | DIASTOLIC BLOOD PRESSURE: 66 MMHG

## 2020-01-01 VITALS
DIASTOLIC BLOOD PRESSURE: 66 MMHG | TEMPERATURE: 96.2 F | BODY MASS INDEX: 29.22 KG/M2 | HEART RATE: 63 BPM | HEIGHT: 62 IN | SYSTOLIC BLOOD PRESSURE: 164 MMHG | OXYGEN SATURATION: 97 % | WEIGHT: 158.8 LBS

## 2020-01-01 DIAGNOSIS — Z01.818 OTHER SPECIFIED PRE-OPERATIVE EXAMINATION: ICD-10-CM

## 2020-01-01 DIAGNOSIS — R55 SYNCOPE AND COLLAPSE: ICD-10-CM

## 2020-01-01 DIAGNOSIS — I48.0 PAROXYSMAL ATRIAL FIBRILLATION (HCC): ICD-10-CM

## 2020-01-01 DIAGNOSIS — I21.4 NON-STEMI (NON-ST ELEVATED MYOCARDIAL INFARCTION) (HCC): ICD-10-CM

## 2020-01-01 DIAGNOSIS — I50.32 CHRONIC DIASTOLIC HEART FAILURE (HCC): ICD-10-CM

## 2020-01-01 DIAGNOSIS — Z01.818 OTHER SPECIFIED PRE-OPERATIVE EXAMINATION: Primary | ICD-10-CM

## 2020-01-01 DIAGNOSIS — I45.2 BIFASCICULAR BLOCK: ICD-10-CM

## 2020-01-01 DIAGNOSIS — Z01.818 PRE-OPERATIVE CLEARANCE: Primary | ICD-10-CM

## 2020-01-01 DIAGNOSIS — I10 ESSENTIAL HYPERTENSION: Primary | ICD-10-CM

## 2020-01-01 DIAGNOSIS — Z01.818 PRE-OPERATIVE CLEARANCE: ICD-10-CM

## 2020-01-01 DIAGNOSIS — I10 ESSENTIAL HYPERTENSION: Chronic | ICD-10-CM

## 2020-01-01 DIAGNOSIS — R00.2 PALPITATIONS: Primary | ICD-10-CM

## 2020-01-01 DIAGNOSIS — R55 SYNCOPE AND COLLAPSE: Primary | ICD-10-CM

## 2020-01-01 LAB
BH CV ECHO MEAS - % IVS THICK: 101.8 %
BH CV ECHO MEAS - % LVPW THICK: 36.4 %
BH CV ECHO MEAS - ACS: 2 CM
BH CV ECHO MEAS - AO MAX PG: 10.8 MMHG
BH CV ECHO MEAS - AO MEAN PG: 5 MMHG
BH CV ECHO MEAS - AO ROOT AREA (BSA CORRECTED): 1.4
BH CV ECHO MEAS - AO ROOT AREA: 4.9 CM^2
BH CV ECHO MEAS - AO ROOT DIAM: 2.5 CM
BH CV ECHO MEAS - AO V2 MAX: 164 CM/SEC
BH CV ECHO MEAS - AO V2 MEAN: 111 CM/SEC
BH CV ECHO MEAS - AO V2 VTI: 39.5 CM
BH CV ECHO MEAS - BSA(HAYCOCK): 1.8 M^2
BH CV ECHO MEAS - BSA: 1.7 M^2
BH CV ECHO MEAS - BZI_BMI: 27.3 KILOGRAMS/M^2
BH CV ECHO MEAS - BZI_METRIC_HEIGHT: 160 CM
BH CV ECHO MEAS - BZI_METRIC_WEIGHT: 69.9 KG
BH CV ECHO MEAS - EDV(CUBED): 98.3 ML
BH CV ECHO MEAS - EDV(MOD-SP4): 46.5 ML
BH CV ECHO MEAS - EDV(TEICH): 98.1 ML
BH CV ECHO MEAS - EF(CUBED): 77.4 %
BH CV ECHO MEAS - EF(MOD-SP4): 60 %
BH CV ECHO MEAS - EF(TEICH): 69.6 %
BH CV ECHO MEAS - ESV(CUBED): 22.2 ML
BH CV ECHO MEAS - ESV(MOD-SP4): 18.6 ML
BH CV ECHO MEAS - ESV(TEICH): 29.8 ML
BH CV ECHO MEAS - FS: 39.1 %
BH CV ECHO MEAS - IVS/LVPW: 0.83
BH CV ECHO MEAS - IVSD: 0.82 CM
BH CV ECHO MEAS - IVSS: 1.7 CM
BH CV ECHO MEAS - LA DIMENSION: 3 CM
BH CV ECHO MEAS - LA/AO: 1.2
BH CV ECHO MEAS - LV DIASTOLIC VOL/BSA (35-75): 26.9 ML/M^2
BH CV ECHO MEAS - LV MASS(C)D: 138.5 GRAMS
BH CV ECHO MEAS - LV MASS(C)DI: 80 GRAMS/M^2
BH CV ECHO MEAS - LV MASS(C)S: 144.5 GRAMS
BH CV ECHO MEAS - LV MASS(C)SI: 83.5 GRAMS/M^2
BH CV ECHO MEAS - LV SYSTOLIC VOL/BSA (12-30): 10.7 ML/M^2
BH CV ECHO MEAS - LVIDD: 4.6 CM
BH CV ECHO MEAS - LVIDS: 2.8 CM
BH CV ECHO MEAS - LVLD AP4: 6 CM
BH CV ECHO MEAS - LVLS AP4: 5.1 CM
BH CV ECHO MEAS - LVOT AREA (M): 3.1 CM^2
BH CV ECHO MEAS - LVOT AREA: 3.1 CM^2
BH CV ECHO MEAS - LVOT DIAM: 2 CM
BH CV ECHO MEAS - LVPWD: 0.98 CM
BH CV ECHO MEAS - LVPWS: 1.3 CM
BH CV ECHO MEAS - MV A MAX VEL: 70.4 CM/SEC
BH CV ECHO MEAS - MV E MAX VEL: 114 CM/SEC
BH CV ECHO MEAS - MV E/A: 1.6
BH CV ECHO MEAS - PA ACC TIME: 0.13 SEC
BH CV ECHO MEAS - PA PR(ACCEL): 21.9 MMHG
BH CV ECHO MEAS - RAP SYSTOLE: 10 MMHG
BH CV ECHO MEAS - RVSP: 32.5 MMHG
BH CV ECHO MEAS - SI(AO): 112 ML/M^2
BH CV ECHO MEAS - SI(CUBED): 44 ML/M^2
BH CV ECHO MEAS - SI(MOD-SP4): 16.1 ML/M^2
BH CV ECHO MEAS - SI(TEICH): 39.5 ML/M^2
BH CV ECHO MEAS - SV(AO): 193.9 ML
BH CV ECHO MEAS - SV(CUBED): 76.1 ML
BH CV ECHO MEAS - SV(MOD-SP4): 27.9 ML
BH CV ECHO MEAS - SV(TEICH): 68.3 ML
BH CV ECHO MEAS - TR MAX VEL: 235 CM/SEC
MAXIMAL PREDICTED HEART RATE: 143 BPM
REF LAB TEST METHOD: ABNORMAL
REF LAB TEST METHOD: NORMAL
SARS-COV-2 RNA NOSE QL NAA+PROBE: NOT DETECTED
SARS-COV-2 RNA RESP QL NAA+PROBE: DETECTED
SARS-COV-2 RNA RESP QL NAA+PROBE: NOT DETECTED
STRESS TARGET HR: 122 BPM

## 2020-01-01 PROCEDURE — U0004 COV-19 TEST NON-CDC HGH THRU: HCPCS

## 2020-01-01 PROCEDURE — 93291 INTERROG DEV EVAL SCRMS IP: CPT | Performed by: INTERNAL MEDICINE

## 2020-01-01 PROCEDURE — C9803 HOPD COVID-19 SPEC COLLECT: HCPCS

## 2020-01-01 PROCEDURE — 93306 TTE W/DOPPLER COMPLETE: CPT | Performed by: INTERNAL MEDICINE

## 2020-01-01 PROCEDURE — U0002 COVID-19 LAB TEST NON-CDC: HCPCS

## 2020-01-01 PROCEDURE — 99214 OFFICE O/P EST MOD 30 MIN: CPT | Performed by: NURSE PRACTITIONER

## 2020-01-01 PROCEDURE — 99213 OFFICE O/P EST LOW 20 MIN: CPT | Performed by: INTERNAL MEDICINE

## 2020-01-01 PROCEDURE — 93000 ELECTROCARDIOGRAM COMPLETE: CPT | Performed by: PHYSICIAN ASSISTANT

## 2020-01-01 PROCEDURE — 93306 TTE W/DOPPLER COMPLETE: CPT

## 2020-01-01 PROCEDURE — 99214 OFFICE O/P EST MOD 30 MIN: CPT | Performed by: PHYSICIAN ASSISTANT

## 2020-01-01 RX ORDER — HYDRALAZINE HYDROCHLORIDE 50 MG/1
TABLET, FILM COATED ORAL
Qty: 90 TABLET | Refills: 2 | Status: SHIPPED | OUTPATIENT
Start: 2020-01-01 | End: 2020-01-01 | Stop reason: ALTCHOICE

## 2020-01-01 RX ORDER — HYDRALAZINE HYDROCHLORIDE 50 MG/1
75 TABLET, FILM COATED ORAL 3 TIMES DAILY
Qty: 135 TABLET | Refills: 5 | Status: SHIPPED | OUTPATIENT
Start: 2020-01-01

## 2020-01-01 RX ORDER — AMLODIPINE BESYLATE 5 MG/1
5 TABLET ORAL DAILY
Qty: 30 TABLET | Refills: 11 | Status: SHIPPED | OUTPATIENT
Start: 2020-01-01

## 2020-01-01 RX ORDER — FUROSEMIDE 20 MG/1
TABLET ORAL
Qty: 30 TABLET | Refills: 2 | Status: SHIPPED | OUTPATIENT
Start: 2020-01-01

## 2020-01-01 RX ORDER — APIXABAN 5 MG/1
TABLET, FILM COATED ORAL
Qty: 60 TABLET | Refills: 5 | Status: SHIPPED | OUTPATIENT
Start: 2020-01-01 | End: 2020-01-01 | Stop reason: SDUPTHER

## 2020-01-01 RX ORDER — HYDRALAZINE HYDROCHLORIDE 50 MG/1
75 TABLET, FILM COATED ORAL 3 TIMES DAILY
Qty: 135 TABLET | Refills: 5 | Status: SHIPPED | OUTPATIENT
Start: 2020-01-01 | End: 2020-01-01 | Stop reason: SDUPTHER

## 2020-01-02 ENCOUNTER — PATIENT OUTREACH (OUTPATIENT)
Dept: CASE MANAGEMENT | Facility: OTHER | Age: 75
End: 2020-01-02

## 2020-01-02 NOTE — OUTREACH NOTE
SNF Follow-up Note      Responses   Acute Facility Discharged From  [facility]   Acute Discharge Date  12/03/19   Name of the Skilled Nursing Facility?  [facility]   Purpose of SNF Admission  SN, OT, PT   Estimated length of stay for the patient?  Patient dc'd to home   Who is the insurance provider or payor of patient stay?  Medicare   Progression of Patient?  Spoke with Joan, patient discharged to home on 12/20/19   Skilled Nursing Discharge Date?  12/20/19   Where was the patient discharged to?  Home            Erin Barndt RN  Ambulatory     1/2/2020, 1:54 PM

## 2020-01-05 ENCOUNTER — APPOINTMENT (OUTPATIENT)
Dept: CT IMAGING | Facility: HOSPITAL | Age: 75
End: 2020-01-05

## 2020-01-05 ENCOUNTER — APPOINTMENT (OUTPATIENT)
Dept: GENERAL RADIOLOGY | Facility: HOSPITAL | Age: 75
End: 2020-01-05

## 2020-01-05 ENCOUNTER — HOSPITAL ENCOUNTER (INPATIENT)
Facility: HOSPITAL | Age: 75
LOS: 3 days | Discharge: HOME HEALTH | End: 2020-01-08
Attending: FAMILY MEDICINE | Admitting: INTERNAL MEDICINE

## 2020-01-05 DIAGNOSIS — R53.81 DEBILITY: ICD-10-CM

## 2020-01-05 DIAGNOSIS — E83.51 HYPOCALCEMIA: ICD-10-CM

## 2020-01-05 DIAGNOSIS — E83.42 HYPOMAGNESEMIA: ICD-10-CM

## 2020-01-05 DIAGNOSIS — R55 SYNCOPE, UNSPECIFIED SYNCOPE TYPE: Primary | ICD-10-CM

## 2020-01-05 PROBLEM — B88.8 INFESTATION BY BED BUG: Status: ACTIVE | Noted: 2020-01-05

## 2020-01-05 PROBLEM — D75.89 MACROCYTOSIS: Status: ACTIVE | Noted: 2020-01-05

## 2020-01-05 PROBLEM — K80.20 CHOLELITHIASIS: Status: RESOLVED | Noted: 2019-11-08 | Resolved: 2020-01-05

## 2020-01-05 PROBLEM — D75.89 MACROCYTOSIS WITHOUT ANEMIA: Status: RESOLVED | Noted: 2019-11-08 | Resolved: 2020-01-05

## 2020-01-05 PROBLEM — D53.9 MACROCYTIC ANEMIA: Status: RESOLVED | Noted: 2019-12-02 | Resolved: 2020-01-05

## 2020-01-05 PROBLEM — E87.20 LACTIC ACIDOSIS: Status: RESOLVED | Noted: 2019-11-28 | Resolved: 2020-01-05

## 2020-01-05 PROBLEM — E87.5 HYPERKALEMIA: Status: RESOLVED | Noted: 2019-11-08 | Resolved: 2020-01-05

## 2020-01-05 PROBLEM — E88.09 HYPOALBUMINEMIA: Status: ACTIVE | Noted: 2020-01-05

## 2020-01-05 PROBLEM — E87.6 HYPOKALEMIA: Status: ACTIVE | Noted: 2020-01-05

## 2020-01-05 PROBLEM — J18.9 RIGHT UPPER LOBE PNEUMONIA: Status: RESOLVED | Noted: 2019-11-28 | Resolved: 2020-01-05

## 2020-01-05 PROBLEM — I51.89 DIASTOLIC DYSFUNCTION: Chronic | Status: ACTIVE | Noted: 2020-01-05

## 2020-01-05 LAB
ALBUMIN SERPL-MCNC: 2.56 G/DL (ref 3.5–5.2)
ALBUMIN/GLOB SERPL: 1.2 G/DL
ALP SERPL-CCNC: 72 U/L (ref 39–117)
ALT SERPL W P-5'-P-CCNC: 9 U/L (ref 1–33)
ANION GAP SERPL CALCULATED.3IONS-SCNC: 10.5 MMOL/L (ref 5–15)
AST SERPL-CCNC: 17 U/L (ref 1–32)
BACTERIA UR QL AUTO: ABNORMAL /HPF
BASOPHILS # BLD AUTO: 0.05 10*3/MM3 (ref 0–0.2)
BASOPHILS NFR BLD AUTO: 0.7 % (ref 0–1.5)
BILIRUB SERPL-MCNC: 0.3 MG/DL (ref 0.2–1.2)
BILIRUB UR QL STRIP: NEGATIVE
BUN BLD-MCNC: 18 MG/DL (ref 8–23)
BUN/CREAT SERPL: 19.6 (ref 7–25)
CA-I SERPL ISE-MCNC: 1.32 MMOL/L (ref 1.12–1.32)
CALCIUM SPEC-SCNC: 6 MG/DL (ref 8.6–10.5)
CHLORIDE SERPL-SCNC: 112 MMOL/L (ref 98–107)
CLARITY UR: CLEAR
CO2 SERPL-SCNC: 15.5 MMOL/L (ref 22–29)
COLOR UR: YELLOW
CREAT BLD-MCNC: 0.92 MG/DL (ref 0.57–1)
CRP SERPL-MCNC: 0.04 MG/DL (ref 0–0.5)
D-LACTATE SERPL-SCNC: 1 MMOL/L (ref 0.5–2)
DEPRECATED RDW RBC AUTO: 53.2 FL (ref 37–54)
EOSINOPHIL # BLD AUTO: 0.16 10*3/MM3 (ref 0–0.4)
EOSINOPHIL NFR BLD AUTO: 2.4 % (ref 0.3–6.2)
ERYTHROCYTE [DISTWIDTH] IN BLOOD BY AUTOMATED COUNT: 13.6 % (ref 12.3–15.4)
FLUAV AG NPH QL: NEGATIVE
FLUBV AG NPH QL IA: NEGATIVE
GFR SERPL CREATININE-BSD FRML MDRD: 59 ML/MIN/1.73
GLOBULIN UR ELPH-MCNC: 2.1 GM/DL
GLUCOSE BLD-MCNC: 131 MG/DL (ref 65–99)
GLUCOSE BLDC GLUCOMTR-MCNC: 134 MG/DL (ref 70–130)
GLUCOSE UR STRIP-MCNC: NEGATIVE MG/DL
HCT VFR BLD AUTO: 42.8 % (ref 34–46.6)
HGB BLD-MCNC: 14.2 G/DL (ref 12–15.9)
HGB UR QL STRIP.AUTO: NEGATIVE
HOLD SPECIMEN: NORMAL
HOLD SPECIMEN: NORMAL
HYALINE CASTS UR QL AUTO: ABNORMAL /LPF
IMM GRANULOCYTES # BLD AUTO: 0.01 10*3/MM3 (ref 0–0.05)
IMM GRANULOCYTES NFR BLD AUTO: 0.1 % (ref 0–0.5)
INR PPP: 1.26 (ref 0.9–1.1)
KETONES UR QL STRIP: NEGATIVE
LEUKOCYTE ESTERASE UR QL STRIP.AUTO: ABNORMAL
LYMPHOCYTES # BLD AUTO: 2.52 10*3/MM3 (ref 0.7–3.1)
LYMPHOCYTES NFR BLD AUTO: 37.2 % (ref 19.6–45.3)
MAGNESIUM SERPL-MCNC: 1.3 MG/DL (ref 1.6–2.4)
MCH RBC QN AUTO: 34.7 PG (ref 26.6–33)
MCHC RBC AUTO-ENTMCNC: 33.2 G/DL (ref 31.5–35.7)
MCV RBC AUTO: 104.6 FL (ref 79–97)
MONOCYTES # BLD AUTO: 0.6 10*3/MM3 (ref 0.1–0.9)
MONOCYTES NFR BLD AUTO: 8.9 % (ref 5–12)
NEUTROPHILS # BLD AUTO: 3.43 10*3/MM3 (ref 1.7–7)
NEUTROPHILS NFR BLD AUTO: 50.7 % (ref 42.7–76)
NITRITE UR QL STRIP: NEGATIVE
NRBC BLD AUTO-RTO: 0 /100 WBC (ref 0–0.2)
NT-PROBNP SERPL-MCNC: 126.7 PG/ML (ref 5–1800)
PH UR STRIP.AUTO: 6 [PH] (ref 5–8)
PLATELET # BLD AUTO: 305 10*3/MM3 (ref 140–450)
PMV BLD AUTO: 10.4 FL (ref 6–12)
POTASSIUM BLD-SCNC: 3.4 MMOL/L (ref 3.5–5.2)
PROCALCITONIN SERPL-MCNC: 0.04 NG/ML (ref 0.1–0.25)
PROLACTIN SERPL-MCNC: 31.1 NG/ML (ref 4.79–23.3)
PROT SERPL-MCNC: 4.7 G/DL (ref 6–8.5)
PROT UR QL STRIP: NEGATIVE
PROTHROMBIN TIME: 16.4 SECONDS (ref 11–15.4)
RBC # BLD AUTO: 4.09 10*6/MM3 (ref 3.77–5.28)
RBC # UR: ABNORMAL /HPF
REF LAB TEST METHOD: ABNORMAL
SODIUM BLD-SCNC: 138 MMOL/L (ref 136–145)
SP GR UR STRIP: 1.01 (ref 1–1.03)
SQUAMOUS #/AREA URNS HPF: ABNORMAL /HPF
T4 FREE SERPL-MCNC: 1.3 NG/DL (ref 0.93–1.7)
TROPONIN T SERPL-MCNC: <0.01 NG/ML (ref 0–0.03)
TSH SERPL DL<=0.05 MIU/L-ACNC: 1.35 UIU/ML (ref 0.27–4.2)
UROBILINOGEN UR QL STRIP: ABNORMAL
WBC NRBC COR # BLD: 6.77 10*3/MM3 (ref 3.4–10.8)
WBC UR QL AUTO: ABNORMAL /HPF
WHOLE BLOOD HOLD SPECIMEN: NORMAL
WHOLE BLOOD HOLD SPECIMEN: NORMAL

## 2020-01-05 PROCEDURE — 82962 GLUCOSE BLOOD TEST: CPT

## 2020-01-05 PROCEDURE — 93005 ELECTROCARDIOGRAM TRACING: CPT | Performed by: FAMILY MEDICINE

## 2020-01-05 PROCEDURE — 87086 URINE CULTURE/COLONY COUNT: CPT | Performed by: PHYSICIAN ASSISTANT

## 2020-01-05 PROCEDURE — 70450 CT HEAD/BRAIN W/O DYE: CPT | Performed by: RADIOLOGY

## 2020-01-05 PROCEDURE — 80307 DRUG TEST PRSMV CHEM ANLYZR: CPT | Performed by: PHYSICIAN ASSISTANT

## 2020-01-05 PROCEDURE — 87040 BLOOD CULTURE FOR BACTERIA: CPT | Performed by: FAMILY MEDICINE

## 2020-01-05 PROCEDURE — 87804 INFLUENZA ASSAY W/OPTIC: CPT | Performed by: FAMILY MEDICINE

## 2020-01-05 PROCEDURE — 99223 1ST HOSP IP/OBS HIGH 75: CPT | Performed by: INTERNAL MEDICINE

## 2020-01-05 PROCEDURE — 25010000002 MAGNESIUM SULFATE 2 GM/50ML SOLUTION: Performed by: INTERNAL MEDICINE

## 2020-01-05 PROCEDURE — 72125 CT NECK SPINE W/O DYE: CPT | Performed by: RADIOLOGY

## 2020-01-05 PROCEDURE — 82330 ASSAY OF CALCIUM: CPT | Performed by: FAMILY MEDICINE

## 2020-01-05 PROCEDURE — 86140 C-REACTIVE PROTEIN: CPT | Performed by: FAMILY MEDICINE

## 2020-01-05 PROCEDURE — 25010000002 MAGNESIUM SULFATE IN D5W 1G/100ML (PREMIX) 1-5 GM/100ML-% SOLUTION: Performed by: FAMILY MEDICINE

## 2020-01-05 PROCEDURE — 84439 ASSAY OF FREE THYROXINE: CPT | Performed by: FAMILY MEDICINE

## 2020-01-05 PROCEDURE — 71045 X-RAY EXAM CHEST 1 VIEW: CPT

## 2020-01-05 PROCEDURE — 85610 PROTHROMBIN TIME: CPT | Performed by: FAMILY MEDICINE

## 2020-01-05 PROCEDURE — 25010000002 HEPARIN (PORCINE) PER 1000 UNITS: Performed by: PHYSICIAN ASSISTANT

## 2020-01-05 PROCEDURE — 93010 ELECTROCARDIOGRAM REPORT: CPT | Performed by: INTERNAL MEDICINE

## 2020-01-05 PROCEDURE — 85025 COMPLETE CBC W/AUTO DIFF WBC: CPT | Performed by: FAMILY MEDICINE

## 2020-01-05 PROCEDURE — 83735 ASSAY OF MAGNESIUM: CPT | Performed by: FAMILY MEDICINE

## 2020-01-05 PROCEDURE — 84146 ASSAY OF PROLACTIN: CPT | Performed by: INTERNAL MEDICINE

## 2020-01-05 PROCEDURE — 81001 URINALYSIS AUTO W/SCOPE: CPT | Performed by: PHYSICIAN ASSISTANT

## 2020-01-05 PROCEDURE — 84484 ASSAY OF TROPONIN QUANT: CPT | Performed by: FAMILY MEDICINE

## 2020-01-05 PROCEDURE — 84145 PROCALCITONIN (PCT): CPT | Performed by: FAMILY MEDICINE

## 2020-01-05 PROCEDURE — 70450 CT HEAD/BRAIN W/O DYE: CPT

## 2020-01-05 PROCEDURE — 71045 X-RAY EXAM CHEST 1 VIEW: CPT | Performed by: RADIOLOGY

## 2020-01-05 PROCEDURE — 80053 COMPREHEN METABOLIC PANEL: CPT | Performed by: FAMILY MEDICINE

## 2020-01-05 PROCEDURE — 83605 ASSAY OF LACTIC ACID: CPT | Performed by: FAMILY MEDICINE

## 2020-01-05 PROCEDURE — 25010000002 CALCIUM GLUCONATE-NACL 1-0.675 GM/50ML-% SOLUTION: Performed by: FAMILY MEDICINE

## 2020-01-05 PROCEDURE — 99285 EMERGENCY DEPT VISIT HI MDM: CPT

## 2020-01-05 PROCEDURE — 83880 ASSAY OF NATRIURETIC PEPTIDE: CPT | Performed by: FAMILY MEDICINE

## 2020-01-05 PROCEDURE — 84443 ASSAY THYROID STIM HORMONE: CPT | Performed by: FAMILY MEDICINE

## 2020-01-05 PROCEDURE — 72125 CT NECK SPINE W/O DYE: CPT

## 2020-01-05 RX ORDER — FUROSEMIDE 20 MG/1
20 TABLET ORAL
Status: CANCELLED | OUTPATIENT
Start: 2020-01-05

## 2020-01-05 RX ORDER — HYDRALAZINE HYDROCHLORIDE 25 MG/1
25 TABLET, FILM COATED ORAL EVERY 8 HOURS
COMMUNITY
End: 2020-01-08 | Stop reason: HOSPADM

## 2020-01-05 RX ORDER — HYDRALAZINE HYDROCHLORIDE 25 MG/1
25 TABLET, FILM COATED ORAL EVERY 8 HOURS
Status: CANCELLED | OUTPATIENT
Start: 2020-01-05

## 2020-01-05 RX ORDER — SODIUM CHLORIDE 0.9 % (FLUSH) 0.9 %
10 SYRINGE (ML) INJECTION AS NEEDED
Status: DISCONTINUED | OUTPATIENT
Start: 2020-01-05 | End: 2020-01-08 | Stop reason: HOSPADM

## 2020-01-05 RX ORDER — POTASSIUM CHLORIDE 20 MEQ/1
40 TABLET, EXTENDED RELEASE ORAL AS NEEDED
Status: DISCONTINUED | OUTPATIENT
Start: 2020-01-05 | End: 2020-01-08 | Stop reason: HOSPADM

## 2020-01-05 RX ORDER — LISINOPRIL 40 MG/1
40 TABLET ORAL DAILY
COMMUNITY
End: 2020-01-08 | Stop reason: HOSPADM

## 2020-01-05 RX ORDER — MULTIVITAMIN
1 TABLET ORAL DAILY
Status: DISCONTINUED | OUTPATIENT
Start: 2020-01-06 | End: 2020-01-08 | Stop reason: HOSPADM

## 2020-01-05 RX ORDER — DEXTROSE MONOHYDRATE 25 G/50ML
25 INJECTION, SOLUTION INTRAVENOUS
Status: DISCONTINUED | OUTPATIENT
Start: 2020-01-05 | End: 2020-01-08 | Stop reason: HOSPADM

## 2020-01-05 RX ORDER — ATORVASTATIN CALCIUM 10 MG/1
10 TABLET, FILM COATED ORAL NIGHTLY
Status: DISCONTINUED | OUTPATIENT
Start: 2020-01-05 | End: 2020-01-08 | Stop reason: HOSPADM

## 2020-01-05 RX ORDER — MAGNESIUM SULFATE 1 G/100ML
1 INJECTION INTRAVENOUS ONCE
Status: COMPLETED | OUTPATIENT
Start: 2020-01-05 | End: 2020-01-05

## 2020-01-05 RX ORDER — VITAMIN E 268 MG
400 CAPSULE ORAL DAILY
COMMUNITY

## 2020-01-05 RX ORDER — UBIDECARENONE 100 MG
100 CAPSULE ORAL DAILY
COMMUNITY

## 2020-01-05 RX ORDER — LISINOPRIL 10 MG/1
40 TABLET ORAL DAILY
Status: CANCELLED | OUTPATIENT
Start: 2020-01-06

## 2020-01-05 RX ORDER — PANTOPRAZOLE SODIUM 40 MG/1
40 TABLET, DELAYED RELEASE ORAL DAILY
Status: DISCONTINUED | OUTPATIENT
Start: 2020-01-06 | End: 2020-01-08 | Stop reason: HOSPADM

## 2020-01-05 RX ORDER — FUROSEMIDE 20 MG/1
20 TABLET ORAL 2 TIMES DAILY
Status: ON HOLD | COMMUNITY
End: 2020-01-08 | Stop reason: SDUPTHER

## 2020-01-05 RX ORDER — NITROGLYCERIN 0.4 MG/1
0.4 TABLET SUBLINGUAL
Status: DISCONTINUED | OUTPATIENT
Start: 2020-01-05 | End: 2020-01-08 | Stop reason: HOSPADM

## 2020-01-05 RX ORDER — SODIUM CHLORIDE 9 MG/ML
75 INJECTION, SOLUTION INTRAVENOUS CONTINUOUS
Status: DISCONTINUED | OUTPATIENT
Start: 2020-01-05 | End: 2020-01-08

## 2020-01-05 RX ORDER — NICOTINE POLACRILEX 4 MG
15 LOZENGE BUCCAL
Status: DISCONTINUED | OUTPATIENT
Start: 2020-01-05 | End: 2020-01-08 | Stop reason: HOSPADM

## 2020-01-05 RX ORDER — HEPARIN SODIUM 5000 [USP'U]/ML
5000 INJECTION, SOLUTION INTRAVENOUS; SUBCUTANEOUS EVERY 12 HOURS SCHEDULED
Status: DISCONTINUED | OUTPATIENT
Start: 2020-01-05 | End: 2020-01-08 | Stop reason: HOSPADM

## 2020-01-05 RX ORDER — ASCORBIC ACID 500 MG
500 TABLET ORAL DAILY
Status: CANCELLED | OUTPATIENT
Start: 2020-01-06

## 2020-01-05 RX ORDER — VALACYCLOVIR HYDROCHLORIDE 1 G/1
1000 TABLET, FILM COATED ORAL DAILY PRN
COMMUNITY

## 2020-01-05 RX ORDER — ASCORBIC ACID 500 MG
500 TABLET ORAL DAILY
COMMUNITY

## 2020-01-05 RX ORDER — CARVEDILOL 6.25 MG/1
12.5 TABLET ORAL 2 TIMES DAILY
Status: CANCELLED | OUTPATIENT
Start: 2020-01-05

## 2020-01-05 RX ORDER — MAGNESIUM SULFATE 1 G/100ML
1 INJECTION INTRAVENOUS AS NEEDED
Status: DISCONTINUED | OUTPATIENT
Start: 2020-01-05 | End: 2020-01-08 | Stop reason: HOSPADM

## 2020-01-05 RX ORDER — MAGNESIUM SULFATE HEPTAHYDRATE 40 MG/ML
2 INJECTION, SOLUTION INTRAVENOUS ONCE
Status: COMPLETED | OUTPATIENT
Start: 2020-01-05 | End: 2020-01-05

## 2020-01-05 RX ORDER — POTASSIUM CHLORIDE 1.5 G/1.77G
40 POWDER, FOR SOLUTION ORAL AS NEEDED
Status: DISCONTINUED | OUTPATIENT
Start: 2020-01-05 | End: 2020-01-08 | Stop reason: HOSPADM

## 2020-01-05 RX ORDER — POTASSIUM CHLORIDE 7.45 MG/ML
10 INJECTION INTRAVENOUS
Status: DISCONTINUED | OUTPATIENT
Start: 2020-01-05 | End: 2020-01-08 | Stop reason: HOSPADM

## 2020-01-05 RX ORDER — MAGNESIUM SULFATE HEPTAHYDRATE 40 MG/ML
2 INJECTION, SOLUTION INTRAVENOUS AS NEEDED
Status: DISCONTINUED | OUTPATIENT
Start: 2020-01-05 | End: 2020-01-08 | Stop reason: HOSPADM

## 2020-01-05 RX ORDER — VALACYCLOVIR HYDROCHLORIDE 500 MG/1
1000 TABLET, FILM COATED ORAL DAILY PRN
Status: CANCELLED | OUTPATIENT
Start: 2020-01-05 | End: 2021-01-01

## 2020-01-05 RX ORDER — MELOXICAM 7.5 MG/1
7.5-15 TABLET ORAL DAILY PRN
COMMUNITY
End: 2020-01-08 | Stop reason: HOSPADM

## 2020-01-05 RX ORDER — CARVEDILOL 12.5 MG/1
12.5 TABLET ORAL 2 TIMES DAILY
COMMUNITY
End: 2020-01-08 | Stop reason: HOSPADM

## 2020-01-05 RX ORDER — TRIAMTERENE AND HYDROCHLOROTHIAZIDE 37.5; 25 MG/1; MG/1
1 TABLET ORAL DAILY
COMMUNITY
End: 2020-01-08 | Stop reason: HOSPADM

## 2020-01-05 RX ORDER — ASPIRIN 81 MG/1
81 TABLET ORAL DAILY
Status: DISCONTINUED | OUTPATIENT
Start: 2020-01-06 | End: 2020-01-08 | Stop reason: HOSPADM

## 2020-01-05 RX ORDER — VITAMIN E 268 MG
400 CAPSULE ORAL DAILY
Status: CANCELLED | OUTPATIENT
Start: 2020-01-06

## 2020-01-05 RX ORDER — CALCIUM GLUCONATE 20 MG/ML
1 INJECTION, SOLUTION INTRAVENOUS ONCE
Status: COMPLETED | OUTPATIENT
Start: 2020-01-05 | End: 2020-01-05

## 2020-01-05 RX ORDER — TRIAMTERENE AND HYDROCHLOROTHIAZIDE 37.5; 25 MG/1; MG/1
1 TABLET ORAL DAILY
Status: CANCELLED | OUTPATIENT
Start: 2020-01-06

## 2020-01-05 RX ORDER — SODIUM CHLORIDE 0.9 % (FLUSH) 0.9 %
10 SYRINGE (ML) INJECTION EVERY 12 HOURS SCHEDULED
Status: DISCONTINUED | OUTPATIENT
Start: 2020-01-05 | End: 2020-01-08 | Stop reason: HOSPADM

## 2020-01-05 RX ADMIN — SODIUM CHLORIDE, PRESERVATIVE FREE 10 ML: 5 INJECTION INTRAVENOUS at 20:22

## 2020-01-05 RX ADMIN — ATORVASTATIN CALCIUM 10 MG: 10 TABLET, FILM COATED ORAL at 21:55

## 2020-01-05 RX ADMIN — CALCIUM GLUCONATE 1 G: 20 INJECTION, SOLUTION INTRAVENOUS at 14:18

## 2020-01-05 RX ADMIN — MAGNESIUM SULFATE HEPTAHYDRATE 1 G: 1 INJECTION, SOLUTION INTRAVENOUS at 15:20

## 2020-01-05 RX ADMIN — HEPARIN SODIUM 5000 UNITS: 5000 INJECTION INTRAVENOUS; SUBCUTANEOUS at 20:32

## 2020-01-05 RX ADMIN — SODIUM CHLORIDE 1000 ML: 9 INJECTION, SOLUTION INTRAVENOUS at 12:21

## 2020-01-05 RX ADMIN — SODIUM CHLORIDE 125 ML/HR: 9 INJECTION, SOLUTION INTRAVENOUS at 19:10

## 2020-01-05 RX ADMIN — MAGNESIUM SULFATE 2 G: 2 INJECTION INTRAVENOUS at 21:55

## 2020-01-05 NOTE — ED NOTES
Helped patient off of bed pan. Patient had a very large soft bowel movement.      Rowan Gonzalez  01/05/20 [id number]

## 2020-01-05 NOTE — ED NOTES
I called out pt lab irene and he is going to come and try to stick pt for labs     Libby Flowers  01/05/20 [id number]

## 2020-01-05 NOTE — H&P
"     [facility]  HISTORY & PHYSICAL    Patient Identification:  Name:  Tahmina Martinez  Age:  74 y.o.  Sex:  female  :  1945  MRN:  [id number]   Visit Number:  [id number]  Primary Care Physician:  Noe Bower MD     Subjective     Chief complaint:   Chief Complaint   Patient presents with   • Syncope     History of presenting illness:   Patient is a 74 y.o. 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 Patient states that this morning she went into her kitchen to get breakfast after taking her morning medications when she had acute onset of weakness and lightheadedness. She states that everything \"went white\" and she slipped into the floor onto her buttock. She states she \"went out\" after that and her ex- came to her side and picked her up and placed her in her chair. She states as he sat her in the chair her head went back and hit the table hard. From this point she states she was out of it for some time and her ex  called EMS. She reports remembering waking up in EMS and was very cold and shaking. She states she lost control of her bowel and \"messed her self\". She states once she was in ED, she had \"the biggest bowel movement of her life.\" She states that after hitting her head, she did have a mild headache, reports this has improved. She denied any prodromal symptoms. She states she felt fine yesterday.  Upon arrival of EMS, patient was found to have systolic blood pressure in the 60s, she was given IV fluids that improved her blood pressure.  She denies any chest pain or pressure, no " palpitations. She denies any abdominal pain, nausea or emesis. She denies any urinary symptoms. No fevers, no cough.      It is of note that the patient was admitted to our service on 11/28/2019 and discharged to the [facility] in [facility] on 12/3/2019. During admission to our facility she underwent syncope work up and was treated for HCAP vs CAP. Patient states that she was discharged home from the NH about 2 weeks later, during her stay she underwent PT and strengthening.     Upon arrival to the ED, vitals were temperature 98, heart rate 60, respiratory rate 20, blood pressure 115/55, and oxygen saturation 98% on room air.  Troponin T negative.  proBNP within normal limits.  CMP with glucose 131, potassium 3.4, CO2 15.5, chloride 112, calcium 6.0, and albumin 2.56.  TSH and free T4 both within normal limits.  C-reactive protein and lactic acid both within normal limits.  Magnesium 1.3.  CBC with .6, otherwise unremarkable.  Influenza negative.  CT cervical spine without contrast with degenerative changes but no acute fractures noted.  CT of head without contrast with atrophy and chronic small vessel ischemic change, no acute intracranial abnormalities identified.  Chest x-ray with no evidence of acute cardiopulmonary disease.  While in the emergency department, patient was administered 1 g IV calcium gluconate, 1 g IV magnesium sulfate, and a 1 L bolus of normal saline with normal saline at 125 mils an hour thereafter.    Patient has been admitted to the telemetry floor for further evaluation and treatment.     Present during exam: N.A   ---------------------------------------------------------------------------------------------------------------------   Review of Systems   Constitutional: Positive for fatigue. Negative for activity change, appetite change, chills, diaphoresis and fever.   HENT: Negative for congestion, postnasal drip, rhinorrhea, sinus pain, sore throat and trouble swallowing.    Eyes:  Negative for discharge and visual disturbance.   Respiratory: Negative for cough, chest tightness, shortness of breath and wheezing.    Cardiovascular: Negative for chest pain, palpitations and leg swelling.   Gastrointestinal: Negative for abdominal pain, constipation, diarrhea, nausea and vomiting.   Endocrine: Positive for cold intolerance. Negative for heat intolerance.   Genitourinary: Negative for decreased urine volume, dysuria, frequency and urgency.   Musculoskeletal: Negative for arthralgias, gait problem and myalgias.   Skin: Negative for color change, rash and wound.   Allergic/Immunologic: Negative for environmental allergies and immunocompromised state.   Neurological: Positive for syncope, weakness, light-headedness and headaches. Negative for dizziness.   Hematological: Negative for adenopathy. Does not bruise/bleed easily.   Psychiatric/Behavioral: Negative for confusion and decreased concentration. The patient is not nervous/anxious.       ---------------------------------------------------------------------------------------------------------------------   Past Medical History:   Diagnosis Date   • Breast cancer (right), status post radiation therapy in 2015 10/12/2016   • Diastolic dysfunction 2020   • Dyslipidemia 2017    On atorvastatin.    • Essential hypertension 10/12/2016   • First degree AV block 2019   • H/O splenectomy    • History of Acute myelocytic leukemia,status post chemotherapy and bone marrow transplant in . 10/12/2016   • History of breast cancer        • History of splenectomy 2019   • Hypertension    • Myelocytic leukemia (CMS/HCC)    • Non-STEMI (non-ST elevated myocardial infarction) (CMS/HCC)    • Restless leg    • Type II diabetes mellitus (CMS/HCC) 2019     Past Surgical History:   Procedure Laterality Date   • BONE MARROW TRANSPLANT     •  SECTION     • SPLENECTOMY     • TUBAL ABDOMINAL LIGATION       Family History   Problem  Relation Age of Onset   • Heart disease Mother    • Hypertension Mother    • Heart disease Father    • Hypertension Father    • Hypertension Sister    • Hypertension Brother      Social History     Socioeconomic History   • Marital status:      Spouse name: Not on file   • Number of children: Not on file   • Years of education: Not on file   • Highest education level: Not on file   Tobacco Use   • Smoking status: Former Smoker     Types: Cigarettes   • Smokeless tobacco: Never Used   • Tobacco comment: Patient states she smoked 3 cigarettes a day for 1 year as a teenager    Substance and Sexual Activity   • Alcohol use: No   • Drug use: No   • Sexual activity: Defer   Social History Narrative    Patient lives at home with ex-; she is currently on disability      ---------------------------------------------------------------------------------------------------------------------   Allergies:  Latex; Penicillins; Pork-derived products; and Zithromax [azithromycin]  ---------------------------------------------------------------------------------------------------------------------   Medications below are reported home medications pulling from within the system; at this time, these medications have not been reconciled unless otherwise specified and are in the verification process for further verifcation as current home medications.    Prior to Admission Medications     Prescriptions Last Dose Informant Patient Reported? Taking?    aspirin 81 MG EC tablet  Provider's Office Yes No    Take 81 mg by mouth Daily.    atorvastatin (LIPITOR) 10 MG tablet  Provider's Office No No    Take 1 tablet by mouth Every Night.    Calcium Citrate-Vitamin D (CALCIUM + D PO)  Provider's Office Yes No    Take 600 mg by mouth Every Morning.    carvedilol (COREG) 25 MG tablet   No No    Take 1 tablet by mouth 2 (Two) Times a Day. Hold for SBP < 110 and/or HR < 60    Coenzyme Q10 (CO Q-10) 200 MG capsule  Provider's Office Yes  No    Take 200 mg by mouth Daily.    exemestane (AROMASIN) 25 MG chemo tablet  Provider's Office Yes No    Take 25 mg by mouth Daily.    hydrALAZINE (APRESOLINE) 50 MG tablet  Provider's Office No No    Take 1.5 tablets by mouth Every 8 (Eight) Hours.    ipratropium-albuterol (DUO-NEB) 0.5-2.5 mg/3 ml nebulizer   No No    Take 3 mL by nebulization 4 (Four) Times a Day.    Loratadine 10 MG capsule  Provider's Office Yes No    Take 1 capsule by mouth Daily.    metFORMIN (GLUCOPHAGE) 500 MG tablet  Provider's Office Yes No    Take 500 mg by mouth Every Morning.    nitroglycerin (NITROSTAT) 0.4 MG SL tablet  Provider's Office Yes No    Place 0.4 mg under the tongue Every 5 (Five) Minutes As Needed for chest pain. Take no more than 3 doses in 15 minutes.    pantoprazole (PROTONIX) 40 MG EC tablet  Provider's Office Yes No    Take 40 mg by mouth Daily.    rOPINIRole (REQUIP) 0.25 MG tablet  Provider's Office Yes No    Take 0.25 mg by mouth Every Night. Take 1 hour before bedtime.    valACYclovir (VALTREX) 500 MG tablet  Provider's Office Yes No    Take 1 g by mouth Daily.    vitamin D (ERGOCALCIFEROL) 43331 UNITS capsule capsule  Provider's Office Yes No    Take 50,000 Units by mouth Every 14 (Fourteen) Days.        ---------------------------------------------------------------------------------------------------------------------    Objective     Hospital Scheduled Meds:       sodium chloride 125 mL/hr       Current listed hospital scheduled medications may not yet reflect those currently placed in orders that are signed and held, awaiting patient's arrival to floor/unit.    ---------------------------------------------------------------------------------------------------------------------   Vital Signs:  Temp:  [97.9 °F (36.6 °C)-98 °F (36.7 °C)] 97.9 °F (36.6 °C)  Heart Rate:  [] 61  Resp:  [20] 20  BP: (103-130)/() 130/107  Mean Arterial Pressure (Non-Invasive) for the past 24 hrs (Last 3 readings):    Noninvasive MAP (mmHg)   01/05/20 1332 75   01/05/20 1330 73   01/05/20 1329 81     SpO2 Percentage    01/05/20 1330 01/05/20 1332 01/05/20 1400   SpO2: 98% 99% 99%     SpO2:  [98 %-100 %] 99 %  on   ;   Device (Oxygen Therapy): room air    Body mass index is 24.03 kg/m².  Wt Readings from Last 3 Encounters:   01/05/20 63.5 kg (140 lb)   12/03/19 69.4 kg (153 lb)   11/27/19 65.8 kg (145 lb)       ---------------------------------------------------------------------------------------------------------------------   Physical Exam:  Physical Exam   Constitutional: She is oriented to person, place, and time. She appears well-developed and well-nourished.  Non-toxic appearance. No distress.   Elderly, resting in bed, in no acute distress.    HENT:   Head: Normocephalic and atraumatic.   Right Ear: External ear normal.   Left Ear: External ear normal.   Nose: Nose normal.   Mouth/Throat: Oropharynx is clear and moist and mucous membranes are normal. No oropharyngeal exudate.   Eyes: Pupils are equal, round, and reactive to light. Conjunctivae are normal. No scleral icterus.   Neck: Normal range of motion. Neck supple. No muscular tenderness present. Carotid bruit is not present. No thyromegaly present.   Cardiovascular: Normal rate, regular rhythm, normal heart sounds and intact distal pulses. Exam reveals no gallop and no friction rub.   No murmur heard.  Pulses:       Dorsalis pedis pulses are 2+ on the right side, and 2+ on the left side.        Posterior tibial pulses are 2+ on the right side, and 2+ on the left side.   Pulmonary/Chest: Effort normal and breath sounds normal. No accessory muscle usage. No tachypnea. No respiratory distress. She has no wheezes. She has no rhonchi. She has no rales. She exhibits no tenderness.   Abdominal: Soft. Bowel sounds are normal. She exhibits no distension and no mass. There is no hepatomegaly. There is no tenderness. There is no rebound and no guarding. No hernia.    Genitourinary:   Genitourinary Comments: No arguelles cath in place.    Musculoskeletal: She exhibits no edema, tenderness or deformity.        Right lower leg: She exhibits no edema.        Left lower leg: She exhibits no edema.     Vascular Status -  Her right foot exhibits normal foot vasculature  and no edema. Her left foot exhibits normal foot vasculature  and no edema.  Neurological: She is alert and oriented to person, place, and time. She has normal strength. No cranial nerve deficit or sensory deficit. GCS eye subscore is 4. GCS verbal subscore is 5. GCS motor subscore is 6.   Awake and alert.  Moves all extremities equally.  Strength and sensation intact, symmetric.  No facial droop.  No slurred speech.  No arm drift.  No focal neurological deficits on exam.  Answers questions appropriately and follows commands.   Skin: Skin is warm and dry. Capillary refill takes less than 2 seconds. No rash noted. No erythema. No pallor.   Psychiatric: She has a normal mood and affect. Her speech is normal and behavior is normal. Judgment and thought content normal. Cognition and memory are normal.   Nursing note and vitals reviewed.    ---------------------------------------------------------------------------------------------------------------------  EKG:  Pending cardiology read. Per my read, Sinus adriana 56, 1st AVB, QTc prolonged 484 m/s, T wave inversion anteriorly seen on previous EKG, overall appears stable when compared to previous     Telemetry:    Sinus 70s, first-degree AV block    I have personally reviewed the EKG/Telemetry strip  ---------------------------------------------------------------------------------------------------------------------   Results from last 7 days   Lab Units 01/05/20  1248   TROPONIN T ng/mL <0.010     Results from last 7 days   Lab Units 01/05/20  1248   PROBNP pg/mL 126.7         Results from last 7 days   Lab Units 01/05/20  1248 01/05/20  1239   CRP mg/dL 0.04  --    LACTATE  mmol/L 1.0  --    WBC 10*3/mm3  --  6.77   HEMOGLOBIN g/dL  --  14.2   HEMATOCRIT %  --  42.8   MCV fL  --  104.6*   MCHC g/dL  --  33.2   PLATELETS 10*3/mm3  --  305   INR  1.26*  --      Results from last 7 days   Lab Units 01/05/20  1248   SODIUM mmol/L 138   POTASSIUM mmol/L 3.4*   MAGNESIUM mg/dL 1.3*   CHLORIDE mmol/L 112*   CO2 mmol/L 15.5*   BUN mg/dL 18   CREATININE mg/dL 0.92   EGFR IF NONAFRICN AM mL/min/1.73 59*   CALCIUM mg/dL 6.0*   GLUCOSE mg/dL 131*   ALBUMIN g/dL 2.56*   BILIRUBIN mg/dL 0.3   ALK PHOS U/L 72   AST (SGOT) U/L 17   ALT (SGPT) U/L 9   Estimated Creatinine Clearance: 52.1 mL/min (by C-G formula based on SCr of 0.92 mg/dL).    Lab Results   Component Value Date    HGBA1C 6.20 (H) 12/10/2019     Lab Results   Component Value Date    TSH 1.350 01/05/2020    FREET4 1.30 01/05/2020     Microbiology Results (last 10 days)     Procedure Component Value - Date/Time    Influenza Antigen, Rapid - Swab, Nasopharynx [[id number]]  (Normal) Collected:  01/05/20 1320    Lab Status:  Final result Specimen:  Swab from Nasopharynx Updated:  01/05/20 1344     Influenza A Ag, EIA Negative     Influenza B Ag, EIA Negative         Pain Management Panel     There is no flowsheet data to display.        I have personally reviewed the above laboratory results.   ---------------------------------------------------------------------------------------------------------------------  Imaging Results (Last 7 Days)     Procedure Component Value Units Date/Time    XR Chest 1 View [[id number]] Collected:  01/05/20 1240     Updated:  01/05/20 1323    Narrative:       EXAMINATION: XR CHEST 1 VW-      CLINICAL INDICATION:     Simple Sepsis Protocol     TECHNIQUE:  XR CHEST 1 VW-      COMPARISON: NONE      FINDINGS:   Coarsened interstitial markings. Heart and mediastinum contours are  unremarkable.  No pleural effusion.  No pneumothorax.   Bony and soft tissue structures are unremarkable.       Impression:       No  radiographic evidence of acute cardiac or pulmonary  disease.     This report was finalized on 1/5/2020 12:40 PM by Dr. Abdias Weiner MD.       CT Head Without Contrast [[id number]] Collected:  01/05/20 1244     Updated:  01/05/20 1246    Narrative:          EXAMINATION: CT HEAD WO CONTRAST-      CLINICAL INDICATION:     headache/syncope     TECHNIQUE: Contiguous axial CT images of the head were obtained without  contrast administration.      Radiation dose reduction techniques were utilized per ALARA protocol.  Automated exposure control was initiated through either or [employer] or  [employer] software packages by  protocol.       1145.92 mGy.cm     COMPARISON:  None.       FINDINGS: Generalized cerebral atrophy is present. There is no mass  effect, midline displacement, or hydrocephalus. There are patchy areas  of decreased density within the periventricular white matter which  likely reflect chronic small vessel ischemic changes. There is no CT  evidence of acute infarct or hemorrhage. Bone windows reveal no osseous  abnormalities or fractures.        Impression:       Atrophy and chronic small vessel ischemic change, but there  are no acute intracranial abnormalities identified.     This report was finalized on 1/5/2020 12:44 PM by Dr. Abdias Weiner MD.       CT Cervical Spine Without Contrast [[id number]] Collected:  01/05/20 1240     Updated:  01/05/20 1243    Narrative:       EXAMINATION: CT CERVICAL SPINE WO CONTRAST-      CLINICAL INDICATION:     C-spine trauma, NEXUS/CCR positive, low risk     TECHNIQUE: Multiple axial CT images of the entire cervical spine (to  include the cervicothoracic junction) were obtained without contrast  administration. Reformatted images in the coronal and/or sagittal  plane(s) were generated from the axial data set to facilitate diagnostic  accuracy and/or surgical planning.      Radiation dose reduction techniques were utilized per ALARA protocol.  Automated exposure  control was initiated through either or [employer] or  [employer] software packages by  protocol.       430.62 mGy.cm     COMPARISON:  None.       FINDINGS: Degenerative changes are present within the cervical spine,  but resulting in no significant bony stenosis of the spinal canal. No  acute fracture or malalignment of the cervical spine is identified.        Impression:       No acute fracture of the cervical spine.      This report was finalized on 1/5/2020 12:41 PM by Dr. Abdias Weiner MD.         I have personally reviewed the above radiology results.     Last Echocardiogram:  Results for orders placed during the hospital encounter of 11/02/19   Transthoracic Echo Complete With Contrast if Necessary Per Protocol    Narrative · Left ventricular wall thickness is consistent with borderline concentric   hypertrophy.  · Left ventricular systolic function is normal.  · Estimated EF appears to be in the range of 66 - 70%.  · Left ventricular diastolic dysfunction.  · Normal cardiac chamber dimensions  · Mild aortic valve regurgitation is present.  · Mild mitral valve regurgitation is present  · Mild tricuspid valve regurgitation is present. No evidence of pulmonary   hypertension is present  · There is no evidence of pericardial effusion        ---------------------------------------------------------------------------------------------------------------------    Assessment & Plan      Active Hospital Problems    Diagnosis POA   • **Syncope [R55] Yes   • Hypokalemia [E87.6] Yes   • Hypomagnesemia [E83.42] Yes   • Macrocytosis [D75.89] Yes   • Hypoalbuminemia [E88.09] Yes   • Diastolic dysfunction [I51.89] Yes   • Former smoker [Z87.891] Not Applicable   • T2DM (type 2 diabetes mellitus) (CMS/HCC) [E11.9] Yes   • History of splenectomy [Z90.81] Not Applicable   • Dyslipidemia [E78.5] Yes     On atorvastatin.      • Essential hypertension [I10] Yes   • Breast cancer (right), status post radiation therapy in  08/2015 [C50.919] Yes   • AML s/p chemo and bone marrow transplant 2005 [C92.00] Yes     · Syncope: Head CT with no acute intracranial abnormality. EKG without acute ischemic changes. Recent ECHO from 11/2019 reviewed with preserved EF, diastolic dysfunction, and mild valvular disease. Troponin T negative. This is her second admission in the past couple of months for syncope episodes. Potassium and magnesium low, replace per protocol and monitor on tele. She may have underlying arrhythmia causing syncope.  Also be vasovagal as she had a bowel movement shortly after syncopal episode.  Could also be due to bradycardia, patient's heart rate in the 50s upon presentation, she is on Coreg at home.  Patient also noted to be hypotensive upon EMS arrival that resolved with IV fluids.  Will consult cardiology as she may need outpatient event monitor. Will continue tele monitoring. Obtain orthostatic vitals, continue gentle fluids and supportive care. Fall precautions. PT/OT consult   · Acute hypokalemia: Replace per protocol. Telemetry monitoring. Replace mag as well. Repeat chemistry panel in AM.   · Hypomagnesemia: Replace per protocol. Telemetry monitoring. Repeat mag level in AM.   · Macrocytosis without anemia: Recent B12 and folate WNL. Monitor, daily CBC. Add daily multivitamin.   · Pseudo-hypocalcemia in setting of hypoalbuminemia: Ionized calcium WNL. Monitor closely.   · Non-insulin-dependent type 2 diabetes mellitus: Hold home oral DM meds for now to prevent hypoglycemia. Low dose SSI, titrate insulin tx as necessary. Closely monitor blood glucose levels with accuchecks QAC and QHS.   · Essential hypertension: BP controlled. Review home antihypertensive regimen once reconciled per pharmacy. Resume as appropriate.   · Hyperlipidemia: Continue home statin once reconciled per pharmacy.   · Diastolic dysfunction: Clinically compensated. Monitor volume status closely with IV fluids on board. Strict Is and Os, daily  weights.   · History of right-sided breast cancer status post radiation in the past: Supportive care, outpatient monitoring.   · History of AML status post chemotherapy and bone marrow transplant in the past: Supportive care. Blood levels stable. Outpatient follow up.   · History of splenectomy: Supportive care.  · Debility/weakness: PT/OT consults  · Former smoker  · F/E/N: Gentle IV fluids. Replace electrolytes per protocol. Consistent carbohydrate diet.     ---------------------------------------------------  DVT Prophylaxis: Sq heparin   GI Prophylaxis: Protonix   Activity: Fall precautions, up with assistance     The patient is considered to be a high risk patient due to: Syncope, electrolyte abnormalities, history of cancer, debility     INPATIENT status due to the need for care which can only be reasonably provided in an hospital setting such as aggressive/expedited ancillary services and/or consultation services, the necessity for IV medications, close physician monitoring and/or the possible need for procedures.  In such, I feel patient’s risk for adverse outcomes and need for care warrant INPATIENT evaluation and predict the patient’s care encounter to likely last beyond 2 midnights.    Code Status: FULL CODE     I have discussed the patient's assessment and plan with the patient and attending physician MD Dilcia Alejandra PA-C  Hospital[facility]   Pager: [phone]    01/05/20  5:49 PM    Attending Physician: Dewey King MD      ---------------------------------------------------------------------------------------------------------------------   I have seen and examined the patient. I agree with the assessment and plan of Dilcia Bryant PA-C    Tahmina Martinez is a 74 y.o. female with PMH significant for chronic diastolic CHF, HTN, history of AML s/p chemotherapy and BMT, breast cancer s/p radiation, HLD, DM type II and history of splenectomy presented to the  emergency department after an episode of syncope.  Patient was at [facility] 3 weeks ago and was discharged to nursing home from where she has been discharged 5 days ago.  Patient states that this morning he woke up and went to the restroom.  He states that afterwards she sat and did not feel well and everything looked like it was white.  Patient states that sleep got weak and dizzy and went down.  Patient states that her ex- was present at bedside came to help her.  Patient states that she does not remember what happened afterwards.  Her ex- states that when he came patient was lying on the floor and could not get up.  He states that he got the patient up and she passed out.  Patient did not have any jerking movements of her arms and legs but did lost control of her bowel and bladder.  Her ex- reports that patient was unconscious for 15 to 20 minutes.  Patient states that the next thing she remembers was being in the ER.  Ex- reports that he put a spoon in her mouth to keep her mouth and airways open.    Patient denies any chest pain or palpitations.    Constitutional: Well-developed and well-nourished.  Lying comfortably in bed.  No acute distress.  HEENT: Pupils are equal and reactive to light.  Fundi not seen.   Neck: Supple.  No JVD  PULM: Lungs clear to auscultation bilaterally.  CVS: Regular rate and rhythm, S1 + S2, no murmurs, rubs or gallops.  GI: Abdomen is soft and nontender.  No viscera palpable.  Bowel sounds audible.    Peripheral vascular: Dorsalis pedis palpable and no edema  Neurologic: Awake, alert and oriented to place, person and time.  Cranial nerves grossly intact.  Strength bilaterally symmetrical in upper and lower extremities.    Labs reviewed.    A/P:  -Syncope  -Hypocalcemia  -Hypomagnesemia  -NAG metabolic acidosis  -Hypoalbuminemia  -Right bundle branch block    CT scan of the head showed atrophy and chronic vessel small ischemic change but no  acute intracranial abnormality.  CT scan of the cervical spine showed degenerative changes but no acute fracture.  Carotid Doppler ultrasound on 11/11/2019 showed no evidence of hemodynamically significant plaques or stenosis.  TTE on 11/3/2019 showed normal LV systolic function, EF of 66 to 70%, diastolic dysfunction, mild AR, mild MR and mild TR.  Etiology of syncope unclear at this time and may be vasovagal vs orthostasis vs seizure disorder.  Patient had low blood pressure and was bradycardic upon arrival to the ER and was found to have orthostasis.  Patient denies having orthostatic dizziness or lightheadedness prior to today.    However, this is patient's second episode of syncope in last few months and therefore will consult cardiology to rule out an arrhythmia causing syncopal episodes.  I will also check prolactin level to rule out seizure disorder.  We will keep her on seizure precaution but will not start any antiepileptic drug at this time.  Consider MRI and EEG  Replace electrolytes per protocol and monitor daily.

## 2020-01-05 NOTE — ED PROVIDER NOTES
Subjective   74-year-old female with history of diabetes hypertension hyperlipidemia presents to the emergency room with complaints syncope.  Patient reports that she was getting ready for breakfast had just taken her medications when she started feeling weak and lightheaded.  She states that she denied having blurry vision and subsequently passed out.  She reports that she hit the back of her head on the floor.  Since that time she has had a headache.  She said headache is located the back of her head where she hit her head.  She denies neck pain patient denies chest pain or shortness of breath.  She states she is felt nauseated since hitting her head.  Due to symptoms patient called EMS upon their arrival patient was noted to have a low blood pressure 60 systolic patient was given IV fluids with improvement of blood pressure.      Syncope   Episode history:  Single  Most recent episode:  Today  Progression:  Resolved  Context: normal activity and standing up    Relieved by:  Nothing  Worsened by:  Nothing  Ineffective treatments:  None tried  Associated symptoms: headaches, nausea, recent fall, visual change and weakness    Associated symptoms: no anxiety, no chest pain, no diaphoresis, no difficulty breathing, no dizziness, no fever, no focal sensory loss, no malaise/fatigue, no palpitations, no shortness of breath and no vomiting        Review of Systems   Constitutional: Negative for diaphoresis, fever and malaise/fatigue.   Respiratory: Negative for shortness of breath.    Cardiovascular: Positive for syncope. Negative for chest pain and palpitations.   Gastrointestinal: Positive for nausea. Negative for vomiting.   Neurological: Positive for weakness and headaches. Negative for dizziness.   All other systems reviewed and are negative.      Past Medical History:   Diagnosis Date   • Breast cancer (right), status post radiation therapy in 08/2015 10/12/2016   • Diastolic dysfunction 1/5/2020   • Dyslipidemia  2017    On atorvastatin.    • Essential hypertension 10/12/2016   • First degree AV block 2019   • H/O splenectomy    • History of Acute myelocytic leukemia,status post chemotherapy and bone marrow transplant in . 10/12/2016   • History of breast cancer        • History of splenectomy 2019   • Hypertension    • Myelocytic leukemia (CMS/HCC)    • Non-STEMI (non-ST elevated myocardial infarction) (CMS/HCC)    • Restless leg    • Type II diabetes mellitus (CMS/HCC) 2019       Allergies   Allergen Reactions   • Latex    • Penicillins    • Pork-Derived Products GI Intolerance   • Zithromax [Azithromycin]        Past Surgical History:   Procedure Laterality Date   • BONE MARROW TRANSPLANT     • CARDIAC ELECTROPHYSIOLOGY PROCEDURE N/A 2020    Procedure: Loop insertion;  Surgeon: JAIME Saunders MD;  Location: [facility] INVASIVE LOCATION;  Service: Cardiovascular   •  SECTION     • SPLENECTOMY     • TUBAL ABDOMINAL LIGATION         Family History   Problem Relation Age of Onset   • Heart disease Mother    • Hypertension Mother    • Heart disease Father    • Hypertension Father    • Hypertension Sister    • Hypertension Brother        Social History     Socioeconomic History   • Marital status:      Spouse name: Not on file   • Number of children: Not on file   • Years of education: Not on file   • Highest education level: Not on file   Tobacco Use   • Smoking status: Former Smoker     Types: Cigarettes   • Smokeless tobacco: Never Used   • Tobacco comment: Patient states she smoked 3 cigarettes a day for 1 year as a teenager    Substance and Sexual Activity   • Alcohol use: No   • Drug use: No   • Sexual activity: Defer   Social History Narrative    Patient lives at home with ex-; she is currently on disability            Objective   Physical Exam   Constitutional: She is oriented to person, place, and time. No distress.   Chronically ill-appearing   HENT:   Head:  Normocephalic and atraumatic.   Mouth/Throat: Oropharynx is clear and moist.   Eyes: Pupils are equal, round, and reactive to light. EOM are normal.   Neck: Neck supple. No JVD present.   Cardiovascular: Normal rate, regular rhythm and normal heart sounds.   No murmur heard.  Pulmonary/Chest: Effort normal and breath sounds normal. She has no wheezes. She has no rales.   Abdominal: Soft. Bowel sounds are normal.   Musculoskeletal: She exhibits no edema.   Neurological: She is alert and oriented to person, place, and time. No cranial nerve deficit or sensory deficit.   Skin: Skin is warm and dry. Capillary refill takes less than 2 seconds.   Nursing note and vitals reviewed.      Procedures           ED Course  ED Course as of Jan 08 0311   Sun Jan 05, 2020   1223 EKG sinus bradycardia ventricular 56   QTc 44 no ST elevation..    [BB]   1244 CT cervical spine is unremarkable    [BB]   1245 Patient white blood cell count is unremarkable.    [BB]   1255 CT scan of head is unremarkable for acute abnormality    [BB]   1324 Chest x-ray is unremarkable    [BB]   1346 Patient potassium low at 3.4 have ordered oral replacement patient's calcium was 6.0.    [BB]   1348 Have ordered IV calcium gluconate.    [BB]   1427 Have contacted hospitalist and awaiting call back    [BB]   1446 Have spoken to Dr. King who states he will review labs/findings and will call back    [BB]   1449 Have spoken to Dr. King who is agreeable to admit    [BB]      ED Course User Index  [BB] Johan Vidal MD                                               MDM  Number of Diagnoses or Management Options  Hypocalcemia: new and requires workup  Hypomagnesemia: new and requires workup  Syncope, unspecified syncope type: new and requires workup     Amount and/or Complexity of Data Reviewed  Clinical lab tests: ordered and reviewed  Tests in the radiology section of CPT®: ordered and reviewed  Decide to obtain previous medical records or to  obtain history from someone other than the patient: yes  Discuss the patient with other providers: yes    Risk of Complications, Morbidity, and/or Mortality  Presenting problems: moderate  Diagnostic procedures: moderate  Management options: moderate    Patient Progress  Patient progress: stable      Final diagnoses:   Syncope, unspecified syncope type   Hypocalcemia   Hypomagnesemia            Johan Vidal MD  01/08/20 031

## 2020-01-05 NOTE — PROGRESS NOTES
Discharge Planning Assessment   Jose     Patient Name: Tahmina Martinez  MRN: [id number]  Today's Date: 1/5/2020    Admit Date: 1/5/2020    Discharge Needs Assessment     Row Name 01/05/20 1639       Living Environment    Lives With  spouse    Name(s) of Who Lives With Patient  LIVES WITH KUSH MARTINEZ    Current Living Arrangements  home/apartment/condo    Primary Care Provided by  self;spouse/significant other    Provides Primary Care For  no one, unable/limited ability to care for self    Family Caregiver if Needed  spouse    Quality of Family Relationships  helpful;involved;supportive    Able to Return to Prior Arrangements  yes       Resource/Environmental Concerns    Resource/Environmental Concerns  none       Transition Planning    Patient/Family Anticipates Transition to  home with family    Patient/Family Anticipated Services at Transition  ;home health care;rehabilitation services    Transportation Anticipated  family or friend will provide       Discharge Needs Assessment    Readmission Within the Last 30 Days  no previous admission in last 30 days    Concerns to be Addressed  discharge planning    Equipment Currently Used at Home  rollator;cane, straight    Anticipated Changes Related to Illness  inability to care for self    Outpatient/Agency/Support Group Needs  homecare agency        Discharge Plan     Row Name 01/05/20 1640       Plan    Plan  PT LIVES WITH HER EX- KUSH MARTINEZ WHO IS AT BEDSIDE AND PLANS TO RETURN HOME UPON DISCHARGE, FAMILY TO PROVIDE TRANSPORTATION. PCP IS DR PAYNE, SHE USES [facility] AND HAS MEDICARE A[place] AND [place] MEDICAID INSURANCE. PT DENIES ANY ISSUES WITH MEDS OR COPAYS. PT DOES NOT HAVE A POA OR LIVING WILL. SHE USES A CANE AND ROLLATOR AT HOME, SHE DOES NOT USE HOME O2 OR HOME HEALTH. PT WAS RECENTLY ADMITTED HERE 11/28/19-12/3/19 FOR SYNCOPE AND [facility] AND WAS DISCHARGED FROM THERE ON 12/20/19. SHE STATES  SHE WAS NOT SET UP WITH ANY HELP OR HOME HEALTH AND SHE FEELS LIKE SHE WOULD BENEFIT FROM HOME HEALTH OR PHYSICAL THERAPY. SOCIAL SERVICE CONSULT PLACED RT DISCHARGE PLANNING.     Patient/Family in Agreement with Plan  yes        Destination      Coordination has not been started for this encounter.      Durable Medical Equipment      Coordination has not been started for this encounter.      Dialysis/Infusion      Coordination has not been started for this encounter.      Home Medical Care      Coordination has not been started for this encounter.      Therapy      Coordination has not been started for this encounter.      Community Resources      Coordination has not been started for this encounter.          Demographic Summary     Row Name 01/05/20 1639       General Information    Admission Type  inpatient    Arrived From  home    Referral Source  emergency department    Reason for Consult  discharge planning    Preferred Language  English        Functional Status     Row Name 01/05/20 1639       Functional Status    Usual Activity Tolerance  fair    Current Activity Tolerance  poor       Mental Status    General Appearance WDL  WDL        Psychosocial     Row Name 01/05/20 1639       Behavior WDL    Behavior WDL  WDL       Emotion Mood WDL    Emotion/Mood/Affect WDL  WDL       Speech WDL    Speech WDL  WDL        Abuse/Neglect    No documentation.       Legal    No documentation.       Substance Abuse    No documentation.       Patient Forms    No documentation.           Avani Roy RN

## 2020-01-06 ENCOUNTER — APPOINTMENT (OUTPATIENT)
Dept: MRI IMAGING | Facility: HOSPITAL | Age: 75
End: 2020-01-06

## 2020-01-06 ENCOUNTER — APPOINTMENT (OUTPATIENT)
Dept: GENERAL RADIOLOGY | Facility: HOSPITAL | Age: 75
End: 2020-01-06

## 2020-01-06 LAB
6-ACETYL MORPHINE: NEGATIVE
ALBUMIN SERPL-MCNC: 3.72 G/DL (ref 3.5–5.2)
ALBUMIN/GLOB SERPL: 1.1 G/DL
ALP SERPL-CCNC: 101 U/L (ref 39–117)
ALT SERPL W P-5'-P-CCNC: 15 U/L (ref 1–33)
AMPHET+METHAMPHET UR QL: NEGATIVE
ANION GAP SERPL CALCULATED.3IONS-SCNC: 13.9 MMOL/L (ref 5–15)
AST SERPL-CCNC: 22 U/L (ref 1–32)
BACTERIA SPEC AEROBE CULT: NORMAL
BARBITURATES UR QL SCN: NEGATIVE
BASOPHILS # BLD AUTO: 0.05 10*3/MM3 (ref 0–0.2)
BASOPHILS NFR BLD AUTO: 0.3 % (ref 0–1.5)
BENZODIAZ UR QL SCN: NEGATIVE
BILIRUB SERPL-MCNC: 0.6 MG/DL (ref 0.2–1.2)
BUN BLD-MCNC: 29 MG/DL (ref 8–23)
BUN/CREAT SERPL: 21.8 (ref 7–25)
BUPRENORPHINE SERPL-MCNC: NEGATIVE NG/ML
CALCIUM SPEC-SCNC: 9.1 MG/DL (ref 8.6–10.5)
CANNABINOIDS SERPL QL: NEGATIVE
CHLORIDE SERPL-SCNC: 98 MMOL/L (ref 98–107)
CO2 SERPL-SCNC: 18.1 MMOL/L (ref 22–29)
COCAINE UR QL: NEGATIVE
CREAT BLD-MCNC: 1.33 MG/DL (ref 0.57–1)
DEPRECATED RDW RBC AUTO: 48.2 FL (ref 37–54)
EOSINOPHIL # BLD AUTO: 0.04 10*3/MM3 (ref 0–0.4)
EOSINOPHIL NFR BLD AUTO: 0.2 % (ref 0.3–6.2)
ERYTHROCYTE [DISTWIDTH] IN BLOOD BY AUTOMATED COUNT: 13.4 % (ref 12.3–15.4)
GFR SERPL CREATININE-BSD FRML MDRD: 39 ML/MIN/1.73
GLOBULIN UR ELPH-MCNC: 3.5 GM/DL
GLUCOSE BLD-MCNC: 219 MG/DL (ref 65–99)
GLUCOSE BLDC GLUCOMTR-MCNC: 106 MG/DL (ref 70–130)
GLUCOSE BLDC GLUCOMTR-MCNC: 108 MG/DL (ref 70–130)
GLUCOSE BLDC GLUCOMTR-MCNC: 148 MG/DL (ref 70–130)
GLUCOSE BLDC GLUCOMTR-MCNC: 216 MG/DL (ref 70–130)
HCT VFR BLD AUTO: 33.3 % (ref 34–46.6)
HGB BLD-MCNC: 11.4 G/DL (ref 12–15.9)
IMM GRANULOCYTES # BLD AUTO: 0.08 10*3/MM3 (ref 0–0.05)
IMM GRANULOCYTES NFR BLD AUTO: 0.4 % (ref 0–0.5)
LYMPHOCYTES # BLD AUTO: 2.94 10*3/MM3 (ref 0.7–3.1)
LYMPHOCYTES NFR BLD AUTO: 15.2 % (ref 19.6–45.3)
MAGNESIUM SERPL-MCNC: 2.5 MG/DL (ref 1.6–2.4)
MCH RBC QN AUTO: 34 PG (ref 26.6–33)
MCHC RBC AUTO-ENTMCNC: 34.2 G/DL (ref 31.5–35.7)
MCV RBC AUTO: 99.4 FL (ref 79–97)
METHADONE UR QL SCN: NEGATIVE
MONOCYTES # BLD AUTO: 1.23 10*3/MM3 (ref 0.1–0.9)
MONOCYTES NFR BLD AUTO: 6.4 % (ref 5–12)
NEUTROPHILS # BLD AUTO: 14.95 10*3/MM3 (ref 1.7–7)
NEUTROPHILS NFR BLD AUTO: 77.5 % (ref 42.7–76)
NRBC BLD AUTO-RTO: 0 /100 WBC (ref 0–0.2)
OPIATES UR QL: NEGATIVE
OXYCODONE UR QL SCN: NEGATIVE
PCP UR QL SCN: NEGATIVE
PHOSPHATE SERPL-MCNC: 3.3 MG/DL (ref 2.5–4.5)
PLATELET # BLD AUTO: 324 10*3/MM3 (ref 140–450)
PMV BLD AUTO: 9.8 FL (ref 6–12)
POTASSIUM BLD-SCNC: 4.6 MMOL/L (ref 3.5–5.2)
PROT SERPL-MCNC: 7.2 G/DL (ref 6–8.5)
RBC # BLD AUTO: 3.35 10*6/MM3 (ref 3.77–5.28)
SODIUM BLD-SCNC: 130 MMOL/L (ref 136–145)
WBC NRBC COR # BLD: 19.29 10*3/MM3 (ref 3.4–10.8)

## 2020-01-06 PROCEDURE — 33285 INSJ SUBQ CAR RHYTHM MNTR: CPT | Performed by: INTERNAL MEDICINE

## 2020-01-06 PROCEDURE — 83735 ASSAY OF MAGNESIUM: CPT | Performed by: PHYSICIAN ASSISTANT

## 2020-01-06 PROCEDURE — 71046 X-RAY EXAM CHEST 2 VIEWS: CPT

## 2020-01-06 PROCEDURE — C1764 EVENT RECORDER, CARDIAC: HCPCS | Performed by: INTERNAL MEDICINE

## 2020-01-06 PROCEDURE — 71046 X-RAY EXAM CHEST 2 VIEWS: CPT | Performed by: RADIOLOGY

## 2020-01-06 PROCEDURE — 97165 OT EVAL LOW COMPLEX 30 MIN: CPT

## 2020-01-06 PROCEDURE — 70553 MRI BRAIN STEM W/O & W/DYE: CPT | Performed by: RADIOLOGY

## 2020-01-06 PROCEDURE — 70553 MRI BRAIN STEM W/O & W/DYE: CPT

## 2020-01-06 PROCEDURE — 84100 ASSAY OF PHOSPHORUS: CPT | Performed by: PHYSICIAN ASSISTANT

## 2020-01-06 PROCEDURE — 0 GADOBENATE DIMEGLUMINE 529 MG/ML SOLUTION: Performed by: INTERNAL MEDICINE

## 2020-01-06 PROCEDURE — 72170 X-RAY EXAM OF PELVIS: CPT | Performed by: RADIOLOGY

## 2020-01-06 PROCEDURE — 99233 SBSQ HOSP IP/OBS HIGH 50: CPT | Performed by: PHYSICIAN ASSISTANT

## 2020-01-06 PROCEDURE — 99222 1ST HOSP IP/OBS MODERATE 55: CPT | Performed by: NURSE PRACTITIONER

## 2020-01-06 PROCEDURE — 25010000002 HEPARIN (PORCINE) PER 1000 UNITS: Performed by: PHYSICIAN ASSISTANT

## 2020-01-06 PROCEDURE — 97162 PT EVAL MOD COMPLEX 30 MIN: CPT

## 2020-01-06 PROCEDURE — 72170 X-RAY EXAM OF PELVIS: CPT

## 2020-01-06 PROCEDURE — 0JH632Z INSERTION OF MONITORING DEVICE INTO CHEST SUBCUTANEOUS TISSUE AND FASCIA, PERCUTANEOUS APPROACH: ICD-10-PCS | Performed by: INTERNAL MEDICINE

## 2020-01-06 PROCEDURE — 82962 GLUCOSE BLOOD TEST: CPT

## 2020-01-06 PROCEDURE — A9577 INJ MULTIHANCE: HCPCS | Performed by: INTERNAL MEDICINE

## 2020-01-06 PROCEDURE — 80053 COMPREHEN METABOLIC PANEL: CPT | Performed by: PHYSICIAN ASSISTANT

## 2020-01-06 PROCEDURE — 85025 COMPLETE CBC W/AUTO DIFF WBC: CPT | Performed by: PHYSICIAN ASSISTANT

## 2020-01-06 PROCEDURE — 25010000002 HEPARIN (PORCINE) PER 1000 UNITS: Performed by: INTERNAL MEDICINE

## 2020-01-06 DEVICE — ICM CONFIRM RX 1.4CC DM3500: Type: IMPLANTABLE DEVICE | Status: FUNCTIONAL

## 2020-01-06 RX ORDER — CETIRIZINE HYDROCHLORIDE 10 MG/1
10 TABLET ORAL DAILY
Status: DISCONTINUED | OUTPATIENT
Start: 2020-01-06 | End: 2020-01-07

## 2020-01-06 RX ORDER — MELOXICAM 7.5 MG/1
7.5 TABLET ORAL DAILY PRN
Status: DISCONTINUED | OUTPATIENT
Start: 2020-01-06 | End: 2020-01-06

## 2020-01-06 RX ORDER — LIDOCAINE HYDROCHLORIDE 20 MG/ML
INJECTION, SOLUTION INFILTRATION; PERINEURAL AS NEEDED
Status: DISCONTINUED | OUTPATIENT
Start: 2020-01-06 | End: 2020-01-06 | Stop reason: HOSPADM

## 2020-01-06 RX ORDER — POTASSIUM CHLORIDE 20 MEQ/1
40 TABLET, EXTENDED RELEASE ORAL EVERY 4 HOURS
Status: DISCONTINUED | OUTPATIENT
Start: 2020-01-06 | End: 2020-01-06

## 2020-01-06 RX ORDER — ACETAMINOPHEN 325 MG/1
650 TABLET ORAL EVERY 6 HOURS PRN
Status: DISCONTINUED | OUTPATIENT
Start: 2020-01-06 | End: 2020-01-08 | Stop reason: HOSPADM

## 2020-01-06 RX ORDER — CARVEDILOL 12.5 MG/1
TABLET ORAL
Qty: 60 TABLET | Refills: 5 | OUTPATIENT
Start: 2020-01-06

## 2020-01-06 RX ORDER — ROPINIROLE 0.25 MG/1
0.25 TABLET, FILM COATED ORAL NIGHTLY
Status: DISCONTINUED | OUTPATIENT
Start: 2020-01-06 | End: 2020-01-08 | Stop reason: HOSPADM

## 2020-01-06 RX ORDER — EXEMESTANE 25 MG/1
25 TABLET ORAL DAILY
Status: DISCONTINUED | OUTPATIENT
Start: 2020-01-06 | End: 2020-01-08 | Stop reason: HOSPADM

## 2020-01-06 RX ADMIN — ACETAMINOPHEN 650 MG: 325 TABLET ORAL at 19:44

## 2020-01-06 RX ADMIN — ASPIRIN 81 MG: 81 TABLET, COATED ORAL at 08:20

## 2020-01-06 RX ADMIN — SODIUM CHLORIDE, PRESERVATIVE FREE 10 ML: 5 INJECTION INTRAVENOUS at 08:20

## 2020-01-06 RX ADMIN — MELOXICAM 7.5 MG: 7.5 TABLET ORAL at 03:41

## 2020-01-06 RX ADMIN — CETIRIZINE HYDROCHLORIDE 10 MG: 10 TABLET, FILM COATED ORAL at 08:20

## 2020-01-06 RX ADMIN — HEPARIN SODIUM 5000 UNITS: 5000 INJECTION INTRAVENOUS; SUBCUTANEOUS at 19:45

## 2020-01-06 RX ADMIN — PANTOPRAZOLE SODIUM 40 MG: 40 TABLET, DELAYED RELEASE ORAL at 08:20

## 2020-01-06 RX ADMIN — SODIUM CHLORIDE 125 ML/HR: 9 INJECTION, SOLUTION INTRAVENOUS at 04:27

## 2020-01-06 RX ADMIN — Medication 1 TABLET: at 08:20

## 2020-01-06 RX ADMIN — GADOBENATE DIMEGLUMINE 8 ML: 529 INJECTION, SOLUTION INTRAVENOUS at 14:50

## 2020-01-06 RX ADMIN — HEPARIN SODIUM 5000 UNITS: 5000 INJECTION INTRAVENOUS; SUBCUTANEOUS at 08:20

## 2020-01-06 RX ADMIN — ROPINIROLE HYDROCHLORIDE 0.25 MG: 0.25 TABLET, FILM COATED ORAL at 19:45

## 2020-01-06 RX ADMIN — ATORVASTATIN CALCIUM 10 MG: 10 TABLET, FILM COATED ORAL at 19:45

## 2020-01-06 RX ADMIN — POTASSIUM CHLORIDE 40 MEQ: 20 TABLET, EXTENDED RELEASE ORAL at 09:38

## 2020-01-06 RX ADMIN — EXEMESTANE 25 MG: 25 TABLET ORAL at 08:21

## 2020-01-06 RX ADMIN — ACETAMINOPHEN 650 MG: 325 TABLET ORAL at 10:57

## 2020-01-06 RX ADMIN — SODIUM CHLORIDE, PRESERVATIVE FREE 10 ML: 5 INJECTION INTRAVENOUS at 19:45

## 2020-01-06 NOTE — DISCHARGE INSTRUCTIONS
You have been enrolled into the transition from hospital to home program.  A nurse will be contacting you after you discharge to home to set up a time to come out to your home for a follow-up visit.  If you have any questions or concerns you can call this number anytime and a nurse will contact you:  [facility]  [phone]. Cardiac loop recorder site care:    Do not peel/scrub/pick at the adhesive over the incision, it will follow off on its own  Keep the incision area dry for the first 7 to 10 days  Call if redness, swelling, drainage, or bleeding occur  You will need to keep your follow-up appointment in Dr. Soler's office to have the device checked electronically after discharge

## 2020-01-06 NOTE — NURSING NOTE
CHRISTIANO ADMISSION NOTE:  Patient enrolled in the Transition program to assist with education and support during transition home from hospital. Transition home  will see patient at home within 48 hours of discharge and will follow up with patient in home and telephonically for 6 weeks post discharge under the [place] Model.    Clinical Assessment Instrument Scores:  Short Portable Mental Status Questionnaire- 10  Geriatric Depression Scale-5  Instrumental Activities of Daily Living-8  HOWARD Activities of Daily Living-6  Overall Quality of Life- 1  Subjective Health Rating- 2  Symptom Bother Scale-26  Generalized Anxiety Scale-4  Health Literacy Test- 7

## 2020-01-06 NOTE — PROGRESS NOTES
Discharge Planning Assessment   Jose     Patient Name: Tahmina Martinez  MRN: [id number]  Today's Date: 1/6/2020    Admit Date: 1/5/2020      Discharge Plan     Row Name 01/06/20 1721       Plan    Plan  Pt admitted on 1/5/20.  SS received consult per Case Management for discharge planning.  SS noted ED Case Management completed initiatl assessment.  SS attempted to speak with pt.  Pt asleep. SS will follow up in am.         Destination      Coordination has not been started for this encounter.      Durable Medical Equipment      Coordination has not been started for this encounter.      Dialysis/Infusion      Coordination has not been started for this encounter.      Home Medical Care      Coordination has not been started for this encounter.      Therapy      Coordination has not been started for this encounter.      Community Resources      Coordination has not been started for this encounter.          Demographic Summary    No documentation.       Functional Status    No documentation.       Psychosocial    No documentation.       Abuse/Neglect    No documentation.       Legal    No documentation.       Substance Abuse    No documentation.       Patient Forms    No documentation.           LLOYD Malin

## 2020-01-06 NOTE — PLAN OF CARE
Pt resting in bed. Has anxiety about home medications not being reconciled. In no acute distress. Will continue to monitor and follow plan of care.

## 2020-01-06 NOTE — THERAPY EVALUATION
Acute Care - Physical Therapy Initial Evaluation  KATTY Garcia     Patient Name: Tahmina Martinez  : 1945  MRN: [id number]  Today's Date: 2020   Onset of Illness/Injury or Date of Surgery: 20(admit date)  Date of Referral to PT: 20  Referring Physician: Manny      Admit Date: 2020    Visit Dx:     ICD-10-CM ICD-9-CM   1. Syncope, unspecified syncope type R55 780.2   2. Hypocalcemia E83.51 275.41   3. 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    • Breast cancer (right), status post radiation therapy in 2015   • Dyslipidemia   • Bifascicular block (RBBB plus LP FB)   • History of splenectomy   • First degree AV block   • T2DM (type 2 diabetes mellitus) (CMS/HCC)   • Acute kidney injury (CMS/HCC)   • Syncope   • Former smoker   • Hypokalemia   • Hypomagnesemia   • Macrocytosis   • Hypoalbuminemia   • Diastolic dysfunction   • Infestation by bed bug     Past Medical History:   Diagnosis Date   • Breast cancer (right), status post radiation therapy in 2015 10/12/2016   • Diastolic dysfunction 2020   • Dyslipidemia 2017    On atorvastatin.    • Essential hypertension 10/12/2016   • First degree AV block 2019   • H/O splenectomy    • History of Acute myelocytic leukemia,status post chemotherapy and bone marrow transplant in . 10/12/2016   • History of breast cancer        • History of splenectomy 2019   • Hypertension    • Myelocytic leukemia (CMS/HCC)    • Non-STEMI (non-ST elevated myocardial infarction) (CMS/HCC)    • Restless leg    • Type II diabetes mellitus (CMS/HCC) 2019     Past Surgical History:   Procedure Laterality Date   • BONE MARROW TRANSPLANT     •  SECTION     • SPLENECTOMY     • TUBAL ABDOMINAL LIGATION          PT ASSESSMENT (last 12 hours)      Physical Therapy Evaluation     Row Name 20 1130          PT Evaluation  Time/Intention    Subjective Information  no complaints  -CT     Document Type  evaluation  -CT     Mode of Treatment  physical therapy  -CT     Patient Effort  good  -CT     Symptoms Noted During/After Treatment  fatigue  -CT     Comment  Pt tolerated evaluation well with rest breaks provided as needed. Pt has had frequent falls at home and is concerned that she lives alone. Pt would benefit from Skilled nursing home stay to increase functional mobility.   -CT     Row Name 01/06/20 1130          General Information    Patient Profile Reviewed?  yes  -CT     Onset of Illness/Injury or Date of Surgery  01/05/20 admit date  -CT     Referring Physician  Manny  -CT     Patient Observations  alert;cooperative;agree to therapy  -CT     Prior Level of Function  independent:  -CT     Equipment Currently Used at Home  walker, rolling;commode, 3-in-1;rollator  -CT     Existing Precautions/Restrictions  fall  -CT     Equipment Issued to Patient  gait belt  -CT     Risks Reviewed  patient:;LOB;nausea/vomiting;dizziness;increased discomfort;change in vital signs;increased drainage;lines disloged  -CT     Benefits Reviewed  patient:;improve function;increase independence;increase strength;increase balance;decrease pain;decrease risk of DVT;improve skin integrity;increase knowledge  -CT     Barriers to Rehab  medically complex  -CT     Row Name 01/06/20 1130          Relationship/Environment    Primary Source of Support/Comfort  significant other;child(slade)  -CT     Lives With  alone ex- stays with pt occasionally per her report  -CT     Family Caregiver if Needed  significant other  -CT     Row Name 01/06/20 1130          Resource/Environmental Concerns    Current Living Arrangements  home/apartment/condo  -CT     Row Name 01/06/20 1130          Cognitive Assessment/Intervention- PT/OT    Orientation Status (Cognition)  oriented x 4  -CT     Follows Commands (Cognition)  follows multi-step commands  -CT     Row Name  01/06/20 1130          Safety Issues, Functional Mobility    Impairments Affecting Function (Mobility)  endurance/activity tolerance;strength  -CT     Row Name 01/06/20 1130          Bed Mobility Assessment/Treatment    Bed Mobility Assessment/Treatment  bed mobility (all) activities  -CT     [place] Level (Bed Mobility)  supervision  -CT     Assistive Device (Bed Mobility)  bed rails  -CT     Row Name 01/06/20 1130          Transfer Assessment/Treatment    Transfer Assessment/Treatment  sit-stand transfer;stand-sit transfer  -CT     Sit-Stand [place] (Transfers)  contact guard  -CT     Stand-Sit [place] (Transfers)  contact guard  -CT     Row Name 01/06/20 1130          Sit-Stand Transfer    Assistive Device (Sit-Stand Transfers)  walker, front-wheeled  -CT     Row Name 01/06/20 1130          Stand-Sit Transfer    Assistive Device (Stand-Sit Transfers)  walker, front-wheeled  -CT     Row Name 01/06/20 1130          Gait/Stairs Assessment/Training    [place] Level (Gait)  contact guard  -CT     Assistive Device (Gait)  walker, front-wheeled  -CT     Distance in Feet (Gait)  60  -CT     Pattern (Gait)  step-through  -CT     Deviations/Abnormal Patterns (Gait)  gait speed decreased  -CT     Bilateral Gait Deviations  forward flexed posture  -CT     Row Name 01/06/20 1130          General ROM    GENERAL ROM COMMENTS  BLE grossly WFL  -CT     Row Name 01/06/20 1130          MMT (Manual Muscle Testing)    General MMT Comments  BLE grossly 4/5  -CT     Row Name 01/06/20 1130          Plan of Care Review    Plan of Care Reviewed With  patient  -CT     Row Name 01/06/20 1130          Physical Therapy Clinical Impression    Date of Referral to PT  01/05/20  -CT     PT Diagnosis (PT Clinical Impression)  decreased functional mobility and endurance  -CT     Prognosis (PT Clinical Impression)  good  -CT     Functional Level at Time of Evaluation (PT Clinical Impression)  CGA  -CT     Patient/Family Goals  Statement (PT Clinical Impression)  Pt goals are to return to PLOF and go home  -CT     Criteria for Skilled Interventions Met (PT Clinical Impression)  yes;treatment indicated  -CT     Pathology/Pathophysiology Noted (Describe Specifically for Each System)  musculoskeletal;neuromuscular  -CT     Impairments Found (describe specific impairments)  aerobic capacity/endurance;gait, locomotion, and balance  -CT     Functional Limitations in Following Categories (Describe Specific Limitations)  self-care;home management  -CT     Disability: Inability to Perform Actions/Activities of Required Roles (describe specific disability)  community/leisure  -CT     Rehab Potential (PT Clinical Summary)  good, to achieve stated therapy goals  -CT     Predicted Duration of Therapy (PT)  length of stay  -CT     Care Plan Review (PT)  evaluation/treatment results reviewed;care plan/treatment goals reviewed;risks/benefits reviewed;current/potential barriers reviewed;patient/other agree to care plan  -CT     Row Name 01/06/20 1130          Physical Therapy Goals    Bed Mobility Goal Selection (PT)  bed mobility, PT goal 1  -CT     Transfer Goal Selection (PT)  transfer, PT goal 1  -CT     Gait Training Goal Selection (PT)  gait training, PT goal 1  -CT     Row Name 01/06/20 1130          Transfer Goal 1 (PT)    Activity/Assistive Device (Transfer Goal 1, PT)  sit-to-stand/stand-to-sit;bed-to-chair/chair-to-bed  -CT     [place] Level/Cues Needed (Transfer Goal 1, PT)  supervision required  -CT     Time Frame (Transfer Goal 1, PT)  by discharge  -CT     Row Name 01/06/20 1130          Gait Training Goal 1 (PT)    Activity/Assistive Device (Gait Training Goal 1, PT)  gait (walking locomotion);assistive device use  -CT     [place] Level (Gait Training Goal 1, PT)  supervision required  -CT     Time Frame (Gait Training Goal 1, PT)  by discharge  -CT     Row Name 01/06/20 1130          Positioning and Restraints    Pre-Treatment  Position  in bed  -CT     Post Treatment Position  chair  -CT     In Chair  sitting;call light within reach;encouraged to call for assist;notified nsg  -CT       User Key  (r) = Recorded By, (t) = Taken By, (c) = Cosigned By    Initials Name Provider Type    CT Nakia Stone, PT Physical Therapist          PT Recommendation and Plan  Anticipated Discharge Disposition (PT): skilled nursing facility, home with 24/7 care  Planned Therapy Interventions (PT Eval): balance training, bed mobility training, gait training, home exercise program, manual therapy techniques, motor coordination training, neuromuscular re-education, patient/family education, postural re-education, stair training, strengthening, transfer training  Therapy Frequency (PT Clinical Impression): 3 times/wk(3-5 times/wk )  Outcome Summary/Treatment Plan (PT)  Anticipated Equipment Needs at Discharge (PT): front wheeled walker  Anticipated Discharge Disposition (PT): skilled nursing facility, home with 24/7 care  Plan of Care Reviewed With: patient     Time Calculation:   PT Charges     Row Name 01/06/20 1145             Time Calculation    PT Received On  01/06/20  -CT      PT Goal Re-Cert Due Date  01/20/20  -CT        User Key  (r) = Recorded By, (t) = Taken By, (c) = Cosigned By    Initials Name Provider Type    CT Nakia Stone PT Physical Therapist        Therapy Charges for Today     Code Description Service Date Service Provider Modifiers Qty    23887857502 HC PT EVAL MOD COMPLEXITY 4 1/6/2020 Nakia Stone, PT GP 1                 Nakia Stone, PT  1/6/2020

## 2020-01-06 NOTE — PLAN OF CARE
Patient sitting up in bed with ex spouse at bedside. Pt had loop recorder monitor placed today under skin near R chest wall at bedside per Dr. Saunders. Pt tolerated procedure. Pt has no complaints of dizziness or syncope when getting up to ambulate. Will continue to monitor and follow plan of care.

## 2020-01-06 NOTE — CONSULTS
Date of Admit: 1/5/2020  Date of Consult: 01/06/20  No ref. 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 Syncope  · This is her third syncopal episode since November 2018. Echocardiogram in November 2018 revealed a normal EF with no significant valvular abnormalities. Discussed with the patient about inserting a loop recorder to further evaluate for any arrhythmias that may be causing her syncopal episodes. She verbalizes understanding and is agreeable to have this done. Will schedule this for today.         Reason for consultation: Syncope     Subjective       Subjective     History of Present Illness     Tahmina Martinez is a 74 year old female admitted for syncope. Past medical history significant for AML status post chemotherapy and bone marrow transplant, right-sided breast cancer status post radiation in the past and essential hypertension. Patient states that Sunday morning she got up and took her morning medications and had a sudden onset of weakness and lightheadedness and states that everything went blurry and she passed out.  She states that her ex- came from upstairs got her in the chair and as soon as he put her in the chair she passed out again onto the table and hit her head.  Her ex- called 911 and when EMS arrived patient was found to have systolic blood pressure in the 60s.She states that she also lost control of her bowels at that time per her ex husbands report. She was recently admitted to this facility in November 2019 where she underwent syncope work-up and was discharged to the nursing home  for PT. She denies chest pain, shortness of breath and palpitations.     Cardiac risk factors:hypercholesterolemia, hypertension and Sedentary life style    Last Echo: 11/3/2019  · Left ventricular wall thickness is consistent with borderline concentric hypertrophy.  · Left ventricular systolic function is normal.  · Estimated EF appears to be in the range of 66 - 70%.  · Left ventricular diastolic dysfunction.  · Normal cardiac chamber dimensions  · Mild aortic valve regurgitation is present.  · Mild mitral valve regurgitation is present  · Mild tricuspid valve regurgitation is present. No evidence of pulmonary hypertension is present  · There is no evidence of pericardial effusion    Last Stress: 2019  · A pharmacological stress test was performed using regadenoson with low-level exercise.  · Findings consistent with an indeterminate ECG stress test.  · Myocardial perfusion imaging indicates a normal myocardial perfusion study with no evidence of ischemia.  · Normal LV cavity size. Normal LV wall motion noted.  · Left ventricular ejection fraction is hyperdynamic (Calculated EF > 70%).  · Impressions are consistent with a low risk study.    Past Medical History:   Diagnosis Date   • Breast cancer (right), status post radiation therapy in 2015 10/12/2016   • Diastolic dysfunction 2020   • Dyslipidemia 2017    On atorvastatin.    • Essential hypertension 10/12/2016   • First degree AV block 2019   • H/O splenectomy    • History of Acute myelocytic leukemia,status post chemotherapy and bone marrow transplant in . 10/12/2016   • History of breast cancer        • History of splenectomy 2019   • Hypertension    • Myelocytic leukemia (CMS/HCC)    • Non-STEMI (non-ST elevated myocardial infarction) (CMS/HCC)    • Restless leg    • Type II diabetes mellitus (CMS/HCC) 2019     Past Surgical History:   Procedure Laterality Date   • BONE MARROW TRANSPLANT     •  SECTION     •  SPLENECTOMY     • TUBAL ABDOMINAL LIGATION       Family History   Problem Relation Age of Onset   • Heart disease Mother    • Hypertension Mother    • Heart disease Father    • Hypertension Father    • Hypertension Sister    • Hypertension Brother      Social History     Tobacco Use   • Smoking status: Former Smoker     Types: Cigarettes   • Smokeless tobacco: Never Used   • Tobacco comment: Patient states she smoked 3 cigarettes a day for 1 year as a teenager    Substance Use Topics   • Alcohol use: No   • Drug use: No     Medications Prior to Admission   Medication Sig Dispense Refill Last Dose   • aspirin 81 MG EC tablet Take 81 mg by mouth Daily.   1/5/2020 at Unknown time   • atorvastatin (LIPITOR) 10 MG tablet Take 1 tablet by mouth Every Night. 90 tablet 2 1/4/2020 at Unknown time   • carvedilol (COREG) 12.5 MG tablet Take 12.5 mg by mouth 2 (Two) Times a Day.   1/5/2020 at AM   • coenzyme Q10 100 MG capsule Take 100 mg by mouth Daily.   1/5/2020 at Unknown time   • exemestane (AROMASIN) 25 MG chemo tablet Take 25 mg by mouth Daily.   1/5/2020 at Unknown time   • furosemide (LASIX) 20 MG tablet Take 20 mg by mouth 2 (Two) Times a Day.   1/5/2020 at AM   • hydrALAZINE (APRESOLINE) 25 MG tablet Take 25 mg by mouth Every 8 (Eight) Hours.   1/5/2020 at AM   • lisinopril (PRINIVIL,ZESTRIL) 40 MG tablet Take 40 mg by mouth Daily.   1/5/2020 at Unknown time   • Loratadine 10 MG capsule Take 1 capsule by mouth Daily.   1/5/2020 at Unknown time   • metFORMIN (GLUCOPHAGE) 500 MG tablet Take 500 mg by mouth Every Morning.   1/5/2020 at Unknown time   • pantoprazole (PROTONIX) 40 MG EC tablet Take 40 mg by mouth Daily.   1/5/2020 at Unknown time   • rOPINIRole (REQUIP) 0.25 MG tablet Take 0.25 mg by mouth Every Night. Take 1 hour before bedtime.   1/4/2020 at Unknown time   • triamterene-hydrochlorothiazide (MAXZIDE-25) 37.5-25 MG per tablet Take 1 tablet by mouth Daily.   1/5/2020 at Unknown time   • vitamin C (ASCORBIC  ACID) 500 MG tablet Take 500 mg by mouth Daily.   1/5/2020 at Unknown time   • vitamin E 400 UNIT capsule Take 400 Units by mouth Daily.   1/5/2020 at Unknown time   • meloxicam (MOBIC) 7.5 MG tablet Take 7.5-15 mg by mouth Daily As Needed for Mild Pain .   Unknown at Unknown time   • metFORMIN (GLUCOPHAGE) 500 MG tablet Take 500 mg by mouth At Night As Needed (If BS higher than 200).   Unknown at Unknown time   • valACYclovir (VALTREX) 1000 MG tablet Take 1,000 mg by mouth Daily As Needed (Shingles or fever blister).   Unknown at Unknown time   • vitamin D (ERGOCALCIFEROL) 39614 UNITS capsule capsule Take 50,000 Units by mouth Every 14 (Fourteen) Days.   12/25/2019 at Unkown Time     Allergies:  Latex; Penicillins; Pork-derived products; and Zithromax [azithromycin]    Review of Systems   Constitutional: Positive for fatigue. Negative for chills, diaphoresis and fever.   HENT: Negative for congestion and trouble swallowing.    Eyes: Negative for photophobia and visual disturbance.   Respiratory: Negative for chest tightness, shortness of breath and wheezing.    Cardiovascular: Negative for chest pain, palpitations and leg swelling.   Gastrointestinal: Negative for abdominal pain, nausea and vomiting.   Endocrine: Negative for polyphagia and polyuria.   Genitourinary: Negative for dysuria and hematuria.   Musculoskeletal: Negative for neck pain and neck stiffness.   Skin: Negative for rash and wound.   Neurological: Positive for dizziness, syncope, weakness and light-headedness.   Hematological: Does not bruise/bleed easily.   Psychiatric/Behavioral: Negative for confusion and suicidal ideas.       Objective     Objective      Vital Signs  Temp:  [97.7 °F (36.5 °C)-98.4 °F (36.9 °C)] 98.4 °F (36.9 °C)  Heart Rate:  [] 100  Resp:  [16-20] 16  BP: ()/() 152/84     Vital Signs (last 72 hrs)       01/03 0700  -  01/04 0659 01/04 0700  -  01/05 0659 01/05 0700  -  01/06 0659 01/06 0700  -  01/06 0954    Most Recent    Temp (°F)     97.7 -  98.4       98.4 (36.9)    Heart Rate     56 -  106    88 -  106     100    Resp     18 -  20    16 -  18     16    BP     96/79 -  144/95    102/58 -  152/84     152/84    SpO2 (%)     95 -  100    95 -  97     97        Body mass index is 24.37 kg/m².    Intake/Output Summary (Last 24 hours) at 1/6/2020 0954  Last data filed at 1/6/2020 0908  Gross per 24 hour   Intake 2710 ml   Output 450 ml   Net 2260 ml     Physical Exam   Constitutional: She is oriented to person, place, and time. She appears well-developed and well-nourished.   HENT:   Head: Normocephalic and atraumatic.   Neck: Normal range of motion.   Cardiovascular: Normal rate, regular rhythm and normal heart sounds.   No murmur heard.  Pulmonary/Chest: Effort normal and breath sounds normal. No respiratory distress. She has no wheezes.   Abdominal: Soft. Bowel sounds are normal. She exhibits no distension. There is no tenderness.   Musculoskeletal: She exhibits no edema.   Neurological: She is alert and oriented to person, place, and time.   Psychiatric: She has a normal mood and affect. Her behavior is normal.     Results review     Results Review:    I reviewed the patient's new clinical results.  Results from last 7 days   Lab Units 01/05/20  1248   TROPONIN T ng/mL <0.010     Results from last 7 days   Lab Units 01/06/20  0841 01/05/20  1239   WBC 10*3/mm3 19.29* 6.77   HEMOGLOBIN g/dL 11.4* 14.2   PLATELETS 10*3/mm3 324 305     Results from last 7 days   Lab Units 01/06/20  0841 01/05/20  1248   SODIUM mmol/L 130* 138   POTASSIUM mmol/L 4.6 3.4*   CHLORIDE mmol/L 98 112*   CO2 mmol/L 18.1* 15.5*   BUN mg/dL 29* 18   CREATININE mg/dL 1.33* 0.92   CALCIUM mg/dL 9.1 6.0*   GLUCOSE mg/dL 219* 131*   ALT (SGPT) U/L 15 9   AST (SGOT) U/L 22 17     Lab Results   Component Value Date    INR 1.26 (H) 01/05/2020    INR 0.91 11/27/2019    INR 0.97 11/02/2019    INR 0.90 07/20/2015    INR <0.90 12/20/2014     Lab Results    Component Value Date    MG 2.5 (H) 01/06/2020    MG 1.3 (L) 01/05/2020    MG 2.4 11/28/2019     Lab Results   Component Value Date    TSH 1.350 01/05/2020    TRIG 104 11/03/2019    HDL 57 11/03/2019    LDL 64 11/03/2019      Lab Results   Component Value Date    PROBNP 126.7 01/05/2020    PROBNP 1,990.0 (H) 12/16/2019    PROBNP 401.0 12/09/2019       ECG  ECG/EMG Results (last 24 hours)     Procedure Component Value Units Date/Time    ECG 12 Lead [[id number]] Collected:  01/05/20 1157     Updated:  01/05/20 2132    Narrative:       Test Reason : Chest Pain  Blood Pressure : **/** mmHG  Vent. Rate : 056 BPM     Atrial Rate : 056 BPM     P-R Int : 256 ms          QRS Dur : 134 ms      QT Int : 502 ms       P-R-T Axes : 070 -85 058 degrees     QTc Int : 484 ms    Sinus bradycardia with 1st degree AV block  Left axis deviation  Right bundle branch block  Abnormal ECG  When compared with ECG of 28-NOV-2019 11:53,  premature atrial complexes are no longer present  T wave inversion now evident in Anterior leads  Confirmed by Stanford Cook (2019) on 1/5/2020 9:32:21 PM    Referred By:  ROSE           Confirmed By:Stanford Cook          Imaging Results (Last 72 Hours)     Procedure Component Value Units Date/Time    XR Chest 1 View [[id number]] Collected:  01/05/20 1240     Updated:  01/05/20 1323    Narrative:       EXAMINATION: XR CHEST 1 VW-      CLINICAL INDICATION:     Simple Sepsis Protocol     TECHNIQUE:  XR CHEST 1 VW-      COMPARISON: NONE      FINDINGS:   Coarsened interstitial markings. Heart and mediastinum contours are  unremarkable.  No pleural effusion.  No pneumothorax.   Bony and soft tissue structures are unremarkable.       Impression:       No radiographic evidence of acute cardiac or pulmonary  disease.     This report was finalized on 1/5/2020 12:40 PM by Dr. Abdias Weiner MD.       CT Head Without Contrast [[id number]] Collected:  01/05/20 1244     Updated:  01/05/20 1246    Narrative:           EXAMINATION: CT HEAD WO CONTRAST-      CLINICAL INDICATION:     headache/syncope     TECHNIQUE: Contiguous axial CT images of the head were obtained without  contrast administration.      Radiation dose reduction techniques were utilized per ALARA protocol.  Automated exposure control was initiated through either or [employer] or  [employer] software packages by  protocol.       1145.92 mGy.cm     COMPARISON:  None.       FINDINGS: Generalized cerebral atrophy is present. There is no mass  effect, midline displacement, or hydrocephalus. There are patchy areas  of decreased density within the periventricular white matter which  likely reflect chronic small vessel ischemic changes. There is no CT  evidence of acute infarct or hemorrhage. Bone windows reveal no osseous  abnormalities or fractures.        Impression:       Atrophy and chronic small vessel ischemic change, but there  are no acute intracranial abnormalities identified.     This report was finalized on 1/5/2020 12:44 PM by Dr. Abdias Weiner MD.       CT Cervical Spine Without Contrast [[id number]] Collected:  01/05/20 1240     Updated:  01/05/20 1243    Narrative:       EXAMINATION: CT CERVICAL SPINE WO CONTRAST-      CLINICAL INDICATION:     C-spine trauma, NEXUS/CCR positive, low risk     TECHNIQUE: Multiple axial CT images of the entire cervical spine (to  include the cervicothoracic junction) were obtained without contrast  administration. Reformatted images in the coronal and/or sagittal  plane(s) were generated from the axial data set to facilitate diagnostic  accuracy and/or surgical planning.      Radiation dose reduction techniques were utilized per ALARA protocol.  Automated exposure control was initiated through either or [employer] or  [employer] software packages by  protocol.       430.62 mGy.cm     COMPARISON:  None.       FINDINGS: Degenerative changes are present within the cervical spine,  but resulting in no  significant bony stenosis of the spinal canal. No  acute fracture or malalignment of the cervical spine is identified.        Impression:       No acute fracture of the cervical spine.      This report was finalized on 1/5/2020 12:41 PM by Dr. Abdias Weiner MD.             I have discussed my impression and recommendations with the patient and family.    Thank you very much for asking us to be involved in this patient's care.  We will follow along with you.      JENNY De La Vega  01/06/20  9:54 AM    Please note that portions of this note were completed with a voice recognition program.

## 2020-01-06 NOTE — PROGRESS NOTES
[facility] Services  PROGRESS NOTE     Patient Identification:  Name:  Tahmina Martinez  Age:  74 y.o.  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 Please see admitting H&P for further details.     Subjective:  Today, the patient sitting up in bedside chair upon my evaluation this morning.  RN at bedside.  Patient states she feels some better.  She has been ambulating around her room without any assistance with no issues.  She denies any further syncopal episodes.  She denies any chest pain or dyspnea, no cough.  She denies any abdominal pain, nausea, vomiting or diarrhea.  She denies any urinary symptoms.  Patient does complain of some left buttock pain status post fall at her home yesterday.  She denies any pain worse with ambulating.  She denies any numbness or tingling, no paresthesias.    Present during exam: Jacqueline ADAMS RN  ----------------------------------------------------------------------------------------------------------------------  Objective     Consults:  • Cardiology     Procedures:  • None     Current [facility] Meds:    aspirin 81 mg Oral Daily   atorvastatin 10 mg Oral Nightly   cetirizine 10 mg Oral Daily   exemestane  25 mg Oral Daily   heparin (porcine) 5,000 Units Subcutaneous Q12H   insulin aspart 0-7 Units Subcutaneous 4x Daily AC & at Bedtime   multivitamin 1 tablet Oral Daily   pantoprazole 40 mg Oral Daily   potassium chloride 40 mEq Oral Q4H   rOPINIRole 0.25 mg Oral Nightly   sodium chloride 10 mL Intravenous Q12H       sodium chloride 75 mL/hr Last Rate: 125 mL/hr (01/06/20 0427)     ----------------------------------------------------------------------------------------------------------------------  Vital Signs:  Temp:  [97.7 °F (36.5 °C)-98.4 °F (36.9 °C)] 98.2 °F (36.8 °C)  Heart Rate:  [] 89  Resp:  [16-20] 16  BP: ()/() 158/66  Mean Arterial Pressure (Non-Invasive) for the past 24 hrs (Last 3 readings):   Noninvasive MAP (mmHg)   01/06/20 0609 107   01/05/20 2014 80   01/05/20 2012 76     SpO2 Percentage    01/06/20 0905 01/06/20 0908 01/06/20 1042   SpO2: 95% 97% 96%     SpO2:  [95 %-100 %] 96 %  on   ;   Device (Oxygen Therapy): room air    Body mass index is 24.37 kg/m².  Wt Readings from Last 3 Encounters:   01/06/20 64.4 kg (142 lb)   12/03/19 69.4 kg (153 lb)   11/27/19 65.8 kg (145 lb)        Intake/Output Summary (Last 24 hours) at 1/6/2020 1216  Last data filed at 1/6/2020 1042  Gross per 24 hour   Intake 2710 ml   Output 900 ml   Net 1810 ml     Diet Regular; Consistent Carbohydrate     Orthostatic Vital Signs 1/6/2019    ----------------------------------------------------------------------------------------------------------------------  Physical exam:  Physical Exam   Constitutional: She is oriented to person, place, and time. She appears well-developed and well-nourished.  Non-toxic appearance. No distress.   Sitting up in bedside chair, no acute distress noted.   HENT:   Head: Normocephalic and atraumatic.   Mouth/Throat: Mucous membranes are normal.   Eyes: Pupils are equal, round, and reactive to light. Conjunctivae and EOM are normal. No scleral icterus.   Neck: Neck supple.    Cardiovascular: Normal rate, regular rhythm, normal heart sounds and intact distal pulses. Exam reveals no gallop and no friction rub.   No murmur heard.  Pulses:       Dorsalis pedis pulses are 2+ on the right side, and 2+ on the left side.        Posterior tibial pulses are 2+ on the right side, and 2+ on the left side.   Pulmonary/Chest: Effort normal and breath sounds normal. No accessory muscle usage. No tachypnea. No respiratory distress. She has no wheezes. She has no rhonchi. She has no rales. She exhibits no tenderness.   Abdominal: Soft. Bowel sounds are normal. She exhibits no distension. There is no tenderness. There is no rebound and no guarding.   Genitourinary:   Genitourinary Comments: No arguelles catheter in place, making urine.    Musculoskeletal: She exhibits no edema, tenderness or deformity.        Right lower leg: She exhibits no edema.        Left lower leg: She exhibits no edema.     Vascular Status -  Her right foot exhibits normal foot vasculature  and no edema. Her left foot exhibits normal foot vasculature  and no edema.  Neurological: She is alert and oriented to person, place, and time. She has normal strength. No cranial nerve deficit or sensory deficit.   Awake alert.  Follows commands.  Answers questions properly.  Moves all extremities equally.  No focal neurological deficits.  No tremor.  No discrepancy between strength or sensation.   Skin: Skin is warm and dry. Capillary refill takes less than 2 seconds. No rash noted. No erythema. No pallor.   Psychiatric: She has a normal mood and affect. Her speech is normal and behavior is normal. Judgment and thought content normal. Cognition and memory are normal.   Nursing note and vitals reviewed.     ----------------------------------------------------------------------------------------------------------------------  Tele:    Sinus 1st AVB 70s    I have personally reviewed the EKG/Telemetry strips    ----------------------------------------------------------------------------------------------------------------------  Results from last 7 days   Lab Units 01/05/20  1248   TROPONIN T ng/mL <0.010     Results from last 7 days   Lab Units 01/05/20  1248   PROBNP pg/mL 126.7         Results from last 7 days   Lab Units 01/06/20  0841 01/05/20  1248 01/05/20  1239   CRP mg/dL  --  0.04  --    LACTATE mmol/L  --  1.0  --    WBC 10*3/mm3 19.29*  --  6.77   HEMOGLOBIN g/dL 11.4*  --  14.2   HEMATOCRIT % 33.3*  --  42.8   MCV fL 99.4*  --  104.6*   MCHC g/dL 34.2  --  33.2   PLATELETS 10*3/mm3 324  --  305   INR   --  1.26*  --              Results from last 7 days   Lab Units 01/06/20  0841 01/05/20  1248   SODIUM mmol/L 130* 138   POTASSIUM mmol/L 4.6 3.4*   MAGNESIUM mg/dL 2.5* 1.3*   CHLORIDE mmol/L 98 112*   CO2 mmol/L 18.1* 15.5*   BUN mg/dL 29* 18   CREATININE mg/dL 1.33* 0.92   EGFR IF NONAFRICN AM mL/min/1.73 39* 59*   CALCIUM mg/dL 9.1 6.0*   GLUCOSE mg/dL 219* 131*   ALBUMIN g/dL 3.72 2.56*   BILIRUBIN mg/dL 0.6 0.3   ALK PHOS U/L 101 72   AST (SGOT) U/L 22 17   ALT (SGPT) U/L 15 9   Estimated Creatinine Clearance: 36.6 mL/min (A) (by C-G formula based on SCr of 1.33 mg/dL (H)).    Glucose   Date/Time Value Ref Range Status   01/06/2020 1034 148 (H) 70 - 130 mg/dL Final   01/06/2020 0610 108 70 - 130 mg/dL Final   01/05/2020 2015 134 (H) 70 - 130 mg/dL Final     Lab Results   Component Value Date    HGBA1C 6.20 (H) 12/10/2019     Lab Results   Component Value Date    TSH 1.350 01/05/2020    FREET4 1.30 01/05/2020     Microbiology Results (last 10 days)     Procedure Component Value - Date/Time    Influenza Antigen, Rapid - Swab, Nasopharynx [[id number]]  (Normal) Collected:  01/05/20 1320    Lab Status:  Final result Specimen:  Swab from Nasopharynx Updated:  01/05/20 1344     Influenza A Ag, EIA Negative     Influenza B Ag, EIA Negative         Pain Management Panel     Pain Management Panel Latest Ref Rng &  Units 1/5/2020    AMPHETAMINES SCREEN, URINE Negative Negative    BARBITURATES SCREEN Negative Negative    BENZODIAZEPINE SCREEN, URINE Negative Negative    BUPRENORPHINEUR Negative Negative    COCAINE SCREEN, URINE Negative Negative    METHADONE SCREEN, URINE Negative Negative        I have personally reviewed the above laboratory results.   ----------------------------------------------------------------------------------------------------------------------  Imaging Results (Last 24 Hours)     Procedure Component Value Units Date/Time    XR Chest PA & Lateral [[id number]] Resulted:  01/06/20 1130     Updated:  01/06/20 1149    XR Chest 1 View [[id number]] Collected:  01/05/20 1240     Updated:  01/05/20 1323    Narrative:       EXAMINATION: XR CHEST 1 VW-      CLINICAL INDICATION:     Simple Sepsis Protocol     TECHNIQUE:  XR CHEST 1 VW-      COMPARISON: NONE      FINDINGS:   Coarsened interstitial markings. Heart and mediastinum contours are  unremarkable.  No pleural effusion.  No pneumothorax.   Bony and soft tissue structures are unremarkable.       Impression:       No radiographic evidence of acute cardiac or pulmonary  disease.     This report was finalized on 1/5/2020 12:40 PM by Dr. Abdias Weiner MD.       CT Head Without Contrast [[id number]] Collected:  01/05/20 1244     Updated:  01/05/20 1246    Narrative:          EXAMINATION: CT HEAD WO CONTRAST-      CLINICAL INDICATION:     headache/syncope     TECHNIQUE: Contiguous axial CT images of the head were obtained without  contrast administration.      Radiation dose reduction techniques were utilized per ALARA protocol.  Automated exposure control was initiated through either or InsideView or  DoseRight software packages by  protocol.       1145.92 mGy.cm     COMPARISON:  None.       FINDINGS: Generalized cerebral atrophy is present. There is no mass  effect, midline displacement, or hydrocephalus. There are patchy areas  of decreased density  within the periventricular white matter which  likely reflect chronic small vessel ischemic changes. There is no CT  evidence of acute infarct or hemorrhage. Bone windows reveal no osseous  abnormalities or fractures.        Impression:       Atrophy and chronic small vessel ischemic change, but there  are no acute intracranial abnormalities identified.     This report was finalized on 1/5/2020 12:44 PM by Dr. Abdias Weiner MD.       CT Cervical Spine Without Contrast [[id number]] Collected:  01/05/20 1240     Updated:  01/05/20 1243    Narrative:       EXAMINATION: CT CERVICAL SPINE WO CONTRAST-      CLINICAL INDICATION:     C-spine trauma, NEXUS/CCR positive, low risk     TECHNIQUE: Multiple axial CT images of the entire cervical spine (to  include the cervicothoracic junction) were obtained without contrast  administration. Reformatted images in the coronal and/or sagittal  plane(s) were generated from the axial data set to facilitate diagnostic  accuracy and/or surgical planning.      Radiation dose reduction techniques were utilized per ALARA protocol.  Automated exposure control was initiated through either or [employer] or  DoseRight software packages by  protocol.       430.62 mGy.cm     COMPARISON:  None.       FINDINGS: Degenerative changes are present within the cervical spine,  but resulting in no significant bony stenosis of the spinal canal. No  acute fracture or malalignment of the cervical spine is identified.        Impression:       No acute fracture of the cervical spine.      This report was finalized on 1/5/2020 12:41 PM by Dr. Abdias Weiner MD.           I have personally reviewed the above radiology results.   ----------------------------------------------------------------------------------------------------------------------  Assessment/Plan     Active Hospital Problems    Diagnosis POA   • **Syncope [R55] Yes   • Hypokalemia [E87.6] Yes   • Hypomagnesemia [E83.42] Yes   •  Macrocytosis [D75.89] Yes   • Hypoalbuminemia [E88.09] Yes   • Diastolic dysfunction [I51.89] Yes   • Infestation by bed bug [B88.8] Yes   • Former smoker [Z87.891] Not Applicable   • T2DM (type 2 diabetes mellitus) (CMS/HCC) [E11.9] Yes   • History of splenectomy [Z90.81] Not Applicable   • Dyslipidemia [E78.5] Yes     On atorvastatin.      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 EKG without acute ischemic changes. Recent ECHO from 11/2019 reviewed with preserved EF, diastolic dysfunction, and mild valvular disease. Troponin T negative. This is her second admission in the past couple of months for syncope episodes. As such, cardiology consultation placed and patient to undergo loop recorder insertion later today. Mag and potassium low on admission, replacement protocol in place.  Statics negative.  There is also concern of possible neurological etiology, elected level elevated yesterday.  We will proceed with EEG and MRI of brain.  Continue to closely monitor, falls precautions.  Continue with neuro checks.  No further hypotension noted. Cont gentle IV fluids.  Continue close telemetry monitoring.  · Leukocytosis: Patient's white blood cell count normal on admission and now 19.  Procalcitonin negative.  She did have recent pneumonia and gastrointestinal illness.  Will repeat chest x-ray today to see if a consolidation will show after IV fluids.  Chest x-ray on admission with no evidence of acute cardiopulmonary disease.  Urinalysis also within normal limits.  Patient is afebrile and hemodynamically stable.  Hold off on antibiotics for now.  Blood cultures pending.   · Acute kidney injury: Patient given a dose of meloxicam last night, this is been discontinued.  Creatinine slightly increased from 0.92 to 1.33.  Avoid  any further nephrotoxins.  Continue gentle IV fluids.  Monitor urine output closely.  Repeat chemistry panel in a.m.  · Hip pain: Obtain x-ray.  · Hypomagnesemia: Continue to replace per protocol.  Repeat magnesium level in the morning.  Telemetry monitoring.  · Hypokalemia: Resolved with supplementation.  Continue to replace per protocol as necessary.  Telemetry monitoring.  Repeat chemistry panel in a.m.  · Pseudo-hypocalcemia in setting of hypoalbuminemia: Ionized calcium WNL. Monitor closely.   · Non-insulin-dependent type 2 diabetes mellitus: HgbA1C 6.20. Hold home oral DM meds for now to prevent hypoglycemia. Low dose SSI, titrate insulin tx as necessary. Closely monitor blood glucose levels with accuchecks QAC and QHS.   · Essential hypertension: BP controlled.  No further orthostasis noted.  Orthostatic vital signs negative.  No further hypotension noted.  Home Coreg on hold due to bradycardia initially.  Lasix and lisinopril on hold for acute renal failure.  She TZ on hold due to renal failure and electrolyte abnormalities.  Will cut down rate of IVF.  Closely monitor blood pressure hospital protocol and titrate medications as necessary.  · Hyperlipidemia: Continue home statin.   · Diastolic dysfunction: Clinically compensated. Monitor volume status closely with IV fluids on board. Strict Is and Os, daily weights.   · History of right-sided breast cancer status post radiation in the past: Supportive care, outpatient monitoring.   · History of AML status post chemotherapy and bone marrow transplant in the past: Supportive care. Blood levels stable. Outpatient follow up.   · History of splenectomy: Supportive care.  · Debility/weakness: PT/OT consults  · Bed bug infestation, present on admission: Cont isolation precautions, room was pre-treated.   · Former smoker    --------------------------------------------------  DVT Prophylaxis: Subcutaneous heparin  GI Prophylaxis: Protonix   FEN: Gentle IV fluids.  Replace electrolytes per protocol as necessary. NPO  Activity: Up with assistance, as tolerated, fall precautions   Disposition: Remains inpatient undergoing work-up for syncope, loop monitor later today.  MRI pending.  --------------------------------------------------    The patient is high risk due to the following diagnoses/reasons: Episodic syncope, electrolyte abnormalities, renal failure, diabetes, hypertension, hyperlipidemia, history of AML, history of breast cancer, history of splenectomy, debility    I have discussed the patient's assessment and plan with the patient, Jacqueline ADAMS RN, and attending physician DO. Dilcia Pichardo PA-C  Hospitalist Service -- [facility]   Pager: [phone]    01/06/20  12:16 PM    Attending Physician: David Matute, *    ----------------------------------------------------------------------------------------------------------------------

## 2020-01-06 NOTE — ED NOTES
Patient care handoff report called to [facility].  ANGEL Xiong Received patient report and verbalizes understanding.     Santi Saavedra RN  01/05/20 [id number]

## 2020-01-07 ENCOUNTER — APPOINTMENT (OUTPATIENT)
Dept: INFUSION THERAPY | Facility: HOSPITAL | Age: 75
End: 2020-01-07

## 2020-01-07 LAB
ALBUMIN SERPL-MCNC: 3.39 G/DL (ref 3.5–5.2)
ALBUMIN/GLOB SERPL: 1 G/DL
ALP SERPL-CCNC: 93 U/L (ref 39–117)
ALT SERPL W P-5'-P-CCNC: 15 U/L (ref 1–33)
ANION GAP SERPL CALCULATED.3IONS-SCNC: 9.2 MMOL/L (ref 5–15)
AST SERPL-CCNC: 20 U/L (ref 1–32)
BASOPHILS # BLD AUTO: 0.04 10*3/MM3 (ref 0–0.2)
BASOPHILS NFR BLD AUTO: 0.3 % (ref 0–1.5)
BILIRUB SERPL-MCNC: 0.3 MG/DL (ref 0.2–1.2)
BUN BLD-MCNC: 29 MG/DL (ref 8–23)
BUN/CREAT SERPL: 19.9 (ref 7–25)
CALCIUM SPEC-SCNC: 9 MG/DL (ref 8.6–10.5)
CHLORIDE SERPL-SCNC: 105 MMOL/L (ref 98–107)
CO2 SERPL-SCNC: 19.8 MMOL/L (ref 22–29)
CREAT BLD-MCNC: 1.46 MG/DL (ref 0.57–1)
DEPRECATED RDW RBC AUTO: 51 FL (ref 37–54)
EOSINOPHIL # BLD AUTO: 0.1 10*3/MM3 (ref 0–0.4)
EOSINOPHIL NFR BLD AUTO: 0.9 % (ref 0.3–6.2)
ERYTHROCYTE [DISTWIDTH] IN BLOOD BY AUTOMATED COUNT: 13.7 % (ref 12.3–15.4)
GFR SERPL CREATININE-BSD FRML MDRD: 35 ML/MIN/1.73
GLOBULIN UR ELPH-MCNC: 3.3 GM/DL
GLUCOSE BLD-MCNC: 122 MG/DL (ref 65–99)
GLUCOSE BLDC GLUCOMTR-MCNC: 112 MG/DL (ref 70–130)
GLUCOSE BLDC GLUCOMTR-MCNC: 160 MG/DL (ref 70–130)
GLUCOSE BLDC GLUCOMTR-MCNC: 164 MG/DL (ref 70–130)
GLUCOSE BLDC GLUCOMTR-MCNC: 171 MG/DL (ref 70–130)
HCT VFR BLD AUTO: 31 % (ref 34–46.6)
HGB BLD-MCNC: 10.5 G/DL (ref 12–15.9)
IMM GRANULOCYTES # BLD AUTO: 0.03 10*3/MM3 (ref 0–0.05)
IMM GRANULOCYTES NFR BLD AUTO: 0.3 % (ref 0–0.5)
LYMPHOCYTES # BLD AUTO: 3.73 10*3/MM3 (ref 0.7–3.1)
LYMPHOCYTES NFR BLD AUTO: 32.3 % (ref 19.6–45.3)
MAGNESIUM SERPL-MCNC: 2.3 MG/DL (ref 1.6–2.4)
MCH RBC QN AUTO: 34.4 PG (ref 26.6–33)
MCHC RBC AUTO-ENTMCNC: 33.9 G/DL (ref 31.5–35.7)
MCV RBC AUTO: 101.6 FL (ref 79–97)
MONOCYTES # BLD AUTO: 1.45 10*3/MM3 (ref 0.1–0.9)
MONOCYTES NFR BLD AUTO: 12.5 % (ref 5–12)
NEUTROPHILS # BLD AUTO: 6.21 10*3/MM3 (ref 1.7–7)
NEUTROPHILS NFR BLD AUTO: 53.7 % (ref 42.7–76)
NRBC BLD AUTO-RTO: 0 /100 WBC (ref 0–0.2)
PHOSPHATE SERPL-MCNC: 2.9 MG/DL (ref 2.5–4.5)
PLATELET # BLD AUTO: 280 10*3/MM3 (ref 140–450)
PMV BLD AUTO: 10.1 FL (ref 6–12)
POTASSIUM BLD-SCNC: 5.3 MMOL/L (ref 3.5–5.2)
PROT SERPL-MCNC: 6.7 G/DL (ref 6–8.5)
RBC # BLD AUTO: 3.05 10*6/MM3 (ref 3.77–5.28)
SODIUM BLD-SCNC: 134 MMOL/L (ref 136–145)
WBC NRBC COR # BLD: 11.56 10*3/MM3 (ref 3.4–10.8)

## 2020-01-07 PROCEDURE — 95816 EEG AWAKE AND DROWSY: CPT

## 2020-01-07 PROCEDURE — 99233 SBSQ HOSP IP/OBS HIGH 50: CPT | Performed by: PHYSICIAN ASSISTANT

## 2020-01-07 PROCEDURE — 84100 ASSAY OF PHOSPHORUS: CPT | Performed by: INTERNAL MEDICINE

## 2020-01-07 PROCEDURE — 85025 COMPLETE CBC W/AUTO DIFF WBC: CPT | Performed by: INTERNAL MEDICINE

## 2020-01-07 PROCEDURE — 80053 COMPREHEN METABOLIC PANEL: CPT | Performed by: INTERNAL MEDICINE

## 2020-01-07 PROCEDURE — 83735 ASSAY OF MAGNESIUM: CPT | Performed by: INTERNAL MEDICINE

## 2020-01-07 PROCEDURE — 97530 THERAPEUTIC ACTIVITIES: CPT

## 2020-01-07 PROCEDURE — 25010000002 HEPARIN (PORCINE) PER 1000 UNITS: Performed by: INTERNAL MEDICINE

## 2020-01-07 PROCEDURE — 97116 GAIT TRAINING THERAPY: CPT

## 2020-01-07 PROCEDURE — 82962 GLUCOSE BLOOD TEST: CPT

## 2020-01-07 RX ORDER — CETIRIZINE HYDROCHLORIDE 10 MG/1
5 TABLET ORAL DAILY
Status: DISCONTINUED | OUTPATIENT
Start: 2020-01-07 | End: 2020-01-08 | Stop reason: HOSPADM

## 2020-01-07 RX ADMIN — ACETAMINOPHEN 650 MG: 325 TABLET ORAL at 20:41

## 2020-01-07 RX ADMIN — SODIUM CHLORIDE, PRESERVATIVE FREE 10 ML: 5 INJECTION INTRAVENOUS at 20:42

## 2020-01-07 RX ADMIN — Medication 1 TABLET: at 08:15

## 2020-01-07 RX ADMIN — EXEMESTANE 25 MG: 25 TABLET ORAL at 08:17

## 2020-01-07 RX ADMIN — CETIRIZINE HYDROCHLORIDE 5 MG: 10 TABLET, FILM COATED ORAL at 08:26

## 2020-01-07 RX ADMIN — SODIUM CHLORIDE 75 ML/HR: 9 INJECTION, SOLUTION INTRAVENOUS at 08:15

## 2020-01-07 RX ADMIN — SODIUM CHLORIDE, PRESERVATIVE FREE 10 ML: 5 INJECTION INTRAVENOUS at 08:15

## 2020-01-07 RX ADMIN — ASPIRIN 81 MG: 81 TABLET, COATED ORAL at 08:15

## 2020-01-07 RX ADMIN — ROPINIROLE HYDROCHLORIDE 0.25 MG: 0.25 TABLET, FILM COATED ORAL at 20:41

## 2020-01-07 RX ADMIN — ATORVASTATIN CALCIUM 10 MG: 10 TABLET, FILM COATED ORAL at 20:41

## 2020-01-07 RX ADMIN — HEPARIN SODIUM 5000 UNITS: 5000 INJECTION INTRAVENOUS; SUBCUTANEOUS at 20:41

## 2020-01-07 RX ADMIN — HEPARIN SODIUM 5000 UNITS: 5000 INJECTION INTRAVENOUS; SUBCUTANEOUS at 08:16

## 2020-01-07 RX ADMIN — PANTOPRAZOLE SODIUM 40 MG: 40 TABLET, DELAYED RELEASE ORAL at 08:15

## 2020-01-07 NOTE — PROGRESS NOTES
Discharge Planning Assessment   Jose     Patient Name: Tahmina Martinez  MRN: [id number]  Today's Date: 1/7/2020    Admit Date: 1/5/2020        Discharge Plan     Row Name 01/07/20 1702       Plan    Plan  SS spoke with pt on this date regarding discharge plans.  Pt not agreeable to nursing home placement.  Pt states she will return home at discharge and requests home health at discharge.  SS will follow.           LLOYD Malin

## 2020-01-07 NOTE — PROGRESS NOTES
[facility] Medicine Services  PROGRESS NOTE     Patient Identification:  Name:  Tahmina Martinez  Age:  74 y.o.  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 Please see admitting H&P for further details.     Subjective:  Today, the patient sitting up in bed upon my evaluation this morning. No acute distress noted. No overnight events reported per nursing staff. Patient underwent loop recorder insertion yesterday, tolerated the procedure well with no acute complications. She states she is feeling some better today, feels less weak. Did work with PT. She has ambulated some in her room. She denies any further syncopal episodes.  She denies any chest pain or dyspnea, no cough.  She denies any abdominal pain, nausea, vomiting or diarrhea.  She denies any urinary symptoms. Her left buttock pain she had yesterday has improved.     Present during exam: N/A   ----------------------------------------------------------------------------------------------------------------------  Objective     Consults:  • Cardiology     Procedures:  • 2020: Loop recorder placement -- Dr. Saunders, cardiology       Current Hospital Meds:    aspirin 81 mg Oral Daily    atorvastatin 10 mg Oral Nightly   cetirizine 5 mg Oral Daily   exemestane 25 mg Oral Daily   heparin (porcine) 5,000 Units Subcutaneous Q12H   insulin aspart 0-7 Units Subcutaneous 4x Daily AC & at Bedtime   multivitamin 1 tablet Oral Daily   pantoprazole 40 mg Oral Daily   rOPINIRole 0.25 mg Oral Nightly   sodium chloride 10 mL Intravenous Q12H       sodium chloride 75 mL/hr Last Rate: 75 mL/hr (01/07/20 0815)     ----------------------------------------------------------------------------------------------------------------------  Vital Signs:  Temp:  [97.3 °F (36.3 °C)-98 °F (36.7 °C)] 98 °F (36.7 °C)  Heart Rate:  [67-79] 67  Resp:  [15-20] 20  BP: (138-189)/(68-85) 153/74  Mean Arterial Pressure (Non-Invasive) for the past 24 hrs (Last 3 readings):   Noninvasive MAP (mmHg)   01/07/20 1015 110     SpO2 Percentage    01/07/20 0314 01/07/20 0700 01/07/20 1015   SpO2: 96% 96% 96%     SpO2:  [95 %-99 %] 96 %  on  Flow (L/min):  [2] 2;   Device (Oxygen Therapy): room air    Body mass index is 25.15 kg/m².  Wt Readings from Last 3 Encounters:   01/07/20 66.5 kg (146 lb 9.6 oz)   12/03/19 69.4 kg (153 lb)   11/27/19 65.8 kg (145 lb)        Intake/Output Summary (Last 24 hours) at 1/7/2020 1231  Last data filed at 1/7/2020 1100  Gross per 24 hour   Intake 960 ml   Output 1900 ml   Net -940 ml     Diet Regular; Cardiac, Consistent Carbohydrate     Orthostatic Vital Signs 1/6/2019    ----------------------------------------------------------------------------------------------------------------------  Physical exam:  Physical Exam   Constitutional: She is oriented to person, place, and time. She appears well-developed and well-nourished.  Non-toxic appearance. No distress.   Sitting up in bed, no acute distress noted.   HENT:   Head: Normocephalic and atraumatic.   Nose: No rhinorrhea. Right sinus exhibits no maxillary sinus tenderness and no frontal sinus tenderness. Left sinus exhibits no maxillary sinus tenderness  and no frontal sinus tenderness.   Mouth/Throat: Mucous membranes are normal.   Eyes: Pupils are equal, round, and reactive to light. Conjunctivae are normal. No scleral icterus.   Neck: Neck supple.   Cardiovascular: Normal rate, regular rhythm, normal heart sounds and intact distal pulses. Exam reveals no gallop and no friction rub.   No murmur heard.  Pulses:       Dorsalis pedis pulses are 2+ on the right side, and 2+ on the left side.        Posterior tibial pulses are 2+ on the right side, and 2+ on the left side.   Pulmonary/Chest: Effort normal and breath sounds normal. No accessory muscle usage. No tachypnea. No respiratory distress. She has no wheezes. She has no rhonchi. She has no rales. She exhibits tenderness (Anterior around site of loop insertion, mild ) and deformity (S/P loop insertion, no surrounding erythema or edema, mild ecchymossis).   Abdominal: Soft. Bowel sounds are normal. She exhibits no distension. There is no tenderness. There is no rebound and no guarding.   Genitourinary:   Genitourinary Comments: No arguelles catheter in place, making urine.    Musculoskeletal: She exhibits no edema, tenderness or deformity.        Right lower leg: She exhibits no edema.        Left lower leg: She exhibits no edema.     Vascular Status -  Her right foot exhibits normal foot vasculature  and no edema. Her left foot exhibits normal foot vasculature  and no edema.  Neurological: She is alert and oriented to person, place, and time. She has normal strength. No cranial nerve deficit or sensory deficit.   Awake alert.  Follows commands.  Answers questions properly.  Moves all extremities equally.  No focal neurological deficits.  No tremor.  No discrepancy between strength or sensation.   Skin: Skin is warm and dry. Capillary refill takes less than 2 seconds. No rash noted. No erythema. No pallor.   Psychiatric: She has a normal mood and affect. Her speech is normal and behavior is normal. Judgment and thought  content normal. Cognition and memory are normal.   Nursing note and vitals reviewed.     ----------------------------------------------------------------------------------------------------------------------  Tele:    Sinus 1st AVB 70s    I have personally reviewed the EKG/Telemetry strips   ----------------------------------------------------------------------------------------------------------------------  Results from last 7 days   Lab Units 01/05/20  1248   TROPONIN T ng/mL <0.010     Results from last 7 days   Lab Units 01/05/20  1248   PROBNP pg/mL 126.7         Results from last 7 days   Lab Units 01/07/20 0035 01/06/20 0841 01/05/20  1248 01/05/20  1239   CRP mg/dL  --   --  0.04  --    LACTATE mmol/L  --   --  1.0  --    WBC 10*3/mm3 11.56* 19.29*  --  6.77   HEMOGLOBIN g/dL 10.5* 11.4*  --  14.2   HEMATOCRIT % 31.0* 33.3*  --  42.8   MCV fL 101.6* 99.4*  --  104.6*   MCHC g/dL 33.9 34.2  --  33.2   PLATELETS 10*3/mm3 280 324  --  305   INR   --   --  1.26*  --              Results from last 7 days   Lab Units 01/07/20 0035 01/06/20 0841 01/05/20  1248   SODIUM mmol/L 134* 130* 138   POTASSIUM mmol/L 5.3* 4.6 3.4*   MAGNESIUM mg/dL 2.3 2.5* 1.3*   CHLORIDE mmol/L 105 98 112*   CO2 mmol/L 19.8* 18.1* 15.5*   BUN mg/dL 29* 29* 18   CREATININE mg/dL 1.46* 1.33* 0.92   EGFR IF NONAFRICN AM mL/min/1.73 35* 39* 59*   CALCIUM mg/dL 9.0 9.1 6.0*   GLUCOSE mg/dL 122* 219* 131*   ALBUMIN g/dL 3.39* 3.72 2.56*   BILIRUBIN mg/dL 0.3 0.6 0.3   ALK PHOS U/L 93 101 72   AST (SGOT) U/L 20 22 17   ALT (SGPT) U/L 15 15 9   Estimated Creatinine Clearance: 30.7 mL/min (A) (by C-G formula based on SCr of 1.46 mg/dL (H)).    Glucose   Date/Time Value Ref Range Status   01/07/2020 1007 164 (H) 70 - 130 mg/dL Final   01/07/2020 0746 112 70 - 130 mg/dL Final   01/06/2020 1915 216 (H) 70 - 130 mg/dL Final   01/06/2020 1612 106 70 - 130 mg/dL Final   01/06/2020 1034 148 (H) 70 - 130 mg/dL Final   01/06/2020 0610 108 70 - 130  mg/dL Final   01/05/2020 2015 134 (H) 70 - 130 mg/dL Final     Lab Results   Component Value Date    HGBA1C 6.20 (H) 12/10/2019     Lab Results   Component Value Date    TSH 1.350 01/05/2020    FREET4 1.30 01/05/2020     Microbiology Results (last 10 days)     Procedure Component Value - Date/Time    Urine Culture - Urine, Urine, Clean Catch [[id number]] Collected:  01/05/20 2340    Lab Status:  Final result Specimen:  Urine, Clean Catch Updated:  01/06/20 1602     Urine Culture 50,000 CFU/mL Mixed Kwasi Isolated    Narrative:       Specimen contains mixed organisms of questionable pathogenicity which indicates contamination with commensal kawsi.  Further identification is unlikely to provide clinically useful information.  Suggest recollection.    Influenza Antigen, Rapid - Swab, Nasopharynx [[id number]]  (Normal) Collected:  01/05/20 1320    Lab Status:  Final result Specimen:  Swab from Nasopharynx Updated:  01/05/20 1344     Influenza A Ag, EIA Negative     Influenza B Ag, EIA Negative    Blood Culture - Blood, Arm, Left [[id number]] Collected:  01/05/20 1248    Lab Status:  Preliminary result Specimen:  Blood from Arm, Left Updated:  01/06/20 1304     Blood Culture No growth at 24 hours    Blood Culture - Blood, Arm, Right [[id number]] Collected:  01/05/20 1239    Lab Status:  Preliminary result Specimen:  Blood from Arm, Right Updated:  01/06/20 1247     Blood Culture No growth at 24 hours         Pain Management Panel     Pain Management Panel Latest Ref Rng & Units 1/5/2020    AMPHETAMINES SCREEN, URINE Negative Negative    BARBITURATES SCREEN Negative Negative    BENZODIAZEPINE SCREEN, URINE Negative Negative    BUPRENORPHINEUR Negative Negative    COCAINE SCREEN, URINE Negative Negative    METHADONE SCREEN, URINE Negative Negative        I have personally reviewed the above laboratory results.    ----------------------------------------------------------------------------------------------------------------------  Imaging Results (Last 24 Hours)     Procedure Component Value Units Date/Time    XR Pelvis 1 or 2 View [[id number]] Collected:  01/06/20 1637     Updated:  01/06/20 1640    Narrative:       EXAMINATION: XR PELVIS 1 OR 2 VW-      CLINICAL INDICATION: hip pain post fall; R55-Syncope and collapse;  E83.51-Hypocalcemia; E83.42-Hypomagnesemia        COMPARISON: None available     FINDINGS: One view of the pelvis shows no evidence of an acute fracture  or dislocation. There are no blastic or lytic lesions       Impression:       No acute bony abnormality      This report was finalized on 1/6/2020 4:37 PM by Dr. Sal Cheek MD.       MRI Brain With & Without Contrast [[id number]] Collected:  01/06/20 1519     Updated:  01/06/20 1612    Narrative:       MRI BRAIN WITH/WITHOUT CONTRAST-     CLINICAL INDICATION: LOC, recent episode, nontrauma, suspect neurologic;  R55-Syncope and collapse; E83.51-Hypocalcemia; E83.42-Hypomagnesemia        COMPARISON: None immediately available     PROCEDURE:  Multiple planar images of the brain were acquired in a high field  strength magnet. Several pulse sequences were used to acquire the study.  Gadolinium  was not  administered.     FINDINGS:  The diffusion weighted images show no focal signal abnormality. There is  no evidence of acute ischemia     The remaining pulse sequences show no mass, hemorrhage, or midline  shift.  The ventricles, cisterns, and sulci are unremarkable. There is no  hydrocephalus.     There is increased T2 and FLAIR signal in the periventricular and  parietal white matter.     Increased signal in the mastoid air cells bilaterally.     The sagittal view show no sellar or parasellar mass.     There is no tectal or pineal lesion.     Coronal imaging demonstrates a symmetric appearance of the visualized  portions of the optic nerves and extraocular  muscles.       Impression:          1. Chronic microangiopathic changes.  2. Increased signal in the mastoid air cells bilaterally.     This report was finalized on 1/6/2020 4:10 PM by Dr. Sal Cheek MD.           I have personally reviewed the above radiology results.   ----------------------------------------------------------------------------------------------------------------------  Assessment/Plan     Active Hospital Problems    Diagnosis POA   • **Syncope [R55] Yes   • Hypokalemia [E87.6] Yes   • Hypomagnesemia [E83.42] Yes   • Macrocytosis [D75.89] Yes   • Hypoalbuminemia [E88.09] Yes   • Diastolic dysfunction [I51.89] Yes   • Infestation by bed bug [B88.8] Yes   • Former smoker [Z87.891] Not Applicable   • T2DM (type 2 diabetes mellitus) (CMS/HCC) [E11.9] Yes   • History of splenectomy [Z90.81] Not Applicable   • Dyslipidemia [E78.5] Yes     On atorvastatin.      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 EKG without acute ischemic changes. Recent ECHO from 11/2019 reviewed with preserved EF, diastolic dysfunction, and mild valvular disease. Troponin T negative. This is her second admission in the past couple of months for syncope episodes. As such, cardiology consultation placed and patient underwent loop recorder insertion yesterday. Mag and potassium low on admission, replacement protocol in place.  Orthostatics negative. There was also concern of possible neurological etiology, prolactin level elevated on admission. MRI of brain with chronic microangiopathic changes, no acute changes, with increased focus in mastoid air cells. Patient without sinus symptoms, started on Zyrtec per attending. Continue to closely monitor, falls precautions. EEG ordered. Continue with neuro checks.  No further hypotension  noted. Cont gentle IV fluids.  Continue close telemetry monitoring. PT on board, patient requesting possible placement vs rehab due to inability to care for self and frequent falls/syncopal episodes.   · Leukocytosis: Improved. Patient's white blood cell count normal on admission and 19 yesterday, improved to 11.56 today. Procalcitonin negative. Repeat CXR with no mention of pneumonia or consolidation. Blood cultures with NGTD, afebrile. Monitor off abx.   · Acute kidney injury: Patient given a dose of meloxicam earlier in admission, this has since been discontinued.  Creatinine slightly increased to 1.4, could be due to relative hypotension on presentation. Avoid any further nephrotoxins.  Continue gentle IV fluids.  Monitor urine output closely.  Repeat chemistry panel in a.m.  · Hip pain: Xray negative, supportive care.   · Hypomagnesemia: Continue to replace per protocol.  Repeat magnesium level in the morning.  Telemetry monitoring.  · Hypokalemia: Resolved with supplementation.  Continue to replace per protocol as necessary.  Telemetry monitoring.  Repeat chemistry panel in a.m.  · Pseudo-hypocalcemia in setting of hypoalbuminemia: Ionized calcium WNL. Monitor closely.   · Non-insulin-dependent type 2 diabetes mellitus: HgbA1C 6.20. Hold home oral DM meds for now to prevent hypoglycemia. Low dose SSI, titrate insulin tx as necessary. Closely monitor blood glucose levels with accuchecks QAC and QHS.   · Essential hypertension: BP controlled.  No further orthostasis noted.  Orthostatic vital signs negative.  No further hypotension noted.  Home Coreg on hold due to bradycardia initially.  Lasix and lisinopril on hold for acute renal failure.  HCTZ on hold due to renal failure and electrolyte abnormalities. Closely monitor blood pressure hospital protocol and titrate medications as necessary.  · Hyperlipidemia: Continue home statin.   · Diastolic dysfunction: Clinically compensated. Monitor volume status closely  with IV fluids on board. Strict Is and Os, daily weights.   · History of right-sided breast cancer status post radiation in the past: Supportive care, outpatient monitoring.   · History of AML status post chemotherapy and bone marrow transplant in the past: Supportive care. Blood levels stable. Outpatient follow up.   · History of splenectomy: Supportive care.  · Debility/weakness: PT/OT consults  · Bed bug infestation, present on admission: Cont isolation precautions, room was pre-treated.   · Former smoker    --------------------------------------------------  DVT Prophylaxis: Subcutaneous heparin  GI Prophylaxis: Protonix   FEN: Gentle IV fluids. Replace electrolytes per protocol as necessary. NPO  Activity: Up with assistance, as tolerated, fall precautions   Disposition: Possible SNF/Rehab for PT, high rall risk   --------------------------------------------------    The patient is high risk due to the following diagnoses/reasons: Episodic syncope, electrolyte abnormalities, renal failure, diabetes, hypertension, hyperlipidemia, history of AML, history of breast cancer, history of splenectomy, debility    I have discussed the patient's assessment and plan with the patient, Jacqueline ADAMS RN, and attending physician DO. Dilcia Pichardo PA-C  Hospitalist Service -- [facility]   Pager: [phone]    01/07/20  12:31 PM    Attending Physician: David Matute, *    ----------------------------------------------------------------------------------------------------------------------

## 2020-01-07 NOTE — PLAN OF CARE
Problem: Patient Care Overview  Goal: Plan of Care Review  Outcome: Ongoing (interventions implemented as appropriate)  Goal: Individualization and Mutuality  Outcome: Ongoing (interventions implemented as appropriate)  Goal: Discharge Needs Assessment  Outcome: Ongoing (interventions implemented as appropriate)  Goal: Interprofessional Rounds/Family Conf  Outcome: Ongoing (interventions implemented as appropriate)     Problem: Fall Risk (Adult)  Goal: Identify Related Risk Factors and Signs and Symptoms  Outcome: Ongoing (interventions implemented as appropriate)  Goal: Absence of Fall  Outcome: Ongoing (interventions implemented as appropriate)     Problem: Syncope (Adult)  Goal: Identify Related Risk Factors and Signs and Symptoms  Outcome: Ongoing (interventions implemented as appropriate)  Goal: Physical Safety/Health Maintenance  Outcome: Ongoing (interventions implemented as appropriate)  Goal: Optimal Emotional/Functional Gentry  Outcome: Ongoing (interventions implemented as appropriate)

## 2020-01-07 NOTE — THERAPY EVALUATION
Acute Care - Occupational Therapy Initial Evaluation   [place]     Patient Name: Tahmina Martinez  : 1945  MRN: [id number]  Today's Date: 2020  Onset of Illness/Injury or Date of Surgery: 20(admit date)     Referring Physician: Manny    Admit Date: 2020       ICD-10-CM ICD-9-CM   1. Syncope, unspecified syncope type R55 780.2   2. Hypocalcemia E83.51 275.41   3. 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    • Breast cancer (right), status post radiation therapy in 2015   • Dyslipidemia   • Bifascicular block (RBBB plus LP FB)   • History of splenectomy   • First degree AV block   • T2DM (type 2 diabetes mellitus) (CMS/HCC)   • Acute kidney injury (CMS/HCC)   • Syncope   • Former smoker   • Hypokalemia   • Hypomagnesemia   • Macrocytosis   • Hypoalbuminemia   • Diastolic dysfunction   • Infestation by bed bug     Past Medical History:   Diagnosis Date   • Breast cancer (right), status post radiation therapy in 2015 10/12/2016   • Diastolic dysfunction 2020   • Dyslipidemia 2017    On atorvastatin.    • Essential hypertension 10/12/2016   • First degree AV block 2019   • H/O splenectomy    • History of Acute myelocytic leukemia,status post chemotherapy and bone marrow transplant in . 10/12/2016   • History of breast cancer        • History of splenectomy 2019   • Hypertension    • Myelocytic leukemia (CMS/HCC)    • Non-STEMI (non-ST elevated myocardial infarction) (CMS/HCC)    • Restless leg    • Type II diabetes mellitus (CMS/HCC) 2019     Past Surgical History:   Procedure Laterality Date   • BONE MARROW TRANSPLANT     • CARDIAC ELECTROPHYSIOLOGY PROCEDURE N/A 2020    Procedure: Loop insertion;  Surgeon: JAIME Saunders MD;  Location: [facility] INVASIVE LOCATION;  Service: Cardiovascular   •  SECTION     • SPLENECTOMY     • TUBAL ABDOMINAL  LIGATION            OT ASSESSMENT FLOWSHEET (last 12 hours)      Occupational Therapy Evaluation    No documentation.              OT Recommendation and Plan  Outcome Summary/Treatment Plan (OT)  Anticipated Discharge Disposition (OT): home  Therapy Frequency (OT Eval): evaluation only           Time Calculation:     Therapy Charges for Today     Code Description Service Date Service Provider Modifiers Qty    [id number] HC OT EVAL LOW COMPLEXITY 4 1/6/2020 Brendan Morris, OT GO 1               Brendan Morris OT  1/7/2020

## 2020-01-07 NOTE — THERAPY TREATMENT NOTE
Acute Care - Physical Therapy Treatment Note   [place]     Patient Name: Tahmina Martinez  : 1945  MRN: [id number]  Today's Date: 2020  Onset of Illness/Injury or Date of Surgery: 20(admit date)  Date of Referral to PT: 20  Referring Physician: Manny    Admit Date: 2020    Visit Dx:    ICD-10-CM ICD-9-CM   1. Syncope, unspecified syncope type R55 780.2   2. Hypocalcemia E83.51 275.41   3. 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    • Breast cancer (right), status post radiation therapy in 2015   • Dyslipidemia   • Bifascicular block (RBBB plus LP FB)   • History of splenectomy   • First degree AV block   • T2DM (type 2 diabetes mellitus) (CMS/HCC)   • Acute kidney injury (CMS/HCC)   • Syncope   • Former smoker   • Hypokalemia   • Hypomagnesemia   • Macrocytosis   • Hypoalbuminemia   • Diastolic dysfunction   • Infestation by bed bug       Therapy Treatment    Rehabilitation Treatment Summary     Row Name 20 1622             Treatment Time/Intention    Discipline  physical therapist  -CT      Document Type  therapy note (daily note)  -CT      Subjective Information  no complaints  -CT      Mode of Treatment  physical therapy  -CT      Therapy Frequency (PT Clinical Impression)  3 times/wk 3-5 times/wk   -CT      Patient Effort  good  -CT      Comment  Pt tolerated treamtent session well with rest breaks provided as needed.   -CT      Existing Precautions/Restrictions  fall  -CT      Recorded by [CT] Nakia Stone, PT 20 1623      Row Name 20 162             Cognitive Assessment/Intervention- PT/OT    Orientation Status (Cognition)  oriented x 4  -CT      Follows Commands (Cognition)  follows multi-step commands  -CT      Recorded by [CT] Nakia Stone, PT 20 1623      Row Name 20 162             Safety Issues, Functional Mobility    Impairments  Affecting Function (Mobility)  endurance/activity tolerance;strength  -CT      Recorded by [CT] Nakia Stone, PT 01/07/20 1623      Row Name 01/07/20 1622             Bed Mobility Assessment/Treatment    Bed Mobility Assessment/Treatment  bed mobility (all) activities  -CT      [place] Level (Bed Mobility)  supervision  -CT      Assistive Device (Bed Mobility)  bed rails  -CT      Recorded by [CT] Nakia Stone, PT 01/07/20 1623      Row Name 01/07/20 1622             Transfer Assessment/Treatment    Transfer Assessment/Treatment  sit-stand transfer;stand-sit transfer  -CT      Recorded by [CT] Nakia Stone, PT 01/07/20 1623      Row Name 01/07/20 1622             Sit-Stand Transfer    Sit-Stand [place] (Transfers)  contact guard  -CT      Assistive Device (Sit-Stand Transfers)  walker, front-wheeled  -CT      Recorded by [CT] Nakia Stone, PT 01/07/20 1623      Row Name 01/07/20 1622             Stand-Sit Transfer    Stand-Sit [place] (Transfers)  contact guard  -CT      Assistive Device (Stand-Sit Transfers)  walker, front-wheeled  -CT      Recorded by [CT] Nakia Stone, PT 01/07/20 1623      Row Name 01/07/20 1622             Gait/Stairs Assessment/Training    [place] Level (Gait)  contact guard  -CT      Assistive Device (Gait)  walker, front-wheeled  -CT      Distance in Feet (Gait)  70  -CT      Pattern (Gait)  step-through  -CT      Deviations/Abnormal Patterns (Gait)  gait speed decreased  -CT      Bilateral Gait Deviations  forward flexed posture  -CT      Recorded by [CT] Nakia Stone, PT 01/07/20 1623      Row Name 01/07/20 1622             Positioning and Restraints    Pre-Treatment Position  in bed  -CT      Post Treatment Position  chair  -CT      In Chair  sitting;call light within reach;encouraged to call for assist;notified nsg  -CT      Recorded by [CT] Nakia Stone, PT 01/07/20 1623      Row Name 01/07/20 1622             Plan of Care Review    Plan of Care  Reviewed With  patient  -CT      Recorded by [CT] Nakia Stone, PT 01/07/20 1623      Row Name 01/07/20 1622             Outcome Summary/Treatment Plan (PT)    Daily Summary of Progress (PT)  progress toward functional goals is good  -CT      Anticipated Equipment Needs at Discharge (PT)  front wheeled walker  -CT      Anticipated Discharge Disposition (PT)  skilled nursing facility;home with 24/7 care  -CT      Recorded by [CT] Nakia Stone, PT 01/07/20 1623        User Key  (r) = Recorded By, (t) = Taken By, (c) = Cosigned By    Initials Name Effective Dates Discipline    CT Nakia Stone, PT 04/03/18 -  PT                       PT Recommendation and Plan  Anticipated Discharge Disposition (PT): skilled nursing facility, home with 24/7 care  Planned Therapy Interventions (PT Eval): balance training, bed mobility training, gait training, home exercise program, manual therapy techniques, motor coordination training, neuromuscular re-education, patient/family education, postural re-education, stair training, strengthening, transfer training  Therapy Frequency (PT Clinical Impression): 3 times/wk(3-5 times/wk )  Outcome Summary/Treatment Plan (PT)  Daily Summary of Progress (PT): progress toward functional goals is good  Anticipated Equipment Needs at Discharge (PT): front wheeled walker  Anticipated Discharge Disposition (PT): skilled nursing facility, home with 24/7 care  Plan of Care Reviewed With: patient     Time Calculation:   PT Charges     Row Name 01/07/20 1623             Time Calculation    PT Received On  01/07/20  -CT      PT Goal Re-Cert Due Date  01/20/20  -CT         Time Calculation- PT    Total Timed Code Minutes- PT  26 minute(s)  -CT        User Key  (r) = Recorded By, (t) = Taken By, (c) = Cosigned By    Initials Name Provider Type    CT Nakia Stone, PT Physical Therapist        Therapy Charges for Today     Code Description Service Date Service Provider Modifiers Qty    [id number]   PT EVAL MOD COMPLEXITY 4 1/6/2020 Nakia Stone, PT GP 1    [id number] HC GAIT TRAINING EA 15 MIN 1/7/2020 Nakia Stone, PT GP 1    [id number] HC PT THERAPEUTIC ACT EA 15 MIN 1/7/2020 Nakia Stone, PT GP 1               Nakia Stone, PT  1/7/2020

## 2020-01-07 NOTE — NURSING NOTE
Pt. A&O refusing the bed alarm. Educated pt. That is the reason for being in the hospitals. She states that she doesn't need any help and she does not want that alarm going off all the time. Educated that the bed alarm is there for her safety. Still refusing.

## 2020-01-08 VITALS
WEIGHT: 149.4 LBS | RESPIRATION RATE: 18 BRPM | SYSTOLIC BLOOD PRESSURE: 149 MMHG | BODY MASS INDEX: 25.51 KG/M2 | HEART RATE: 86 BPM | HEIGHT: 64 IN | OXYGEN SATURATION: 98 % | TEMPERATURE: 97.8 F | DIASTOLIC BLOOD PRESSURE: 68 MMHG

## 2020-01-08 LAB
ANION GAP SERPL CALCULATED.3IONS-SCNC: 11.5 MMOL/L (ref 5–15)
BASOPHILS # BLD AUTO: 0.07 10*3/MM3 (ref 0–0.2)
BASOPHILS NFR BLD AUTO: 0.8 % (ref 0–1.5)
BUN BLD-MCNC: 16 MG/DL (ref 8–23)
BUN/CREAT SERPL: 22.9 (ref 7–25)
CALCIUM SPEC-SCNC: 9.4 MG/DL (ref 8.6–10.5)
CHLORIDE SERPL-SCNC: 108 MMOL/L (ref 98–107)
CO2 SERPL-SCNC: 19.5 MMOL/L (ref 22–29)
CREAT BLD-MCNC: 0.7 MG/DL (ref 0.57–1)
DEPRECATED RDW RBC AUTO: 53.3 FL (ref 37–54)
EOSINOPHIL # BLD AUTO: 0.34 10*3/MM3 (ref 0–0.4)
EOSINOPHIL NFR BLD AUTO: 3.9 % (ref 0.3–6.2)
ERYTHROCYTE [DISTWIDTH] IN BLOOD BY AUTOMATED COUNT: 14.1 % (ref 12.3–15.4)
GFR SERPL CREATININE-BSD FRML MDRD: 81 ML/MIN/1.73
GLUCOSE BLD-MCNC: 102 MG/DL (ref 65–99)
GLUCOSE BLDC GLUCOMTR-MCNC: 108 MG/DL (ref 70–130)
GLUCOSE BLDC GLUCOMTR-MCNC: 125 MG/DL (ref 70–130)
GLUCOSE BLDC GLUCOMTR-MCNC: 150 MG/DL (ref 70–130)
HCT VFR BLD AUTO: 31.3 % (ref 34–46.6)
HGB BLD-MCNC: 10.3 G/DL (ref 12–15.9)
IMM GRANULOCYTES # BLD AUTO: 0.02 10*3/MM3 (ref 0–0.05)
IMM GRANULOCYTES NFR BLD AUTO: 0.2 % (ref 0–0.5)
LYMPHOCYTES # BLD AUTO: 3.71 10*3/MM3 (ref 0.7–3.1)
LYMPHOCYTES NFR BLD AUTO: 43.1 % (ref 19.6–45.3)
MCH RBC QN AUTO: 33.8 PG (ref 26.6–33)
MCHC RBC AUTO-ENTMCNC: 32.9 G/DL (ref 31.5–35.7)
MCV RBC AUTO: 102.6 FL (ref 79–97)
MONOCYTES # BLD AUTO: 1.25 10*3/MM3 (ref 0.1–0.9)
MONOCYTES NFR BLD AUTO: 14.5 % (ref 5–12)
NEUTROPHILS # BLD AUTO: 3.22 10*3/MM3 (ref 1.7–7)
NEUTROPHILS NFR BLD AUTO: 37.5 % (ref 42.7–76)
NRBC BLD AUTO-RTO: 0 /100 WBC (ref 0–0.2)
PLATELET # BLD AUTO: 288 10*3/MM3 (ref 140–450)
PMV BLD AUTO: 10.1 FL (ref 6–12)
POTASSIUM BLD-SCNC: 4.2 MMOL/L (ref 3.5–5.2)
RBC # BLD AUTO: 3.05 10*6/MM3 (ref 3.77–5.28)
SODIUM BLD-SCNC: 139 MMOL/L (ref 136–145)
WBC NRBC COR # BLD: 8.61 10*3/MM3 (ref 3.4–10.8)

## 2020-01-08 PROCEDURE — 85025 COMPLETE CBC W/AUTO DIFF WBC: CPT | Performed by: INTERNAL MEDICINE

## 2020-01-08 PROCEDURE — 97116 GAIT TRAINING THERAPY: CPT

## 2020-01-08 PROCEDURE — 80048 BASIC METABOLIC PNL TOTAL CA: CPT | Performed by: INTERNAL MEDICINE

## 2020-01-08 PROCEDURE — 25010000002 HEPARIN (PORCINE) PER 1000 UNITS: Performed by: INTERNAL MEDICINE

## 2020-01-08 PROCEDURE — 82962 GLUCOSE BLOOD TEST: CPT

## 2020-01-08 PROCEDURE — 97530 THERAPEUTIC ACTIVITIES: CPT

## 2020-01-08 PROCEDURE — 99239 HOSP IP/OBS DSCHRG MGMT >30: CPT | Performed by: PHYSICIAN ASSISTANT

## 2020-01-08 RX ORDER — FUROSEMIDE 20 MG/1
20 TABLET ORAL DAILY PRN
Start: 2020-01-08 | End: 2020-01-22 | Stop reason: SDUPTHER

## 2020-01-08 RX ORDER — HYDRALAZINE HYDROCHLORIDE 50 MG/1
50 TABLET, FILM COATED ORAL EVERY 8 HOURS SCHEDULED
Status: DISCONTINUED | OUTPATIENT
Start: 2020-01-08 | End: 2020-01-08 | Stop reason: HOSPADM

## 2020-01-08 RX ORDER — HYDRALAZINE HYDROCHLORIDE 25 MG/1
25 TABLET, FILM COATED ORAL ONCE
Status: COMPLETED | OUTPATIENT
Start: 2020-01-08 | End: 2020-01-08

## 2020-01-08 RX ORDER — HYDRALAZINE HYDROCHLORIDE 25 MG/1
25 TABLET, FILM COATED ORAL EVERY 8 HOURS SCHEDULED
Status: DISCONTINUED | OUTPATIENT
Start: 2020-01-08 | End: 2020-01-08

## 2020-01-08 RX ORDER — HYDRALAZINE HYDROCHLORIDE 50 MG/1
50 TABLET, FILM COATED ORAL EVERY 8 HOURS SCHEDULED
Qty: 90 TABLET | Refills: 0 | Status: SHIPPED | OUTPATIENT
Start: 2020-01-08 | End: 2020-01-22 | Stop reason: SDUPTHER

## 2020-01-08 RX ADMIN — SODIUM CHLORIDE 75 ML/HR: 9 INJECTION, SOLUTION INTRAVENOUS at 02:30

## 2020-01-08 RX ADMIN — HYDRALAZINE HYDROCHLORIDE 25 MG: 25 TABLET ORAL at 08:34

## 2020-01-08 RX ADMIN — Medication 1 TABLET: at 08:34

## 2020-01-08 RX ADMIN — CETIRIZINE HYDROCHLORIDE 5 MG: 10 TABLET, FILM COATED ORAL at 08:34

## 2020-01-08 RX ADMIN — ASPIRIN 81 MG: 81 TABLET, COATED ORAL at 08:34

## 2020-01-08 RX ADMIN — HEPARIN SODIUM 5000 UNITS: 5000 INJECTION INTRAVENOUS; SUBCUTANEOUS at 08:35

## 2020-01-08 RX ADMIN — EXEMESTANE 25 MG: 25 TABLET ORAL at 08:55

## 2020-01-08 RX ADMIN — HYDRALAZINE HYDROCHLORIDE 25 MG: 25 TABLET ORAL at 11:15

## 2020-01-08 RX ADMIN — PANTOPRAZOLE SODIUM 40 MG: 40 TABLET, DELAYED RELEASE ORAL at 08:35

## 2020-01-08 NOTE — DISCHARGE PLACEMENT REQUEST
"Erica Martinez (74 y.o. Female)     Date of Birth Social Security Number Address Home Phone MRN    1945  [address] [facility] [address] [phone] [id number]    [employer] Marital Status          Evangelical        Admission Date Admission Type Admitting Provider Attending Provider Department, Room/Bed    1/5/20 Emergency Didi Ulloa DO Troxell, Christopher A, DO [address], 3325/1P    Discharge Date Discharge Disposition Discharge Destination         Home or Self Care              Attending Provider:  David Matute DO    Allergies:  Latex, Penicillins, Pork-derived Products, Zithromax [Azithromycin]    Isolation:  None   Infection:  None   Code Status:  CPR    Ht:  162.6 cm (64.02\")   Wt:  67.8 kg (149 lb 6.4 oz)    Admission Cmt:  None   Principal Problem:  Syncope [R55]                 Active Insurance as of 1/5/2020     Primary Coverage     Payor Plan Insurance Group Employer/Plan Group    MEDICARE MEDICARE A & B      Payor Plan Address Payor Plan Phone Number Payor Plan Fax Number Effective Dates    [address] [phone]  3/1/2007 - None Entered    [facility] [id number]       Subscriber Name Subscriber Birth Date Member ERICA TRIMBLE 1945 [id number]           Secondary Coverage     Payor Plan Insurance Group Employer/Plan Group    [place]      Payor Plan Address Payor Plan Phone Number Payor Plan Fax Number Effective Dates    [address] [phone]  5/31/2017 - None Entered    [facility] [id number]       Subscriber Name Subscriber Birth Date Member ERICA TRIMBLE 1945 [id number]                 Emergency Contacts      (Rel.) Home Phone Work Phone Mobile Phone    Bala Martinez (Spouse) [phone] -- --    Kamari Merchant (Son) [phone] -- [phone]    WHITNEY MERCHANT (Son) [phone] -- --    ROSAURA MERCHANT (Son) [phone] -- --        [address]  [address] " [facility]  [place] [address]  Phone:  [phone]  Fax:   Date: 2020      Referral to Home Health     Patient:  Tahmina Martinez MRN:  [id number]   [address]  [place] [address] :  1945  SSN:    Phone: [phone] Sex:  F      INSURANCE PAYOR PLAN GROUP # SUBSCRIBER ID   Primary:  Secondary:    [place] [id number]  [id number]      [id number]  [id number]      Referring Provider Information:  DAVID HOWELL Phone: [phone] Fax:       Referral Information:   # Visits:  1 Referral Type: Home Health [42]   Urgency:  Routine Referral Reason: Specialty Services Required   Start Date: 2020 End Date:  To be determined by Insurer   Diagnosis: Syncope, unspecified syncope type (R55 [ICD-10-CM] 780.2 [ICD-9-CM])  Debility (R53.81 [ICD-10-CM] 799.3 [ICD-9-CM])      Refer to Dept:   Refer to Provider:   Refer to Facility:       Face to Face Visit Date: 2020  Follow-up provider for Plan of Care? I treated the patient in an acute care facility and will not continue treatment after discharge.  Follow-up provider: BRANT PAYNE [[id number]]  Reason/Clinical Findings: Generalized weakness, recurrent falls  Describe mobility limitations that make leaving home difficult: Debility, recurrent falls  Nursing/Therapeutic Services Requested: Physical Therapy  Nursing/Therapeutic Services Requested: Occupational Therapy  Frequency: 1 Week 1     This document serves as a request of services and does not constitute Insurance authorization or approval of services.  To determine eligibility, please contact the members Insurance carrier to verify and review coverage.     If you have medical questions regarding this request for services. Please contact [address] at [phone] during normal business hours.       Authorizing Provider:David Howell DO  Authorizing Provider's NPI: [id number]  Order Entered By: David Howell DO 2020   3:41 PM     Electronically signed by: David Matute DO 1/8/2020  3:41 PM            Emergency Contact Information     Name Relation Home Work Mobile    Bala Martinez Spouse [phone]      Kamari Merchant Son [phone]    WHITNEY MERCHANT Son [phone]      ROSAURA MERCHANT Son [phone]            Insurance Information                MEDICARE/MEDICARE A & B Phone: [phone]    Subscriber: Tahmina Martinez Subscriber#: [id number]    Group#:  Precert#:         [place] Phone: [phone]    Subscriber: Tahmina Martinez Subscriber#: [id number]    Group#:  Precert#:           Treatment Team     Provider Relationship Specialty Contact    David Matute DO Attending, Physician of Record Hospitalist [phone]    Zee Mackey Patient Care Technician --     Opal Cornelius, PCT Patient Care Technician --     Dilcia Bryant PA Physician Assistant Physician Assistant [phone]    Justin Miranda, RRT Respiratory Therapist -- [phone]    Nakia Stone, PAULINA Physical Therapist Physical Therapy     Carl Wilson, RN Registered Nurse --           Problem List           Codes Noted - Resolved       Hospital    Hypokalemia ICD-10-CM: E87.6  ICD-9-CM: 276.8 1/5/2020 - Present    Hypomagnesemia ICD-10-CM: E83.42  ICD-9-CM: 275.2 1/5/2020 - Present    Macrocytosis ICD-10-CM: D75.89  ICD-9-CM: 289.89 1/5/2020 - Present    Hypoalbuminemia ICD-10-CM: E88.09  ICD-9-CM: 273.8 1/5/2020 - Present    Diastolic dysfunction (Chronic) ICD-10-CM: I51.89  ICD-9-CM: 429.9 1/5/2020 - Present    Infestation by bed bug ICD-10-CM: B88.8  ICD-9-CM: 134.8 1/5/2020 - Present    * (Principal) Syncope ICD-10-CM: R55  ICD-9-CM: 780.2 12/2/2019 - Present    Former smoker (Chronic) ICD-10-CM: Z87.891  ICD-9-CM: V15.82 12/2/2019 - Present    History of splenectomy (Chronic) ICD-10-CM: Z90.81  ICD-9-CM: V45.79 11/8/2019 - Present    T2DM (type 2 diabetes mellitus)  "(CMS/HCC) (Chronic) ICD-10-CM: E11.9  ICD-9-CM: 250.00 2019 - Present    Dyslipidemia (Chronic) ICD-10-CM: E78.5  ICD-9-CM: 272.4 2017 - Present    Essential hypertension (Chronic) ICD-10-CM: I10  ICD-9-CM: 401.9 10/12/2016 - Present    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) ICD-10-CM: C92.00  ICD-9-CM: 205.00 10/12/2016 - Present    Breast cancer (right), status post radiation therapy in 2015 (Chronic) ICD-10-CM: C50.919  ICD-9-CM: 174.9 10/12/2016 - Present       Non-Hospital    Acute kidney injury (CMS/HCC) ICD-10-CM: N17.9  ICD-9-CM: 584.9 2019 - Present    First degree AV block ICD-10-CM: I44.0  ICD-9-CM: 426.11 2019 - Present    Bifascicular block (RBBB plus LP FB) ICD-10-CM: I45.2  ICD-9-CM: 426.53 3/29/2018 - Present             History & Physical      Dewey King MD at 20 1702               [facility]  HISTORY & PHYSICAL    Patient Identification:  Name:  Tahmina Martinez  Age:  74 y.o.  Sex:  female  :  1945  MRN:  [id number]   Visit Number:  [id number]  Primary Care Physician:  Noe Bower MD     Subjective     Chief complaint:   Chief Complaint   Patient presents with   • Syncope     History of presenting illness:   Patient is a 74 y.o. 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 Patient states that this morning she went into her kitchen to get breakfast after taking her morning medications when she had acute onset of weakness and lightheadedness. She states that everything \"went white\" and she slipped into the floor onto her buttock. " "She states she \"went out\" after that and her ex- came to her side and picked her up and placed her in her chair. She states as he sat her in the chair her head went back and hit the table hard. From this point she states she was out of it for some time and her ex  called EMS. She reports remembering waking up in EMS and was very cold and shaking. She states she lost control of her bowel and \"messed her self\". She states once she was in ED, she had \"the biggest bowel movement of her life.\" She states that after hitting her head, she did have a mild headache, reports this has improved. She denied any prodromal symptoms. She states she felt fine yesterday.  Upon arrival of EMS, patient was found to have systolic blood pressure in the 60s, she was given IV fluids that improved her blood pressure.  She denies any chest pain or pressure, no palpitations. She denies any abdominal pain, nausea or emesis. She denies any urinary symptoms. No fevers, no cough.      It is of note that the patient was admitted to our service on 11/28/2019 and discharged to the [facility] in [facility] on 12/3/2019. During admission to our facility she underwent syncope work up and was treated for HCAP vs CAP. Patient states that she was discharged home from the NH about 2 weeks later, during her stay she underwent PT and strengthening.     Upon arrival to the ED, vitals were temperature 98, heart rate 60, respiratory rate 20, blood pressure 115/55, and oxygen saturation 98% on room air.  Troponin T negative.  proBNP within normal limits.  CMP with glucose 131, potassium 3.4, CO2 15.5, chloride 112, calcium 6.0, and albumin 2.56.  TSH and free T4 both within normal limits.  C-reactive protein and lactic acid both within normal limits.  Magnesium 1.3.  CBC with .6, otherwise unremarkable.  Influenza negative.  CT cervical spine without contrast with degenerative changes but no acute fractures noted.  CT of head without " contrast with atrophy and chronic small vessel ischemic change, no acute intracranial abnormalities identified.  Chest x-ray with no evidence of acute cardiopulmonary disease.  While in the emergency department, patient was administered 1 g IV calcium gluconate, 1 g IV magnesium sulfate, and a 1 L bolus of normal saline with normal saline at 125 mils an hour thereafter.    Patient has been admitted to the telemetry floor for further evaluation and treatment.     Present during exam: N.A   ---------------------------------------------------------------------------------------------------------------------   Review of Systems   Constitutional: Positive for fatigue. Negative for activity change, appetite change, chills, diaphoresis and fever.   HENT: Negative for congestion, postnasal drip, rhinorrhea, sinus pain, sore throat and trouble swallowing.    Eyes: Negative for discharge and visual disturbance.   Respiratory: Negative for cough, chest tightness, shortness of breath and wheezing.    Cardiovascular: Negative for chest pain, palpitations and leg swelling.   Gastrointestinal: Negative for abdominal pain, constipation, diarrhea, nausea and vomiting.   Endocrine: Positive for cold intolerance. Negative for heat intolerance.   Genitourinary: Negative for decreased urine volume, dysuria, frequency and urgency.   Musculoskeletal: Negative for arthralgias, gait problem and myalgias.   Skin: Negative for color change, rash and wound.   Allergic/Immunologic: Negative for environmental allergies and immunocompromised state.   Neurological: Positive for syncope, weakness, light-headedness and headaches. Negative for dizziness.   Hematological: Negative for adenopathy. Does not bruise/bleed easily.   Psychiatric/Behavioral: Negative for confusion and decreased concentration. The patient is not nervous/anxious.          ---------------------------------------------------------------------------------------------------------------------   Past Medical History:   Diagnosis Date   • Breast cancer (right), status post radiation therapy in 2015 10/12/2016   • Diastolic dysfunction 2020   • Dyslipidemia 2017    On atorvastatin.    • Essential hypertension 10/12/2016   • First degree AV block 2019   • H/O splenectomy    • History of Acute myelocytic leukemia,status post chemotherapy and bone marrow transplant in . 10/12/2016   • History of breast cancer        • History of splenectomy 2019   • Hypertension    • Myelocytic leukemia (CMS/[facility])    • Non-STEMI (non-ST elevated myocardial infarction) (CMS/[facility])    • Restless leg    • Type II diabetes mellitus (CMS/[facility]) 2019     Past Surgical History:   Procedure Laterality Date   • BONE MARROW TRANSPLANT     •  SECTION     • SPLENECTOMY     • TUBAL ABDOMINAL LIGATION       Family History   Problem Relation Age of Onset   • Heart disease Mother    • Hypertension Mother    • Heart disease Father    • Hypertension Father    • Hypertension Sister    • Hypertension Brother      Social History     Socioeconomic History   • Marital status:      Spouse name: Not on file   • Number of children: Not on file   • Years of education: Not on file   • Highest education level: Not on file   Tobacco Use   • Smoking status: Former Smoker     Types: Cigarettes   • Smokeless tobacco: Never Used   • Tobacco comment: Patient states she smoked 3 cigarettes a day for 1 year as a teenager    Substance and Sexual Activity   • Alcohol use: No   • Drug use: No   • Sexual activity: Defer   Social History Narrative    Patient lives at home with ex-; she is currently on disability      ---------------------------------------------------------------------------------------------------------------------   Allergies:  Latex; Penicillins; Pork-derived products; and  Zithromax [azithromycin]  ---------------------------------------------------------------------------------------------------------------------   Medications below are reported home medications pulling from within the system; at this time, these medications have not been reconciled unless otherwise specified and are in the verification process for further verifcation as current home medications.    Prior to Admission Medications     Prescriptions Last Dose Informant Patient Reported? Taking?    aspirin 81 MG EC tablet  Provider's Office Yes No    Take 81 mg by mouth Daily.    atorvastatin (LIPITOR) 10 MG tablet  Provider's Office No No    Take 1 tablet by mouth Every Night.    Calcium Citrate-Vitamin D (CALCIUM + D PO)  Provider's Office Yes No    Take 600 mg by mouth Every Morning.    carvedilol (COREG) 25 MG tablet   No No    Take 1 tablet by mouth 2 (Two) Times a Day. Hold for SBP < 110 and/or HR < 60    Coenzyme Q10 (CO Q-10) 200 MG capsule  Provider's Office Yes No    Take 200 mg by mouth Daily.    exemestane (AROMASIN) 25 MG chemo tablet  Provider's Office Yes No    Take 25 mg by mouth Daily.    hydrALAZINE (APRESOLINE) 50 MG tablet  Provider's Office No No    Take 1.5 tablets by mouth Every 8 (Eight) Hours.    ipratropium-albuterol (DUO-NEB) 0.5-2.5 mg/3 ml nebulizer   No No    Take 3 mL by nebulization 4 (Four) Times a Day.    Loratadine 10 MG capsule  Provider's Office Yes No    Take 1 capsule by mouth Daily.    metFORMIN (GLUCOPHAGE) 500 MG tablet  Provider's Office Yes No    Take 500 mg by mouth Every Morning.    nitroglycerin (NITROSTAT) 0.4 MG SL tablet  Provider's Office Yes No    Place 0.4 mg under the tongue Every 5 (Five) Minutes As Needed for chest pain. Take no more than 3 doses in 15 minutes.    pantoprazole (PROTONIX) 40 MG EC tablet  Provider's Office Yes No    Take 40 mg by mouth Daily.    rOPINIRole (REQUIP) 0.25 MG tablet  Provider's Office Yes No    Take 0.25 mg by mouth Every Night. Take 1  hour before bedtime.    valACYclovir (VALTREX) 500 MG tablet  Provider's Office Yes No    Take 1 g by mouth Daily.    vitamin D (ERGOCALCIFEROL) 01465 UNITS capsule capsule  Provider's Office Yes No    Take 50,000 Units by mouth Every 14 (Fourteen) Days.        ---------------------------------------------------------------------------------------------------------------------    Objective     Hospital Scheduled Meds:       sodium chloride 125 mL/hr       Current listed hospital scheduled medications may not yet reflect those currently placed in orders that are signed and held, awaiting patient's arrival to floor/unit.    ---------------------------------------------------------------------------------------------------------------------   Vital Signs:  Temp:  [97.9 °F (36.6 °C)-98 °F (36.7 °C)] 97.9 °F (36.6 °C)  Heart Rate:  [] 61  Resp:  [20] 20  BP: (103-130)/() 130/107  Mean Arterial Pressure (Non-Invasive) for the past 24 hrs (Last 3 readings):   Noninvasive MAP (mmHg)   01/05/20 1332 75   01/05/20 1330 73   01/05/20 1329 81     SpO2 Percentage    01/05/20 1330 01/05/20 1332 01/05/20 1400   SpO2: 98% 99% 99%     SpO2:  [98 %-100 %] 99 %  on   ;   Device (Oxygen Therapy): room air    Body mass index is 24.03 kg/m².  Wt Readings from Last 3 Encounters:   01/05/20 63.5 kg (140 lb)   12/03/19 69.4 kg (153 lb)   11/27/19 65.8 kg (145 lb)       ---------------------------------------------------------------------------------------------------------------------   Physical Exam:  Physical Exam   Constitutional: She is oriented to person, place, and time. She appears well-developed and well-nourished.  Non-toxic appearance. No distress.   Elderly, resting in bed, in no acute distress.    HENT:   Head: Normocephalic and atraumatic.   Right Ear: External ear normal.   Left Ear: External ear normal.   Nose: Nose normal.   Mouth/Throat: Oropharynx is clear and moist and mucous membranes are normal. No  oropharyngeal exudate.   Eyes: Pupils are equal, round, and reactive to light. Conjunctivae are normal. No scleral icterus.   Neck: Normal range of motion. Neck supple. No muscular tenderness present. Carotid bruit is not present. No thyromegaly present.   Cardiovascular: Normal rate, regular rhythm, normal heart sounds and intact distal pulses. Exam reveals no gallop and no friction rub.   No murmur heard.  Pulses:       Dorsalis pedis pulses are 2+ on the right side, and 2+ on the left side.        Posterior tibial pulses are 2+ on the right side, and 2+ on the left side.   Pulmonary/Chest: Effort normal and breath sounds normal. No accessory muscle usage. No tachypnea. No respiratory distress. She has no wheezes. She has no rhonchi. She has no rales. She exhibits no tenderness.   Abdominal: Soft. Bowel sounds are normal. She exhibits no distension and no mass. There is no hepatomegaly. There is no tenderness. There is no rebound and no guarding. No hernia.   Genitourinary:   Genitourinary Comments: No arguelles cath in place.    Musculoskeletal: She exhibits no edema, tenderness or deformity.        Right lower leg: She exhibits no edema.        Left lower leg: She exhibits no edema.     Vascular Status -  Her right foot exhibits normal foot vasculature  and no edema. Her left foot exhibits normal foot vasculature  and no edema.  Neurological: She is alert and oriented to person, place, and time. She has normal strength. No cranial nerve deficit or sensory deficit. GCS eye subscore is 4. GCS verbal subscore is 5. GCS motor subscore is 6.   Awake and alert.  Moves all extremities equally.  Strength and sensation intact, symmetric.  No facial droop.  No slurred speech.  No arm drift.  No focal neurological deficits on exam.  Answers questions appropriately and follows commands.   Skin: Skin is warm and dry. Capillary refill takes less than 2 seconds. No rash noted. No erythema. No pallor.   Psychiatric: She has a  normal mood and affect. Her speech is normal and behavior is normal. Judgment and thought content normal. Cognition and memory are normal.   Nursing note and vitals reviewed.    ---------------------------------------------------------------------------------------------------------------------  EKG:  Pending cardiology read. Per my read, Sinus adriana 56, 1st AVB, QTc prolonged 484 m/s, T wave inversion anteriorly seen on previous EKG, overall appears stable when compared to previous     Telemetry:    Sinus 70s, first-degree AV block    I have personally reviewed the EKG/Telemetry strip  ---------------------------------------------------------------------------------------------------------------------   Results from last 7 days   Lab Units 01/05/20  1248   TROPONIN T ng/mL <0.010     Results from last 7 days   Lab Units 01/05/20  1248   PROBNP pg/mL 126.7         Results from last 7 days   Lab Units 01/05/20  1248 01/05/20  1239   CRP mg/dL 0.04  --    LACTATE mmol/L 1.0  --    WBC 10*3/mm3  --  6.77   HEMOGLOBIN g/dL  --  14.2   HEMATOCRIT %  --  42.8   MCV fL  --  104.6*   MCHC g/dL  --  33.2   PLATELETS 10*3/mm3  --  305   INR  1.26*  --      Results from last 7 days   Lab Units 01/05/20  1248   SODIUM mmol/L 138   POTASSIUM mmol/L 3.4*   MAGNESIUM mg/dL 1.3*   CHLORIDE mmol/L 112*   CO2 mmol/L 15.5*   BUN mg/dL 18   CREATININE mg/dL 0.92   EGFR IF NONAFRICN AM mL/min/1.73 59*   CALCIUM mg/dL 6.0*   GLUCOSE mg/dL 131*   ALBUMIN g/dL 2.56*   BILIRUBIN mg/dL 0.3   ALK PHOS U/L 72   AST (SGOT) U/L 17   ALT (SGPT) U/L 9   Estimated Creatinine Clearance: 52.1 mL/min (by C-G formula based on SCr of 0.92 mg/dL).    Lab Results   Component Value Date    HGBA1C 6.20 (H) 12/10/2019     Lab Results   Component Value Date    TSH 1.350 01/05/2020    FREET4 1.30 01/05/2020     Microbiology Results (last 10 days)     Procedure Component Value - Date/Time    Influenza Antigen, Rapid - Swab, Nasopharynx [[id number]]  (Normal)  Collected:  01/05/20 1320    Lab Status:  Final result Specimen:  Swab from Nasopharynx Updated:  01/05/20 1344     Influenza A Ag, EIA Negative     Influenza B Ag, EIA Negative         Pain Management Panel     There is no flowsheet data to display.        I have personally reviewed the above laboratory results.   ---------------------------------------------------------------------------------------------------------------------  Imaging Results (Last 7 Days)     Procedure Component Value Units Date/Time    XR Chest 1 View [[id number]] Collected:  01/05/20 1240     Updated:  01/05/20 1323    Narrative:       EXAMINATION: XR CHEST 1 VW-      CLINICAL INDICATION:     Simple Sepsis Protocol     TECHNIQUE:  XR CHEST 1 VW-      COMPARISON: NONE      FINDINGS:   Coarsened interstitial markings. Heart and mediastinum contours are  unremarkable.  No pleural effusion.  No pneumothorax.   Bony and soft tissue structures are unremarkable.       Impression:       No radiographic evidence of acute cardiac or pulmonary  disease.     This report was finalized on 1/5/2020 12:40 PM by Dr. Abdias Weiner MD.       CT Head Without Contrast [[id number]] Collected:  01/05/20 1244     Updated:  01/05/20 1246    Narrative:          EXAMINATION: CT HEAD WO CONTRAST-      CLINICAL INDICATION:     headache/syncope     TECHNIQUE: Contiguous axial CT images of the head were obtained without  contrast administration.      Radiation dose reduction techniques were utilized per ALARA protocol.  Automated exposure control was initiated through either or [employer] or  [employer] software packages by  protocol.       1145.92 mGy.cm     COMPARISON:  None.       FINDINGS: Generalized cerebral atrophy is present. There is no mass  effect, midline displacement, or hydrocephalus. There are patchy areas  of decreased density within the periventricular white matter which  likely reflect chronic small vessel ischemic changes. There is no  CT  evidence of acute infarct or hemorrhage. Bone windows reveal no osseous  abnormalities or fractures.        Impression:       Atrophy and chronic small vessel ischemic change, but there  are no acute intracranial abnormalities identified.     This report was finalized on 1/5/2020 12:44 PM by Dr. Abdias Weiner MD.       CT Cervical Spine Without Contrast [[id number]] Collected:  01/05/20 1240     Updated:  01/05/20 1243    Narrative:       EXAMINATION: CT CERVICAL SPINE WO CONTRAST-      CLINICAL INDICATION:     C-spine trauma, NEXUS/CCR positive, low risk     TECHNIQUE: Multiple axial CT images of the entire cervical spine (to  include the cervicothoracic junction) were obtained without contrast  administration. Reformatted images in the coronal and/or sagittal  plane(s) were generated from the axial data set to facilitate diagnostic  accuracy and/or surgical planning.      Radiation dose reduction techniques were utilized per ALARA protocol.  Automated exposure control was initiated through either or [employer] or  DoseRight software packages by  protocol.       430.62 mGy.cm     COMPARISON:  None.       FINDINGS: Degenerative changes are present within the cervical spine,  but resulting in no significant bony stenosis of the spinal canal. No  acute fracture or malalignment of the cervical spine is identified.        Impression:       No acute fracture of the cervical spine.      This report was finalized on 1/5/2020 12:41 PM by Dr. Abdias Weiner MD.         I have personally reviewed the above radiology results.     Last Echocardiogram:  Results for orders placed during the hospital encounter of 11/02/19   Transthoracic Echo Complete With Contrast if Necessary Per Protocol    Narrative · Left ventricular wall thickness is consistent with borderline concentric   hypertrophy.  · Left ventricular systolic function is normal.  · Estimated EF appears to be in the range of 66 - 70%.  · Left ventricular  diastolic dysfunction.  · Normal cardiac chamber dimensions  · Mild aortic valve regurgitation is present.  · Mild mitral valve regurgitation is present  · Mild tricuspid valve regurgitation is present. No evidence of pulmonary   hypertension is present  · There is no evidence of pericardial effusion        ---------------------------------------------------------------------------------------------------------------------    Assessment & Plan      Active Hospital Problems    Diagnosis POA   • **Syncope [R55] Yes   • Hypokalemia [E87.6] Yes   • Hypomagnesemia [E83.42] Yes   • Macrocytosis [D75.89] Yes   • Hypoalbuminemia [E88.09] Yes   • Diastolic dysfunction [I51.89] Yes   • Former smoker [Z87.891] Not Applicable   • T2DM (type 2 diabetes mellitus) (CMS/HCC) [E11.9] Yes   • History of splenectomy [Z90.81] Not Applicable   • Dyslipidemia [E78.5] Yes     On atorvastatin.      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 EKG without acute ischemic changes. Recent ECHO from 11/2019 reviewed with preserved EF, diastolic dysfunction, and mild valvular disease. Troponin T negative. This is her second admission in the past couple of months for syncope episodes. Potassium and magnesium low, replace per protocol and monitor on tele. She may have underlying arrhythmia causing syncope.  Also be vasovagal as she had a bowel movement shortly after syncopal episode.  Could also be due to bradycardia, patient's heart rate in the 50s upon presentation, she is on Coreg at home.  Patient also noted to be hypotensive upon EMS arrival that resolved with IV fluids.  Will consult cardiology as she may need outpatient event monitor. Will continue tele monitoring. Obtain orthostatic vitals, continue gentle fluids and supportive care. Fall  precautions. PT/OT consult   · Acute hypokalemia: Replace per protocol. Telemetry monitoring. Replace mag as well. Repeat chemistry panel in AM.   · Hypomagnesemia: Replace per protocol. Telemetry monitoring. Repeat mag level in AM.   · Macrocytosis without anemia: Recent B12 and folate WNL. Monitor, daily CBC. Add daily multivitamin.   · Pseudo-hypocalcemia in setting of hypoalbuminemia: Ionized calcium WNL. Monitor closely.   · Non-insulin-dependent type 2 diabetes mellitus: Hold home oral DM meds for now to prevent hypoglycemia. Low dose SSI, titrate insulin tx as necessary. Closely monitor blood glucose levels with accuchecks QAC and QHS.   · Essential hypertension: BP controlled. Review home antihypertensive regimen once reconciled per pharmacy. Resume as appropriate.   · Hyperlipidemia: Continue home statin once reconciled per pharmacy.   · Diastolic dysfunction: Clinically compensated. Monitor volume status closely with IV fluids on board. Strict Is and Os, daily weights.   · History of right-sided breast cancer status post radiation in the past: Supportive care, outpatient monitoring.   · History of AML status post chemotherapy and bone marrow transplant in the past: Supportive care. Blood levels stable. Outpatient follow up.   · History of splenectomy: Supportive care.  · Debility/weakness: PT/OT consults  · Former smoker  · F/E/N: Gentle IV fluids. Replace electrolytes per protocol. Consistent carbohydrate diet.     ---------------------------------------------------  DVT Prophylaxis: Sq heparin   GI Prophylaxis: Protonix   Activity: Fall precautions, up with assistance     The patient is considered to be a high risk patient due to: Syncope, electrolyte abnormalities, history of cancer, debility     INPATIENT status due to the need for care which can only be reasonably provided in an hospital setting such as aggressive/expedited ancillary services and/or consultation services, the necessity for IV  medications, close physician monitoring and/or the possible need for procedures.  In such, I feel patient’s risk for adverse outcomes and need for care warrant INPATIENT evaluation and predict the patient’s care encounter to likely last beyond 2 midnights.    Code Status: FULL CODE     I have discussed the patient's assessment and plan with the patient and attending physician MD Dilcia Alejandra PA-C  Hospitalist Service -- [facility]   Pager: [phone]    01/05/20  5:49 PM    Attending Physician: Dewey King MD      ---------------------------------------------------------------------------------------------------------------------   I have seen and examined the patient. I agree with the assessment and plan of Dilcia Bryant PA-C    Tahmina Martinez is a 74 y.o. female with PMH significant for chronic diastolic CHF, HTN, history of AML s/p chemotherapy and BMT, breast cancer s/p radiation, HLD, DM type II and history of splenectomy presented to the emergency department after an episode of syncope.  Patient was at [facility] 3 weeks ago and was discharged to nursing home from where she has been discharged 5 days ago.  Patient states that this morning he woke up and went to the restroom.  He states that afterwards she sat and did not feel well and everything looked like it was white.  Patient states that sleep got weak and dizzy and went down.  Patient states that her ex- was present at bedside came to help her.  Patient states that she does not remember what happened afterwards.  Her ex- states that when he came patient was lying on the floor and could not get up.  He states that he got the patient up and she passed out.  Patient did not have any jerking movements of her arms and legs but did lost control of her bowel and bladder.  Her ex- reports that patient was unconscious for 15 to 20 minutes.  Patient states that the next thing she remembers was being  in the ER.  Ex- reports that he put a spoon in her mouth to keep her mouth and airways open.    Patient denies any chest pain or palpitations.    Constitutional: Well-developed and well-nourished.  Lying comfortably in bed.  No acute distress.  HEENT: Pupils are equal and reactive to light.  Fundi not seen.   Neck: Supple.  No JVD  PULM: Lungs clear to auscultation bilaterally.  CVS: Regular rate and rhythm, S1 + S2, no murmurs, rubs or gallops.  GI: Abdomen is soft and nontender.  No viscera palpable.  Bowel sounds audible.    Peripheral vascular: Dorsalis pedis palpable and no edema  Neurologic: Awake, alert and oriented to place, person and time.  Cranial nerves grossly intact.  Strength bilaterally symmetrical in upper and lower extremities.    Labs reviewed.    A/P:  -Syncope  -Hypocalcemia  -Hypomagnesemia  -NAG metabolic acidosis  -Hypoalbuminemia  -Right bundle branch block    CT scan of the head showed atrophy and chronic vessel small ischemic change but no acute intracranial abnormality.  CT scan of the cervical spine showed degenerative changes but no acute fracture.  Carotid Doppler ultrasound on 11/11/2019 showed no evidence of hemodynamically significant plaques or stenosis.  TTE on 11/3/2019 showed normal LV systolic function, EF of 66 to 70%, diastolic dysfunction, mild AR, mild MR and mild TR.  Etiology of syncope unclear at this time and may be vasovagal vs orthostasis vs seizure disorder.  Patient had low blood pressure and was bradycardic upon arrival to the ER and was found to have orthostasis.  Patient denies having orthostatic dizziness or lightheadedness prior to today.    However, this is patient's second episode of syncope in last few months and therefore will consult cardiology to rule out an arrhythmia causing syncopal episodes.  I will also check prolactin level to rule out seizure disorder.  We will keep her on seizure precaution but will not start any antiepileptic drug at this  time.  Consider MRI and EEG  Replace electrolytes per protocol and monitor daily.              Electronically signed by Dewey King MD at 01/05/20 2100       Vital Signs (last day)     Date/Time   Temp   Temp src   Pulse   Resp   BP   Patient Position   SpO2    01/08/20 1512   97.8 (36.6)   Oral   86   18   149/68   Sitting   98    01/08/20 1346   98.4 (36.9)   Oral   78   18   171/72   Lying   98    01/08/20 1024   97.6 (36.4)   Oral   71   18   (!) 159/107   Lying   98    01/08/20 0834   --   --   68   --   140/68   Sitting   98    01/08/20 0627   98.1 (36.7)   Oral   71   20   180/89   Lying   98    01/08/20 0300   97.1 (36.2)   Oral   70   20   130/90   Lying   98    01/07/20 1843   98.1 (36.7)   Oral   70   20   150/80   Lying   94    01/07/20 1015   98 (36.7)   Oral   67   20   153/74   Lying   96    01/07/20 0700   98 (36.7)   Oral   77   20   155/68   Lying   96    01/07/20 0314   97.6 (36.4)   Oral   79   20   156/70   Lying   96              Intake & Output (last day)       01/07 0701 - 01/08 0700 01/08 0701 - 01/09 0700    P.O. 1800 1440    Total Intake(mL/kg) 1800 (26.5) 1440 (21.2)    Urine (mL/kg/hr) 2100 (1.3) 1650 (2.7)    Stool      Total Output 2100 1650    Net -300 -210                Current Facility-Administered Medications   Medication Dose Route Frequency Provider Last Rate Last Dose   • acetaminophen (TYLENOL) tablet 650 mg  650 mg Oral Q6H PRN JAIME Saunders MD   650 mg at 01/07/20 2041   • aspirin EC tablet 81 mg  81 mg Oral Daily JAIME Saunders MD   81 mg at 01/08/20 0834   • atorvastatin (LIPITOR) tablet 10 mg  10 mg Oral Nightly JAIME Saunders MD   10 mg at 01/07/20 2041   • cetirizine (zyrTEC) tablet 5 mg  5 mg Oral Daily David Matute DO   5 mg at 01/08/20 0834   • dextrose (D50W) 25 g/ 50mL Intravenous Solution 25 g  25 g Intravenous Q15 Min PRN JAIME Saunders MD       • dextrose (GLUTOSE) oral gel 15 g  15 g Oral Q15 Min PRN JAIME Saunders MD       •  exemestane (AROMASIN) chemo tablet 25 mg  25 mg Oral Daily JAIME Saunders MD   25 mg at 01/08/20 0855   • glucagon (human recombinant) (GLUCAGEN DIAGNOSTIC) injection 1 mg  1 mg Subcutaneous Q15 Min PRN JAIME Saunders MD       • heparin (porcine) 5000 UNIT/ML injection 5,000 Units  5,000 Units Subcutaneous Q12H JAIME Saunders MD   5,000 Units at 01/08/20 0835   • hydrALAZINE (APRESOLINE) tablet 50 mg  50 mg Oral Q8H Aurora Ulloa PA-C       • insulin aspart (novoLOG) injection 0-7 Units  0-7 Units Subcutaneous 4x Daily AC & at Bedtime JAIME Saunders MD   Stopped at 01/08/20 0809   • Magnesium Sulfate 2 gram infusion - Mg less than or equal to 1.5 mg/dL  2 g Intravenous PRN JAIME Saunders MD        Or   • Magnesium Sulfate 1 gram infusion - Mg 1.6-1.9 mg/dL  1 g Intravenous PRN JAIME Saunders MD       • multivitamin (DAILY MERCEDES) tablet 1 tablet  1 tablet Oral Daily JAIME Saunders MD   1 tablet at 01/08/20 0834   • nitroglycerin (NITROSTAT) SL tablet 0.4 mg  0.4 mg Sublingual Q5 Min PRN JAIME Saunders MD       • pantoprazole (PROTONIX) EC tablet 40 mg  40 mg Oral Daily JAIME Saunders MD   40 mg at 01/08/20 0835   • potassium chloride (K-DUR,KLOR-CON) CR tablet 40 mEq  40 mEq Oral PRN JAIME Saunders MD        Or   • potassium chloride (KLOR-CON) packet 40 mEq  40 mEq Oral PRN JAIME Saunders MD        Or   • potassium chloride 10 mEq in 100 mL IVPB  10 mEq Intravenous Q1H PRN JAIME Saunders MD       • rOPINIRole (REQUIP) tablet 0.25 mg  0.25 mg Oral Nightly JAIME Saunders MD   0.25 mg at 01/07/20 2041   • sodium chloride 0.9 % flush 10 mL  10 mL Intravenous PRN JAIME Saunders MD       • sodium chloride 0.9 % flush 10 mL  10 mL Intravenous Q12H JAIME Saunders MD   10 mL at 01/07/20 2042   • sodium chloride 0.9 % flush 10 mL  10 mL Intravenous PRN JAIME Saunders MD           Lab Results (last 24 hours)     Procedure Component Value Units Date/Time    Blood Culture - Blood, Arm, Left [[id number]]  Collected:  01/05/20 1248    Specimen:  Blood from Arm, Left Updated:  01/08/20 1300     Blood Culture No growth at 3 days    Blood Culture - Blood, Arm, Right [[id number]] Collected:  01/05/20 1239    Specimen:  Blood from Arm, Right Updated:  01/08/20 1245     Blood Culture No growth at 3 days    POC Glucose Once [[id number]]  (Normal) Collected:  01/08/20 1027    Specimen:  Blood Updated:  01/08/20 1033     Glucose 125 mg/dL     POC Glucose Once [[id number]]  (Normal) Collected:  01/08/20 0705    Specimen:  Blood Updated:  01/08/20 0712     Glucose 108 mg/dL     Basic Metabolic Panel [[id number]]  (Abnormal) Collected:  01/08/20 0457    Specimen:  Blood Updated:  01/08/20 0549     Glucose 102 mg/dL      BUN 16 mg/dL      Creatinine 0.70 mg/dL      Sodium 139 mmol/L      Potassium 4.2 mmol/L      Chloride 108 mmol/L      CO2 19.5 mmol/L      Calcium 9.4 mg/dL      eGFR Non African Amer 81 mL/min/1.73      BUN/Creatinine Ratio 22.9     Anion Gap 11.5 mmol/L     Narrative:       GFR Normal >60  Chronic Kidney Disease <60  Kidney Failure <15      CBC & Differential [[id number]] Collected:  01/08/20 0457    Specimen:  Blood Updated:  01/08/20 0529    Narrative:       The following orders were created for panel order CBC & Differential.  Procedure                               Abnormality         Status                     ---------                               -----------         ------                     CBC Auto Differential[[id number]]        Abnormal            Final result                 Please view results for these tests on the individual orders.    CBC Auto Differential [[id number]]  (Abnormal) Collected:  01/08/20 0457    Specimen:  Blood Updated:  01/08/20 0529     WBC 8.61 10*3/mm3      RBC 3.05 10*6/mm3      Hemoglobin 10.3 g/dL      Hematocrit 31.3 %      .6 fL      MCH 33.8 pg      MCHC 32.9 g/dL      RDW 14.1 %      RDW-SD 53.3 fl      MPV 10.1 fL      Platelets 288 10*3/mm3      Neutrophil % 37.5 %       Lymphocyte % 43.1 %      Monocyte % 14.5 %      Eosinophil % 3.9 %      Basophil % 0.8 %      Immature Grans % 0.2 %      Neutrophils, Absolute 3.22 10*3/mm3      Lymphocytes, Absolute 3.71 10*3/mm3      Monocytes, Absolute 1.25 10*3/mm3      Eosinophils, Absolute 0.34 10*3/mm3      Basophils, Absolute 0.07 10*3/mm3      Immature Grans, Absolute 0.02 10*3/mm3      nRBC 0.0 /100 WBC     POC Glucose Once [[id number]]  (Abnormal) Collected:  01/07/20 1950    Specimen:  Blood Updated:  01/07/20 1956     Glucose 171 mg/dL     POC Glucose Once [[id number]]  (Abnormal) Collected:  01/07/20 1558    Specimen:  Blood Updated:  01/07/20 1606     Glucose 160 mg/dL         Imaging Results (Most Recent)     Procedure Component Value Units Date/Time    XR Pelvis 1 or 2 View [[id number]] Collected:  01/06/20 1637     Updated:  01/06/20 1640    Narrative:       EXAMINATION: XR PELVIS 1 OR 2 VW-      CLINICAL INDICATION: hip pain post fall; R55-Syncope and collapse;  E83.51-Hypocalcemia; E83.42-Hypomagnesemia        COMPARISON: None available     FINDINGS: One view of the pelvis shows no evidence of an acute fracture  or dislocation. There are no blastic or lytic lesions       Impression:       No acute bony abnormality      This report was finalized on 1/6/2020 4:37 PM by Dr. Sal Cheek MD.       MRI Brain With & Without Contrast [[id number]] Collected:  01/06/20 1519     Updated:  01/06/20 1612    Narrative:       MRI BRAIN WITH/WITHOUT CONTRAST-     CLINICAL INDICATION: LOC, recent episode, nontrauma, suspect neurologic;  R55-Syncope and collapse; E83.51-Hypocalcemia; E83.42-Hypomagnesemia        COMPARISON: None immediately available     PROCEDURE:  Multiple planar images of the brain were acquired in a high field  strength magnet. Several pulse sequences were used to acquire the study.  Gadolinium  was not  administered.     FINDINGS:  The diffusion weighted images show no focal signal abnormality. There is  no evidence of  acute ischemia     The remaining pulse sequences show no mass, hemorrhage, or midline  shift.  The ventricles, cisterns, and sulci are unremarkable. There is no  hydrocephalus.     There is increased T2 and FLAIR signal in the periventricular and  parietal white matter.     Increased signal in the mastoid air cells bilaterally.     The sagittal view show no sellar or parasellar mass.     There is no tectal or pineal lesion.     Coronal imaging demonstrates a symmetric appearance of the visualized  portions of the optic nerves and extraocular muscles.       Impression:          1. Chronic microangiopathic changes.  2. Increased signal in the mastoid air cells bilaterally.     This report was finalized on 1/6/2020 4:10 PM by Dr. Sal Cheek MD.       XR Chest PA & Lateral [[id number]] Collected:  01/06/20 1213     Updated:  01/06/20 1216    Narrative:       EXAMINATION: XR CHEST PA AND LATERAL-      CLINICAL INDICATION: rule out pneumonia; R55-Syncope and collapse;  E83.51-Hypocalcemia; E83.42-Hypomagnesemia        COMPARISON: 01/05/2020      TECHNIQUE: XR CHEST PA AND LATERAL-      FINDINGS:   Lungs are adequately aerated.   Heart and mediastinal contours are unremarkable.   No pneumothorax.   Scoliosis          Impression:       No radiographic evidence of acute cardiac or pulmonary disease.     This report was finalized on 1/6/2020 12:14 PM by Dr. Sal Cheek MD.       XR Chest 1 View [[id number]] Collected:  01/05/20 1240     Updated:  01/05/20 1323    Narrative:       EXAMINATION: XR CHEST 1 VW-      CLINICAL INDICATION:     Simple Sepsis Protocol     TECHNIQUE:  XR CHEST 1 VW-      COMPARISON: NONE      FINDINGS:   Coarsened interstitial markings. Heart and mediastinum contours are  unremarkable.  No pleural effusion.  No pneumothorax.   Bony and soft tissue structures are unremarkable.       Impression:       No radiographic evidence of acute cardiac or pulmonary  disease.     This report was finalized on  1/5/2020 12:40 PM by Dr. Abdias Weiner MD.       CT Head Without Contrast [[id number]] Collected:  01/05/20 1244     Updated:  01/05/20 1246    Narrative:          EXAMINATION: CT HEAD WO CONTRAST-      CLINICAL INDICATION:     headache/syncope     TECHNIQUE: Contiguous axial CT images of the head were obtained without  contrast administration.      Radiation dose reduction techniques were utilized per ALARA protocol.  Automated exposure control was initiated through either or "[employer] or  DoseRight software packages by  protocol.       1145.92 mGy.cm     COMPARISON:  None.       FINDINGS: Generalized cerebral atrophy is present. There is no mass  effect, midline displacement, or hydrocephalus. There are patchy areas  of decreased density within the periventricular white matter which  likely reflect chronic small vessel ischemic changes. There is no CT  evidence of acute infarct or hemorrhage. Bone windows reveal no osseous  abnormalities or fractures.        Impression:       Atrophy and chronic small vessel ischemic change, but there  are no acute intracranial abnormalities identified.     This report was finalized on 1/5/2020 12:44 PM by Dr. Abdias Weiner MD.       CT Cervical Spine Without Contrast [[id number]] Collected:  01/05/20 1240     Updated:  01/05/20 1243    Narrative:       EXAMINATION: CT CERVICAL SPINE WO CONTRAST-      CLINICAL INDICATION:     C-spine trauma, NEXUS/CCR positive, low risk     TECHNIQUE: Multiple axial CT images of the entire cervical spine (to  include the cervicothoracic junction) were obtained without contrast  administration. Reformatted images in the coronal and/or sagittal  plane(s) were generated from the axial data set to facilitate diagnostic  accuracy and/or surgical planning.      Radiation dose reduction techniques were utilized per ALARA protocol.  Automated exposure control was initiated through either or [employer] or  DoseRight software packages by   protocol.       430.62 mGy.cm     COMPARISON:  None.       FINDINGS: Degenerative changes are present within the cervical spine,  but resulting in no significant bony stenosis of the spinal canal. No  acute fracture or malalignment of the cervical spine is identified.        Impression:       No acute fracture of the cervical spine.      This report was finalized on 1/5/2020 12:41 PM by Dr. Abdias Weiner MD.           ECG/EMG Results (last 24 hours)     ** No results found for the last 24 hours. **        Orders (last 24 hrs)      Start     Ordered    01/09/20 0600  CBC & Differential  Daily      01/08/20 1005    01/09/20 0600  Comprehensive Metabolic Panel  Morning Draw      01/08/20 1005    01/08/20 2200  hydrALAZINE (APRESOLINE) tablet 50 mg  Every 8 Hours Scheduled      01/08/20 1027    01/08/20 1540  Discontinue IV  Once      01/08/20 1541    01/08/20 1540  Discontinue Telemetry  Once      01/08/20 1541    01/08/20 1537  Discharge patient  Once      01/08/20 1541    01/08/20 1115  hydrALAZINE (APRESOLINE) tablet 25 mg  Once      01/08/20 1027    01/08/20 1034  POC Glucose Once  Once      01/08/20 1027    01/08/20 1000  hydrALAZINE (APRESOLINE) tablet 25 mg  Every 8 Hours Scheduled,   Status:  Discontinued      01/08/20 0812    01/08/20 0938  Inpatient Rehab Admission Consult  Once     Provider:  (Not yet assigned)    01/08/20 0938    01/08/20 0713  POC Glucose Once  Once      01/08/20 0705    01/08/20 0600  CBC & Differential  Morning Draw      01/07/20 1126    01/08/20 0600  Basic Metabolic Panel  Morning Draw      01/07/20 1126    01/08/20 0600  CBC Auto Differential  PROCEDURE ONCE      01/08/20 0003    01/08/20 0000  furosemide (LASIX) 20 MG tablet  Daily PRN      01/08/20 1541    01/08/20 0000  hydrALAZINE (APRESOLINE) 50 MG tablet  Every 8 Hours Scheduled      01/08/20 1541    01/08/20 0000  Discharge Follow-up with PCP      01/08/20 1541    01/08/20 0000  Discharge Follow-up with Specified Provider:  Follow up with Dr. Soler in 7-10 days.      01/08/20 1541    01/08/20 0000  Referral to Home Health      01/08/20 1541    01/07/20 1957  POC Glucose Once  Once      01/07/20 1950    01/07/20 1606  POC Glucose Once  Once      01/07/20 1558    01/07/20 0900  cetirizine (zyrTEC) tablet 5 mg  Daily      01/07/20 0822    01/06/20 2100  rOPINIRole (REQUIP) tablet 0.25 mg  Nightly      01/06/20 0253    01/06/20 0900  aspirin EC tablet 81 mg  Daily      01/05/20 2001 01/06/20 0900  pantoprazole (PROTONIX) EC tablet 40 mg  Daily      01/05/20 2001 01/06/20 0900  multivitamin (DAILY MERCEDES) tablet 1 tablet  Daily      01/05/20 2001 01/06/20 0900  exemestane (AROMASIN) chemo tablet 25 mg  Daily      01/06/20 0253    01/06/20 0246  acetaminophen (TYLENOL) tablet 650 mg  Every 6 Hours PRN      01/06/20 0246    01/05/20 2200  POC Glucose 4x Daily AC & at Bedtime  4 Times Daily Before Meals & at Bedtime      01/05/20 2001 01/05/20 2100  atorvastatin (LIPITOR) tablet 10 mg  Nightly      01/05/20 2001 01/05/20 2100  sodium chloride 0.9 % flush 10 mL  Every 12 Hours Scheduled      01/05/20 2001 01/05/20 2100  heparin (porcine) 5000 UNIT/ML injection 5,000 Units  Every 12 Hours Scheduled      01/05/20 2001 01/05/20 2100  insulin aspart (novoLOG) injection 0-7 Units  4 Times Daily Before Meals & Nightly      01/05/20 2001 01/05/20 2001  dextrose (GLUTOSE) oral gel 15 g  Every 15 Minutes PRN      01/05/20 2001 01/05/20 2001  dextrose (D50W) 25 g/ 50mL Intravenous Solution 25 g  Every 15 Minutes PRN      01/05/20 2001 01/05/20 2001  glucagon (human recombinant) (GLUCAGEN DIAGNOSTIC) injection 1 mg  Every 15 Minutes PRN      01/05/20 2001 01/05/20 2001  sodium chloride 0.9 % flush 10 mL  As Needed      01/05/20 2001 01/05/20 2001  potassium chloride (K-DUR,KLOR-CON) CR tablet 40 mEq  As Needed      01/05/20 2001 01/05/20 2001  potassium chloride (KLOR-CON) packet 40 mEq  As Needed      01/05/20 2001     01/05/20 2001  potassium chloride 10 mEq in 100 mL IVPB  Every 1 Hour PRN      01/05/20 2001 01/05/20 2001  Magnesium Sulfate 2 gram infusion - Mg less than or equal to 1.5 mg/dL  As Needed      01/05/20 2001 01/05/20 2001  Magnesium Sulfate 1 gram infusion - Mg 1.6-1.9 mg/dL  As Needed      01/05/20 2001 01/05/20 2001  nitroglycerin (NITROSTAT) SL tablet 0.4 mg  Every 5 Minutes PRN      01/05/20 2001 01/05/20 1612  sodium chloride 0.9 % infusion  Continuous,   Status:  Discontinued      01/05/20 1610    01/05/20 1159  sodium chloride 0.9 % flush 10 mL  As Needed      01/05/20 1200    Unscheduled  Magnesium  As Needed      01/05/20 2001    Unscheduled  Potassium  As Needed      01/05/20 2001    Unscheduled  Telemetry - Pulse Oximetry  Continuous PRN     Comments:  If Patient Develops Unresponsiveness, Acute Dyspnea, Cyanosis or Suspected Hypoxemia Start Continuous Pulse Ox Monitoring, Apply Oxygen & Notify Provider    01/05/20 2001    Unscheduled  Oxygen Therapy- Nasal Cannula; Titrate for SPO2: 90% - 95%  Continuous PRN     Comments:  If Patient Develops Unresponsiveness, Acute Dyspnea, Cyanosis or Suspected Hypoxemia Start Continuous Pulse Ox Monitoring, Apply Oxygen & Notify Provider    01/05/20 2001    Unscheduled  ECG 12 Lead  As Needed     Comments:  Nurse to Release if Patient Expericences Acute Chest Pain or Dysrhythmias    01/05/20 2001    Unscheduled  Potassium  As Needed     Comments:  For Ventricular Arrhythmias      01/05/20 2001    Unscheduled  Magnesium  As Needed     Comments:  For Ventricular Arrhythmias      01/05/20 2001    Unscheduled  Troponin  As Needed     Comments:  For Chest Pain      01/05/20 2001    Unscheduled  Digoxin Level  As Needed     Comments:  For Atrial Arrhythmias      01/05/20 2001    Unscheduled  Blood Gas, Arterial  As Needed     Comments:  Per O2 PolicyNotify Physician      01/05/20 2001    Unscheduled  Up With Assistance  As Needed      01/05/20 2001    --   vitamin C (ASCORBIC ACID) 500 MG tablet  Daily      20    --  carvedilol (COREG) 12.5 MG tablet  2 Times Daily      20    --  coenzyme Q10 100 MG capsule  Daily      20    --  hydrALAZINE (APRESOLINE) 25 MG tablet  Every 8 Hours      20    --  lisinopril (PRINIVIL,ZESTRIL) 40 MG tablet  Daily      20    --  metFORMIN (GLUCOPHAGE) 500 MG tablet  Nightly PRN      20    --  valACYclovir (VALTREX) 1000 MG tablet  Daily PRN      20    --  triamterene-hydrochlorothiazide (MAXZIDE-25) 37.5-25 MG per tablet  Daily      20    --  meloxicam (MOBIC) 7.5 MG tablet  Daily PRN      20    --  vitamin E 400 UNIT capsule  Daily      20    --  furosemide (LASIX) 20 MG tablet  2 Times Daily,   Status:  Discontinued      20                   Physician Progress Notes (last 24 hours) (Notes from 20 1603 through 20 1603)      Aurora lUloa PA-C at 20 0938          Patient Identification:  Name:  Tahmina Martinez  Age:  74 y.o.  Sex:  female  :  1945  MRN:  [id number]  Visit Number:  [id number]  Primary Care Provider:  Noe Bower MD    Length of stay:  3    Subjective/Interval History/Consultants/Procedures     Chief complaint: Uncontrolled hypertension;Follow-up syncope, electrolyte abnormalities    HPI:   Ms. 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  She was admitted to this facility on 2020 secondary to a syncopal episode sustained at her home.  She was recently discharged from SNF on 19 where she was reportedly doing well.  She was sent to SNF after hospitalizations with syncope  and need for inpatient rehabilitation.  She reports she did well after discharge home until 1/5/20 when she had syncopal episode.  CT of head did not reveal acute intracranial abnormality.  She was admitted for further evaluation and treatment of CADENCE on CKD in addition to syncope and hypokalemia.     Hospital course:     Given ongoing recurrent syncope, MRI of the brain was ordered and did not reveal acute intracranial abnormality but revealed chronic microangiopathic changes and increased signal in the mastoid air cells bilaterally.   EEG was performed and revealed mild intermittent generalized slowness without elliptiform activity seen.  Cardiology consultation was placed during hospitalization given recurrent syncope with Loop recorder placed on 1/6/2020 by Dr. Saunders with recommendation of follow-up in 7-10 days as an outpatient at the office of Dr. Soler.      During early hospitalization with recurrent syncope, her home Coreg was held due to initial bradycardia.  In addition, home Hydralazine, Lisinopril, Maxzide, and Lasix were held given creatinine elevation of 1.33 on admission.   The dosages and medications on admission in comparison to her discharge from here to SNF on 12/5 after pneumonia admission do not match up completely. She was discharged back to SNF with Hydralazine 75mg TID, Coreg 25mg BID, and without lisinopril, Lasix, and HCTZ (sent to NH on this in November 2019 admission but not on this on 12/5 DC to NH).  It is unclear at present if she was discharged home on these medications or began taking old medications upon arrival home.     Given ongoing debility, SNF placement was discussed.  She did not wish to go to SNF longterm.    Consultants:   · Cardiology    Procedures:  · Implantable loop recorder placement on 1/6/2020  · EEG on  0/07/2020      Subjective:      Mrs. Martinez is resting in bed. She reports she doesn't sleep well in the hospital.  She reports she is unsure about being able to  ambulate up and down her steps at home. She reports 7 steps going up and 7 steps going down.  She reports when she left the SNF she could ambulate some of the steps and then take a break in the middle.  She expresses she does not want to go to longterm SNF.      Discussed with AM ANGEL Ramos with no acute events overnight. Room location at the time of evaluation was [facility].  ----------------------------------------------------------------------------------------------------------------------  Current Hospital Meds:    aspirin 81 mg Oral Daily   atorvastatin 10 mg Oral Nightly   cetirizine 5 mg Oral Daily   exemestane 25 mg Oral Daily   heparin (porcine) 5,000 Units Subcutaneous Q12H   hydrALAZINE 25 mg Oral Q8H   insulin aspart 0-7 Units Subcutaneous 4x Daily AC & at Bedtime   multivitamin 1 tablet Oral Daily   pantoprazole 40 mg Oral Daily   rOPINIRole 0.25 mg Oral Nightly   sodium chloride 10 mL Intravenous Q12H        ----------------------------------------------------------------------------------------------------------------------      Objective     Vital Signs:  Temp:  [97.1 °F (36.2 °C)-98.1 °F (36.7 °C)] 98.1 °F (36.7 °C)  Heart Rate:  [67-71] 68  Resp:  [20] 20  BP: (130-180)/(68-90) 140/68      01/06/20  0500 01/07/20  0500 01/08/20  0500   Weight: 64.4 kg (142 lb) 66.5 kg (146 lb 9.6 oz) 67.8 kg (149 lb 6.4 oz)     Body mass index is 25.63 kg/m².    Intake/Output Summary (Last 24 hours) at 1/8/2020 1006  Last data filed at 1/8/2020 0900  Gross per 24 hour   Intake 1920 ml   Output 2250 ml   Net -330 ml     I/O this shift:  In: 600 [P.O.:600]  Out: 550 [Urine:550]  Diet Regular; Cardiac, Consistent Carbohydrate  ----------------------------------------------------------------------------------------------------------------------    Physical Exam   Constitutional: She is oriented to person, place, and time and well-developed, well-nourished, and in no distress.   HENT:   Head: Normocephalic and atraumatic.      Eyes: Pupils are equal, round, and reactive to light. EOM are normal.   Black eyeliner in place on lower lids   Neck: Normal range of motion.   Cardiovascular: Normal rate and regular rhythm. Exam reveals no friction rub.   No murmur heard.  Pulmonary/Chest: Effort normal. No respiratory distress. She has no wheezes.   Abdominal: Soft. Bowel sounds are normal. She exhibits no distension.   Musculoskeletal: She exhibits no edema.   Neurological: She is alert and oriented to person, place, and time.   Skin: Skin is warm and dry.   Psychiatric: Affect and judgment normal.              ----------------------------------------------------------------------------------------------------------------------  Tele:  SR 70s-90s  ----------------------------------------------------------------------------------------------------------------------  Results from last 7 days   Lab Units 01/05/20  1248   TROPONIN T ng/mL <0.010   PROBNP pg/mL 126.7     Results from last 7 days   Lab Units 01/08/20  0457 01/07/20  0035 01/06/20  0841 01/05/20  1248   CRP mg/dL  --   --   --  0.04   LACTATE mmol/L  --   --   --  1.0   WBC 10*3/mm3 8.61 11.56* 19.29*  --    HEMOGLOBIN g/dL 10.3* 10.5* 11.4*  --    HEMATOCRIT % 31.3* 31.0* 33.3*  --    MCV fL 102.6* 101.6* 99.4*  --    MCHC g/dL 32.9 33.9 34.2  --    PLATELETS 10*3/mm3 288 280 324  --    INR   --   --   --  1.26*         Results from last 7 days   Lab Units 01/08/20  0457 01/07/20  0035 01/06/20  0841 01/05/20  1248   SODIUM mmol/L 139 134* 130* 138   POTASSIUM mmol/L 4.2 5.3* 4.6 3.4*   MAGNESIUM mg/dL  --  2.3 2.5* 1.3*   CHLORIDE mmol/L 108* 105 98 112*   CO2 mmol/L 19.5* 19.8* 18.1* 15.5*   BUN mg/dL 16 29* 29* 18   CREATININE mg/dL 0.70 1.46* 1.33* 0.92   EGFR IF NONAFRICN AM mL/min/1.73 81 35* 39* 59*   CALCIUM mg/dL 9.4 9.0 9.1 6.0*   GLUCOSE mg/dL 102* 122* 219* 131*   ALBUMIN g/dL  --  3.39* 3.72 2.56*   BILIRUBIN mg/dL  --  0.3 0.6 0.3   ALK PHOS U/L  --  93 101 72   AST  (SGOT) U/L  --  20 22 17   ALT (SGPT) U/L  --  15 15 9   Estimated Creatinine Clearance: 56.6 mL/min (by C-G formula based on SCr of 0.7 mg/dL).  No results found for: AMMONIA      Blood Culture   Date Value Ref Range Status   01/05/2020 No growth at 2 days  Preliminary   01/05/2020 No growth at 2 days  Preliminary     Urine Culture   Date Value Ref Range Status   01/05/2020 50,000 CFU/mL Mixed Liss Isolated  Final     No results found for: WOUNDCX  No results found for: STOOLCX  ----------------------------------------------------------------------------------------------------------------------  Imaging Results (Last 24 Hours)     ** No results found for the last 24 hours. **        ----------------------------------------------------------------------------------------------------------------------   I have reviewed the above laboratory values for 01/08/20    Assessment/Plan     Active Hospital Problems    Diagnosis POA   • **Syncope [R55] Yes   • Hypokalemia [E87.6] Yes   • Hypomagnesemia [E83.42] Yes   • Macrocytosis [D75.89] Yes   • Hypoalbuminemia [E88.09] Yes   • Diastolic dysfunction [I51.89] Yes   • Infestation by bed bug [B88.8] Yes   • Former smoker [Z87.891] Not Applicable   • T2DM (type 2 diabetes mellitus) (CMS/HCC) [E11.9] Yes   • History of splenectomy [Z90.81] Not Applicable   • Dyslipidemia [E78.5] Yes     On atorvastatin.      • Essential hypertension [I10] Yes   • Breast cancer (right), status post radiation therapy in 08/2015 [C50.919] Yes   • AML s/p chemo and bone marrow transplant 2005 [C92.00] Yes         ASSESSMENT/PLAN:  · Syncope:  CT head unremarkable. MRI without acute source of episodes. Loop recorder insertion on 1/6 with outpatient follow-up with cardiology recommended 7-10 days following.  No further episodes at present.  Continue to monitor.  IV fluids discontinued this AM.     · Leukocytosis, improved:  Blood cultures with NGTD. CXR unremarkable.      · CADENCE, improved:  Continue  to hold Meloxicam, lasix, HCTZ, and lisinopril. IV fluids stopped.     · Uncontrolled hypertension: Hydralazine 25mg TID added.  Will hold on other home antihypertensives at present.  Discussed with attending physician.     · DM2, NID:  FSBG ACHS.  SSI PRN.  Hgb a1c 6.20.     · Hyperlipidemia:   Continue atorvastatin.     · History of right-side breast cancer s/p radiation:  Continue Aromasin.       · Ongoing debility:  Inpatient rehab consultation placed.  Bedbug isolation removed.     · History of AML s/p chemotherapy and bone transplant int he past:  Supportive care.       -----------  -DVT prophylaxis: SQ heparin  -GI prophylaxis: PPI  -Activity: Up with assist  -Disposition: Home vs. Inpatient rehab  -Diet: Cardiac/Consistent carb        The patient is high risk due to the following diagnoses/reasons:  Recurrent syncope, CADENCE, ongoign debility, uncontrolled hypertension    Aurora Ulloa PA-C  20  10:06 AM  Pager # [phone]      Electronically signed by Aurora Ulloa PA-C at 20 1000          Physical Therapy Notes (most recent note)      Nakia Stone, PT at 20 1534  Version 1 of 1         Acute Care - Physical Therapy Treatment Note  KATTY Garcia     Patient Name: Tahmina Martinez  : 1945  MRN: [id number]  Today's Date: 2020  Onset of Illness/Injury or Date of Surgery: 20(admit date)  Date of Referral to PT: 20  Referring Physician: Manny    Admit Date: 2020    Visit Dx:    ICD-10-CM ICD-9-CM   1. Syncope, unspecified syncope type R55 780.2   2. Hypocalcemia E83.51 275.41   3. 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    • Breast cancer (right), status post radiation therapy in 2015   • Dyslipidemia   • Bifascicular block (RBBB plus LP FB)   • History of splenectomy   • First degree AV block   • T2DM (type 2 diabetes mellitus) (CMS/[facility])   •  Acute kidney injury (CMS/HCC)   • Syncope   • Former smoker   • Hypokalemia   • Hypomagnesemia   • Macrocytosis   • Hypoalbuminemia   • Diastolic dysfunction   • Infestation by bed bug       Therapy Treatment    Rehabilitation Treatment Summary     Row Name 01/08/20 1516             Treatment Time/Intention    Discipline  physical therapist  -CT      Document Type  therapy note (daily note)  -CT      Subjective Information  no complaints  -CT      Mode of Treatment  physical therapy  -CT      Therapy Frequency (PT Clinical Impression)  3 times/wk 3-5 times/wk   -CT      Patient Effort  good  -CT      Comment  Pt tolerated treatment session well with rest breaks provided as needed. Pt able to go up/down stairs with 1 right handrail but needed Min A. Pt is schedule to return home this date but PT recommendations are for 24/7 care as well as pt to have assist with stairs.   -CT      Existing Precautions/Restrictions  fall  -CT      Recorded by [CT] Nakia Stone, PT 01/08/20 1531      Row Name 01/08/20 1516             Cognitive Assessment/Intervention- PT/OT    Orientation Status (Cognition)  oriented x 4  -CT      Follows Commands (Cognition)  follows multi-step commands  -CT      Recorded by [CT] Nakia Stone, PT 01/08/20 1531      Row Name 01/08/20 1516             Safety Issues, Functional Mobility    Impairments Affecting Function (Mobility)  endurance/activity tolerance;strength  -CT      Recorded by [CT] Nakia Stone, PT 01/08/20 1531      Row Name 01/08/20 1516             Bed Mobility Assessment/Treatment    Bed Mobility Assessment/Treatment  bed mobility (all) activities  -CT      [place] Level (Bed Mobility)  supervision  -CT      Assistive Device (Bed Mobility)  bed rails  -CT      Recorded by [CT] Nakia Stone, PT 01/08/20 1531      Row Name 01/08/20 1516             Transfer Assessment/Treatment    Transfer Assessment/Treatment  sit-stand transfer;stand-sit transfer  -CT      Recorded by  [CT] Nakia Stone, PT 01/08/20 1531      Row Name 01/08/20 1516             Sit-Stand Transfer    Sit-Stand [place] (Transfers)  contact guard  -CT      Assistive Device (Sit-Stand Transfers)  walker, front-wheeled  -CT      Recorded by [CT] Nakia Stone, PT 01/08/20 1531      Row Name 01/08/20 1516             Stand-Sit Transfer    Stand-Sit [place] (Transfers)  contact guard  -CT      Assistive Device (Stand-Sit Transfers)  walker, front-wheeled  -CT      Recorded by [CT] Nakia Stone, PT 01/08/20 1531      Row Name 01/08/20 1516             Gait/Stairs Assessment/Training    [place] Level (Gait)  contact guard  -CT      Assistive Device (Gait)  walker, front-wheeled  -CT      Distance in Feet (Gait)  100  -CT      Pattern (Gait)  step-through  -CT      Deviations/Abnormal Patterns (Gait)  gait speed decreased  -CT      Bilateral Gait Deviations  forward flexed posture  -CT      Negotiation (Stairs)  stairs independence;stairs assistive device;handrail location;number of steps  -CT      [place] Level (Stairs)  minimum assist (75% patient effort)  -CT      Assistive Device (Stairs)  walker, front-wheeled  -CT      Handrail Location (Stairs)  right side (ascending)  -CT      Number of Steps (Stairs)  12  -CT      Ascending Technique (Stairs)  step-to-step  -CT      Descending Technique (Stairs)  step-to-step  -CT      Recorded by [CT] Nakia Stone, PT 01/08/20 1531      Row Name 01/08/20 1516             Outcome Summary/Treatment Plan (PT)    Daily Summary of Progress (PT)  progress toward functional goals is good  -CT      Anticipated Equipment Needs at Discharge (PT)  front wheeled walker  -CT      Anticipated Discharge Disposition (PT)  skilled nursing facility;home with 24/7 care  -CT      Recorded by [CT] Nakia Stone, PT 01/08/20 1531        User Key  (r) = Recorded By, (t) = Taken By, (c) = Cosigned By    Initials Name Effective Dates Discipline    CT Nakia Stone, PT  04/03/18 -  PT                       PT Recommendation and Plan  Anticipated Discharge Disposition (PT): skilled nursing facility, home with 24/7 care  Planned Therapy Interventions (PT Eval): balance training, bed mobility training, gait training, home exercise program, manual therapy techniques, motor coordination training, neuromuscular re-education, patient/family education, postural re-education, stair training, strengthening, transfer training  Therapy Frequency (PT Clinical Impression): 3 times/wk(3-5 times/wk )  Outcome Summary/Treatment Plan (PT)  Daily Summary of Progress (PT): progress toward functional goals is good  Anticipated Equipment Needs at Discharge (PT): front wheeled walker  Anticipated Discharge Disposition (PT): skilled nursing facility, home with 24/7 care  Plan of Care Reviewed With: patient     Time Calculation:   PT Charges     Row Name 01/08/20 1531             Time Calculation    PT Received On  01/08/20  -CT      PT Goal Re-Cert Due Date  01/20/20  -CT         Time Calculation- PT    Total Timed Code Minutes- PT  40 minute(s)  -CT        User Key  (r) = Recorded By, (t) = Taken By, (c) = Cosigned By    Initials Name Provider Type    CT Nakia Stone, PT Physical Therapist        Therapy Charges for Today     Code Description Service Date Service Provider Modifiers Qty    17959160928 HC GAIT TRAINING EA 15 MIN 1/7/2020 Nakia Stone, PT GP 1    13764414288 HC PT THERAPEUTIC ACT EA 15 MIN 1/7/2020 Nakia Stone, PT GP 1    38288211850 HC GAIT TRAINING EA 15 MIN 1/8/2020 Nakia Stone, PT GP 1    57916075257 HC PT THERAPEUTIC ACT EA 15 MIN 1/8/2020 Nakia Stone, PT GP 2               Nakia Stone PT  1/8/2020         Electronically signed by Nakia Stone PT at 01/08/20 1534          Occupational Therapy Notes (most recent note)      Brendan Morris, OT at 01/06/20 1519          Acute Care - Occupational Therapy Initial Evaluation  KATTY Garcia     Patient Name: Tahmina  Michelle  : 1945  MRN: [id number]  Today's Date: 2020  Onset of Illness/Injury or Date of Surgery: 20(admit date)     Referring Physician: Manny    Admit Date: 2020       ICD-10-CM ICD-9-CM   1. Syncope, unspecified syncope type R55 780.2   2. Hypocalcemia E83.51 275.41   3. 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    • Breast cancer (right), status post radiation therapy in 2015   • Dyslipidemia   • Bifascicular block (RBBB plus LP FB)   • History of splenectomy   • First degree AV block   • T2DM (type 2 diabetes mellitus) (CMS/HCC)   • Acute kidney injury (CMS/HCC)   • Syncope   • Former smoker   • Hypokalemia   • Hypomagnesemia   • Macrocytosis   • Hypoalbuminemia   • Diastolic dysfunction   • Infestation by bed bug     Past Medical History:   Diagnosis Date   • Breast cancer (right), status post radiation therapy in 2015 10/12/2016   • Diastolic dysfunction 2020   • Dyslipidemia 2017    On atorvastatin.    • Essential hypertension 10/12/2016   • First degree AV block 2019   • H/O splenectomy    • History of Acute myelocytic leukemia,status post chemotherapy and bone marrow transplant in . 10/12/2016   • History of breast cancer        • History of splenectomy 2019   • Hypertension    • Myelocytic leukemia (CMS/HCC)    • Non-STEMI (non-ST elevated myocardial infarction) (CMS/HCC)    • Restless leg    • Type II diabetes mellitus (CMS/HCC) 2019     Past Surgical History:   Procedure Laterality Date   • BONE MARROW TRANSPLANT     • CARDIAC ELECTROPHYSIOLOGY PROCEDURE N/A 2020    Procedure: Loop insertion;  Surgeon: JAIME Saunders MD;  Location: [facility] IN[facility]ASIVE [facility];  Service: Cardiovascular   •  SECTION     • SPLENECTOMY     • TUBAL ABDOMINAL LIGATION            OT ASSESSMENT FLOWSHEET (last 12 hours)      Occupational Therapy  Evaluation    No documentation.              OT Recommendation and Plan  Outcome Summary/Treatment Plan (OT)  Anticipated Discharge Disposition (OT): home  Therapy Frequency (OT Eval): evaluation only           Time Calculation:     Therapy Charges for Today     Code Description Service Date Service Provider Modifiers Qty    01803288945 HC OT EVAL LOW COMPLEXITY 4 1/6/2020 Brendan Morris OT GO 1               Brendan Morris OT  1/7/2020    Electronically signed by Brendan Morris OT at 01/07/20 1519         Discharge Summary    No notes of this type exist for this encounter.         Discharge Order (From admission, onward)     Start     Ordered    01/08/20 1537  Discharge patient  Once     Expected Discharge Date:  01/08/20    Discharge Disposition:  Home or Self Care    Physician of Record for Attribution - Please select from Treatment Team:  JANET HOWELL [[id number]]    Review needed by CMO to determine Physician of Record:  No       Question Answer Comment   Physician of Record for Attribution - Please select from Treatment Team JANET HOWELL    Review needed by CMO to determine Physician of Record No        01/08/20 1546

## 2020-01-08 NOTE — NURSING NOTE
Acute inpatient rehab consult noted.  According to notes, PT/OT both recommend home or SNF.  Patient is CGA with PT and OT only saw patient for evaluation and did not pick patient up for OT therapy services.  Notified Carly that patient would not be a candidate for acute inpatient rehab.  According to functional levels and therapy notes, I would also recommend home w/assist or SNF.

## 2020-01-08 NOTE — PLAN OF CARE
Problem: Patient Care Overview  Goal: Plan of Care Review  Outcome: Ongoing (interventions implemented as appropriate)  Goal: Individualization and Mutuality  Outcome: Ongoing (interventions implemented as appropriate)  Goal: Discharge Needs Assessment  Outcome: Ongoing (interventions implemented as appropriate)  Goal: Interprofessional Rounds/Family Conf  Outcome: Ongoing (interventions implemented as appropriate)     Problem: Fall Risk (Adult)  Goal: Identify Related Risk Factors and Signs and Symptoms  Outcome: Ongoing (interventions implemented as appropriate)  Goal: Absence of Fall  Outcome: Ongoing (interventions implemented as appropriate)     Problem: Syncope (Adult)  Goal: Identify Related Risk Factors and Signs and Symptoms  Outcome: Ongoing (interventions implemented as appropriate)  Goal: Physical Safety/Health Maintenance  Outcome: Ongoing (interventions implemented as appropriate)  Goal: Optimal Emotional/Functional [place]  Outcome: Ongoing (interventions implemented as appropriate)

## 2020-01-08 NOTE — PROGRESS NOTES
Patient Identification:  Name:  Tahmina Martinez  Age:  74 y.o.  Sex:  female  :  1945  MRN:  [id number]  Visit Number:  [id number]  Primary Care Provider:  Noe Bower MD    Length of stay:  3    Subjective/Interval History/Consultants/Procedures     Chief complaint: Uncontrolled hypertension;Follow-up syncope, electrolyte abnormalities    HPI:   Ms. 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  She was admitted to this facility on 2020 secondary to a syncopal episode sustained at her home.  She was recently discharged from SNF on 19 where she was reportedly doing well.  She was sent to SNF after hospitalizations with syncope and need for inpatient rehabilitation.  She reports she did well after discharge home until 20 when she had syncopal episode.  CT of head did not reveal acute intracranial abnormality.  She was admitted for further evaluation and treatment of CADENCE on CKD in addition to syncope and hypokalemia.     Hospital course:     Given ongoing recurrent syncope, MRI of the brain was ordered and did not reveal acute intracranial abnormality but revealed chronic microangiopathic changes and increased signal in the mastoid air cells bilaterally.   EEG was performed and revealed mild intermittent generalized slowness without elliptiform activity seen.  Cardiology consultation was placed during hospitalization given recurrent syncope with Loop recorder placed on 2020 by Dr. Saunders with recommendation of follow-up in 7-10 days as an outpatient at the office of Dr. Soler.      During early hospitalization with recurrent syncope, her home Coreg was held due to initial bradycardia.  In addition, home  Hydralazine, Lisinopril, Maxzide, and Lasix were held given creatinine elevation of 1.33 on admission.   The dosages and medications on admission in comparison to her discharge from here to SNF on 12/5 after pneumonia admission do not match up completely. She was discharged back to SNF with Hydralazine 75mg TID, Coreg 25mg BID, and without lisinopril, Lasix, and HCTZ (sent to NH on this in November 2019 admission but not on this on 12/5 DC to NH).  It is unclear at present if she was discharged home on these medications or began taking old medications upon arrival home.     Given ongoing debility, SNF placement was discussed.  She did not wish to go to SNF longterm.    Consultants:   · Cardiology    Procedures:  · Implantable loop recorder placement on 1/6/2020  · EEG on  0/07/2020      Subjective:      Mrs. Martinez is resting in bed. She reports she doesn't sleep well in the hospital.  She reports she is unsure about being able to ambulate up and down her steps at home. She reports 7 steps going up and 7 steps going down.  She reports when she left the SNF she could ambulate some of the steps and then take a break in the middle.  She expresses she does not want to go to longterm SNF.      Discussed with AM ANGEL Ramos with no acute events overnight. Room location at the time of evaluation was 325.  ----------------------------------------------------------------------------------------------------------------------  Current Hospital Meds:    aspirin 81 mg Oral Daily   atorvastatin 10 mg Oral Nightly   cetirizine 5 mg Oral Daily   exemestane 25 mg Oral Daily   heparin (porcine) 5,000 Units Subcutaneous Q12H   hydrALAZINE 25 mg Oral Q8H   insulin aspart 0-7 Units Subcutaneous 4x Daily AC & at Bedtime   multivitamin 1 tablet Oral Daily   pantoprazole 40 mg Oral Daily   rOPINIRole 0.25 mg Oral Nightly   sodium chloride 10 mL Intravenous Q12H         ----------------------------------------------------------------------------------------------------------------------      Objective     Vital Signs:  Temp:  [97.1 °F (36.2 °C)-98.1 °F (36.7 °C)] 98.1 °F (36.7 °C)  Heart Rate:  [67-71] 68  Resp:  [20] 20  BP: (130-180)/(68-90) 140/68      01/06/20  0500 01/07/20  0500 01/08/20  0500   Weight: 64.4 kg (142 lb) 66.5 kg (146 lb 9.6 oz) 67.8 kg (149 lb 6.4 oz)     Body mass index is 25.63 kg/m².    Intake/Output Summary (Last 24 hours) at 1/8/2020 1006  Last data filed at 1/8/2020 0900  Gross per 24 hour   Intake 1920 ml   Output 2250 ml   Net -330 ml     I/O this shift:  In: 600 [P.O.:600]  Out: 550 [Urine:550]  Diet Regular; Cardiac, Consistent Carbohydrate  ----------------------------------------------------------------------------------------------------------------------    Physical Exam   Constitutional: She is oriented to person, place, and time and well-developed, well-nourished, and in no distress.   HENT:   Head: Normocephalic and atraumatic.   Eyes: Pupils are equal, round, and reactive to light. EOM are normal.   Black eyeliner in place on lower lids   Neck: Normal range of motion.   Cardiovascular: Normal rate and regular rhythm. Exam reveals no friction rub.   No murmur heard.  Pulmonary/Chest: Effort normal. No respiratory distress. She has no wheezes.   Abdominal: Soft. Bowel sounds are normal. She exhibits no distension.   Musculoskeletal: She exhibits no edema.   Neurological: She is alert and oriented to person, place, and time.   Skin: Skin is warm and dry.   Psychiatric: Affect and judgment normal.              ----------------------------------------------------------------------------------------------------------------------  Tele:  SR 70s-90s  ----------------------------------------------------------------------------------------------------------------------  Results from last 7 days   Lab Units 01/05/20  1248   TROPONIN T ng/mL <0.010    PROBNP pg/mL 126.7     Results from last 7 days   Lab Units 01/08/20 0457 01/07/20  0035 01/06/20  0841 01/05/20  1248   CRP mg/dL  --   --   --  0.04   LACTATE mmol/L  --   --   --  1.0   WBC 10*3/mm3 8.61 11.56* 19.29*  --    HEMOGLOBIN g/dL 10.3* 10.5* 11.4*  --    HEMATOCRIT % 31.3* 31.0* 33.3*  --    MCV fL 102.6* 101.6* 99.4*  --    MCHC g/dL 32.9 33.9 34.2  --    PLATELETS 10*3/mm3 288 280 324  --    INR   --   --   --  1.26*         Results from last 7 days   Lab Units 01/08/20 0457 01/07/20 0035 01/06/20  0841 01/05/20  1248   SODIUM mmol/L 139 134* 130* 138   POTASSIUM mmol/L 4.2 5.3* 4.6 3.4*   MAGNESIUM mg/dL  --  2.3 2.5* 1.3*   CHLORIDE mmol/L 108* 105 98 112*   CO2 mmol/L 19.5* 19.8* 18.1* 15.5*   BUN mg/dL 16 29* 29* 18   CREATININE mg/dL 0.70 1.46* 1.33* 0.92   EGFR IF NONAFRICN AM mL/min/1.73 81 35* 39* 59*   CALCIUM mg/dL 9.4 9.0 9.1 6.0*   GLUCOSE mg/dL 102* 122* 219* 131*   ALBUMIN g/dL  --  3.39* 3.72 2.56*   BILIRUBIN mg/dL  --  0.3 0.6 0.3   ALK PHOS U/L  --  93 101 72   AST (SGOT) U/L  --  20 22 17   ALT (SGPT) U/L  --  15 15 9   Estimated Creatinine Clearance: 56.6 mL/min (by C-G formula based on SCr of 0.7 mg/dL).  No results found for: AMMONIA      Blood Culture   Date Value Ref Range Status   01/05/2020 No growth at 2 days  Preliminary   01/05/2020 No growth at 2 days  Preliminary     Urine Culture   Date Value Ref Range Status   01/05/2020 50,000 CFU/mL Mixed Liss Isolated  Final     No results found for: WOUNDCX  No results found for: STOOLCX  ----------------------------------------------------------------------------------------------------------------------  Imaging Results (Last 24 Hours)     ** No results found for the last 24 hours. **        ----------------------------------------------------------------------------------------------------------------------   I have reviewed the above laboratory values for 01/08/20    Assessment/Plan     Active Hospital Problems     Diagnosis POA   • **Syncope [R55] Yes   • Hypokalemia [E87.6] Yes   • Hypomagnesemia [E83.42] Yes   • Macrocytosis [D75.89] Yes   • Hypoalbuminemia [E88.09] Yes   • Diastolic dysfunction [I51.89] Yes   • Infestation by bed bug [B88.8] Yes   • Former smoker [Z87.891] Not Applicable   • T2DM (type 2 diabetes mellitus) (CMS/HCC) [E11.9] Yes   • History of splenectomy [Z90.81] Not Applicable   • Dyslipidemia [E78.5] Yes     On atorvastatin.      • Essential hypertension [I10] Yes   • Breast cancer (right), status post radiation therapy in 08/2015 [C50.919] Yes   • AML s/p chemo and bone marrow transplant 2005 [C92.00] Yes         ASSESSMENT/PLAN:  · Syncope:  CT head unremarkable. MRI without acute source of episodes. Loop recorder insertion on 1/6 with outpatient follow-up with cardiology recommended 7-10 days following.  No further episodes at present.  Continue to monitor.  IV fluids discontinued this AM.     · Leukocytosis, improved:  Blood cultures with NGTD. CXR unremarkable.      · CADENCE, improved:  Continue to hold Meloxicam, lasix, HCTZ, and lisinopril. IV fluids stopped.     · Uncontrolled hypertension: Hydralazine 25mg TID added.  Will hold on other home antihypertensives at present.  Discussed with attending physician.     · DM2, NID:  FSBG ACHS.  SSI PRN.  Hgb a1c 6.20.     · Hyperlipidemia:   Continue atorvastatin.     · History of right-side breast cancer s/p radiation:  Continue Aromasin.       · Ongoing debility:  Inpatient rehab consultation placed.  Bedbug isolation removed.     · History of AML s/p chemotherapy and bone transplant int he past:  Supportive care.       -----------  -DVT prophylaxis: SQ heparin  -GI prophylaxis: PPI  -Activity: Up with assist  -Disposition: Home vs. Inpatient rehab  -Diet: Cardiac/Consistent carb        The patient is high risk due to the following diagnoses/reasons:  Recurrent syncope, CADENCE, ongoign debility, uncontrolled hypertension    Aurora Ulloa,  ANN MARIE  01/08/20  10:06 AM  Pager # [phone]

## 2020-01-08 NOTE — THERAPY TREATMENT NOTE
Acute Care - Physical Therapy Treatment Note   [place]     Patient Name: Tahmina Martinez  : 1945  MRN: [id number]  Today's Date: 2020  Onset of Illness/Injury or Date of Surgery: 20(admit date)  Date of Referral to PT: 20  Referring Physician: Manny    Admit Date: 2020    Visit Dx:    ICD-10-CM ICD-9-CM   1. Syncope, unspecified syncope type R55 780.2   2. Hypocalcemia E83.51 275.41   3. 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    • Breast cancer (right), status post radiation therapy in 2015   • Dyslipidemia   • Bifascicular block (RBBB plus LP FB)   • History of splenectomy   • First degree AV block   • T2DM (type 2 diabetes mellitus) (CMS/HCC)   • Acute kidney injury (CMS/HCC)   • Syncope   • Former smoker   • Hypokalemia   • Hypomagnesemia   • Macrocytosis   • Hypoalbuminemia   • Diastolic dysfunction   • Infestation by bed bug       Therapy Treatment    Rehabilitation Treatment Summary     Row Name 20 1516             Treatment Time/Intention    Discipline  physical therapist  -CT      Document Type  therapy note (daily note)  -CT      Subjective Information  no complaints  -CT      Mode of Treatment  physical therapy  -CT      Therapy Frequency (PT Clinical Impression)  3 times/wk 3-5 times/wk   -CT      Patient Effort  good  -CT      Comment  Pt tolerated treatment session well with rest breaks provided as needed. Pt able to go up/down stairs with 1 right handrail but needed Min A. Pt is schedule to return home this date but PT recommendations are for 24/7 care as well as pt to have assist with stairs.   -CT      Existing Precautions/Restrictions  fall  -CT      Recorded by [CT] Nakia Stone, PT 20 1531      Row Name 20 1516             Cognitive Assessment/Intervention- PT/OT    Orientation Status (Cognition)  oriented x 4  -CT      Follows Commands  (Cognition)  follows multi-step commands  -CT      Recorded by [CT] Nakia Stone, PT 01/08/20 1531      Row Name 01/08/20 1516             Safety Issues, Functional Mobility    Impairments Affecting Function (Mobility)  endurance/activity tolerance;strength  -CT      Recorded by [CT] Nakia Stone, PT 01/08/20 1531      Row Name 01/08/20 1516             Bed Mobility Assessment/Treatment    Bed Mobility Assessment/Treatment  bed mobility (all) activities  -CT      [place]ren Level (Bed Mobility)  supervision  -CT      Assistive Device (Bed Mobility)  bed rails  -CT      Recorded by [CT] Nakia Stone, PT 01/08/20 1531      Row Name 01/08/20 1516             Transfer Assessment/Treatment    Transfer Assessment/Treatment  sit-stand transfer;stand-sit transfer  -CT      Recorded by [CT] Nakia Stone, PT 01/08/20 1531      Row Name 01/08/20 1516             Sit-Stand Transfer    Sit-Stand [place] (Transfers)  contact guard  -CT      Assistive Device (Sit-Stand Transfers)  walker, front-wheeled  -CT      Recorded by [CT] Nakia Stone, PT 01/08/20 1531      Row Name 01/08/20 1516             Stand-Sit Transfer    Stand-Sit [place] (Transfers)  contact guard  -CT      Assistive Device (Stand-Sit Transfers)  walker, front-wheeled  -CT      Recorded by [CT] Nakia Stone, PT 01/08/20 1531      Row Name 01/08/20 1516             Gait/Stairs Assessment/Training    Warren Level (Gait)  contact guard  -CT      Assistive Device (Gait)  walker, front-wheeled  -CT      Distance in Feet (Gait)  100  -CT      Pattern (Gait)  step-through  -CT      Deviations/Abnormal Patterns (Gait)  gait speed decreased  -CT      Bilateral Gait Deviations  forward flexed posture  -CT      Negotiation (Stairs)  stairs independence;stairs assistive device;handrail location;number of steps  -CT      [place] Level (Stairs)  minimum assist (75% patient effort)  -CT      Assistive Device (Stairs)  walker,  front-wheeled  -CT      Handrail Location (Stairs)  right side (ascending)  -CT      Number of Steps (Stairs)  12  -CT      Ascending Technique (Stairs)  step-to-step  -CT      Descending Technique (Stairs)  step-to-step  -CT      Recorded by [CT] Chase Nakia, PT 01/08/20 1531      Row Name 01/08/20 1516             Outcome Summary/Treatment Plan (PT)    Daily Summary of Progress (PT)  progress toward functional goals is good  -CT      Anticipated Equipment Needs at Discharge (PT)  front wheeled walker  -CT      Anticipated Discharge Disposition (PT)  skilled nursing facility;home with 24/7 care  -CT      Recorded by [CT] Nakia Stone, PT 01/08/20 1531        User Key  (r) = Recorded By, (t) = Taken By, (c) = Cosigned By    Initials Name Effective Dates Discipline    CT Nakia Stone, PT 04/03/18 -  PT                       PT Recommendation and Plan  Anticipated Discharge Disposition (PT): skilled nursing facility, home with 24/7 care  Planned Therapy Interventions (PT Eval): balance training, bed mobility training, gait training, home exercise program, manual therapy techniques, motor coordination training, neuromuscular re-education, patient/family education, postural re-education, stair training, strengthening, transfer training  Therapy Frequency (PT Clinical Impression): 3 times/wk(3-5 times/wk )  Outcome Summary/Treatment Plan (PT)  Daily Summary of Progress (PT): progress toward functional goals is good  Anticipated Equipment Needs at Discharge (PT): front wheeled walker  Anticipated Discharge Disposition (PT): skilled nursing facility, home with 24/7 care  Plan of Care Reviewed With: patient     Time Calculation:   PT Charges     Row Name 01/08/20 1531             Time Calculation    PT Received On  01/08/20  -CT      PT Goal Re-Cert Due Date  01/20/20  -CT         Time Calculation- PT    Total Timed Code Minutes- PT  40 minute(s)  -CT        User Key  (r) = Recorded By, (t) = Taken By, (c) =  Cosigned By    Initials Name Provider Type    CT Nakia Stone, PT Physical Therapist        Therapy Charges for Today     Code Description Service Date Service Provider Modifiers Qty    92432264200 HC GAIT TRAINING EA 15 MIN 1/7/2020 Nakia Stone, PT GP 1    [id number]  PT THERAPEUTIC ACT EA 15 MIN 1/7/2020 Nakia Stone, PT GP 1    12782433139 HC GAIT TRAINING EA 15 MIN 1/8/2020 Nakia Stone, PT GP 1    [id number]  PT THERAPEUTIC ACT EA 15 MIN 1/8/2020 Nakia Stone, PT GP 2               Nakia Stone, PT  1/8/2020

## 2020-01-08 NOTE — DISCHARGE SUMMARY
[facility] Medicine Services  DISCHARGE SUMMARY    Date of Admission: 1/5/2020    Date of Discharge:  1/8/2020    PCP: Noe Bower MD    Discharging Provider: Aurora Ulloa PA-C  Attending Physician on day of DC: Dr. 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  She was admitted to this facility on 1/5/2020 secondary to a syncopal episode sustained at her home.  She was recently discharged from SNF on 12/20/19 where she was reportedly doing well.  She was sent to SNF after hospitalizations with syncope and need for inpatient rehabilitation.  She reports she did well after discharge home until 1/5/20 when she had syncopal episode.  CT of head did not reveal acute intracranial abnormality.  She was admitted for further evaluation and treatment of CADENCE on CKD in addition to syncope and hypokalemia.       Hospital Course     Hospital Course  Tahmina Martinez was admitted as outlined in above HPI.     Given ongoing recurrent syncope, MRI of the brain was ordered and did not reveal acute intracranial abnormality  "but revealed chronic microangiopathic changes and increased signal in the mastoid air cells bilaterally.   EEG was performed and revealed mild intermittent generalized slowness without elliptiform activity seen.  Cardiology consultation was placed during hospitalization given recurrent syncope with Loop recorder placed on 1/6/2020 by Dr. Saunders with recommendation of follow-up in 7-10 days as an outpatient at the office of Dr. Soler.       During early hospitalization with recurrent syncope, her home Coreg was held due to initial bradycardia.  In addition, home Hydralazine, Lisinopril, Maxzide, and Lasix were held given creatinine elevation of 1.33 on admission.   The dosages and medications on admission in comparison to her discharge from here to SNF on 12/5 after pneumonia admission do not match up completely. She was discharged back to SNF with Hydralazine 75mg TID, Coreg 25mg BID, and without lisinopril, Lasix, and HCTZ (sent to NH on this in November 2019 admission but not on this on 12/5 DC to NH).  She reported she was taking the home medications from her discharge list from here on 12/5/19.      Given ongoing debility, SNF placement was discussed.  She did not wish to go to SNF long-term and ultimately wished to go home.  Inpatient rehabilitation was consulted.  She was not felt to be a candidate and was not sure if she would want to go.  She then expressed again a desire to go to the [facility], because \"there was always something to do there\".  She ultimately again later decided she wanted to return home with current DME.  She was counseled by multiple staff members regarding needing help in her home.     Her blood pressures were elevated on 1/8/2020 AM but improved with 50mg of Hydralazine oral. She was discharged home with home health. She declined SNF placement and was declined by inpatient rehab. She was counseled by multiple staff members.      Pertinent Laboratory and Radiology Results     Pertinent " Test Results:          Results from last 7 days   Lab Units 01/08/20 0457 01/07/20  0035 01/06/20  0841 01/05/20  1248 01/05/20  1239   WBC 10*3/mm3 8.61 11.56* 19.29*  --  6.77   HEMOGLOBIN g/dL 10.3* 10.5* 11.4*  --  14.2   HEMATOCRIT % 31.3* 31.0* 33.3*  --  42.8   PLATELETS 10*3/mm3 288 280 324  --  305   MCV fL 102.6* 101.6* 99.4*  --  104.6*   SODIUM mmol/L 139 134* 130* 138  --    POTASSIUM mmol/L 4.2 5.3* 4.6 3.4*  --    CHLORIDE mmol/L 108* 105 98 112*  --    CO2 mmol/L 19.5* 19.8* 18.1* 15.5*  --    BUN mg/dL 16 29* 29* 18  --    CREATININE mg/dL 0.70 1.46* 1.33* 0.92  --    GLUCOSE mg/dL 102* 122* 219* 131*  --    CALCIUM mg/dL 9.4 9.0 9.1 6.0*  --         Results from last 7 days   Lab Units 01/05/20  1248   TROPONIN T ng/mL <0.010   PROBNP pg/mL 126.7     Results from last 7 days   Lab Units 01/08/20 0457 01/07/20 0035 01/06/20  0841 01/05/20  1248 01/05/20  1239   CRP mg/dL  --   --   --  0.04  --    LACTATE mmol/L  --   --   --  1.0  --    WBC 10*3/mm3 8.61 11.56* 19.29*  --  6.77     Results from last 7 days   Lab Units 01/07/20 0035 01/06/20  0841 01/05/20  1248   BILIRUBIN mg/dL 0.3 0.6 0.3   ALK PHOS U/L 93 101 72   ALT (SGPT) U/L 15 15 9   AST (SGOT) U/L 20 22 17   PROTIME Seconds  --   --  16.4*   INR   --   --  1.26*           Invalid input(s): TG, LDLCALC, LDLREALC  Results from last 7 days   Lab Units 01/05/20  1248   TSH uIU/mL 1.350     Brief Urine Lab Results  (Last result in the past 365 days)      Color   Clarity   Blood   Leuk Est   Nitrite   Protein   CREAT   Urine HCG        01/05/20 2340 Yellow Clear Negative Trace Negative Negative                        Results for orders placed during the hospital encounter of 11/02/19   Transthoracic Echo Complete With Contrast if Necessary Per Protocol    Narrative · Left ventricular wall thickness is consistent with borderline concentric   hypertrophy.  · Left ventricular systolic function is normal.  · Estimated EF appears to be in the  range of 66 - 70%.  · Left ventricular diastolic dysfunction.  · Normal cardiac chamber dimensions  · Mild aortic valve regurgitation is present.  · Mild mitral valve regurgitation is present  · Mild tricuspid valve regurgitation is present. No evidence of pulmonary   hypertension is present  · There is no evidence of pericardial effusion           Order Current Status    Blood Culture - Blood, Arm, Left Preliminary result    Blood Culture - Blood, Arm, Right Preliminary result            ----------------------------------------------------------------------------------------------------------------------  Ct Head Without Contrast    Result Date: 1/5/2020  Atrophy and chronic small vessel ischemic change, but there are no acute intracranial abnormalities identified.  This report was finalized on 1/5/2020 12:44 PM by Dr. Abdias Weiner MD.      Ct Cervical Spine Without Contrast    Result Date: 1/5/2020  No acute fracture of the cervical spine.  This report was finalized on 1/5/2020 12:41 PM by Dr. Abdias Weiner MD.      Mri Brain With & Without Contrast    Result Date: 1/6/2020   1. Chronic microangiopathic changes. 2. Increased signal in the mastoid air cells bilaterally.  This report was finalized on 1/6/2020 4:10 PM by Dr. Sal Cheek MD.      Nm Lung Ventilation Perfusion    Result Date: 12/10/2019  Low probability of PE.  This report was finalized on 12/10/2019 3:51 PM by Dr. Abdias Weiner MD.      Xr Chest 1 View    Result Date: 1/5/2020  No radiographic evidence of acute cardiac or pulmonary disease.  This report was finalized on 1/5/2020 12:40 PM by Dr. Abdias Weiner MD.      Xr Pelvis 1 Or 2 View    Result Date: 1/6/2020  No acute bony abnormality  This report was finalized on 1/6/2020 4:37 PM by Dr. Sal Cheek MD.      Us Venous Doppler Lower Extremity Bilateral (duplex)    Result Date: 12/10/2019  No DVT. Mild soft tissue edema.  This report was finalized on 12/10/2019 2:41 PM by Dr. Brendan Orellana,  MD.      Xr Chest Pa & Lateral    Result Date: 1/6/2020  No radiographic evidence of acute cardiac or pulmonary disease.  This report was finalized on 1/6/2020 12:14 PM by Dr. Sal Cheek MD.          Microbiology Results (last 10 days)     Procedure Component Value - Date/Time    Urine Culture - Urine, Urine, Clean Catch [[id number]] Collected:  01/05/20 2340    Lab Status:  Final result Specimen:  Urine, Clean Catch Updated:  01/06/20 1602     Urine Culture 50,000 CFU/mL Mixed Kwasi Isolated    Narrative:       Specimen contains mixed organisms of questionable pathogenicity which indicates contamination with commensal kwasi.  Further identification is unlikely to provide clinically useful information.  Suggest recollection.    Influenza Antigen, Rapid - Swab, Nasopharynx [[id number]]  (Normal) Collected:  01/05/20 1320    Lab Status:  Final result Specimen:  Swab from Nasopharynx Updated:  01/05/20 1344     Influenza A Ag, EIA Negative     Influenza B Ag, EIA Negative    Blood Culture - Blood, Arm, Left [[id number]] Collected:  01/05/20 1248    Lab Status:  Preliminary result Specimen:  Blood from Arm, Left Updated:  01/08/20 1300     Blood Culture No growth at 3 days    Blood Culture - Blood, Arm, Right [[id number]] Collected:  01/05/20 1239    Lab Status:  Preliminary result Specimen:  Blood from Arm, Right Updated:  01/08/20 1245     Blood Culture No growth at 3 days          Labs above have been reviewed on the day of discharge.  Radiology images from prior 30 days were reviewed prior to discharge as incorporated into this document.     Discharge Vitals and Physical Examination       Vital Signs  Temp:  [97.1 °F (36.2 °C)-98.4 °F (36.9 °C)] 97.8 °F (36.6 °C)  Heart Rate:  [68-86] 86  Resp:  [18-20] 18  BP: (130-180)/() 149/68     PHYSICAL EXAMINATION:   Physical Exam   Constitutional: She is oriented to person, place, and time and well-developed, well-nourished, and in no distress.   HENT:   Head:  Normocephalic and atraumatic.   Eyes: Pupils are equal, round, and reactive to light. EOM are normal.   Neck: Normal range of motion. Neck supple.   Cardiovascular: Normal rate and regular rhythm.   No murmur heard.  Pulmonary/Chest: Effort normal. No respiratory distress. She has no wheezes. She has no rales.   Abdominal: Soft. She exhibits no distension. There is no tenderness.   Musculoskeletal: Normal range of motion. She exhibits no edema.   Neurological: She is alert and oriented to person, place, and time.   Skin: Skin is warm and dry. No rash noted. No erythema.   Psychiatric: Affect and judgment normal.         Discharge Disposition, Discharge Medications, and Discharge Appointments     Discharge Disposition:   home    Condition on Discharge:  Fair    Discharge Medications:     Discharge Medications      Changes to Medications      Instructions Start Date   furosemide 20 MG tablet  Commonly known as:  LASIX  What changed:    · when to take this  · reasons to take this   20 mg, Oral, Daily PRN      hydrALAZINE 50 MG tablet  Commonly known as:  APRESOLINE  What changed:    · medication strength  · how much to take  · when to take this   50 mg, Oral, Every 8 Hours Scheduled         Continue These Medications      Instructions Start Date   aspirin 81 MG EC tablet   81 mg, Oral, Daily      atorvastatin 10 MG tablet  Commonly known as:  LIPITOR   10 mg, Oral, Nightly      coenzyme Q10 100 MG capsule   100 mg, Oral, Daily      exemestane 25 MG chemo tablet  Commonly known as:  AROMASIN   25 mg, Oral, Daily      Loratadine 10 MG capsule   1 capsule, Oral, Daily      metFORMIN 500 MG tablet  Commonly known as:  GLUCOPHAGE   500 mg, Oral, Nightly PRN      metFORMIN 500 MG tablet  Commonly known as:  GLUCOPHAGE   500 mg, Oral, Every Morning      pantoprazole 40 MG EC tablet  Commonly known as:  PROTONIX   40 mg, Oral, Daily      rOPINIRole 0.25 MG tablet  Commonly known as:  REQUIP   0.25 mg, Oral, Nightly, Take 1  hour before bedtime.      VALTREX 1000 MG tablet  Generic drug:  valACYclovir   1,000 mg, Oral, Daily PRN      vitamin C 500 MG tablet  Commonly known as:  ASCORBIC ACID   500 mg, Oral, Daily      vitamin D 1.25 MG (14640 UT) capsule capsule  Commonly known as:  ERGOCALCIFEROL   50,000 Units, Oral, Every 14 Days      vitamin E 400 UNIT capsule   400 Units, Oral, Daily         Stop These Medications    carvedilol 12.5 MG tablet  Commonly known as:  COREG     lisinopril 40 MG tablet  Commonly known as:  PRINIVIL,ZESTRIL     meloxicam 7.5 MG tablet  Commonly known as:  MOBIC     triamterene-hydrochlorothiazide 37.5-25 MG per tablet  Commonly known as:  MAXZIDE-25            Discharged medication regimen discussed with attending physician prior to discharge.     Discharge Diet:   regular diet  Dietary Orders (From admission, onward)     Start     Ordered    01/06/20 1417  Diet Regular; Cardiac, Consistent Carbohydrate  Diet Effective Now     Question Answer Comment   Diet Texture / Consistency Regular    Common Modifiers Cardiac    Common Modifiers Consistent Carbohydrate        01/06/20 1416                Activity at Discharge:  activity as tolerated         Discharge Disposition:    Home or Self Care        Follow-up Appointments:  Your Scheduled Appointments    Jan 22, 2020  1:30 PM EST  Follow Up with Marcellus Araya PA-C  [facility] CARDIOLOG[facility] (--) [address] [place] [address]  [phone]   Arrive 15 minutes prior to appointment.     Feb 27, 2020  1:15 PM EST  Follow Up with Marcellus Araya PA-C  [facility] CARDIOLOG[facility] (--) [address] [place] [address]  [phone]   Arrive 15 minutes prior to appointment.     Mar 31, 2020  1:00 PM EDT  Follow Up with JENNY Hope  [facility] CARDIOLOG[facility] (--) [address] [place] [address]  [phone]   Arrive 15 minutes prior to appointment.      Additional instructions:      KERRY  1/17/2020 10 AMRAMÍREZ 1/22/2020 1:30 PM                  Additional Instructions for the Follow-ups that You Need to Schedule     Discharge Follow-up with PCP   As directed       Currently Documented PCP:    Noe Bower MD    PCP Phone Number:    [phone]     Follow Up Details:  Follow up with Dr. Bower in 1 week.         Discharge Follow-up with Specified Provider: Follow up with Dr. Soler in 7-10 days.   As directed      To:  Follow up with Dr. Soler in 7-10 days.         Referral to Home Health   As directed      Face to Face Visit Date:  1/8/2020    Follow-up provider for Plan of Care?:  I treated the patient in an acute care facility and will not continue treatment after discharge.    Follow-up provider:  NOE BOWER [[id number]]    Reason/Clinical Findings:  Generalized weakness, recurrent falls    Describe mobility limitations that make leaving home difficult:  Debility, recurrent falls    Nursing/Therapeutic Services Requested:  Physical Therapy Occupational Therapy    Frequency:  1 Week 1            Contact information for follow-up providers     Dashawn Soler MD. Schedule an appointment as soon as possible for a visit in 10 day(s).    Specialty:  Cardiology  Why:  Make appointment 10 days after discharge for follow-up of cardiac recorder implant  Contact information:  [address] [place] [address]  [phone]             Noe Bower MD .    Specialty:  Internal Medicine  Why:  Follow up with Dr. Bower in 1 week.  Contact information:  [address] [facility] [address]  [phone]                   Contact information for after-discharge care     Home Medical Care     [facility] .    Service:  Home Health Services  Contact information:  [address] [facility] [address] [phone]                             Additional Instructions for the Follow-ups that You Need to Schedule     Discharge Follow-up with PCP   As directed        Currently Documented PCP:    Brant Bower MD    PCP Phone Number:    [phone]     Follow Up Details:  Follow up with Dr. Bower in 1 week.         Discharge Follow-up with Specified Provider: Follow up with Dr. Soler in 7-10 days.   As directed      To:  Follow up with Dr. Soler in 7-10 days.         Referral to Home Health   As directed      Face to Face Visit Date:  1/8/2020    Follow-up provider for Plan of Care?:  I treated the patient in an acute care facility and will not continue treatment after discharge.    Follow-up provider:  BRANT BOWER [[id number]]    Reason/Clinical Findings:  Generalized weakness, recurrent falls    Describe mobility limitations that make leaving home difficult:  Debility, recurrent falls    Nursing/Therapeutic Services Requested:  Physical Therapy Occupational Therapy    Frequency:  1 Week 1               Test Results Pending at Discharge:     Order Current Status    Blood Culture - Blood, Arm, Left Preliminary result    Blood Culture - Blood, Arm, Right Preliminary result             Aurora Ulloa PA-C  [facility] Medicine Team  01/08/20  6:22 PM      Time: Greater than 30 minutes spent on this discharge.

## 2020-01-08 NOTE — PROGRESS NOTES
Discharge Planning Assessment   [place]     Patient Name: Tahmina Martinez  MRN: [id number]  Today's Date: 1/8/2020    Admit Date: 1/5/2020      Discharge Plan     Row Name 01/08/20 1708       Plan    Final Discharge Disposition Code  06 - home with home health care    Final Note  Pt is being discharged home on this day. [facility] receives [facility] [place]. SS sent updated information to [facility], and gave RN the number to call report. No other needs identified.     Row Name 01/08/20 1558           Home Medical Care - Selection Complete      Service Provider Request Status Selected Services Address Phone Number Fax Number    [facility] [address] , [facility] [id number] [phone]           Expected Discharge Date and Time     Expected Discharge Date Expected Discharge Time    Jan 8, 2020            LLOYD Vieira

## 2020-01-08 NOTE — PLAN OF CARE
Patient has no complaints of syncope so far this shift, primary team adjusting BP meds for better pressure control.  Will continue to monitor for any changes.

## 2020-01-08 NOTE — PROGRESS NOTES
Discharge Planning Assessment   [place]     Patient Name: Tahmina Martinez  MRN: [id number]  Today's Date: 1/8/2020    Admit Date: 1/5/2020        Discharge Plan     Row Name 01/08/20 1558       Plan    Plan  SS spoke with [facility] per Hedy on this date.  Pt is not a candidate for [facility].  SS spoke with pt regarding rehab denial.  Pt aware and agreeable.  Pt continues not to be agreeable to nursing home placement.  Pt requests to be discharged home with home health services.  SS will follow.           CHENG MalinW

## 2020-01-09 ENCOUNTER — READMISSION MANAGEMENT (OUTPATIENT)
Dept: CALL CENTER | Facility: HOSPITAL | Age: 75
End: 2020-01-09

## 2020-01-09 ENCOUNTER — TELEPHONE (OUTPATIENT)
Dept: CARDIOLOGY | Facility: CLINIC | Age: 75
End: 2020-01-09

## 2020-01-09 NOTE — TELEPHONE ENCOUNTER
Tatiana with home health called to ask for clarification on pt's hydralazine dose. [facility] d/c 1/8/20.      Tatiana  nurse:    [place]- [phone]  Office-[phone]

## 2020-01-10 LAB
BACTERIA SPEC AEROBE CULT: NORMAL
BACTERIA SPEC AEROBE CULT: NORMAL

## 2020-01-10 NOTE — OUTREACH NOTE
Prep Survey      Responses   Facility patient discharged from?  [facility]   Is patient eligible?  Yes   Discharge diagnosis  Syncope   Does the patient have one of the following disease processes/diagnoses(primary or secondary)?  Other   Does the patient have Home health ordered?  Yes   What is the Home health agency?   [facility]   Is there a DME ordered?  No   Prep survey completed?  Yes          Mili Menchaca RN

## 2020-01-13 ENCOUNTER — READMISSION MANAGEMENT (OUTPATIENT)
Dept: CALL CENTER | Facility: HOSPITAL | Age: 75
End: 2020-01-13

## 2020-01-13 NOTE — OUTREACH NOTE
Medical Week 1 Survey      Responses   Facility patient discharged from?  [facility]   Does the patient have one of the following disease processes/diagnoses(primary or secondary)?  Other   Is there a successful TCM telephone encounter documented?  No   Week 1 attempt successful?  Yes   Call start time  1514   Call end time  1520   Discharge diagnosis  syncope    Is patient permission given to speak with other caregiver?  Yes   List who call center can speak with  Bala Martinez, spouse    Meds reviewed with patient/caregiver?  Yes   Is the patient having any side effects they believe may be caused by any medication additions or changes?  No   Does the patient have all medications ordered at discharge?  Yes   Is the patient taking all medications as directed (includes completed medication regime)?  Yes   Does the patient have a primary care provider?   Yes   Does the patient have an appointment with their PCP within 7 days of discharge?  Greater than 7 days   Comments regarding PCP  Changed her appointment because she had a  to go to.    What is preventing the patient from scheduling follow up appointments within 7 days of discharge?  Earlier appointment not available   Has the patient kept scheduled appointments due by today?  N/A   What is the Home health agency?   [facility]   Has home health visited the patient within 72 hours of discharge?  Yes   Psychosocial issues?  No   Did the patient receive a copy of their discharge instructions?  Yes   Nursing interventions  Reviewed instructions with patient   What is the patient's perception of their health status since discharge?  Improving   Is the patient/caregiver able to teach back signs and symptoms related to disease process for when to call PCP?  Yes   Is the patient/caregiver able to teach back signs and symptoms related to disease process for when to call 911?  Yes   Is the patient/caregiver able to teach back the hierarchy of who to call/visit for  symptoms/problems? PCP, Specialist, Home health nurse, Urgent Care, ED, 911  Yes   Additional teach back comments  Not a smoker.     Week 1 call completed?  Yes          Ursula Stacy RN

## 2020-01-21 ENCOUNTER — READMISSION MANAGEMENT (OUTPATIENT)
Dept: CALL CENTER | Facility: HOSPITAL | Age: 75
End: 2020-01-21

## 2020-01-21 NOTE — OUTREACH NOTE
Medical Week 2 Survey      Responses   Facility patient discharged from?  [facility]   Does the patient have one of the following disease processes/diagnoses(primary or secondary)?  Other   Week 2 attempt successful?  Yes   Call start time  1138   Call end time  1145   Meds reviewed with patient/caregiver?  Yes   Is the patient taking all medications as directed (includes completed medication regime)?  Yes   Comments regarding appointments  Pt states her APRN is coming on Thursday.   Does the patient have a primary care provider?   Yes   Has the patient kept scheduled appointments due by today?  Yes   What is the Home health agency?   [facility]   What is the patient's perception of their health status since discharge?  Improving   Additional teach back comments  Pt reports doing ok. She is very anxious and is a bit diffiult to follow. She is participating in PT and HH.   Week 2 Call Completed?  Yes          Kathy Giron RN

## 2020-01-22 ENCOUNTER — OFFICE VISIT (OUTPATIENT)
Dept: CARDIOLOGY | Facility: CLINIC | Age: 75
End: 2020-01-22

## 2020-01-22 VITALS
HEIGHT: 64 IN | DIASTOLIC BLOOD PRESSURE: 80 MMHG | SYSTOLIC BLOOD PRESSURE: 158 MMHG | BODY MASS INDEX: 26.46 KG/M2 | HEART RATE: 79 BPM | RESPIRATION RATE: 16 BRPM | WEIGHT: 155 LBS

## 2020-01-22 DIAGNOSIS — R55 SYNCOPE, UNSPECIFIED SYNCOPE TYPE: ICD-10-CM

## 2020-01-22 DIAGNOSIS — E78.5 DYSLIPIDEMIA: ICD-10-CM

## 2020-01-22 DIAGNOSIS — I21.4 NON-STEMI (NON-ST ELEVATED MYOCARDIAL INFARCTION) (HCC): ICD-10-CM

## 2020-01-22 DIAGNOSIS — I51.89 DIASTOLIC DYSFUNCTION: Chronic | ICD-10-CM

## 2020-01-22 DIAGNOSIS — I10 ESSENTIAL HYPERTENSION: Primary | Chronic | ICD-10-CM

## 2020-01-22 PROCEDURE — 99213 OFFICE O/P EST LOW 20 MIN: CPT | Performed by: PHYSICIAN ASSISTANT

## 2020-01-22 RX ORDER — ASPIRIN 81 MG/1
81 TABLET ORAL DAILY
Qty: 90 TABLET | Refills: 3 | Status: SHIPPED | OUTPATIENT
Start: 2020-01-22

## 2020-01-22 RX ORDER — FUROSEMIDE 20 MG/1
20 TABLET ORAL DAILY PRN
Qty: 30 TABLET | Refills: 2 | Status: SHIPPED | OUTPATIENT
Start: 2020-01-22 | End: 2020-04-01 | Stop reason: SDUPTHER

## 2020-01-22 RX ORDER — HYDRALAZINE HYDROCHLORIDE 50 MG/1
50 TABLET, FILM COATED ORAL EVERY 8 HOURS SCHEDULED
Qty: 90 TABLET | Refills: 2 | Status: SHIPPED | OUTPATIENT
Start: 2020-01-22 | End: 2020-04-01 | Stop reason: SDUPTHER

## 2020-01-22 RX ORDER — ATORVASTATIN CALCIUM 10 MG/1
10 TABLET, FILM COATED ORAL NIGHTLY
Qty: 90 TABLET | Refills: 2 | Status: SHIPPED | OUTPATIENT
Start: 2020-01-22 | End: 2020-02-25 | Stop reason: ALTCHOICE

## 2020-01-22 RX ORDER — HYDRALAZINE HYDROCHLORIDE 50 MG/1
50 TABLET, FILM COATED ORAL EVERY 8 HOURS SCHEDULED
Qty: 90 TABLET | Refills: 2 | Status: SHIPPED | OUTPATIENT
Start: 2020-01-22 | End: 2020-01-22 | Stop reason: SDUPTHER

## 2020-01-22 NOTE — PROGRESS NOTES
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 female with a past medical history significant for non-ST elevation infarction and nonobstructive coronary artery disease, she comes in today for hospital follow-up.     Patient was recently hospitalized due to syncope.  During her stay at the cause of her syncope was not identified.  She did have an EEG performed which was unremarkable and was even given the loop recorder but has not revealed any dysrhythmias thus far.  She does report since being discharged from the hospital she has been doing well without any symptoms.  She denies any chest pains, shortness of breath, any further episodes of syncope or near syncope.  She also denies dizziness or palpitations.  Her blood pressure is mildly elevated today however she admits that she has been taking hydralazine incorrectly only twice a day rather than 3 times daily as prescribed.    Allergies   Allergen Reactions   • Latex    • Penicillins    • Pork-Derived Products GI Intolerance   • Zithromax [Azithromycin]    :      Current Outpatient Medications:   •  aspirin 81 MG EC tablet, Take 1 tablet by mouth Daily., Disp: 90 tablet, Rfl: 3  •  atorvastatin (LIPITOR) 10 MG tablet, Take 1 tablet by  mouth Every Night., Disp: 90 tablet, Rfl: 2  •  coenzyme Q10 100 MG capsule, Take 100 mg by mouth Daily., Disp: , Rfl:   •  exemestane (AROMASIN) 25 MG chemo tablet, Take 25 mg by mouth Daily., Disp: , Rfl:   •  furosemide (LASIX) 20 MG tablet, Take 1 tablet by mouth Daily As Needed (Increased swelling or shortness of breath)., Disp: 30 tablet, Rfl: 2  •  hydrALAZINE (APRESOLINE) 50 MG tablet, Take 1 tablet by mouth Every 8 (Eight) Hours., Disp: 90 tablet, Rfl: 2  •  Loratadine 10 MG capsule, Take 1 capsule by mouth Daily., Disp: , Rfl:   •  metFORMIN (GLUCOPHAGE) 500 MG tablet, Take 500 mg by mouth Every Morning., Disp: , Rfl:   •  metFORMIN (GLUCOPHAGE) 500 MG tablet, Take 500 mg by mouth At Night As Needed (If BS higher than 200)., Disp: , Rfl:   •  pantoprazole (PROTONIX) 40 MG EC tablet, Take 40 mg by mouth Daily., Disp: , Rfl:   •  rOPINIRole (REQUIP) 0.25 MG tablet, Take 0.25 mg by mouth Every Night. Take 1 hour before bedtime., Disp: , Rfl:   •  valACYclovir (VALTREX) 1000 MG tablet, Take 1,000 mg by mouth Daily As Needed (Shingles or fever blister)., Disp: , Rfl:   •  vitamin C (ASCORBIC ACID) 500 MG tablet, Take 500 mg by mouth Daily., Disp: , Rfl:   •  vitamin D (ERGOCALCIFEROL) 07854 UNITS capsule capsule, Take 50,000 Units by mouth Every 14 (Fourteen) Days., Disp: , Rfl:   •  vitamin E 400 UNIT capsule, Take 400 Units by mouth Daily., Disp: , Rfl:     The following portions of the patient's history were reviewed and updated as appropriate: allergies, current medications, past family history, past medical history, past social history, past surgical history and problem list.    Social History     Tobacco Use   • Smoking status: Former Smoker     Types: Cigarettes   • Smokeless tobacco: Never Used   • Tobacco comment: Patient states she smoked 3 cigarettes a day for 1 year as a teenager    Substance Use Topics   • Alcohol use: No   • Drug use: No       Review of Systems   Constitution: Negative for  "malaise/fatigue.   Cardiovascular: Negative for chest pain, dyspnea on exertion and irregular heartbeat.   Respiratory: Negative for cough and shortness of breath.    Hematologic/Lymphatic: Negative for bleeding problem. Does not bruise/bleed easily.   Gastrointestinal: Negative for nausea and vomiting.   Neurological: Negative for weakness.       Objective   Vitals:    01/22/20 1307   BP: 158/80   BP Location: Left arm   Pulse: 79   Resp: 16   Weight: 70.3 kg (155 lb)   Height: 162.6 cm (64.02\")     Body mass index is 26.59 kg/m².    Physical Exam   Constitutional: She is oriented to person, place, and time. She appears well-developed and well-nourished. No distress.   HENT:   Head: Normocephalic and atraumatic.   Cardiovascular: Normal rate, regular rhythm and normal heart sounds.   Pulmonary/Chest: Effort normal and breath sounds normal. No respiratory distress.   Musculoskeletal: She exhibits edema ( 1+ pedal edema bilaterally. ).   Neurological: She is alert and oriented to person, place, and time.   Skin: She is not diaphoretic.       Lab Results   Component Value Date     01/08/2020    K 4.2 01/08/2020     (H) 01/08/2020    CO2 19.5 (L) 01/08/2020    BUN 16 01/08/2020    CREATININE 0.70 01/08/2020    GLUCOSE 102 (H) 01/08/2020    CALCIUM 9.4 01/08/2020    AST 20 01/07/2020    ALT 15 01/07/2020    ALKPHOS 93 01/07/2020    LABIL2 1.4 (L) 12/20/2014     No results found for: CKTOTAL  Lab Results   Component Value Date    WBC 8.61 01/08/2020    HGB 10.3 (L) 01/08/2020    HCT 31.3 (L) 01/08/2020     01/08/2020     Lab Results   Component Value Date    INR 1.26 (H) 01/05/2020    INR 0.91 11/27/2019    INR 0.97 11/02/2019     Lab Results   Component Value Date    MG 2.3 01/07/2020     Lab Results   Component Value Date    TSH 1.350 01/05/2020    TRIG 104 11/03/2019    HDL 57 11/03/2019    LDL 64 11/03/2019      No results found for: BNP    During this visit the following were done:  Labs Reviewed " [x]    Labs Ordered []    Radiology Reports Reviewed [x]    Radiology Ordered []    PCP Records Reviewed []    Referring Provider Records Reviewed []    ER Records Reviewed []    Hospital Records Reviewed []    History Obtained From Family []    Radiology Images Reviewed []    Other Reviewed []    Records Requested []       Procedures    Assessment/Plan    Diagnosis Plan   1. Essential hypertension  atorvastatin (LIPITOR) 10 MG tablet   2. Diastolic dysfunction     3. History of Non-STEMI (non-ST elevated myocardial infarction) with no coronary intervention needed in 2008, clinically stable.   atorvastatin (LIPITOR) 10 MG tablet   4. Dyslipidemia  atorvastatin (LIPITOR) 10 MG tablet   5. Syncope, unspecified syncope type              Recommendations:  1. Overall patient seems to be doing well from cardiac standpoint. We will monitor her loop recorder for any possible dysrhythmias.  I did suggest for her to undergo tilt table test however she declined to have this done at this time.  2. Since she has not been taking hydralazine correctly would not increase antihypertensives at this time but I did educate her on taking this medication 3 times daily.  She expressed understanding.      Return in about 3 months (around 4/22/2020).    As always, I appreciate very much the opportunity to participate in the cardiovascular care of your patients.      With Best Regards,    Marcellus Araya PA-C

## 2020-01-24 ENCOUNTER — TELEPHONE (OUTPATIENT)
Dept: CARDIOLOGY | Facility: CLINIC | Age: 75
End: 2020-01-24

## 2020-01-24 NOTE — TELEPHONE ENCOUNTER
Tatiana called and wanted to update up on her BP has been ranging in 160's and 170's on top in the 90's on the bottom. She has some headaches when her BP goes up.   Some BP readings are 130/75 and 184/90

## 2020-01-27 ENCOUNTER — TRANSCRIBE ORDERS (OUTPATIENT)
Dept: ADMINISTRATIVE | Facility: HOSPITAL | Age: 75
End: 2020-01-27

## 2020-01-27 DIAGNOSIS — R55 SYNCOPE AND COLLAPSE: Primary | ICD-10-CM

## 2020-01-28 ENCOUNTER — HOSPITAL ENCOUNTER (OUTPATIENT)
Dept: CARDIOLOGY | Facility: HOSPITAL | Age: 75
Discharge: HOME | End: 2020-01-28
Admitting: INTERNAL MEDICINE

## 2020-01-28 DIAGNOSIS — R55 SYNCOPE AND COLLAPSE: ICD-10-CM

## 2020-01-28 PROCEDURE — 93880 EXTRACRANIAL BILAT STUDY: CPT

## 2020-01-28 PROCEDURE — 93880 EXTRACRANIAL BILAT STUDY: CPT | Performed by: RADIOLOGY

## 2020-01-29 ENCOUNTER — READMISSION MANAGEMENT (OUTPATIENT)
Dept: CALL CENTER | Facility: HOSPITAL | Age: 75
End: 2020-01-29

## 2020-01-29 NOTE — OUTREACH NOTE
Medical Week 3 Survey      Responses   Facility patient discharged from?  [facility]   Does the patient have one of the following disease processes/diagnoses(primary or secondary)?  Other   Week 3 attempt successful?  Yes   Call start time  1612   Call end time  1617   Discharge diagnosis  syncope    Meds reviewed with patient/caregiver?  Yes   Is the patient taking all medications as directed (includes completed medication regime)?  Yes   Medication comments  Hydralazine 75 mg--b/p med adjusted   Has the patient kept scheduled appointments due by today?  Yes   What is the patient's perception of their health status since discharge?  Improving   Is the patient/caregiver able to teach back signs and symptoms related to disease process for when to call PCP?  Yes   Is the patient/caregiver able to teach back signs and symptoms related to disease process for when to call 911?  Yes   Is the patient/caregiver able to teach back the hierarchy of who to call/visit for symptoms/problems? PCP, Specialist, Home health nurse, Urgent Care, ED, 911  Yes   Additional teach back comments  Pt doing well --her b/p is still a bit high. Hydralazine has been adjusted and she will continue to record b/p values    Week 3 Call Completed?  Yes          Migdalia Porter RN

## 2020-02-06 ENCOUNTER — EPISODE CHANGES (OUTPATIENT)
Dept: CASE MANAGEMENT | Facility: OTHER | Age: 75
End: 2020-02-06

## 2020-02-06 ENCOUNTER — READMISSION MANAGEMENT (OUTPATIENT)
Dept: CALL CENTER | Facility: HOSPITAL | Age: 75
End: 2020-02-06

## 2020-02-06 NOTE — OUTREACH NOTE
Medical Week 4 Survey      Responses   Facility patient discharged from?  [facility]   Does the patient have one of the following disease processes/diagnoses(primary or secondary)?  Other   Week 4 attempt successful?  Yes   Call start time  1753   Call end time  1757   Discharge diagnosis  syncope    Meds reviewed with patient/caregiver?  Yes   Is the patient having any side effects they believe may be caused by any medication additions or changes?  No   Is the patient taking all medications as directed (includes completed medication regime)?  Yes   Has the patient kept scheduled appointments due by today?  Yes   Is the patient still receiving Home Health Services?  Yes   Home health comments  Home Health is seeing.    Psychosocial issues?  No   What is the patient's perception of their health status since discharge?  Improving   Is the patient/caregiver able to teach back signs and symptoms related to disease process for when to call PCP?  Yes   Is the patient/caregiver able to teach back signs and symptoms related to disease process for when to call 911?  Yes   Is the patient/caregiver able to teach back the hierarchy of who to call/visit for symptoms/problems? PCP, Specialist, Home health nurse, Urgent Care, ED, 911  Yes   Additional teach back comments  Pt doing well --her b/p is still a bit high. Hydralazine has been adjusted and she will continue to record b/p values    Week 4 Call Completed?  Yes   Would the patient like one additional call?  No          Ursula Stacy RN

## 2020-02-13 ENCOUNTER — CLINICAL SUPPORT (OUTPATIENT)
Dept: CARDIOLOGY | Facility: CLINIC | Age: 75
End: 2020-02-13

## 2020-02-13 DIAGNOSIS — R55 SYNCOPE AND COLLAPSE: Primary | ICD-10-CM

## 2020-02-13 PROCEDURE — 93291 INTERROG DEV EVAL SCRMS IP: CPT | Performed by: INTERNAL MEDICINE

## 2020-02-25 ENCOUNTER — OFFICE VISIT (OUTPATIENT)
Dept: CARDIOLOGY | Facility: CLINIC | Age: 75
End: 2020-02-25

## 2020-02-25 VITALS
DIASTOLIC BLOOD PRESSURE: 72 MMHG | HEIGHT: 63 IN | RESPIRATION RATE: 16 BRPM | BODY MASS INDEX: 26.44 KG/M2 | WEIGHT: 149.2 LBS | HEART RATE: 73 BPM | SYSTOLIC BLOOD PRESSURE: 157 MMHG

## 2020-02-25 DIAGNOSIS — I10 ESSENTIAL HYPERTENSION: Primary | ICD-10-CM

## 2020-02-25 DIAGNOSIS — I48.0 PAROXYSMAL ATRIAL FIBRILLATION (HCC): ICD-10-CM

## 2020-02-25 DIAGNOSIS — E78.5 DYSLIPIDEMIA: ICD-10-CM

## 2020-02-25 DIAGNOSIS — R55 SYNCOPE AND COLLAPSE: ICD-10-CM

## 2020-02-25 DIAGNOSIS — I21.4 NON-STEMI (NON-ST ELEVATED MYOCARDIAL INFARCTION) (HCC): ICD-10-CM

## 2020-02-25 PROCEDURE — 99214 OFFICE O/P EST MOD 30 MIN: CPT | Performed by: NURSE PRACTITIONER

## 2020-02-25 NOTE — PROGRESS NOTES
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   • Breast cancer (right), status post radiation therapy in 08/2015   • Dyslipidemia   • Bifascicular block (RBBB plus LP FB)   • History of splenectomy   • First degree AV block   • T2DM (type 2 diabetes mellitus) (CMS/HCC)   • Acute kidney injury (CMS/HCC)   • Syncope   • Former smoker   • Hypokalemia   • Hypomagnesemia   • Macrocytosis   • Hypoalbuminemia   • Diastolic dysfunction   • Infestation by bed bug       Dear Noe Bower MD:    Subjective     Chief Complaint   Patient presents with   • Syncope     loop recorder eval   • Shortness of Breath     mod exertion   • Med Management     list provided   • Follow-up     loop recorder readings           History of Present Illness:    Tahmina Martinez is a 74 y.o. female with a history of non-ST elevation myocardial infarction with subsequent nonobstructive coronary artery disease, hypertension, and recent episodes of syncope for which she has been hospitalized multiple times.  Ultimately, a loop recorder was placed for this reason.  The patient presents today for routine cardiology follow-up.  Since loop recorder placement, she has had no syncopal episodes.  However, she does report a rapid heartbeat and some near syncope.  She reports her blood pressure has been markedly elevated at times with readings around 200 systolic and 120 diastolic.  She has only been taking the hydralazine as prescribed.  Recently, a loop recorder interrogation revealed episodes of atrial fibrillation with rapid ventricular rates.  She denies any bleeding issues.  She is chronically anemic but hemoglobin has been stable.          Allergies   Allergen Reactions   • Latex    • Penicillins    • Pork-Derived Products GI Intolerance   • Zithromax [Azithromycin]    :      Current Outpatient Medications:   •   aspirin 81 MG EC tablet, Take 1 tablet by mouth Daily., Disp: 90 tablet, Rfl: 3  •  coenzyme Q10 100 MG capsule, Take 100 mg by mouth Daily., Disp: , Rfl:   •  exemestane (AROMASIN) 25 MG chemo tablet, Take 25 mg by mouth Daily., Disp: , Rfl:   •  furosemide (LASIX) 20 MG tablet, Take 1 tablet by mouth Daily As Needed (Increased swelling or shortness of breath)., Disp: 30 tablet, Rfl: 2  •  hydrALAZINE (APRESOLINE) 50 MG tablet, Take 1 tablet by mouth Every 8 (Eight) Hours., Disp: 90 tablet, Rfl: 2  •  pantoprazole (PROTONIX) 40 MG EC tablet, Take 40 mg by mouth Daily., Disp: , Rfl:   •  rOPINIRole (REQUIP) 0.25 MG tablet, Take 0.25 mg by mouth Every Night. Take 1 hour before bedtime., Disp: , Rfl:   •  valACYclovir (VALTREX) 1000 MG tablet, Take 1,000 mg by mouth Daily As Needed (Shingles or fever blister)., Disp: , Rfl:   •  vitamin C (ASCORBIC ACID) 500 MG tablet, Take 500 mg by mouth Daily., Disp: , Rfl:   •  vitamin D (ERGOCALCIFEROL) 36399 UNITS capsule capsule, Take 50,000 Units by mouth Every 14 (Fourteen) Days., Disp: , Rfl:   •  vitamin E 400 UNIT capsule, Take 400 Units by mouth Daily., Disp: , Rfl:   •  apixaban (ELIQUIS) 5 MG tablet tablet, Take 1 tablet by mouth Every 12 (Twelve) Hours., Disp: 60 tablet, Rfl: 5  •  metoprolol tartrate (LOPRESSOR) 25 MG tablet, Take 1 tablet by mouth 2 (Two) Times a Day., Disp: 60 tablet, Rfl: 5      The following portions of the patient's history were reviewed and updated as appropriate: allergies, current medications, past family history, past medical history, past social history, past surgical history and problem list.    Social History     Tobacco Use   • Smoking status: Former Smoker     Types: Cigarettes   • Smokeless tobacco: Never Used   • Tobacco comment: Patient states she smoked 3 cigarettes a day for 1 year as a teenager    Substance Use Topics   • Alcohol use: No   • Drug use: No       Review of Systems   Constitution: Negative for malaise/fatigue.  "  Cardiovascular: Positive for dyspnea on exertion and syncope. Negative for chest pain, irregular heartbeat, leg swelling, near-syncope, orthopnea, palpitations and paroxysmal nocturnal dyspnea.   Respiratory: Negative for cough, shortness of breath and wheezing.    Neurological: Negative for dizziness, light-headedness and weakness.       Objective   Vitals:    02/25/20 1329   BP: 157/72   Pulse: 73   Resp: 16   Weight: 67.7 kg (149 lb 3.2 oz)   Height: 160 cm (63\")     Body mass index is 26.43 kg/m².        Physical Exam   Constitutional: She is oriented to person, place, and time. She appears well-developed and well-nourished.   HENT:   Head: Normocephalic and atraumatic.   Neck: No JVD present.   Cardiovascular: Normal rate, regular rhythm and normal heart sounds. Exam reveals no S3 and no S4.   No murmur heard.  Pulmonary/Chest: Effort normal and breath sounds normal. She has no wheezes. She has no rales.   Abdominal: Soft. Bowel sounds are normal.   Musculoskeletal: She exhibits no edema.   Neurological: She is alert and oriented to person, place, and time.   Skin: Skin is warm and dry.   Psychiatric: She has a normal mood and affect. Her behavior is normal.       Lab Results   Component Value Date     01/08/2020    K 4.2 01/08/2020     (H) 01/08/2020    CO2 19.5 (L) 01/08/2020    BUN 16 01/08/2020    CREATININE 0.70 01/08/2020    GLUCOSE 102 (H) 01/08/2020    CALCIUM 9.4 01/08/2020    AST 20 01/07/2020    ALT 15 01/07/2020    ALKPHOS 93 01/07/2020    LABIL2 1.4 (L) 12/20/2014        Lab Results   Component Value Date    WBC 8.61 01/08/2020    HGB 10.3 (L) 01/08/2020    HCT 31.3 (L) 01/08/2020     01/08/2020     Lab Results   Component Value Date    INR 1.26 (H) 01/05/2020    INR 0.91 11/27/2019    INR 0.97 11/02/2019     Lab Results   Component Value Date    MG 2.3 01/07/2020     Lab Results   Component Value Date    TSH 1.350 01/05/2020    TRIG 104 11/03/2019    HDL 57 11/03/2019    LDL " 64 11/03/2019             Procedures      Assessment/Plan    Diagnosis Plan   1. Essential hypertension  Basic Metabolic Panel    Magnesium    CBC & Differential   2. Syncope and collapse  Basic Metabolic Panel    Magnesium    CBC & Differential   3. History of Non-STEMI (non-ST elevated myocardial infarction) with no coronary intervention needed in 2008, clinically stable.   Basic Metabolic Panel    Magnesium    CBC & Differential   4. Dyslipidemia  Basic Metabolic Panel    Magnesium    CBC & Differential   5. Paroxysmal atrial fibrillation (CMS/HCC)  apixaban (ELIQUIS) 5 MG tablet tablet    metoprolol tartrate (LOPRESSOR) 25 MG tablet    Basic Metabolic Panel    Magnesium    CBC & Differential                Recommendations:    1.  I have reviewed her plan of care with Dr. Soler.  Since her CHADSVASc score is at least 5 for hypertension, age, gender, and CAD, anticoagulation is indicated. Will start Eliquis 5 mg BID.  I have advised her to monitor for any signs of bleeding such as hematuria or black, tarry stools.    2.  We will start metoprolol tartrate 25 mg twice daily.    3.  BMP, magnesium, and CBC ordered today.    4.  She may ultimately need antiarrhythmic therapy.  She is not a good candidate for flecainide due to CAD or sotalol due to history of QT prolongation.  Therefore, amiodarone will be considered.    5.  She will follow-up in 2 weeks with Dr. Soler or sooner if needed.        Return in about 2 weeks (around 3/10/2020) for Recheck.    As always, I appreciate very much the opportunity to participate in the cardiovascular care of your patients.      With Best Regards,    JENNY Hope

## 2020-03-04 ENCOUNTER — TELEPHONE (OUTPATIENT)
Dept: CARDIOLOGY | Facility: CLINIC | Age: 75
End: 2020-03-04

## 2020-03-04 NOTE — TELEPHONE ENCOUNTER
Result Notes for [facility] - LABS     Notes recorded by Marcellus Araya PA-C on 2/28/2020 at 9:45 AM EST  Mag normal.

## 2020-03-12 ENCOUNTER — OFFICE VISIT (OUTPATIENT)
Dept: CARDIOLOGY | Facility: CLINIC | Age: 75
End: 2020-03-12

## 2020-03-12 VITALS
BODY MASS INDEX: 26.26 KG/M2 | OXYGEN SATURATION: 97 % | HEART RATE: 58 BPM | SYSTOLIC BLOOD PRESSURE: 165 MMHG | DIASTOLIC BLOOD PRESSURE: 69 MMHG | HEIGHT: 63 IN | WEIGHT: 148.2 LBS

## 2020-03-12 DIAGNOSIS — I10 ESSENTIAL HYPERTENSION: Primary | ICD-10-CM

## 2020-03-12 DIAGNOSIS — E78.5 DYSLIPIDEMIA: ICD-10-CM

## 2020-03-12 DIAGNOSIS — I48.0 PAROXYSMAL ATRIAL FIBRILLATION (HCC): ICD-10-CM

## 2020-03-12 DIAGNOSIS — I21.4 NON-STEMI (NON-ST ELEVATED MYOCARDIAL INFARCTION) (HCC): ICD-10-CM

## 2020-03-12 DIAGNOSIS — C92.01 ACUTE MYELOID LEUKEMIA IN REMISSION (HCC): ICD-10-CM

## 2020-03-12 DIAGNOSIS — I45.2 BIFASCICULAR BLOCK: ICD-10-CM

## 2020-03-12 DIAGNOSIS — R55 SYNCOPE, UNSPECIFIED SYNCOPE TYPE: ICD-10-CM

## 2020-03-12 PROCEDURE — 99214 OFFICE O/P EST MOD 30 MIN: CPT | Performed by: NURSE PRACTITIONER

## 2020-03-12 PROCEDURE — 93000 ELECTROCARDIOGRAM COMPLETE: CPT | Performed by: NURSE PRACTITIONER

## 2020-03-12 RX ORDER — AMLODIPINE BESYLATE 10 MG/1
10 TABLET ORAL DAILY
Qty: 30 TABLET | Refills: 5 | Status: SHIPPED | OUTPATIENT
Start: 2020-03-12 | End: 2020-03-24 | Stop reason: SINTOL

## 2020-03-12 NOTE — PROGRESS NOTES
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    • Dizziness     When BP is high.    • Syncope   • Palpitations           History of Present Illness:    Tahmina Martinez is a 74 y.o. female with a history of non-ST elevation myocardial infarction with subsequent nonobstructive CAD, hypertension, recurrent syncope, and atrial fibrillation with RVR. She presents today for routine cardiology follow up. Previously, she was placed on Eliquis and metoprolol.  Since starting the medication, she has been doing better.  She has had no further palpitations, lightheadedness, near-syncope, or syncope.  She denies any bleeding issues with Eliquis.  She did complete a CBC, BMP, and magnesium which was obtained at her PCPs office.  However, magnesium results are the only results available today.  She does report her blood pressures been markedly elevated.  She has blood pressure logs with her today.  Blood pressure has been anywhere from 180-220 systolic and 90s to 100 diastolic.  When this occurs, she does become dizzy.          Allergies   Allergen Reactions   • Penicillins Swelling and Rash   • Pork-Derived Products GI Intolerance   • Latex Itching   • Zithromax [Azithromycin] Itching, Swelling and Rash   :      Current Outpatient Medications:   •   apixaban (ELIQUIS) 5 MG tablet tablet, Take 1 tablet by mouth Every 12 (Twelve) Hours., Disp: 60 tablet, Rfl: 5  •  aspirin 81 MG EC tablet, Take 1 tablet by mouth Daily., Disp: 90 tablet, Rfl: 3  •  coenzyme Q10 100 MG capsule, Take 100 mg by mouth Daily., Disp: , Rfl:   •  exemestane (AROMASIN) 25 MG chemo tablet, Take 25 mg by mouth Daily., Disp: , Rfl:   •  furosemide (LASIX) 20 MG tablet, Take 1 tablet by mouth Daily As Needed (Increased swelling or shortness of breath)., Disp: 30 tablet, Rfl: 2  •  hydrALAZINE (APRESOLINE) 50 MG tablet, Take 1 tablet by mouth Every 8 (Eight) Hours., Disp: 90 tablet, Rfl: 2  •  metoprolol tartrate (LOPRESSOR) 25 MG tablet, Take 1 tablet by mouth 2 (Two) Times a Day., Disp: 60 tablet, Rfl: 5  •  pantoprazole (PROTONIX) 40 MG EC tablet, Take 40 mg by mouth Daily., Disp: , Rfl:   •  rOPINIRole (REQUIP) 0.25 MG tablet, Take 0.25 mg by mouth Every Night. Take 1 hour before bedtime., Disp: , Rfl:   •  valACYclovir (VALTREX) 1000 MG tablet, Take 1,000 mg by mouth Daily As Needed (Shingles or fever blister)., Disp: , Rfl:   •  vitamin C (ASCORBIC ACID) 500 MG tablet, Take 500 mg by mouth Daily., Disp: , Rfl:   •  vitamin D (ERGOCALCIFEROL) 24846 UNITS capsule capsule, Take 50,000 Units by mouth Every 14 (Fourteen) Days., Disp: , Rfl:   •  vitamin E 400 UNIT capsule, Take 400 Units by mouth Daily., Disp: , Rfl:   •  amLODIPine (NORVASC) 10 MG tablet, Take 1 tablet by mouth Daily., Disp: 30 tablet, Rfl: 5      The following portions of the patient's history were reviewed and updated as appropriate: allergies, current medications, past family history, past medical history, past social history, past surgical history and problem list.    Social History     Tobacco Use   • Smoking status: Former Smoker     Types: Cigarettes   • Smokeless tobacco: Never Used   • Tobacco comment: Patient states she smoked 3 cigarettes a day for 1 year as a teenager    Substance Use Topics   • Alcohol use: No  "  • Drug use: No       Review of Systems   Constitution: Negative for decreased appetite and malaise/fatigue.   Cardiovascular: Negative for chest pain, dyspnea on exertion, irregular heartbeat, leg swelling, near-syncope, orthopnea, palpitations, paroxysmal nocturnal dyspnea and syncope.   Respiratory: Negative for cough, shortness of breath and wheezing.    Neurological: Positive for dizziness. Negative for light-headedness and weakness.       Objective   Vitals:    03/12/20 1532   BP: 165/69   BP Location: Left arm   Patient Position: Sitting   Cuff Size: Adult   Pulse: 58   SpO2: 97%   Weight: 67.2 kg (148 lb 3.2 oz)   Height: 160 cm (63\")     Body mass index is 26.25 kg/m².        Physical Exam   Constitutional: She is oriented to person, place, and time. She appears well-developed and well-nourished.   HENT:   Head: Normocephalic and atraumatic.   Cardiovascular: Regular rhythm and normal heart sounds. Bradycardia present. Exam reveals no S3 and no S4.   No murmur heard.  Pulmonary/Chest: Effort normal and breath sounds normal. She has no wheezes. She has no rales.   Abdominal: Soft. Bowel sounds are normal.   Musculoskeletal: She exhibits no edema.   Neurological: She is alert and oriented to person, place, and time.   Skin: Skin is warm and dry.   Psychiatric: She has a normal mood and affect. Her behavior is normal.       Lab Results   Component Value Date     01/08/2020    K 4.2 01/08/2020     (H) 01/08/2020    CO2 19.5 (L) 01/08/2020    BUN 16 01/08/2020    CREATININE 0.70 01/08/2020    GLUCOSE 102 (H) 01/08/2020    CALCIUM 9.4 01/08/2020    AST 20 01/07/2020    ALT 15 01/07/2020    ALKPHOS 93 01/07/2020    LABIL2 1.4 (L) 12/20/2014       Lab Results   Component Value Date    WBC 8.61 01/08/2020    HGB 10.3 (L) 01/08/2020    HCT 31.3 (L) 01/08/2020     01/08/2020     Lab Results   Component Value Date    INR 1.26 (H) 01/05/2020    INR 0.91 11/27/2019    INR 0.97 11/02/2019     Lab " Results   Component Value Date    MG 2.3 01/07/2020     Lab Results   Component Value Date    TSH 1.350 01/05/2020    TRIG 104 11/03/2019    HDL 57 11/03/2019    LDL 64 11/03/2019            ECG 12 Lead  Date/Time: 3/12/2020 4:03 PM  Performed by: Kyra Clark APRN  Authorized by: Kyra Clark APRN   Comparison: compared with previous ECG   Similar to previous ECG  Rhythm: sinus bradycardia  BPM: 52  Conduction: right bundle branch block and 1st degree AV block  Comments: QTc 454              Assessment/Plan    Diagnosis Plan   1. Essential hypertension  amLODIPine (NORVASC) 10 MG tablet   2. History of Non-STEMI (non-ST elevated myocardial infarction) with no coronary intervention needed in 2008, clinically stable.      3. Paroxysmal atrial fibrillation (CMS/HCC)     4. Syncope, unspecified syncope type     5. Dyslipidemia     6. Bifascicular block (RBBB plus LP FB), clinically asymptomatic and stable.     7. Acute myeloid leukemia in remission (CMS/HCC)                  Recommendations:    1.  Since overall, she is feeling much better.  We will continue with Eliquis and metoprolol for now.  Should she become symptomatic with the atrial fibrillation in the future, we will consider initiating amiodarone.  We will continue to monitor.    2.  For her uncontrolled hypertension, will start amlodipine 10 mg daily.  I have asked her to continue to monitor her blood pressure at home.    3.  We will contact her PCP office to obtain CBC and BMP results.    4.  She will follow-up in 2 months or sooner if needed.    Plan of care discussed with Dr. Soler    Return in about 2 months (around 5/12/2020) for Recheck.    As always, I appreciate very much the opportunity to participate in the cardiovascular care of your patients.      With Best Regards,    JENNY Hope

## 2020-03-24 ENCOUNTER — TELEPHONE (OUTPATIENT)
Dept: CARDIOLOGY | Facility: CLINIC | Age: 75
End: 2020-03-24

## 2020-03-24 NOTE — PROGRESS NOTES
Patient called and stated her feet and legs were swollen since starting amlodipine. She also reports she has only been taking hydralazine twice a day. Therefore, I advised her to stop amlodipine entirely. Increase hydralazine to 50 mg every 8 hours. Monitor BP and heart rate closely. We discussed parameters. She will call me in a few days if she has any issues. She does not want to come into the office due to the pandemic.

## 2020-03-26 ENCOUNTER — TELEPHONE (OUTPATIENT)
Dept: CARDIOLOGY | Facility: CLINIC | Age: 75
End: 2020-03-26

## 2020-03-26 DIAGNOSIS — R25.2 BILATERAL LEG CRAMPS: Primary | ICD-10-CM

## 2020-03-26 NOTE — TELEPHONE ENCOUNTER
Patient called stated that her legs are still swollen but she has only changed her medication for 1 day she said she would call back tomorrow if they have not changed.

## 2020-03-31 ENCOUNTER — TELEPHONE (OUTPATIENT)
Dept: CARDIOLOGY | Facility: CLINIC | Age: 75
End: 2020-03-31

## 2020-03-31 DIAGNOSIS — I48.0 PAROXYSMAL ATRIAL FIBRILLATION (HCC): ICD-10-CM

## 2020-03-31 NOTE — TELEPHONE ENCOUNTER
Called pt and advised her. She stated that she will go to her PCP to have her labs done. I will fax them the order after Kyra signs off on her labs.

## 2020-03-31 NOTE — TELEPHONE ENCOUNTER
Called pt to see if she is taking Lipitor and see if she had switched pharmacy. She stated that she is taking Lipitor once a week and she did changed pharmacy.     I have sent in her Metoprolol and Eliquis. Is it okay to send in the Lipitor?     Is okay to refill her Lipitor? I have pended the order.

## 2020-04-01 RX ORDER — FUROSEMIDE 20 MG/1
20 TABLET ORAL DAILY PRN
Qty: 90 TABLET | Refills: 0 | Status: SHIPPED | OUTPATIENT
Start: 2020-04-01 | End: 2020-01-01

## 2020-04-01 RX ORDER — HYDRALAZINE HYDROCHLORIDE 50 MG/1
50 TABLET, FILM COATED ORAL EVERY 8 HOURS SCHEDULED
Qty: 270 TABLET | Refills: 0 | Status: SHIPPED | OUTPATIENT
Start: 2020-04-01 | End: 2020-01-01

## 2020-04-01 RX ORDER — ATORVASTATIN CALCIUM 10 MG/1
10 TABLET, FILM COATED ORAL DAILY
Qty: 90 TABLET | Refills: 0 | Status: SHIPPED | OUTPATIENT
Start: 2020-04-01 | End: 2020-01-01 | Stop reason: ALTCHOICE

## 2020-06-24 NOTE — PROGRESS NOTES
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    • Med Management   • Dizziness   • Palpitations   • Edema           History of Present Illness:    Tahmina Martinez is a 74 y.o. female presents today non-ST elevation myocardial infarction with subsequent nonobstructive CAD, hypertension, recurrent syncope, and atrial fibrillation with RVR. She presents today for routine cardiology follow up. She reports she has had no further syncopal episodes.  She denies chest pains, palpitations, or shortness of breath.  Her blood pressure is elevated in the office today.  Home BP logs are similar.  She had to discontinue the amlodipine due to bilateral lower extremity edema.  Therefore, she has been taking hydralazine 50 mg 3 times a day.  She denies any bleeding issues with Eliquis.          Allergies   Allergen Reactions   • Penicillins Swelling and Rash   • Pork-Derived Products GI Intolerance   • Latex Itching   • Zithromax [Azithromycin] Itching, Swelling and Rash   :      Current Outpatient Medications:   •  apixaban (ELIQUIS) 5 MG tablet tablet, Take 1 tablet by mouth Every 12 (Twelve) Hours., Disp: 180 tablet, Rfl: 0  •  aspirin 81 MG EC tablet, Take 1 tablet by mouth Daily., Disp: 90 tablet, Rfl: 3  •  coenzyme Q10 100 MG capsule, Take 100 mg by mouth Daily., Disp: ,  Rfl:   •  exemestane (AROMASIN) 25 MG chemo tablet, Take 25 mg by mouth Daily., Disp: , Rfl:   •  furosemide (LASIX) 20 MG tablet, TAKE ONE TABLET BY MOUTH EVERY DAY AS NEEDED FOR FLUID, Disp: 30 tablet, Rfl: 2  •  metoprolol tartrate (LOPRESSOR) 25 MG tablet, Take 1 tablet by mouth 2 (Two) Times a Day., Disp: 180 tablet, Rfl: 0  •  pantoprazole (PROTONIX) 40 MG EC tablet, Take 40 mg by mouth Daily., Disp: , Rfl:   •  rOPINIRole (REQUIP) 0.25 MG tablet, Take 0.25 mg by mouth Every Night. Take 1 hour before bedtime., Disp: , Rfl:   •  valACYclovir (VALTREX) 1000 MG tablet, Take 1,000 mg by mouth Daily As Needed (Shingles or fever blister)., Disp: , Rfl:   •  vitamin C (ASCORBIC ACID) 500 MG tablet, Take 500 mg by mouth Daily., Disp: , Rfl:   •  vitamin D (ERGOCALCIFEROL) 03410 UNITS capsule capsule, Take 50,000 Units by mouth Every 14 (Fourteen) Days., Disp: , Rfl:   •  vitamin E 400 UNIT capsule, Take 400 Units by mouth Daily., Disp: , Rfl:   •  hydrALAZINE (APRESOLINE) 50 MG tablet, Take 1.5 tablets by mouth 3 (Three) Times a Day., Disp: 135 tablet, Rfl: 5      The following portions of the patient's history were reviewed and updated as appropriate: allergies, current medications, past family history, past medical history, past social history, past surgical history and problem list.    Social History     Tobacco Use   • Smoking status: Former Smoker     Types: Cigarettes   • Smokeless tobacco: Never Used   • Tobacco comment: Patient states she smoked 3 cigarettes a day for 1 year as a teenager    Substance Use Topics   • Alcohol use: No   • Drug use: No       Review of Systems   Constitution: Negative for decreased appetite and malaise/fatigue.   Cardiovascular: Positive for leg swelling. Negative for chest pain, dyspnea on exertion, irregular heartbeat, near-syncope, orthopnea, palpitations, paroxysmal nocturnal dyspnea and syncope.   Respiratory: Negative for cough, shortness of breath and wheezing.   "  Neurological: Negative for dizziness, light-headedness and weakness.       Objective   Vitals:    06/24/20 1423   BP: (!) 189/76   BP Location: Right arm   Patient Position: Sitting   Cuff Size: Adult   Pulse: 58   Temp: 99.7 °F (37.6 °C)   TempSrc: Infrared   SpO2: 99%   Weight: 69.6 kg (153 lb 6.4 oz)   Height: 160 cm (63\")     Body mass index is 27.17 kg/m².        Physical Exam   Constitutional: She is oriented to person, place, and time. She appears well-developed and well-nourished.   HENT:   Head: Normocephalic and atraumatic.   Cardiovascular: Normal rate, regular rhythm and normal heart sounds. Exam reveals no S3 and no S4.   No murmur heard.  Pulmonary/Chest: Effort normal and breath sounds normal. She has no wheezes. She has no rales.   Abdominal: Soft. Bowel sounds are normal.   Musculoskeletal: She exhibits no edema.   Neurological: She is alert and oriented to person, place, and time.   Skin: Skin is warm and dry.   Psychiatric: She has a normal mood and affect. Her behavior is normal.       Lab Results   Component Value Date     01/08/2020    K 4.2 01/08/2020     (H) 01/08/2020    CO2 19.5 (L) 01/08/2020    BUN 16 01/08/2020    CREATININE 0.70 01/08/2020    GLUCOSE 102 (H) 01/08/2020    CALCIUM 9.4 01/08/2020    AST 20 01/07/2020    ALT 15 01/07/2020    ALKPHOS 93 01/07/2020    LABIL2 1.4 (L) 12/20/2014       Lab Results   Component Value Date    WBC 8.61 01/08/2020    HGB 10.3 (L) 01/08/2020    HCT 31.3 (L) 01/08/2020     01/08/2020     Lab Results   Component Value Date    INR 1.26 (H) 01/05/2020    INR 0.91 11/27/2019    INR 0.97 11/02/2019     Lab Results   Component Value Date    MG 2.3 01/07/2020     Lab Results   Component Value Date    TSH 1.350 01/05/2020    TRIG 104 11/03/2019    HDL 57 11/03/2019    LDL 64 11/03/2019            Procedures      Assessment/Plan    Diagnosis Plan   1. Essential hypertension  hydrALAZINE (APRESOLINE) 50 MG tablet   2. Paroxysmal atrial " fibrillation (CMS/HCC)     3. History of Non-STEMI (non-ST elevated myocardial infarction) with no coronary intervention needed in 2008, clinically stable.      4. Bifascicular block (RBBB plus LP FB), clinically asymptomatic and stable.                  Recommendations:    1. For her uncontrolled hypertension, increase hydralazine to 75 mg 3 times a day.  I have asked her to monitor blood pressure at home.  If her BP remains greater than 150/90 in a few weeks, she will call us.  Will consider increasing hydralazine to 100 mg 3 times a day at that time.    2.  Continue metoprolol, Eliquis, and low-dose aspirin.    3.  Follow-up in 3 months or sooner if needed.           Return in about 3 months (around 9/24/2020) for Recheck.    As always, I appreciate very much the opportunity to participate in the cardiovascular care of your patients.      With Best Regards,    Kyra Clark, JENNY

## 2020-07-16 NOTE — TELEPHONE ENCOUNTER
Called pt's pharmacy spoke with Fadumo she stated that Tahmina picked up her Eliquis with a co-pay of 8.95.

## 2020-09-24 NOTE — PROGRESS NOTES
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   • Breast cancer (right), status post radiation therapy in 08/2015   • Dyslipidemia   • Bifascicular block (RBBB plus LP FB)   • History of splenectomy   • First degree AV block   • T2DM (type 2 diabetes mellitus) (CMS/HCC)   • Acute kidney injury (CMS/HCC)   • Syncope   • Former smoker   • Hypokalemia   • Hypomagnesemia   • Macrocytosis   • Hypoalbuminemia   • Diastolic dysfunction   • Infestation by bed bug       Dear Noe Bower MD:    Subjective     History of Present Illness:    Chief Complaint   Patient presents with   • Follow-up     3 month   • Med Management     list       Tahmina Martinez is a pleasant 75 y.o. female with a past medical history significant for non-ST elevated myocardial infarction with subsequent left heart catheterization revealing nonobstructive coronary artery disease with the worst stenosis being in the right coronary artery that showed 30% stenosis she also had 50% narrowing on a distal small sized posterior descending artery branch.  She also has essential hypertension and recurrent syncope which she recently had loop recorder placed for, paroxysmal atrial fibrillation.  She comes in for routine cardiology follow-up.    Patient reports that she did recently have a syncopal episode she reported that this was 2 days ago she was changing out a light when she suddenly passed out without any prodromal symptoms.  She denies any dizziness, lightheadedness, chest pains prior to this episode.  She is unsure for how long she was out for she thinks it was only for a few seconds.  She denies any loss of bowel or bladder.  She does report she had a small cut on her ear but otherwise had no other sequelae from this fall.  She does deny any recent chest pains, worsening shortness of breath from baseline.              Allergies   Allergen Reactions   • Penicillins Swelling and Rash   • Pork-Derived Products GI Intolerance   • Latex Itching   • Zithromax [Azithromycin] Itching, Swelling and Rash   :      Current Outpatient Medications:   •  apixaban (ELIQUIS) 5 MG tablet tablet, Take 1 tablet by mouth Every 12 (Twelve) Hours., Disp: 180 tablet, Rfl: 0  •  aspirin 81 MG EC tablet, Take 1 tablet by mouth Daily., Disp: 90 tablet, Rfl: 3  •  coenzyme Q10 100 MG capsule, Take 100 mg by mouth Daily., Disp: , Rfl:   •  exemestane (AROMASIN) 25 MG chemo tablet, Take 25 mg by mouth Daily., Disp: , Rfl:   •  furosemide (LASIX) 20 MG tablet, TAKE ONE TABLET BY MOUTH EVERY DAY AS NEEDED FOR FLUID, Disp: 30 tablet, Rfl: 2  •  hydrALAZINE (APRESOLINE) 50 MG tablet, Take 1.5 tablets by mouth 3 (Three) Times a Day., Disp: 135 tablet, Rfl: 5  •  metoprolol tartrate (LOPRESSOR) 25 MG tablet, Take 1 tablet by mouth 2 (Two) Times a Day., Disp: 180 tablet, Rfl: 0  •  pantoprazole (PROTONIX) 40 MG EC tablet, Take 40 mg by mouth Daily., Disp: , Rfl:   •  rOPINIRole (REQUIP) 0.25 MG tablet, Take 0.25 mg by mouth Every Night. Take 1 hour before bedtime., Disp: , Rfl:   •  valACYclovir (VALTREX) 1000 MG tablet, Take 1,000 mg by mouth Daily As Needed (Shingles or fever blister)., Disp: , Rfl:   •  vitamin C (ASCORBIC ACID) 500 MG tablet, Take 500 mg by mouth Daily., Disp: , Rfl:   •  vitamin D (ERGOCALCIFEROL) 55606 UNITS capsule capsule, Take 50,000 Units by mouth Every 14 (Fourteen) Days., Disp: , Rfl:   •  vitamin E 400 UNIT capsule, Take 400 Units by mouth Daily., Disp: , Rfl:     The following portions of the patient's history were reviewed and updated as appropriate: allergies, current medications, past family history, past medical history, past social history, past surgical history and problem list.    Social History     Tobacco Use   • Smoking status: Former Smoker     Types: Cigarettes   • Smokeless tobacco: Never Used   • Tobacco  "comment: Patient states she smoked 3 cigarettes a day for 1 year as a teenager    Substance Use Topics   • Alcohol use: No   • Drug use: No       Review of Systems   Constitution: Negative for malaise/fatigue.   Cardiovascular: Positive for syncope. Negative for chest pain, dyspnea on exertion and irregular heartbeat.   Respiratory: Negative for cough and shortness of breath.    Hematologic/Lymphatic: Negative for bleeding problem. Does not bruise/bleed easily.   Gastrointestinal: Negative for nausea and vomiting.   Neurological: Negative for weakness.       Objective   Vitals:    09/24/20 1414   BP: 139/66   BP Location: Left arm   Patient Position: Sitting   Cuff Size: Large Adult   Pulse: 60   Resp: 14   Temp: 98.6 °F (37 °C)   TempSrc: Temporal   SpO2: 98%   Weight: 70 kg (154 lb 6.4 oz)   Height: 160 cm (62.99\")     Body mass index is 27.36 kg/m².    Constitutional:       General: Not in acute distress.     Appearance: Healthy appearance. Well-developed and not in distress. Not diaphoretic.   Eyes:      Conjunctiva/sclera: Conjunctivae normal.      Pupils: Pupils are equal, round, and reactive to light.   HENT:      Head: Normocephalic and atraumatic.   Neck:      Vascular: No carotid bruit or JVD.   Pulmonary:      Effort: Pulmonary effort is normal. No respiratory distress.      Breath sounds: Normal breath sounds.   Cardiovascular:      Normal rate. Regular rhythm.   Skin:     General: Skin is cool.   Neurological:      Mental Status: Alert, oriented to person, place, and time and oriented to person, place and time.         Lab Results   Component Value Date     01/08/2020    K 4.2 01/08/2020     (H) 01/08/2020    CO2 19.5 (L) 01/08/2020    BUN 16 01/08/2020    CREATININE 0.70 01/08/2020    GLUCOSE 102 (H) 01/08/2020    CALCIUM 9.4 01/08/2020    AST 20 01/07/2020    ALT 15 01/07/2020    ALKPHOS 93 01/07/2020    LABIL2 1.4 (L) 12/20/2014     No results found for: CKTOTAL  Lab Results   Component " Value Date    WBC 8.61 01/08/2020    HGB 10.3 (L) 01/08/2020    HCT 31.3 (L) 01/08/2020     01/08/2020     Lab Results   Component Value Date    INR 1.26 (H) 01/05/2020    INR 0.91 11/27/2019    INR 0.97 11/02/2019     Lab Results   Component Value Date    MG 2.3 01/07/2020     Lab Results   Component Value Date    TSH 1.350 01/05/2020    TRIG 104 11/03/2019    HDL 57 11/03/2019    LDL 64 11/03/2019      No results found for: BNP    During this visit the following were done:  Labs Reviewed [x]    Labs Ordered []    Radiology Reports Reviewed [x]    Radiology Ordered []    PCP Records Reviewed []    Referring Provider Records Reviewed []    ER Records Reviewed []    Hospital Records Reviewed []    History Obtained From Family []    Radiology Images Reviewed []    Other Reviewed []    Records Requested []         ECG 12 Lead    Date/Time: 9/24/2020 2:17 PM  Performed by: Marcellus Araya PA-C  Authorized by: aMrcellus Araya PA-C   Comparison: compared with previous ECG   Similar to previous ECG  Rhythm: sinus rhythm  Conduction: right bundle branch block and 1st degree AV block  QRS axis: left    Clinical impression: non-specific ECG            Assessment/Plan    Diagnosis Plan   1. Essential hypertension     2. Syncope and collapse  Adult Transthoracic Echo Complete W/ Cont if Necessary Per Protocol            Recommendations:  1. Syncope and collapse  1. This is very concerning we so far have not found an etiology for her recurrent syncope.  Thankfully she does now have a loop recorder so I will try to have this interrogated Monday, unfortunately unable to interrogate this today as company representative unable to get here.    2. I will also order echocardiogram as this has not been done in over a year as well.    3. She did recently have carotid ultrasound which was unremarkable.  4. If loop recorder does not show any dysrhythmia to explain her syncope I think she would benefit from a neurology  referral.      Return in about 5 days (around 9/29/2020).    As always, I appreciate very much the opportunity to participate in the cardiovascular care of your patients.      With Best Regards,    Marcellus Araya PA-C

## 2020-10-29 NOTE — PROGRESS NOTES
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 female with the problems as listed above, presents    Chief Complaint: Follow-up of recent syncope.       History of Present Illness: Ms. Liu is a pleasant 75-year-old  female with history of remote non-ST elevation myocardial infarction with nonobstructive coronary artery disease on coronary angiography in the past, previous history of diastolic heart failure, hypertension and history of acute myeloid leukemia for which she has had bone marrow transplant and then had breast cancer for which she has had radiation therapy to the chest, recently has had episodes of syncope for which an internal loop recorder was placed in January 2020.  She presents for regular follow-up.  She says her last episode of syncope was in August 2020.  Interrogation of her event monitor/loop recorder revealed episodes of what looks like atrial flutter/fibrillation.  Patient is currently on Eliquis for stroke prevention.  Her recent echo Doppler study revealed normal left ventricle chamber size, wall motion systolic function with mild to moderate mitral regurgitation and mild tricuspid regurgitation with no evidence of pulmonary hypertension.  No other significant valvular normalities noted.  She says she has not had any more episodes of syncope since August  .      Tahmina Martinez  Echo Complete w/Doppler and Color Flow  Order# [id number]  Reading physician:   Dashawn Soler MD Ordering physician:   Marcellus Araya PA-C Study date: 10/6/20   Patient Information    Patient Name   Tahmina Martinez MRN   [id number] Sex   Female  (Age)   1945 (75 y.o.)   Sedation Narrator Report    Interpretation Summary    · Normal left ventricular cavity size and wall thickness noted. All left ventricular wall segments contract normally.  · Left ventricular ejection fraction appears to be 61 - 65%.  · The aortic valve is structurally normal with no regurgitation or stenosis present.  · There is mild, bileaflet mitral valve thickening present. Mild to moderate mitral valve regurgitation is present. No significant mitral valve stenosis is present.  · The tricuspid valve is structurally normal with no significant stenosis present. Mild tricuspid valve regurgitation is present. Estimated right ventricular systolic pressure from tricuspid regurgitation is normal (<35 mmHg).  · There is no evidence of pericardial effusion. .         Allergies   Allergen Reactions   • Penicillins Swelling and Rash   • Pork-Derived Products GI Intolerance   • Latex Itching   • Zithromax [Azithromycin] Itching, Swelling and Rash   :      Current Outpatient Medications:   •  aspirin 81 MG EC tablet, Take 1 tablet by mouth Daily., Disp: 90 tablet, Rfl: 3  •  coenzyme Q10 100 MG capsule, Take 100 mg by mouth Daily., Disp: , Rfl:   •  Eliquis 5 MG tablet tablet, TAKE ONE TABLET BY MOUTH EVERY 12 HOURS, Disp: 60 tablet, Rfl: 5  •  exemestane (AROMASIN) 25 MG chemo tablet, Take 25 mg by mouth Daily., Disp: , Rfl:   •  furosemide (LASIX) 20 MG tablet, TAKE ONE TABLET BY MOUTH EVERY DAY AS NEEDED FOR FLUID, Disp: 30 tablet, Rfl: 2  •  hydrALAZINE (APRESOLINE) 50 MG tablet, Take 1.5 tablets by mouth 3 (Three) Times a Day., Disp: 135 tablet, Rfl: 5  •  metoprolol tartrate (LOPRESSOR) 25 MG tablet, TAKE ONE  TABLET BY MOUTH TWICE A DAY FOR BLOOD PRESSURE, Disp: 60 tablet, Rfl: 5  •  pantoprazole (PROTONIX) 40 MG EC tablet, Take 40 mg by mouth Daily., Disp: , Rfl:   •  rOPINIRole (REQUIP) 0.25 MG tablet, Take 0.25 mg by mouth Every Night. Take 1 hour before bedtime., Disp: , Rfl:   •  valACYclovir (VALTREX) 1000 MG tablet, Take 1,000 mg by mouth Daily As Needed (Shingles or fever blister)., Disp: , Rfl:   •  vitamin C (ASCORBIC ACID) 500 MG tablet, Take 500 mg by mouth Daily., Disp: , Rfl:   •  vitamin D (ERGOCALCIFEROL) 38264 UNITS capsule capsule, Take 50,000 Units by mouth Every 14 (Fourteen) Days., Disp: , Rfl:   •  vitamin E 400 UNIT capsule, Take 400 Units by mouth Daily., Disp: , Rfl:   •  amLODIPine (NORVASC) 5 MG tablet, Take 1 tablet by mouth Daily., Disp: 30 tablet, Rfl: 11      The following portions of the patient's history were reviewed and updated as appropriate: allergies, current medications, past family history, past medical history, past social history, past surgical history and problem list.    Social History     Tobacco Use   • Smoking status: Former Smoker     Types: Cigarettes   • Smokeless tobacco: Never Used   • Tobacco comment: Patient states she smoked 3 cigarettes a day for 1 year as a teenager    Substance Use Topics   • Alcohol use: No   • Drug use: No       Review of Systems   Constitution: Negative for chills and fever.   HENT: Negative for nosebleeds and sore throat.    Cardiovascular: Positive for near-syncope and syncope.   Respiratory: Negative for cough, hemoptysis and wheezing.    Gastrointestinal: Negative for abdominal pain, hematemesis, hematochezia, melena, nausea and vomiting.   Genitourinary: Negative for dysuria and hematuria.   Neurological: Positive for dizziness. Negative for headaches.       Objective   Vitals:    10/29/20 1247   BP: 164/66   BP Location: Left arm   Patient Position: Sitting   Cuff Size: Adult   Pulse: 63   Temp: 96.2 °F (35.7 °C)   TempSrc: Infrared  "  SpO2: 97%   Weight: 72 kg (158 lb 12.8 oz)   Height: 157.5 cm (62\")     Body mass index is 29.04 kg/m².        Constitutional:       Appearance: Well-developed.   Eyes:      Pupils: Pupils are equal, round, and reactive to light.   Neck:      Musculoskeletal: Neck supple.      Thyroid: No thyromegaly.      Vascular: No JVD.      Trachea: No tracheal deviation.      Lymphadenopathy: No cervical adenopathy.   Pulmonary:      Effort: Pulmonary effort is normal.      Breath sounds: Normal breath sounds.   Cardiovascular:      PMI at left midclavicular line. Normal rate. Regular rhythm. Normal S1. Normal S2.      Murmurs: There is a grade 3/6 systolic murmur at the LLSB, radiating to the axilla.      No gallop. No click. No rub.   Pulses:     Intact distal pulses.   Edema:     Peripheral edema absent.   Abdominal:      General: Bowel sounds are normal.      Palpations: Abdomen is soft. There is no abdominal mass.      Tenderness: There is no abdominal tenderness.   Musculoskeletal: Normal range of motion.   Skin:     General: Skin is warm and dry.      Findings: No rash.   Neurological:      Mental Status: Alert and oriented to person, place, and time.         Lab Results   Component Value Date     01/08/2020    K 4.2 01/08/2020     (H) 01/08/2020    CO2 19.5 (L) 01/08/2020    BUN 16 01/08/2020    CREATININE 0.70 01/08/2020    GLUCOSE 102 (H) 01/08/2020    CALCIUM 9.4 01/08/2020    AST 20 01/07/2020    ALT 15 01/07/2020    ALKPHOS 93 01/07/2020    LABIL2 1.4 (L) 12/20/2014     No results found for: CKTOTAL  Lab Results   Component Value Date    WBC 8.61 01/08/2020    HGB 10.3 (L) 01/08/2020    HCT 31.3 (L) 01/08/2020     01/08/2020     Lab Results   Component Value Date    INR 1.26 (H) 01/05/2020    INR 0.91 11/27/2019    INR 0.97 11/02/2019     Lab Results   Component Value Date    MG 2.3 01/07/2020     Lab Results   Component Value Date    TSH 1.350 01/05/2020    TRIG 104 11/03/2019    HDL 57 " 11/03/2019    LDL 64 11/03/2019        Assessment/Plan    Diagnosis Plan   1. Syncope and collapse, could be from tachyarrhythmia (atrial flutter/fibrillation), patient has been started on metoprolol.     2. Chronic diastolic heart failure, appears compensated.     3. Bifascicular block (RBBB plus LP FB)     4. Essential hypertension            Recommendations:  1. Continue with metoprolol and continue to monitor on the loop recorder for any other significant tach or bradycardia arrhythmias and intervene as needed.  2. For her paroxysmal atrial flutter/fibrillation, continue with Eliquis for stroke prevention.  3. Continue with low-dose aspirin for her history of nonobstructive coronary artery disease.  4. Check fasting lipid panel and consider adding a statin if needed.  Her LDL previously was in acceptable range.  5. Add amlodipine 5 mg daily for her elevated systolic blood pressure.    Return in about 4 months (around 2/28/2021) for or sooner if needed.    As always Bill  I appreciate very much the opportunity to participate in the cardiovascular care of your patients. Please do not hesitate to call me with any questions with regards to Tahmina Martinez's evaluation and management.       With Best Regards,        Dashawn Soler MD, [facility]    Please note that portions of this note were completed with a voice recognition program.

## 2021-01-01 ENCOUNTER — IMMUNIZATION (OUTPATIENT)
Dept: VACCINE CLINIC | Facility: HOSPITAL | Age: 76
End: 2021-01-01

## 2021-01-01 ENCOUNTER — TELEPHONE (OUTPATIENT)
Dept: CARDIOLOGY | Facility: CLINIC | Age: 76
End: 2021-01-01

## 2021-01-01 ENCOUNTER — HOSPITAL ENCOUNTER (EMERGENCY)
Facility: HOSPITAL | Age: 76
End: 2021-04-26
Attending: FAMILY MEDICINE | Admitting: FAMILY MEDICINE

## 2021-01-01 ENCOUNTER — APPOINTMENT (OUTPATIENT)
Dept: GENERAL RADIOLOGY | Facility: HOSPITAL | Age: 76
End: 2021-01-01

## 2021-01-01 VITALS
BODY MASS INDEX: 25.61 KG/M2 | HEIGHT: 64 IN | SYSTOLIC BLOOD PRESSURE: 70 MMHG | WEIGHT: 150 LBS | TEMPERATURE: 94.8 F | OXYGEN SATURATION: 56 % | DIASTOLIC BLOOD PRESSURE: 64 MMHG

## 2021-01-01 DIAGNOSIS — I46.9 CARDIAC ARREST (HCC): Primary | ICD-10-CM

## 2021-01-01 PROCEDURE — 91300 HC SARSCOV02 VAC 30MCG/0.3ML IM: CPT | Performed by: INTERNAL MEDICINE

## 2021-01-01 PROCEDURE — 92950 HEART/LUNG RESUSCITATION CPR: CPT

## 2021-01-01 PROCEDURE — 94799 UNLISTED PULMONARY SVC/PX: CPT

## 2021-01-01 PROCEDURE — 99284 EMERGENCY DEPT VISIT MOD MDM: CPT

## 2021-01-01 PROCEDURE — 31500 INSERT EMERGENCY AIRWAY: CPT

## 2021-01-01 PROCEDURE — 0002A: CPT | Performed by: INTERNAL MEDICINE

## 2021-01-01 PROCEDURE — 0001A: CPT | Performed by: INTERNAL MEDICINE

## 2021-01-01 PROCEDURE — 25010000002 EPINEPHRINE 1 MG/10ML SOLUTION PREFILLED SYRINGE: Performed by: FAMILY MEDICINE

## 2021-01-01 PROCEDURE — 71045 X-RAY EXAM CHEST 1 VIEW: CPT | Performed by: RADIOLOGY

## 2021-01-01 PROCEDURE — 71045 X-RAY EXAM CHEST 1 VIEW: CPT

## 2021-01-01 RX ORDER — EPINEPHRINE 0.1 MG/ML
SYRINGE (ML) INJECTION
Status: COMPLETED | OUTPATIENT
Start: 2021-01-01 | End: 2021-01-01

## 2021-01-01 RX ADMIN — Medication 1 MG: at 08:52

## 2021-01-01 RX ADMIN — Medication 1 MG: at 08:56

## 2021-01-01 RX ADMIN — Medication 1 MG: at 08:59

## 2021-01-01 RX ADMIN — SODIUM BICARBONATE 50 MEQ: 84 INJECTION, SOLUTION INTRAVENOUS at 08:55

## 2021-04-26 NOTE — ED NOTES
Contacted [facility] at family request, spoke with Nas who reports he will be here asap.     Tanja Stock RN  21 [id number]

## 2021-04-26 NOTE — ED NOTES
and myself spoke with pts  alber loco at this time and made him aware of pts condition.      Rehana Alexandra RN  04/26/21 6917

## 2021-04-26 NOTE — ED PROVIDER NOTES
Subjective   75-year-old female presents the emergency room from nursing, and cardiac arrest.  Per nursing home report patient was last well-known at around 8:00.  Upon being rechecked patient was noted to be unresponsive without a pulse.  CPR was initiated by nursing home.  Ambulance was notified.  Patient was previously been admitted to the nursing home for recent stroke.  Patient upon MS arrival seen remain in asystole without a pulse.  Patient had a Jomar airway established ambulance service patient had CPR resumed and then brought to the emergency room for evaluation.      Cardiac Arrest  Incident location:  Nursing home  Time before BLS initiated:  > 5 minutes  Time before ALS initiated:  > 10 minutes  Condition upon EMS arrival:  Unresponsive  Pulse:  Absent  Initial cardiac rhythm per EMS:  Asystole  Treatments prior to arrival:  Intubation  Airway: jomar airway.  Rhythm on admission to ED:  Asystole      Review of Systems   Unable to perform ROS: Patient unresponsive   All other systems reviewed and are negative.      Past Medical History:   Diagnosis Date   • Breast cancer (right), status post radiation therapy in 08/2015 10/12/2016   • Diastolic dysfunction 1/5/2020   • Dyslipidemia 11/14/2017    On atorvastatin.    • Essential hypertension 10/12/2016   • First degree AV block 11/8/2019   • H/O splenectomy    • History of Acute myelocytic leukemia,status post chemotherapy and bone marrow transplant in 2005. 10/12/2016   • History of breast cancer     2015   • History of splenectomy 11/8/2019   • Hypertension    • Myelocytic leukemia (CMS/HCC)    • Non-STEMI (non-ST elevated myocardial infarction) (CMS/HCC)    • Restless leg    • Type II diabetes mellitus (CMS/HCC) 11/8/2019       Allergies   Allergen Reactions   • Penicillins Swelling and Rash   • Pork-Derived Products GI Intolerance   • Latex Itching   • Zithromax [Azithromycin] Itching, Swelling and Rash       Past Surgical History:   Procedure Laterality  Date   • BONE MARROW TRANSPLANT     • CARDIAC ELECTROPHYSIOLOGY PROCEDURE N/A 2020    Procedure: Loop insertion;  Surgeon: JAIME Saunders MD;  Location: [facility] INVASIVE LOCATION;  Service: Cardiovascular   •  SECTION     • SPLENECTOMY     • TUBAL ABDOMINAL LIGATION         Family History   Problem Relation Age of Onset   • Heart disease Mother    • Hypertension Mother    • Heart disease Father    • Hypertension Father    • Hypertension Sister    • Hypertension Brother        Social History     Socioeconomic History   • Marital status:      Spouse name: Not on file   • Number of children: Not on file   • Years of education: Not on file   • Highest education level: Not on file   Tobacco Use   • Smoking status: Former Smoker     Types: Cigarettes   • Smokeless tobacco: Never Used   • Tobacco comment: Patient states she smoked 3 cigarettes a day for 1 year as a teenager    Substance and Sexual Activity   • Alcohol use: No   • Drug use: No   • Sexual activity: Defer           Objective   Physical Exam  Vitals and nursing note reviewed.   Constitutional:       Comments: Patient is unresponsive   HENT:      Head: Normocephalic.      Comments: Jomar airway in place.  No gag reflex.  Eyes:      Comments: Pupils fixed and dilated.   Neck:      Comments: Trachea midline  Cardiovascular:      Comments: Absent spontaneous heart sounds absent continuous pulses good femoral pulse with chest compressions.  Pulmonary:      Comments: No spontaneous respirations.  Coarse breath sounds heard throughout with bag-valve-mask.  Abdominal:      Comments: G-tube in place.  Absent bowel sounds.   Musculoskeletal:      Comments: Trace pedal edema bilateral extremities.   Skin:     Comments: Patient is cyanotic appearing to the face.   Neurological:      Comments: No gag GCS 3         Intubation    Date/Time: 2021 9:36 AM  Performed by: Johan Vidal MD  Authorized by: oJhan Vidal MD     Consent:      Consent obtained:  Emergent situation  Pre-procedure details:     Patient status:  Unresponsive    Mallampati score:  II    Pretreatment medications:  None    Paralytics:  None  Procedure details:     Preoxygenation:  KAYC/LMA    CPR in progress: yes      Intubation method:  Oral    Oral intubation technique:  Direct    Laryngoscope blade:  Mac 3    Tube size (mm):  7.5    Tube type:  Cuffed    Number of attempts:  1    Cricoid pressure: no      Tube visualized through cords: yes    Placement assessment:     ETT to lip:  23    Tube secured with:  ETT schmidt    Breath sounds:  Equal    Placement verification: chest rise, condensation, CXR verification, equal breath sounds, ETCO2 detector and tube exhalation      CXR findings:  ETT in proper place  Post-procedure details:     Patient tolerance of procedure:  Tolerated well, no immediate complications               ED Course  ED Course as of Apr 26 0940 Mon Apr 26, 2021   0914 Patient upon presentation was noted to have a Jomar airway and had no spontaneous pulses.  Patient was noted be in asystole.  CPR was resumed.  Have exchanged out Jomar airway to endotracheal tube 7.5 without difficulty patient is noted to have difficulty with bag valve have suction patient with large amount of thick secretions.  Patient Accu-Chek was noted to 259.  CPR was continued.  Patient did not regain pulse.  She was given epinephrine as well as sodium bicarb without return of pulse.  Patient had been down 30 minutes prior to arrival and was noted to have continued to be given chest rest throughout ER stay without return reports code was called at 902.    [BB]   6288 Please refer to code sheet for succession of events. Have called . Have consoled family    [BB]      ED Course User Index  [BB] Johan Vidal MD                                           [facility]    Final diagnoses:   Cardiac arrest (CMS/HCC)       ED Disposition  ED Disposition     ED Disposition Condition Comment                 No follow-up provider specified.       Medication List      No changes were made to your prescriptions during this visit.          Johan Vidal MD  21 0916       Johan Vidal MD  21 0937       Johan Vidal MD  21 0941

## 2021-04-26 NOTE — ED NOTES
Spoke with Mahesh Prasad he released pt body at this time.      Rehana Alexandra RN  04/26/21 0915

## 2023-02-02 NOTE — SIGNIFICANT NOTE
11/13/19 1429   Rehab Time/Intention   Evaluation Not Performed patient/family decline, not feeling well  (Pt declined eval d/t not feeling well. Will check back next date. )   Rehab Treatment   Discipline physical therapist      .

## (undated) DEVICE — SPNG GZ WOVN 4X4IN 12PLY 10/BX STRL

## (undated) DEVICE — TRY LAC BHCOR

## (undated) DEVICE — ADULT DISPOSABLE SINGLE-PATIENT USE PULSE OXIMETER SENSOR: Brand: NONIN

## (undated) DEVICE — Device

## (undated) DEVICE — SHEET,DRAPE,53X77,STERILE: Brand: MEDLINE

## (undated) DEVICE — ELECTRODE,FOAM,MONITORING,SLD GEL,5 PK: Brand: MEDLINE

## (undated) DEVICE — TOWEL,OR,DSP,ST,BLUE,STD,4/PK,20PK/CS: Brand: MEDLINE